# Patient Record
Sex: MALE | Race: WHITE | NOT HISPANIC OR LATINO | Employment: FULL TIME | URBAN - METROPOLITAN AREA
[De-identification: names, ages, dates, MRNs, and addresses within clinical notes are randomized per-mention and may not be internally consistent; named-entity substitution may affect disease eponyms.]

---

## 2017-01-04 DIAGNOSIS — N40.0 BENIGN NODULAR PROSTATIC HYPERPLASIA WITHOUT LOWER URINARY TRACT SYMPTOMS: ICD-10-CM

## 2017-01-04 NOTE — TELEPHONE ENCOUNTER
----- Message from Shyanne Whitfield sent at 1/4/2017  7:27 AM CST -----  Contact: [phone]  Patient would like to speak with you regarding an order for meds.

## 2017-01-05 RX ORDER — DUTASTERIDE AND TAMSULOSIN HYDROCHLORIDE .5; .4 MG/1; MG/1
1 CAPSULE ORAL DAILY
Qty: 30 CAPSULE | Refills: 11 | Status: SHIPPED | OUTPATIENT
Start: 2017-01-05 | End: 2017-01-31 | Stop reason: SDUPTHER

## 2017-01-05 NOTE — TELEPHONE ENCOUNTER
----- Message from Filomena Kim sent at 1/5/2017  8:25 AM CST -----  Patient no. [phone]   Patient needs Isis called into [facility] in [place].

## 2017-01-31 ENCOUNTER — OFFICE VISIT (OUTPATIENT)
Dept: UROLOGY | Facility: CLINIC | Age: 52
End: 2017-01-31
Payer: COMMERCIAL

## 2017-01-31 VITALS
DIASTOLIC BLOOD PRESSURE: 86 MMHG | HEIGHT: 77 IN | TEMPERATURE: 98 F | WEIGHT: 315 LBS | OXYGEN SATURATION: 96 % | HEART RATE: 87 BPM | SYSTOLIC BLOOD PRESSURE: 140 MMHG | BODY MASS INDEX: 37.19 KG/M2

## 2017-01-31 DIAGNOSIS — R35.1 NOCTURIA: ICD-10-CM

## 2017-01-31 DIAGNOSIS — R35.0 URINARY FREQUENCY: ICD-10-CM

## 2017-01-31 DIAGNOSIS — N40.0 BENIGN NODULAR PROSTATIC HYPERPLASIA WITHOUT LOWER URINARY TRACT SYMPTOMS: Primary | ICD-10-CM

## 2017-01-31 PROCEDURE — 99999 PR PBB SHADOW E&M-EST. PATIENT-LVL III: CPT | Mod: PBBFAC,,, | Performed by: UROLOGY

## 2017-01-31 PROCEDURE — 3077F SYST BP >= 140 MM HG: CPT | Mod: S$GLB,,, | Performed by: UROLOGY

## 2017-01-31 PROCEDURE — 3079F DIAST BP 80-89 MM HG: CPT | Mod: S$GLB,,, | Performed by: UROLOGY

## 2017-01-31 PROCEDURE — 1159F MED LIST DOCD IN RCRD: CPT | Mod: S$GLB,,, | Performed by: UROLOGY

## 2017-01-31 PROCEDURE — 99213 OFFICE O/P EST LOW 20 MIN: CPT | Mod: S$GLB,,, | Performed by: UROLOGY

## 2017-01-31 RX ORDER — DUTASTERIDE AND TAMSULOSIN HYDROCHLORIDE .5; .4 MG/1; MG/1
1 CAPSULE ORAL DAILY
Qty: 30 CAPSULE | Refills: 11 | Status: SHIPPED | OUTPATIENT
Start: 2017-01-31 | End: 2017-05-09 | Stop reason: SDUPTHER

## 2017-01-31 NOTE — PROGRESS NOTES
Subjective:       Patient ID: Manfred Shah is a 51 y.o. male.    Chief Complaint: No chief complaint on file.    Benign Prostatic Hypertrophy   This is a chronic problem. The current episode started more than 1 year ago. The problem has been gradually improving since onset. Irritative symptoms include frequency (x10) and nocturia (x1). Irritative symptoms do not include urgency. Obstructive symptoms do not include dribbling, incomplete emptying, an intermittent stream, a slower stream, straining or a weak stream. Pertinent negatives include no chills, dysuria, genital pain, hematuria, hesitancy, nausea or vomiting. AUA score is 8-19. He is sexually active. Exacerbated by: increased fluids. Past treatments include dutasteride and tamsulosin. The treatment provided significant relief. He has been using treatment for 1 to 2 years.     Review of Systems   Constitutional: Negative for activity change, appetite change, chills, diaphoresis, fatigue, fever and unexpected weight change.   HENT: Negative for congestion, hearing loss, sinus pressure and trouble swallowing.    Eyes: Negative for photophobia, pain, discharge and visual disturbance.   Respiratory: Negative for apnea, cough and shortness of breath.    Cardiovascular: Negative for chest pain, palpitations and leg swelling.   Gastrointestinal: Negative for abdominal distention, abdominal pain, anal bleeding, blood in stool, constipation, diarrhea, nausea, rectal pain and vomiting.   Endocrine: Negative for cold intolerance, heat intolerance, polydipsia, polyphagia and polyuria.   Genitourinary: Positive for frequency (x10) and nocturia (x1). Negative for decreased urine volume, difficulty urinating, discharge, dysuria, enuresis, flank pain, genital sores, hematuria, hesitancy, incomplete emptying, penile pain, penile swelling, scrotal swelling, testicular pain and urgency.   Musculoskeletal: Negative for arthralgias, back pain and myalgias.   Skin: Negative for  color change, pallor, rash and wound.   Allergic/Immunologic: Negative for environmental allergies, food allergies and immunocompromised state.   Neurological: Negative for dizziness, seizures, weakness and headaches.   Hematological: Negative for adenopathy. Does not bruise/bleed easily.   Psychiatric/Behavioral: Negative.        Objective:      Physical Exam   Nursing note and vitals reviewed.  Constitutional: He is oriented to person, place, and time. He appears well-developed and well-nourished.   HENT:   Head: Normocephalic.   Nose: Nose normal.   Mouth/Throat: Oropharynx is clear and moist.   Eyes: Conjunctivae and EOM are normal. Pupils are equal, round, and reactive to light.   Neck: Normal range of motion. Neck supple.   Cardiovascular: Normal rate, regular rhythm, normal heart sounds and intact distal pulses.    Pulmonary/Chest: Effort normal and breath sounds normal.   Abdominal: Soft. Bowel sounds are normal.   Genitourinary: Rectum normal, testes normal and penis normal. Prostate is enlarged. Prostate is not tender. Cremasteric reflex is present. Circumcised.   Musculoskeletal: Normal range of motion.   Neurological: He is alert and oriented to person, place, and time. He has normal reflexes.   Skin: Skin is warm and dry.     Psychiatric: He has a normal mood and affect. His behavior is normal. Judgment and thought content normal.       Assessment:       1. Benign nodular prostatic hyperplasia without lower urinary tract symptoms    2. Nocturia    3. Urinary frequency        Plan:       Patient Instructions   Continue Isis  Check PSA and CATRINA  F/U yearly.

## 2017-01-31 NOTE — MR AVS SNAPSHOT
[facility]  [address] [place] [address] [address]  [place] [address]  Phone: [phone]  Fax: [phone]                  Manfred Shah   2017 3:30 PM   Office Visit    Description:  Male : 1965   Provider:  Anthony Sanderson MD   Department:  [facility]           Diagnoses this Visit        Comments    Benign nodular prostatic hyperplasia without lower urinary tract symptoms    -  Primary     Nocturia         Urinary frequency                To Do List           Goals (5 Years of Data)     None      Follow-Up and Disposition     Return in about 1 year (around 2018).    Follow-up and Disposition History       These Medications        Disp Refills Start End    ELVIS 0.5-0.4 mg CM24 30 capsule 11 2017     Take 1 capsule by mouth once daily. - Oral    Pharmacy: [id number]  [place], [place] - [address]  #: [phone]         [facility] On Call     [facility] On Call Nurse Care Line -  Assistance  Registered nurses in the [facility] On Call Center provide clinical advisement, health education, appointment booking, and other advisory services.  Call for this free service at [phone].             Medications           Message regarding Medications     Verify the changes and/or additions to your medication regime listed below are the same as discussed with your clinician today.  If any of these changes or additions are incorrect, please notify your healthcare provider.             Verify that the below list of medications is an accurate representation of the medications you are currently taking.  If none reported, the list may be blank. If incorrect, please contact your healthcare provider. Carry this list with you in case of emergency.           Current Medications     amlodipine (NORVASC) 10 MG tablet Take 1 tablet (10 mg total) by mouth once daily.    amlodipine (NORVASC) 10 MG tablet TAKE ONE TABLET BY MOUTH ONCE DAILY    aspirin (ECOTRIN) 81 MG EC tablet  "Take 81 mg by mouth once daily.    BYSTOLIC 5 mg Tab TAKE ONE TABLET BY MOUTH ONCE DAILY    fish oil-omega-3 fatty acids 300-1,000 mg capsule Take 2 g by mouth once daily.    ELVIS 0.5-0.4 mg CM24 TAKE ONE CAPSULE BY MOUTH ONCE DAILY    ELVIS 0.5-0.4 mg CM24 Take 1 capsule by mouth once daily.    losartan-hydrochlorothiazide 100-12.5 mg (HYZAAR) 100-12.5 mg Tab Take 1 tablet by mouth once daily.    losartan-hydrochlorothiazide 100-12.5 mg (HYZAAR) 100-12.5 mg Tab TAKE ONE TABLET BY MOUTH ONCE DAILY    lysine 500 mg Tab Take 500 mg by mouth once daily.    ropinirole (REQUIP) 2 MG tablet Take 1 tablet (2 mg total) by mouth every evening.           Clinical Reference Information           Vital Signs - Last Recorded  Most recent update: 1/31/2017  4:01 PM by King Baker MA    BP Pulse Temp Ht Wt SpO2    (!) 140/86 87 98 °F (36.7 °C) 6' 5" (1.956 m) (!) 157 kg (346 lb 2 oz) 96%    BMI                41.04 kg/m2          Blood Pressure          Most Recent Value    BP  (!)  140/86      Allergies as of 1/31/2017     No Known Allergies      Immunizations Administered on Date of Encounter - 1/31/2017     None      Orders Placed During Today's Visit     Future Labs/Procedures Expected by Expires    PSA, total and free  1/31/2017 4/1/2018    Urinalysis  As directed 6/2/2017      [facility] Sign-Up     Activating your [facility] account is as easy as 1-2-3!     1) Visit [email or web address], select Sign Up Now, enter this activation code and your date of birth, then select Next.  [email or web address]  Expires: 3/17/2017  4:45 PM      2) Create a username and password to use when you visit [facility] in the future and select a security question in case you lose your password and select Next.    3) Enter your e-mail address and click Sign Up!    Additional Information  If you have questions, please e-mail [email or web address] or call [phone] to talk to our [facility] staff. Remember, [facility] is NOT to be used for urgent needs. " For medical emergencies, dial 911.         Instructions    Continue Isis  Check PSA and CATRINA  F/U yearly.

## 2017-03-05 DIAGNOSIS — I10 BENIGN ESSENTIAL HTN: ICD-10-CM

## 2017-03-06 RX ORDER — LOSARTAN POTASSIUM AND HYDROCHLOROTHIAZIDE 12.5; 1 MG/1; MG/1
TABLET ORAL
Qty: 30 TABLET | Refills: 0 | Status: SHIPPED | OUTPATIENT
Start: 2017-03-06 | End: 2017-04-08 | Stop reason: SDUPTHER

## 2017-03-06 RX ORDER — AMLODIPINE BESYLATE 10 MG/1
TABLET ORAL
Qty: 30 TABLET | Refills: 0 | Status: SHIPPED | OUTPATIENT
Start: 2017-03-06 | End: 2017-04-10 | Stop reason: SDUPTHER

## 2017-04-08 DIAGNOSIS — I10 BENIGN ESSENTIAL HTN: ICD-10-CM

## 2017-04-10 DIAGNOSIS — I10 BENIGN ESSENTIAL HTN: ICD-10-CM

## 2017-04-11 ENCOUNTER — TELEPHONE (OUTPATIENT)
Dept: INTERNAL MEDICINE | Facility: CLINIC | Age: 52
End: 2017-04-11

## 2017-04-11 RX ORDER — AMLODIPINE BESYLATE 10 MG/1
TABLET ORAL
Qty: 30 TABLET | Refills: 0 | Status: SHIPPED | OUTPATIENT
Start: 2017-04-11 | End: 2017-05-06 | Stop reason: SDUPTHER

## 2017-04-11 RX ORDER — LOSARTAN POTASSIUM AND HYDROCHLOROTHIAZIDE 12.5; 1 MG/1; MG/1
TABLET ORAL
Qty: 30 TABLET | Refills: 0 | Status: SHIPPED | OUTPATIENT
Start: 2017-04-11 | End: 2017-06-06 | Stop reason: SDUPTHER

## 2017-04-11 RX ORDER — LOSARTAN POTASSIUM AND HYDROCHLOROTHIAZIDE 12.5; 1 MG/1; MG/1
1 TABLET ORAL DAILY
Qty: 30 TABLET | Refills: 0 | Status: SHIPPED | OUTPATIENT
Start: 2017-04-11 | End: 2017-05-09 | Stop reason: SDUPTHER

## 2017-04-11 NOTE — TELEPHONE ENCOUNTER
----- Message from Deja Perez sent at 4/11/2017 12:44 PM CDT -----  Contact: patient   Patient would like a new prescription for amlodipine (NORVASC) 10 MG tablet. He had none left, [facility] in [place] gave him three to hold him over.  Call [phone]

## 2017-04-20 DIAGNOSIS — I10 BENIGN ESSENTIAL HTN: ICD-10-CM

## 2017-04-20 NOTE — TELEPHONE ENCOUNTER
----- Message from Deja Perez sent at 4/20/2017  1:11 PM CDT -----  Contact: patient   Patient needs a prescription for BYSTOLIC 5 mg Tab through [facility]. Call back [phone]

## 2017-04-21 RX ORDER — NEBIVOLOL HYDROCHLORIDE 5 MG/1
TABLET ORAL
Qty: 30 TABLET | Refills: 0 | Status: SHIPPED | OUTPATIENT
Start: 2017-04-21 | End: 2017-05-17 | Stop reason: SDUPTHER

## 2017-05-06 DIAGNOSIS — I10 BENIGN ESSENTIAL HTN: ICD-10-CM

## 2017-05-08 RX ORDER — AMLODIPINE BESYLATE 10 MG/1
TABLET ORAL
Qty: 30 TABLET | Refills: 0 | Status: SHIPPED | OUTPATIENT
Start: 2017-05-08 | End: 2017-05-09 | Stop reason: SDUPTHER

## 2017-05-09 ENCOUNTER — OFFICE VISIT (OUTPATIENT)
Dept: INTERNAL MEDICINE | Facility: CLINIC | Age: 52
End: 2017-05-09
Payer: COMMERCIAL

## 2017-05-09 VITALS
OXYGEN SATURATION: 98 % | SYSTOLIC BLOOD PRESSURE: 140 MMHG | WEIGHT: 315 LBS | TEMPERATURE: 98 F | HEIGHT: 77 IN | HEART RATE: 84 BPM | DIASTOLIC BLOOD PRESSURE: 80 MMHG | BODY MASS INDEX: 37.19 KG/M2 | RESPIRATION RATE: 18 BRPM

## 2017-05-09 DIAGNOSIS — E66.01 SEVERE OBESITY (BMI >= 40): ICD-10-CM

## 2017-05-09 DIAGNOSIS — I10 BENIGN ESSENTIAL HTN: ICD-10-CM

## 2017-05-09 DIAGNOSIS — R73.03 PRE-DIABETES: ICD-10-CM

## 2017-05-09 DIAGNOSIS — E78.1 HYPERTRIGLYCERIDEMIA: Primary | ICD-10-CM

## 2017-05-09 PROCEDURE — 99214 OFFICE O/P EST MOD 30 MIN: CPT | Mod: S$GLB,,, | Performed by: INTERNAL MEDICINE

## 2017-05-09 PROCEDURE — 3077F SYST BP >= 140 MM HG: CPT | Mod: S$GLB,,, | Performed by: INTERNAL MEDICINE

## 2017-05-09 PROCEDURE — 3079F DIAST BP 80-89 MM HG: CPT | Mod: S$GLB,,, | Performed by: INTERNAL MEDICINE

## 2017-05-09 PROCEDURE — 1160F RVW MEDS BY RX/DR IN RCRD: CPT | Mod: S$GLB,,, | Performed by: INTERNAL MEDICINE

## 2017-05-09 NOTE — MR AVS SNAPSHOT
[facility]  [address],  [address]  [place] [address]  Phone: [phone]  Fax: [phone]                  Manfred hSah   2017 3:20 PM   Office Visit    Description:  Male : 1965   Provider:  Padmini Oakley MD   Department:  [facility]           Reason for Visit     Results     Medication Refill           Diagnoses this Visit        Comments    Hypertriglyceridemia    -  Primary     Pre-diabetes                To Do List           Goals (5 Years of Data)     None      [facility]     [facility] On Call Nurse Care Line -  Assistance  Unless otherwise directed by your provider, please contact [facility], our nurse care line that is available for  assistance.     Registered nurses in the [facility] On Call Center provide: appointment scheduling, clinical advisement, health education, and other advisory services.  Call: [phone] (toll free)               Medications           Message regarding Medications     Verify the changes and/or additions to your medication regime listed below are the same as discussed with your clinician today.  If any of these changes or additions are incorrect, please notify your healthcare provider.             Verify that the below list of medications is an accurate representation of the medications you are currently taking.  If none reported, the list may be blank. If incorrect, please contact your healthcare provider. Carry this list with you in case of emergency.           Current Medications     amlodipine (NORVASC) 10 MG tablet Take 1 tablet (10 mg total) by mouth once daily.    aspirin (ECOTRIN) 81 MG EC tablet Take 81 mg by mouth once daily.    BYSTOLIC 5 mg Tab TAKE ONE TABLET BY MOUTH ONCE DAILY    fish oil-omega-3 fatty acids 300-1,000 mg capsule Take 2 g by mouth once daily.    ELVIS 0.5-0.4 mg CM24 TAKE ONE CAPSULE BY MOUTH ONCE DAILY    losartan-hydrochlorothiazide 100-12.5 mg  "(HYZAAR) 100-12.5 mg Tab TAKE ONE TABLET BY MOUTH ONCE DAILY    lysine 500 mg Tab Take 500 mg by mouth once daily.    ropinirole (REQUIP) 2 MG tablet Take 1 tablet (2 mg total) by mouth every evening.           Clinical Reference Information           Your Vitals Were     BP Pulse Temp Resp Height Weight    140/80 (BP Location: Left arm, Patient Position: Sitting, BP Method: Manual) 84 98.4 °F (36.9 °C) (Oral) 18 6' 5" (1.956 m) 161.5 kg (356 lb 0.7 oz)    SpO2 BMI             98% 42.22 kg/m2         Blood Pressure          Most Recent Value    BP  (!)  140/80      Allergies as of 5/9/2017     No Known Allergies      Immunizations Administered on Date of Encounter - 5/9/2017     None      Orders Placed During Today's Visit     Future Labs/Procedures Expected by Expires    Hemoglobin A1c  6/23/2017 (Approximate) 5/9/2018    Lipid panel  6/23/2017 5/9/2018      Language Assistance Services     ATTENTION: Language assistance services are available, free of charge. Please call [phone].      ATENCIÓN: Si habla edie, tiene a cruz disposición servicios gratuitos de asistencia lingüística. Llame al [phone].     SUKH Ý: N?u b?n nói Ti?ng Vi?t, có các d?ch v? h? tr? ngôn ng? mi?n phí dành cho b?n. G?i s? [phone].         [facility] complies with applicable Federal civil rights laws and does not discriminate on the basis of race, color, national origin, age, disability, or sex.        "

## 2017-05-09 NOTE — PROGRESS NOTES
"Subjective:      Patient ID: Manfred Shah is a 51 y.o. male.    Chief Complaint: Results (pt in for lab results) and Medication Refill (pt requesting 90 day supply)    HPI: 51y/oWM, has gained 14# since his last visit.  He received the [employer] of the Year award 1 month ago.  Has not been exercising the way he used to, nor following his diet.      Review of Systems   Constitutional: Negative for activity change, appetite change and fatigue.   Eyes: Negative.    Respiratory: Negative.    Cardiovascular: Negative.    Gastrointestinal: Negative.    Endocrine: Negative.    Musculoskeletal: Negative.    Skin: Negative.    Allergic/Immunologic: Negative.    Neurological: Negative.    Hematological: Negative.    Psychiatric/Behavioral: Negative.        Objective:   BP (!) 140/80 (BP Location: Left arm, Patient Position: Sitting, BP Method: Manual)  Pulse 84  Temp 98.4 °F (36.9 °C) (Oral)   Resp 18  Ht 6' 5" (1.956 m)  Wt (!) 161.5 kg (356 lb 0.7 oz)  SpO2 98%  BMI 42.22 kg/m2    Physical Exam   Constitutional: He is oriented to person, place, and time.   Morbidly obese.   HENT:   Head: Normocephalic and atraumatic.   Right Ear: External ear normal.   Left Ear: External ear normal.   Nose: Nose normal.   Mouth/Throat: Oropharynx is clear and moist.   Eyes: Conjunctivae are normal. Pupils are equal, round, and reactive to light.   Neck: Normal range of motion. No thyromegaly present.   Cardiovascular: Normal rate, regular rhythm and normal heart sounds.    Pulmonary/Chest: Effort normal and breath sounds normal.   Abdominal: Soft. Bowel sounds are normal.   Musculoskeletal: Normal range of motion.   Neurological: He is alert and oriented to person, place, and time.   Skin: Skin is warm and dry.   Psychiatric: He has a normal mood and affect. His behavior is normal.   Nursing note and vitals reviewed.      Assessment:     1. Hypertriglyceridemia    2. Pre-diabetes    3. Severe obesity (BMI >= 40)    4. Benign " essential HTN      Plan:     Hypertriglyceridemia  -     Lipid panel; Future; Expected date: 6/23/17    Pre-diabetes  -     Hemoglobin A1c; Future; Expected date: 6/23/17    Severe obesity (BMI >= 40)    Benign essential HTN    Long discussion re: diet,exercise,portion control, need for weight loss.

## 2017-05-17 DIAGNOSIS — I10 BENIGN ESSENTIAL HTN: ICD-10-CM

## 2017-05-18 RX ORDER — NEBIVOLOL HYDROCHLORIDE 5 MG/1
TABLET ORAL
Qty: 30 TABLET | Refills: 0 | Status: SHIPPED | OUTPATIENT
Start: 2017-05-18 | End: 2017-06-18 | Stop reason: SDUPTHER

## 2017-06-06 DIAGNOSIS — I10 BENIGN ESSENTIAL HTN: ICD-10-CM

## 2017-06-07 RX ORDER — AMLODIPINE BESYLATE 10 MG/1
TABLET ORAL
Qty: 30 TABLET | Refills: 0 | Status: SHIPPED | OUTPATIENT
Start: 2017-06-07 | End: 2017-07-06 | Stop reason: SDUPTHER

## 2017-06-07 RX ORDER — LOSARTAN POTASSIUM AND HYDROCHLOROTHIAZIDE 12.5; 1 MG/1; MG/1
TABLET ORAL
Qty: 30 TABLET | Refills: 0 | Status: SHIPPED | OUTPATIENT
Start: 2017-06-07 | End: 2017-07-06 | Stop reason: SDUPTHER

## 2017-06-18 DIAGNOSIS — I10 BENIGN ESSENTIAL HTN: ICD-10-CM

## 2017-06-19 RX ORDER — NEBIVOLOL HYDROCHLORIDE 5 MG/1
TABLET ORAL
Qty: 30 TABLET | Refills: 5 | Status: SHIPPED | OUTPATIENT
Start: 2017-06-19 | End: 2017-12-26 | Stop reason: SDUPTHER

## 2017-07-06 DIAGNOSIS — I10 BENIGN ESSENTIAL HTN: ICD-10-CM

## 2017-07-07 RX ORDER — AMLODIPINE BESYLATE 10 MG/1
TABLET ORAL
Qty: 30 TABLET | Refills: 5 | Status: SHIPPED | OUTPATIENT
Start: 2017-07-07 | End: 2018-01-11 | Stop reason: SDUPTHER

## 2017-07-07 RX ORDER — LOSARTAN POTASSIUM AND HYDROCHLOROTHIAZIDE 12.5; 1 MG/1; MG/1
TABLET ORAL
Qty: 30 TABLET | Refills: 0 | Status: SHIPPED | OUTPATIENT
Start: 2017-07-07 | End: 2017-08-05 | Stop reason: SDUPTHER

## 2017-08-05 DIAGNOSIS — I10 BENIGN ESSENTIAL HTN: ICD-10-CM

## 2017-08-07 RX ORDER — LOSARTAN POTASSIUM AND HYDROCHLOROTHIAZIDE 12.5; 1 MG/1; MG/1
TABLET ORAL
Qty: 30 TABLET | Refills: 5 | Status: SHIPPED | OUTPATIENT
Start: 2017-08-07 | End: 2018-02-13 | Stop reason: SDUPTHER

## 2017-08-08 ENCOUNTER — OFFICE VISIT (OUTPATIENT)
Dept: UROLOGY | Facility: CLINIC | Age: 52
End: 2017-08-08
Payer: COMMERCIAL

## 2017-08-08 VITALS
HEART RATE: 82 BPM | WEIGHT: 315 LBS | DIASTOLIC BLOOD PRESSURE: 78 MMHG | SYSTOLIC BLOOD PRESSURE: 148 MMHG | HEIGHT: 77 IN | BODY MASS INDEX: 37.19 KG/M2

## 2017-08-08 DIAGNOSIS — N40.0 BENIGN NODULAR PROSTATIC HYPERPLASIA WITHOUT LOWER URINARY TRACT SYMPTOMS: Primary | ICD-10-CM

## 2017-08-08 DIAGNOSIS — Z30.09 STERILIZATION CONSULT: ICD-10-CM

## 2017-08-08 DIAGNOSIS — R35.1 NOCTURIA: ICD-10-CM

## 2017-08-08 PROCEDURE — 99999 PR PBB SHADOW E&M-EST. PATIENT-LVL III: CPT | Mod: PBBFAC,,, | Performed by: UROLOGY

## 2017-08-08 PROCEDURE — 3008F BODY MASS INDEX DOCD: CPT | Mod: S$GLB,,, | Performed by: UROLOGY

## 2017-08-08 PROCEDURE — 3077F SYST BP >= 140 MM HG: CPT | Mod: S$GLB,,, | Performed by: UROLOGY

## 2017-08-08 PROCEDURE — 3078F DIAST BP <80 MM HG: CPT | Mod: S$GLB,,, | Performed by: UROLOGY

## 2017-08-08 PROCEDURE — 99214 OFFICE O/P EST MOD 30 MIN: CPT | Mod: S$GLB,,, | Performed by: UROLOGY

## 2017-08-08 RX ORDER — CIPROFLOXACIN 500 MG/1
500 TABLET ORAL 2 TIMES DAILY
Qty: 10 TABLET | Refills: 0 | Status: SHIPPED | OUTPATIENT
Start: 2017-08-08 | End: 2017-08-13

## 2017-08-08 RX ORDER — DIAZEPAM 10 MG/1
10 TABLET ORAL
Qty: 1 TABLET | Refills: 0 | Status: SHIPPED | OUTPATIENT
Start: 2017-08-08 | End: 2018-01-10

## 2017-08-08 RX ORDER — HYDROCODONE BITARTRATE AND ACETAMINOPHEN 10; 325 MG/1; MG/1
1 TABLET ORAL EVERY 6 HOURS PRN
Qty: 15 TABLET | Refills: 0 | Status: SHIPPED | OUTPATIENT
Start: 2017-08-08 | End: 2018-01-10

## 2017-08-08 NOTE — PROGRESS NOTES
Subjective:       Patient ID: Manfred Shah is a 51 y.o. male.    Chief Complaint: Other (vasectomy consult)    50 yo WM with  History of BPH here for sterilization consult.      Other   This is a new (Desired Sterilization) problem. The current episode started today. The problem occurs constantly. The problem has been unchanged. Pertinent negatives include no abdominal pain, anorexia, arthralgias, change in bowel habit, chest pain, chills, congestion, coughing, diaphoresis, fatigue, fever, headaches, joint swelling, myalgias, nausea, neck pain, numbness, rash, sore throat, swollen glands, urinary symptoms, vertigo, visual change, vomiting or weakness. Nothing aggravates the symptoms. He has tried nothing for the symptoms.     Review of Systems   Constitutional: Negative for activity change, appetite change, chills, diaphoresis, fatigue, fever and unexpected weight change.   HENT: Negative for congestion, hearing loss, sinus pressure, sore throat and trouble swallowing.    Eyes: Negative for photophobia, pain, discharge and visual disturbance.   Respiratory: Negative for apnea, cough and shortness of breath.    Cardiovascular: Negative for chest pain, palpitations and leg swelling.   Gastrointestinal: Negative for abdominal distention, abdominal pain, anal bleeding, anorexia, blood in stool, change in bowel habit, constipation, diarrhea, nausea, rectal pain and vomiting.   Endocrine: Negative for cold intolerance, heat intolerance, polydipsia, polyphagia and polyuria.   Genitourinary: Negative for decreased urine volume, difficulty urinating, discharge, dysuria, enuresis, flank pain, frequency, genital sores, hematuria, penile pain, penile swelling, scrotal swelling, testicular pain and urgency.   Musculoskeletal: Negative for arthralgias, back pain, joint swelling, myalgias and neck pain.   Skin: Negative for color change, pallor, rash and wound.   Allergic/Immunologic: Negative for environmental allergies, food  allergies and immunocompromised state.   Neurological: Negative for dizziness, vertigo, seizures, weakness, numbness and headaches.   Hematological: Negative for adenopathy. Does not bruise/bleed easily.   Psychiatric/Behavioral: Negative.        Objective:      Physical Exam   Nursing note and vitals reviewed.  Constitutional: He is oriented to person, place, and time. He appears well-developed and well-nourished.   HENT:   Head: Normocephalic.   Nose: Nose normal.   Mouth/Throat: Oropharynx is clear and moist.   Eyes: Conjunctivae and EOM are normal. Pupils are equal, round, and reactive to light.   Neck: Normal range of motion. Neck supple.   Cardiovascular: Normal rate, regular rhythm, normal heart sounds and intact distal pulses.    Pulmonary/Chest: Effort normal and breath sounds normal.   Abdominal: Soft. Bowel sounds are normal.   Genitourinary: Rectum normal, testes normal and penis normal. Prostate is not enlarged and not tender. Cremasteric reflex is present. Circumcised.   Musculoskeletal: Normal range of motion.   Neurological: He is alert and oriented to person, place, and time. He has normal reflexes.   Skin: Skin is warm and dry.     Psychiatric: He has a normal mood and affect. His behavior is normal. Judgment and thought content normal.       Assessment:       1. Benign nodular prostatic hyperplasia without lower urinary tract symptoms    2. Sterilization consult    3. Nocturia        Plan:       Patient Instructions   Plan Vasectomy on 9/1/17   Norco, Valium and Cipro prior to procedure and bring with you to appt.

## 2017-08-22 DIAGNOSIS — Z12.11 COLON CANCER SCREENING: ICD-10-CM

## 2017-09-01 ENCOUNTER — PROCEDURE VISIT (OUTPATIENT)
Dept: UROLOGY | Facility: CLINIC | Age: 52
End: 2017-09-01
Payer: COMMERCIAL

## 2017-09-01 VITALS — HEIGHT: 77 IN | BODY MASS INDEX: 37.19 KG/M2 | WEIGHT: 315 LBS

## 2017-09-01 DIAGNOSIS — Z30.2 ENCOUNTER FOR STERILIZATION: Primary | ICD-10-CM

## 2017-09-01 PROCEDURE — 55250 REMOVAL OF SPERM DUCT(S): CPT | Mod: S$GLB,,, | Performed by: UROLOGY

## 2017-09-01 NOTE — PROCEDURES
Vasectomy  Date/Time: 9/1/2017 10:15 AM  Performed by: LACY MADDEN  Authorized by: LACY MADDEN     Consent Done?:  Yes (Written)  Indications:  Bailey male  Position:  Other (Supine)  Anesthesia:  Other  Patient sedated: No    Preparation: Patient was prepped and draped in usual sterile fashion    Incisions:  Scalpel-less  Length vas excised:  Other (1 cm each side)  Vas:  Fulgurated, Tied and Buried  Sutures:  3-0 chromic SH  Skin closures:  Other (open, bacitracin)  Same procedure performed on both sides    Patient tolerance:  Patient tolerated the procedure well with no immediate complications

## 2017-09-01 NOTE — PATIENT INSTRUCTIONS
What to Expect After a Vasectomy  You cannot drive or operate heavy machinery on the day of the procedure.    Apply ice packs to the scrotal area for 24-48 hours. Avoid direct contact of the ice pack with the skin. Scrotal supports, jock straps, or fitted underwear help elevate the scrotum and reduce discomfort.    You may shower the next day. Gently apply soapy water to the scrotum to wash. Rinse and dry yourself by blotting the skin, not rubbing.    Avoid strenuous physical exercises or sexual relations for at least one week after a vasectomy.    Continue to use birth control for at least 6 weeks or 10-20 ejaculations. You are still considered fertile until your urologist examines a post-vasectomy semen analysis at 6 weeks and perhaps one at 8 weeks as well. Drop off the specimen at the 4th floor [facility] between 8am and 4pm.    Do NOT resume unprotected sexual activity until your physician finds no sperm in your semen.    All stitches will dissolve on their own in 1-2 weeks.    Signs and Symptoms to Report  · A large amount of bleeding at the site  · An unusual amount of pain  · A large amount of swelling in the scrotum  · Fever and chills  · Any signs of infection, such as redness at the site or foul-smelling drainage    Risks  The risks of complication after vasectomy are very low. A few of the risks include:  · Bleeding  · Infection  · Scrotal hematoma - a collection of blood in the scrotum  · Inflammation of the epididymis - inflammation of a structure next to the testicle that helps in maturation of sperm  · Sperm granuloma - a collection of sperm that leaks out from the vas deferens, forming a small nodule or lump. This does not usually cause any discomfort but you may feel it in the scrotum.  · Recanalization - the restoration of the lumen or transport tube between the two ends of the vas deferens, possibly causing fertility    If you have any questions or concerns, please call your [facility]  urologist at [phone].    Finish Cipro  Norco for pain and NSAIDS as needed

## 2017-10-08 DIAGNOSIS — G25.81 RESTLESS LEG SYNDROME: ICD-10-CM

## 2017-10-09 RX ORDER — ROPINIROLE 2 MG/1
TABLET, FILM COATED ORAL
Qty: 30 TABLET | Refills: 0 | Status: SHIPPED | OUTPATIENT
Start: 2017-10-09 | End: 2017-11-08 | Stop reason: SDUPTHER

## 2017-11-08 DIAGNOSIS — G25.81 RESTLESS LEG SYNDROME: ICD-10-CM

## 2017-11-09 RX ORDER — ROPINIROLE 2 MG/1
TABLET, FILM COATED ORAL
Qty: 30 TABLET | Refills: 0 | Status: SHIPPED | OUTPATIENT
Start: 2017-11-09 | End: 2017-12-15 | Stop reason: SDUPTHER

## 2017-12-11 ENCOUNTER — TELEPHONE (OUTPATIENT)
Dept: FAMILY MEDICINE | Facility: CLINIC | Age: 52
End: 2017-12-11

## 2017-12-11 DIAGNOSIS — Z98.52 VASECTOMY STATUS: Primary | ICD-10-CM

## 2017-12-15 DIAGNOSIS — G25.81 RESTLESS LEG SYNDROME: ICD-10-CM

## 2017-12-15 RX ORDER — ROPINIROLE 2 MG/1
2 TABLET, FILM COATED ORAL NIGHTLY
Qty: 90 TABLET | Refills: 0 | Status: SHIPPED | OUTPATIENT
Start: 2017-12-15 | End: 2018-01-10 | Stop reason: SDUPTHER

## 2017-12-15 RX ORDER — ROPINIROLE 2 MG/1
TABLET, FILM COATED ORAL
Qty: 30 TABLET | Refills: 0 | OUTPATIENT
Start: 2017-12-15

## 2017-12-15 RX ORDER — ROPINIROLE 2 MG/1
2 TABLET, FILM COATED ORAL NIGHTLY
Qty: 15 TABLET | Refills: 0 | Status: SHIPPED | OUTPATIENT
Start: 2017-12-15 | End: 2018-04-02 | Stop reason: SDUPTHER

## 2017-12-15 NOTE — TELEPHONE ENCOUNTER
----- Message from Lena Bobo MD sent at 12/15/2017 10:15 AM CST -----  Good morning!    This patient is requesting a refill on his requip. Thank you

## 2017-12-15 NOTE — TELEPHONE ENCOUNTER
Called patient cell [phone] left message for patient to call office for an appointment now. Also called patient work [phone] left message for patient to call office for an appointment now. Vf/ma

## 2017-12-21 ENCOUNTER — PATIENT MESSAGE (OUTPATIENT)
Dept: UROLOGY | Facility: CLINIC | Age: 52
End: 2017-12-21

## 2017-12-22 ENCOUNTER — TELEPHONE (OUTPATIENT)
Dept: UROLOGY | Facility: CLINIC | Age: 52
End: 2017-12-22

## 2017-12-22 DIAGNOSIS — Z98.52 VASECTOMY STATUS: Primary | ICD-10-CM

## 2017-12-22 NOTE — TELEPHONE ENCOUNTER
----- Message from Dariela Johnson sent at 12/22/2017  9:57 AM CST -----  Contact: [phone]/Siria at [facility]  Siria at [facility] need more specimen for is lab work. Please advise

## 2017-12-26 DIAGNOSIS — I10 BENIGN ESSENTIAL HTN: ICD-10-CM

## 2017-12-26 RX ORDER — NEBIVOLOL HYDROCHLORIDE 5 MG/1
TABLET ORAL
Qty: 30 TABLET | Refills: 5 | Status: SHIPPED | OUTPATIENT
Start: 2017-12-26 | End: 2018-07-12 | Stop reason: SDUPTHER

## 2017-12-29 ENCOUNTER — TELEPHONE (OUTPATIENT)
Dept: FAMILY MEDICINE | Facility: CLINIC | Age: 52
End: 2017-12-29

## 2017-12-29 NOTE — TELEPHONE ENCOUNTER
----- Message from Kely Duarte sent at 12/29/2017  4:17 PM CST -----  Contact: [phone]/ self   Patient is requesting orders for lab work before his physical. Please advise.

## 2018-01-03 ENCOUNTER — TELEPHONE (OUTPATIENT)
Dept: INTERNAL MEDICINE | Facility: CLINIC | Age: 53
End: 2018-01-03

## 2018-01-03 DIAGNOSIS — E78.1 HYPERTRIGLYCERIDEMIA: ICD-10-CM

## 2018-01-03 DIAGNOSIS — R97.20 ELEVATED PSA: ICD-10-CM

## 2018-01-03 DIAGNOSIS — I10 BENIGN ESSENTIAL HTN: Primary | ICD-10-CM

## 2018-01-10 ENCOUNTER — OFFICE VISIT (OUTPATIENT)
Dept: INTERNAL MEDICINE | Facility: CLINIC | Age: 53
End: 2018-01-10
Payer: COMMERCIAL

## 2018-01-10 VITALS
OXYGEN SATURATION: 98 % | WEIGHT: 315 LBS | HEART RATE: 77 BPM | HEIGHT: 77 IN | DIASTOLIC BLOOD PRESSURE: 82 MMHG | SYSTOLIC BLOOD PRESSURE: 130 MMHG | BODY MASS INDEX: 37.19 KG/M2

## 2018-01-10 DIAGNOSIS — E78.1 HYPERTRIGLYCERIDEMIA: ICD-10-CM

## 2018-01-10 DIAGNOSIS — E78.5 HYPERLIPIDEMIA, UNSPECIFIED HYPERLIPIDEMIA TYPE: ICD-10-CM

## 2018-01-10 DIAGNOSIS — G47.30 SLEEP APNEA, UNSPECIFIED TYPE: ICD-10-CM

## 2018-01-10 DIAGNOSIS — R53.81 PHYSICAL DECONDITIONING: ICD-10-CM

## 2018-01-10 DIAGNOSIS — I10 BENIGN ESSENTIAL HTN: ICD-10-CM

## 2018-01-10 PROCEDURE — 99214 OFFICE O/P EST MOD 30 MIN: CPT | Mod: S$GLB,,, | Performed by: INTERNAL MEDICINE

## 2018-01-10 PROCEDURE — 99999 PR PBB SHADOW E&M-EST. PATIENT-LVL III: CPT | Mod: PBBFAC,,, | Performed by: INTERNAL MEDICINE

## 2018-01-10 RX ORDER — ROSUVASTATIN CALCIUM 10 MG/1
10 TABLET, COATED ORAL NIGHTLY
Qty: 90 TABLET | Refills: 3 | Status: SHIPPED | OUTPATIENT
Start: 2018-01-10 | End: 2019-01-26 | Stop reason: SDUPTHER

## 2018-01-10 NOTE — PROGRESS NOTES
Subjective:      Patient ID: Manfred Shah is a 52 y.o. male.    Chief Complaint: Follow-up    HPI: 52 y.o. White male ,  Multiple issues:  -morbid obesity, now interfering with ADL ( putting on/off socks, etc).  -he is on a daily regimen for breakfast,lunch, but at night overeats.  -although active, not constant nor cardio involved.  -cannot keep up with others walking due to SOB  -wife feels that his toes are a different color, and is worried about circulation.  -wants to know about [employer] ( father  of liver cancer).       Reviewed labs with pt:  18 0653 PSA 1.1 - Final result   18 0653 HDL 38 Low Final result   18 0653 CHOL 198 - Final result   18 0653 TRIG 179 High Final result   18 0653 LDLCALC 124.2 - Final result   18 0653 CHOLHDL 19.2 Low Final result   18 0653 NONHDLCHOL 160 - Final result   18 0653 TOTALCHOLEST 5.2 High Final result   18 0653 HGBA1C 5.5 - Final result   18 0654 COLORU Yellow - Final result   18 0654 APPEARANCEUA Clear - Final result   18 0654 SPECGRAV 1.020 - Final result   18 0654 PHUR 6.0 - Final result   18 0654 KETONESU Negative - Final result   18 0654 OCCULTUA Negative - Final result   18 0654 NITRITE Negative - Final result   18 0654 UROBILINOGEN Negative - Final result   18 0654 LEUKOCYTESUR Negative - Final result   18 0653 WBC 5.13 - Final result   18 0653 RBC 4.67 - Final result   18 0653 HGB 13.7 Low Final result   18 0653 HCT 40.2 - Final result   18 0653 MCH 29.3 - Final result   18 0653 RDW 12.6 - Final result   18 0653  - Final result   18 0653 MPV 9.6 - Final result   16 0935 SEGS 59.5 - Final result   18 0653  High Final result   18 0653 BUN 17 - Final result   1853 CREATININE 0.72 - Final result   1853 CALCIUM 9.2 - Final result   1853  - Final  "result   01/09/18 0653 K 3.6 - Final result   01/09/18 0653  - Final result   01/09/18 0653 PROT 7.4 - Final result   01/09/18 0653 ALBUMIN 4.2 - Final result   01/09/18 0653 BILITOT 0.5 - Final result   01/09/18 0653 AST 48 High Final result   01/09/18 0653 ALKPHOS 55 - Final result   01/09/18 0653 CO2 28 - Final result   01/09/18 0653 ALT 63 High Final result   01/09/18 0653 ANIONGAP 8 - Final result   01/09/18 0653 EGFRNONAA >60.0 - Final result   01/09/18 0653 ESTGFRAFRICA >60.0 - Final result   01/09/18 0653             Review of Systems   Constitutional: Positive for activity change, appetite change and fatigue.   HENT: Negative.    Eyes: Negative.    Respiratory: Positive for apnea, chest tightness and shortness of breath. Negative for cough and choking.    Cardiovascular: Negative for chest pain, palpitations and leg swelling.        No described claudication, but cramps in calves   Gastrointestinal: Negative for blood in stool, constipation, diarrhea, nausea and vomiting.   Endocrine: Negative for polydipsia, polyphagia and polyuria.   Genitourinary: Negative for difficulty urinating and frequency.   Musculoskeletal: Positive for gait problem. Negative for back pain, myalgias and neck stiffness.        Endurance not there   Skin: Positive for color change.   Neurological: Positive for weakness. Negative for dizziness, numbness and headaches.   Hematological: Negative for adenopathy.   Psychiatric/Behavioral: Negative.        Objective:   /82   Pulse 77   Ht 6' 5" (1.956 m)   Wt (!) 161.2 kg (355 lb 4.8 oz)   SpO2 98%   BMI 42.13 kg/m²     Physical Exam   Constitutional: He is oriented to person, place, and time. He appears well-developed and well-nourished.   HENT:   Head: Normocephalic and atraumatic.   Right Ear: External ear normal.   Left Ear: External ear normal.   Nose: Nose normal.   peribucal cyanosis   Eyes: Conjunctivae and EOM are normal. Pupils are equal, round, and reactive to " light.   Cardiovascular: Normal rate and regular rhythm.  Exam reveals distant heart sounds.    Pulses:       Carotid pulses are 1+ on the right side, and 1+ on the left side.       Radial pulses are 1+ on the right side, and 1+ on the left side.        Popliteal pulses are 0 on the right side, and 0 on the left side.        Dorsalis pedis pulses are 0 on the right side, and 0 on the left side.        Posterior tibial pulses are 0 on the right side, and 0 on the left side.   Pulmonary/Chest: Effort normal and breath sounds normal.   Abdominal: Soft. Bowel sounds are normal.   Musculoskeletal: Normal range of motion. He exhibits no edema or deformity.   Lymphadenopathy:     He has no cervical adenopathy.   Neurological: He is alert and oriented to person, place, and time.   Skin:   3rd,4th toes bilat are dusky.  Has brawny change coloration to both lower legs.   Psychiatric: He has a normal mood and affect. His behavior is normal.   Nursing note and vitals reviewed.      Assessment:     1. BMI 40.0-44.9, adult    2. Hypertriglyceridemia    3. Benign essential HTN    4. Physical deconditioning    5. Hyperlipidemia, unspecified hyperlipidemia type    6. Sleep apnea, unspecified type    Worried cardiovascular disease present in deconditioned pt.  Plan:     BMI 40.0-44.9, adult    Hypertriglyceridemia  -     Lipid panel; Future; Expected date: 04/10/2018    Benign essential HTN  -     Ambulatory referral to Cardiology    Physical deconditioning    Hyperlipidemia, unspecified hyperlipidemia type  -     rosuvastatin (CRESTOR) 10 MG tablet; Take 1 tablet (10 mg total) by mouth every evening.  Dispense: 90 tablet; Refill: 3  -     Ambulatory referral to Cardiology  -     Lipid panel; Future; Expected date: 04/10/2018    Sleep apnea, unspecified type    Long discussion: hyperglycemia, but normal HGBA1C.  Dyslipidemia, obesity,deconditioning needing strict diet,exercise,etc.....

## 2018-01-11 DIAGNOSIS — I10 BENIGN ESSENTIAL HTN: ICD-10-CM

## 2018-01-12 RX ORDER — AMLODIPINE BESYLATE 10 MG/1
TABLET ORAL
Qty: 30 TABLET | Refills: 5 | Status: SHIPPED | OUTPATIENT
Start: 2018-01-12 | End: 2018-06-26 | Stop reason: SDUPTHER

## 2018-02-12 ENCOUNTER — TELEPHONE (OUTPATIENT)
Dept: UROLOGY | Facility: CLINIC | Age: 53
End: 2018-02-12

## 2018-02-12 DIAGNOSIS — Z98.52 S/P VASECTOMY: Primary | ICD-10-CM

## 2018-02-12 NOTE — TELEPHONE ENCOUNTER
----- Message from Anthony Sanderson MD sent at 2/12/2018  2:26 PM CST -----  Low volume sample, need to repeat. No sperm seen.

## 2018-02-12 NOTE — TELEPHONE ENCOUNTER
----- Message from Filomena Kim sent at 2/12/2018  8:43 AM CST -----  No. [phone]    Patient needs an order for a semen sample.    Please call.

## 2018-02-13 DIAGNOSIS — I10 BENIGN ESSENTIAL HTN: ICD-10-CM

## 2018-02-13 DIAGNOSIS — N40.0 BENIGN NODULAR PROSTATIC HYPERPLASIA WITHOUT LOWER URINARY TRACT SYMPTOMS: ICD-10-CM

## 2018-02-14 RX ORDER — LOSARTAN POTASSIUM AND HYDROCHLOROTHIAZIDE 12.5; 1 MG/1; MG/1
TABLET ORAL
Qty: 30 TABLET | Refills: 5 | Status: SHIPPED | OUTPATIENT
Start: 2018-02-14 | End: 2018-06-26 | Stop reason: SDUPTHER

## 2018-02-14 RX ORDER — DUTASTERIDE AND TAMSULOSIN HYDROCHLORIDE .5; .4 MG/1; MG/1
CAPSULE ORAL
Qty: 30 CAPSULE | Refills: 11 | Status: SHIPPED | OUTPATIENT
Start: 2018-02-14 | End: 2019-03-25 | Stop reason: SDUPTHER

## 2018-04-02 DIAGNOSIS — G25.81 RESTLESS LEG SYNDROME: ICD-10-CM

## 2018-04-02 RX ORDER — ROPINIROLE 2 MG/1
TABLET, FILM COATED ORAL
Qty: 15 TABLET | Refills: 0 | Status: SHIPPED | OUTPATIENT
Start: 2018-04-02 | End: 2018-04-15 | Stop reason: SDUPTHER

## 2018-04-03 ENCOUNTER — TELEPHONE (OUTPATIENT)
Dept: UROLOGY | Facility: CLINIC | Age: 53
End: 2018-04-03

## 2018-04-03 NOTE — TELEPHONE ENCOUNTER
----- Message from Lizabeth Alonso sent at 4/3/2018  8:39 AM CDT -----  Contact: [phone]/self  Pt would like to speak with you concerning a previous procedure (pt did not care to elaborate)  Please call and advise

## 2018-04-05 ENCOUNTER — TELEPHONE (OUTPATIENT)
Dept: UROLOGY | Facility: CLINIC | Age: 53
End: 2018-04-05

## 2018-04-05 DIAGNOSIS — Z98.52 VASECTOMY STATUS: Primary | ICD-10-CM

## 2018-04-05 NOTE — TELEPHONE ENCOUNTER
----- Message from Melony Smith sent at 4/5/2018  1:07 PM CDT -----  Contact: Peggy [phone] With [facility] is requesting a call back regarding, his Vasectomy. Please advise

## 2018-04-06 ENCOUNTER — TELEPHONE (OUTPATIENT)
Dept: FAMILY MEDICINE | Facility: CLINIC | Age: 53
End: 2018-04-06

## 2018-04-06 DIAGNOSIS — Z98.52 VASECTOMY STATUS: Primary | ICD-10-CM

## 2018-04-15 DIAGNOSIS — G25.81 RESTLESS LEG SYNDROME: ICD-10-CM

## 2018-04-16 RX ORDER — ROPINIROLE 2 MG/1
2 TABLET, FILM COATED ORAL NIGHTLY
Qty: 30 TABLET | Refills: 2 | Status: SHIPPED | OUTPATIENT
Start: 2018-04-16 | End: 2018-11-14 | Stop reason: SDUPTHER

## 2018-04-16 RX ORDER — ROPINIROLE 2 MG/1
TABLET, FILM COATED ORAL
Qty: 30 TABLET | Refills: 2 | Status: SHIPPED | OUTPATIENT
Start: 2018-04-16 | End: 2018-04-16 | Stop reason: SDUPTHER

## 2018-04-23 ENCOUNTER — CLINICAL SUPPORT (OUTPATIENT)
Dept: OCCUPATIONAL MEDICINE | Facility: CLINIC | Age: 53
End: 2018-04-23

## 2018-04-23 DIAGNOSIS — Z02.1 PRE-EMPLOYMENT DRUG SCREENING: ICD-10-CM

## 2018-04-23 PROCEDURE — 80305 DRUG TEST PRSMV DIR OPT OBS: CPT | Mod: S$GLB,,, | Performed by: EMERGENCY MEDICINE

## 2018-04-23 NOTE — PROGRESS NOTES
Subjective:       Patient ID: Manfred Shha is a 52 y.o. male.    Chief Complaint: Drug / Alcohol Assessment    HPI  ROS    Objective:      Physical Exam    Assessment:       No diagnosis found.    Plan:       Patient here for pre employment drug screen.            No Follow-up on file.

## 2018-05-04 ENCOUNTER — TELEPHONE (OUTPATIENT)
Dept: UROLOGY | Facility: CLINIC | Age: 53
End: 2018-05-04

## 2018-05-04 NOTE — TELEPHONE ENCOUNTER
----- Message from Melony Smith sent at 5/4/2018 12:43 PM CDT -----  Contact: self / [phone]  Patient is requesting a call back regarding, follow up from his last procedure. Please advise

## 2018-05-17 ENCOUNTER — TELEPHONE (OUTPATIENT)
Dept: UROLOGY | Facility: CLINIC | Age: 53
End: 2018-05-17

## 2018-05-17 DIAGNOSIS — Z30.2 ENCOUNTER FOR VASECTOMY: Primary | ICD-10-CM

## 2018-05-17 NOTE — TELEPHONE ENCOUNTER
----- Message from Gus Flores sent at 5/17/2018  2:45 PM CDT -----  Contact: [phone] self  Patient would like to get test results. Please call and advise.

## 2018-06-26 DIAGNOSIS — I10 BENIGN ESSENTIAL HTN: ICD-10-CM

## 2018-06-26 RX ORDER — AMLODIPINE BESYLATE 10 MG/1
10 TABLET ORAL DAILY
Qty: 30 TABLET | Refills: 0 | Status: SHIPPED | OUTPATIENT
Start: 2018-06-26 | End: 2018-08-28 | Stop reason: SDUPTHER

## 2018-06-26 RX ORDER — LOSARTAN POTASSIUM AND HYDROCHLOROTHIAZIDE 12.5; 1 MG/1; MG/1
1 TABLET ORAL DAILY
Qty: 30 TABLET | Refills: 0 | Status: SHIPPED | OUTPATIENT
Start: 2018-06-26 | End: 2018-10-09 | Stop reason: SDUPTHER

## 2018-06-26 NOTE — TELEPHONE ENCOUNTER
Patient requesting medication refill Amlodipine Besylate 10 mg, Losartan//12.5 mg. [facility]

## 2018-07-11 ENCOUNTER — PATIENT MESSAGE (OUTPATIENT)
Dept: INTERNAL MEDICINE | Facility: CLINIC | Age: 53
End: 2018-07-11

## 2018-07-11 DIAGNOSIS — I10 BENIGN ESSENTIAL HTN: ICD-10-CM

## 2018-07-12 RX ORDER — NEBIVOLOL 5 MG/1
5 TABLET ORAL DAILY
Qty: 30 TABLET | Refills: 0 | Status: SHIPPED | OUTPATIENT
Start: 2018-07-12 | End: 2018-08-09 | Stop reason: SDUPTHER

## 2018-08-09 DIAGNOSIS — I10 BENIGN ESSENTIAL HTN: ICD-10-CM

## 2018-08-09 RX ORDER — NEBIVOLOL 5 MG/1
5 TABLET ORAL DAILY
Qty: 30 TABLET | Refills: 0 | Status: SHIPPED | OUTPATIENT
Start: 2018-08-09 | End: 2018-10-08 | Stop reason: DRUGHIGH

## 2018-08-09 NOTE — TELEPHONE ENCOUNTER
----- Message from Trisha Freitas sent at 8/9/2018 12:57 PM CDT -----  Contact: Wife. [phone]  Patient would like a refill for nebivolol (BYSTOLIC) 5 MG Tab sent to [facility] in [place] [phone]. Patient would like the prescription today because he is out of medication. Please advise.

## 2018-08-09 NOTE — TELEPHONE ENCOUNTER
----- Message from Ashli Schofield sent at 8/9/2018 10:43 AM CDT -----  Contact: [phone]/ self   Pt requesting a refill on rx nebivolol (BYSTOLIC) 5 MG Tab sent to [facility] [phone]. Please advise

## 2018-08-27 ENCOUNTER — TELEPHONE (OUTPATIENT)
Dept: UROLOGY | Facility: CLINIC | Age: 53
End: 2018-08-27

## 2018-08-27 NOTE — TELEPHONE ENCOUNTER
----- Message from Tsering Wiggins sent at 8/27/2018 12:00 PM CDT -----  Contact: [phone]/self  Patient would like to be seen sooner for hospital follow up. Please advise.

## 2018-08-28 DIAGNOSIS — I10 BENIGN ESSENTIAL HTN: ICD-10-CM

## 2018-08-28 RX ORDER — AMLODIPINE BESYLATE 10 MG/1
TABLET ORAL
Qty: 15 TABLET | Refills: 0 | Status: SHIPPED | OUTPATIENT
Start: 2018-08-28 | End: 2018-09-18 | Stop reason: SDUPTHER

## 2018-09-04 DIAGNOSIS — Z12.11 COLON CANCER SCREENING: ICD-10-CM

## 2018-09-05 ENCOUNTER — OFFICE VISIT (OUTPATIENT)
Dept: UROLOGY | Facility: CLINIC | Age: 53
End: 2018-09-05
Payer: COMMERCIAL

## 2018-09-05 ENCOUNTER — TELEPHONE (OUTPATIENT)
Dept: UROLOGY | Facility: CLINIC | Age: 53
End: 2018-09-05

## 2018-09-05 VITALS — WEIGHT: 315 LBS | BODY MASS INDEX: 37.19 KG/M2 | HEIGHT: 77 IN

## 2018-09-05 DIAGNOSIS — R33.9 URINARY RETENTION: ICD-10-CM

## 2018-09-05 DIAGNOSIS — N31.9 NEUROGENIC BLADDER: ICD-10-CM

## 2018-09-05 DIAGNOSIS — Z30.09 STERILIZATION CONSULT: ICD-10-CM

## 2018-09-05 DIAGNOSIS — N40.0 BENIGN NODULAR PROSTATIC HYPERPLASIA WITHOUT LOWER URINARY TRACT SYMPTOMS: Primary | ICD-10-CM

## 2018-09-05 PROCEDURE — 3008F BODY MASS INDEX DOCD: CPT | Mod: CPTII,S$GLB,, | Performed by: UROLOGY

## 2018-09-05 PROCEDURE — 99999 PR PBB SHADOW E&M-EST. PATIENT-LVL III: CPT | Mod: PBBFAC,,, | Performed by: UROLOGY

## 2018-09-05 PROCEDURE — 99214 OFFICE O/P EST MOD 30 MIN: CPT | Mod: S$GLB,,, | Performed by: UROLOGY

## 2018-09-05 NOTE — PROGRESS NOTES
Subjective:       Patient ID: Manfred Shah is a 53 y.o. male.    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 Treated with IGIG therapy, the Lumbar laminectomies x 2 . Developed neurogenic bladder and bowel. Here with erazo x 1 month. For voiding trial. Discussed possibility of need for intermittent catheterization.  History of BPH.      Benign Prostatic Hypertrophy   This is a chronic problem. The current episode started more than 1 year ago. The problem has been gradually improving since onset. Irritative symptoms include frequency and nocturia. Irritative symptoms do not include urgency. Obstructive symptoms include dribbling, incomplete emptying, a slower stream and a weak stream. Pertinent negatives include no chills, dysuria, hematuria, nausea or vomiting. Past treatments include tamsulosin and dutasteride. The treatment provided significant relief. He has been using treatment for 1 to 2 years.   Other   This is a new (Neurogenic Bladder) problem. The current episode started more than 1 month ago. The problem occurs constantly. The problem has been unchanged. Pertinent negatives include no abdominal pain, anorexia, arthralgias, change in bowel habit, chest pain, chills, congestion, coughing, diaphoresis, fatigue, fever, headaches, joint swelling, myalgias, nausea, neck pain, numbness, rash, sore throat, swollen glands, urinary symptoms, vertigo, visual change, vomiting or weakness. Associated symptoms comments: Decreased Bladder sensation with nerve compression. S/P laminectomy but bladder recovery has been slow.. Nothing aggravates the symptoms. He has tried nothing for the symptoms. The treatment provided significant relief.     Review of Systems   Constitutional: Negative for activity change, appetite change, chills, diaphoresis, fatigue, fever and unexpected weight change.   HENT: Negative for congestion, hearing loss, sinus pressure, sore throat and  trouble swallowing.    Eyes: Negative for photophobia, pain, discharge and visual disturbance.   Respiratory: Negative for apnea, cough and shortness of breath.    Cardiovascular: Negative for chest pain, palpitations and leg swelling.   Gastrointestinal: Negative for abdominal distention, abdominal pain, anal bleeding, anorexia, blood in stool, change in bowel habit, constipation, diarrhea, nausea, rectal pain and vomiting.   Endocrine: Negative for cold intolerance, heat intolerance, polydipsia, polyphagia and polyuria.   Genitourinary: Positive for frequency, incomplete emptying and nocturia. Negative for decreased urine volume, difficulty urinating, discharge, dysuria, enuresis, flank pain, genital sores, hematuria, penile pain, penile swelling, scrotal swelling, testicular pain and urgency.   Musculoskeletal: Negative for arthralgias, back pain, joint swelling, myalgias and neck pain.   Skin: Negative for color change, pallor, rash and wound.   Allergic/Immunologic: Negative for environmental allergies, food allergies and immunocompromised state.   Neurological: Negative for dizziness, vertigo, seizures, weakness, numbness and headaches.   Hematological: Negative for adenopathy. Does not bruise/bleed easily.   Psychiatric/Behavioral: Negative.        Objective:      Physical Exam   Nursing note and vitals reviewed.  Constitutional: He is oriented to person, place, and time. He appears well-developed and well-nourished.   HENT:   Head: Normocephalic.   Nose: Nose normal.   Mouth/Throat: Oropharynx is clear and moist.   Eyes: Conjunctivae and EOM are normal. Pupils are equal, round, and reactive to light.   Neck: Normal range of motion. Neck supple.   Cardiovascular: Normal rate, regular rhythm, normal heart sounds and intact distal pulses.    Pulmonary/Chest: Effort normal and breath sounds normal.   Abdominal: Soft. Bowel sounds are normal.   Genitourinary: Penis normal.   Genitourinary Comments: Nam in place    Musculoskeletal: Normal range of motion.   Neurological: He is alert and oriented to person, place, and time. He has normal reflexes.   Skin: Skin is warm and dry.     Psychiatric: He has a normal mood and affect. His behavior is normal. Judgment and thought content normal.       Assessment:       1. Benign nodular prostatic hyperplasia without lower urinary tract symptoms    2. Urinary retention    3. Neurogenic bladder    4. Sterilization consult        Plan:           Patient Instructions   Voiding Trial today  F/U in 1 week for f/u and PVR

## 2018-09-05 NOTE — TELEPHONE ENCOUNTER
Did urinate a very small amount. He has to go to [facility] this afternoon for a stat mri. He will go to the ed if no urinations

## 2018-09-05 NOTE — TELEPHONE ENCOUNTER
----- Message from Tsering Wiggins sent at 9/5/2018 12:15 PM CDT -----  Contact: [phone]/sellf  Patient called in requesting to speak with you. Patient prefers to speak with a nurse. Please advise.

## 2018-09-06 ENCOUNTER — TELEPHONE (OUTPATIENT)
Dept: FAMILY MEDICINE | Facility: CLINIC | Age: 53
End: 2018-09-06

## 2018-09-06 NOTE — TELEPHONE ENCOUNTER
----- Message from Tsering Wiggins sent at 9/6/2018  9:18 AM CDT -----  Contact: [phone]/self  Patient requesting to speak with you regarding his after visit process. Please advise.

## 2018-09-11 ENCOUNTER — OFFICE VISIT (OUTPATIENT)
Dept: UROLOGY | Facility: CLINIC | Age: 53
End: 2018-09-11
Payer: COMMERCIAL

## 2018-09-11 VITALS — HEIGHT: 77 IN | WEIGHT: 315 LBS | BODY MASS INDEX: 37.19 KG/M2

## 2018-09-11 DIAGNOSIS — N39.41 URGE INCONTINENCE: ICD-10-CM

## 2018-09-11 DIAGNOSIS — R33.9 URINARY RETENTION: ICD-10-CM

## 2018-09-11 DIAGNOSIS — R35.0 URINARY FREQUENCY: ICD-10-CM

## 2018-09-11 DIAGNOSIS — R35.1 NOCTURIA: ICD-10-CM

## 2018-09-11 DIAGNOSIS — N40.0 BENIGN NODULAR PROSTATIC HYPERPLASIA WITHOUT LOWER URINARY TRACT SYMPTOMS: ICD-10-CM

## 2018-09-11 DIAGNOSIS — N31.9 NEUROGENIC BLADDER: Primary | ICD-10-CM

## 2018-09-11 LAB — POC RESIDUAL URINE VOLUME: 999 ML (ref 0–100)

## 2018-09-11 PROCEDURE — 51798 US URINE CAPACITY MEASURE: CPT | Mod: S$GLB,,, | Performed by: UROLOGY

## 2018-09-11 PROCEDURE — 99999 PR PBB SHADOW E&M-EST. PATIENT-LVL III: CPT | Mod: PBBFAC,,, | Performed by: UROLOGY

## 2018-09-11 PROCEDURE — 3008F BODY MASS INDEX DOCD: CPT | Mod: CPTII,S$GLB,, | Performed by: UROLOGY

## 2018-09-11 PROCEDURE — 99215 OFFICE O/P EST HI 40 MIN: CPT | Mod: S$GLB,,, | Performed by: UROLOGY

## 2018-09-11 RX ORDER — CIPROFLOXACIN 500 MG/1
TABLET ORAL
COMMUNITY
Start: 2018-08-21 | End: 2018-10-08

## 2018-09-11 RX ORDER — METAXALONE 800 MG/1
TABLET ORAL
COMMUNITY
Start: 2018-06-11 | End: 2018-10-08

## 2018-09-11 NOTE — PROGRESS NOTES
Subjective:       Patient ID: Manfred Shah is a 53 y.o. male.    Chief Complaint: Benign Prostatic Hypertrophy    52 yo WM with NGB after spinal surgery. Voiding trial x 6 days with overflow incontinence. Voiding x 5-6 and leaking in between. Nocturia x 2      Benign Prostatic Hypertrophy   This is a new (NGB symptoms as well as BPH) problem. The current episode started more than 1 month ago. The problem has been waxing and waning since onset. Irritative symptoms include frequency and nocturia. Irritative symptoms do not include urgency. Obstructive symptoms include incomplete emptying, a slower stream and a weak stream. Obstructive symptoms do not include dribbling, an intermittent stream or straining. Associated symptoms include hesitancy. Pertinent negatives include no chills, dysuria, genital pain, hematuria, nausea or vomiting. AUA score is 20-35. He is sexually active. The symptoms are aggravated by constipation. Past treatments include tamsulosin and dutasteride. The treatment provided mild relief. He has been using treatment for 1 to 4 weeks.     Review of Systems   Constitutional: Negative for activity change, appetite change, chills, diaphoresis, fatigue, fever and unexpected weight change.   HENT: Negative for congestion, hearing loss, sinus pressure and trouble swallowing.    Eyes: Negative for photophobia, pain, discharge and visual disturbance.   Respiratory: Negative for apnea, cough and shortness of breath.    Cardiovascular: Negative for chest pain, palpitations and leg swelling.   Gastrointestinal: Negative for abdominal distention, abdominal pain, anal bleeding, blood in stool, constipation, diarrhea, nausea, rectal pain and vomiting.   Endocrine: Negative for cold intolerance, heat intolerance, polydipsia, polyphagia and polyuria.   Genitourinary: Positive for frequency, hesitancy, incomplete emptying and nocturia. Negative for decreased urine volume, difficulty urinating, discharge, dysuria,  enuresis, flank pain, genital sores, hematuria, penile pain, penile swelling, scrotal swelling, testicular pain and urgency.   Musculoskeletal: Negative for arthralgias, back pain and myalgias.   Skin: Negative for color change, pallor, rash and wound.   Allergic/Immunologic: Negative for environmental allergies, food allergies and immunocompromised state.   Neurological: Negative for dizziness, seizures, weakness and headaches.   Hematological: Negative for adenopathy. Does not bruise/bleed easily.   Psychiatric/Behavioral: Negative.        Objective:      Physical Exam   Nursing note and vitals reviewed.  Constitutional: He is oriented to person, place, and time. He appears well-developed and well-nourished.   HENT:   Head: Normocephalic.   Nose: Nose normal.   Mouth/Throat: Oropharynx is clear and moist.   Eyes: Conjunctivae and EOM are normal. Pupils are equal, round, and reactive to light.   Neck: Normal range of motion. Neck supple.   Cardiovascular: Normal rate, regular rhythm, normal heart sounds and intact distal pulses.    Pulmonary/Chest: Effort normal and breath sounds normal.   Abdominal: Soft. Bowel sounds are normal.   Genitourinary: Penis normal.   Genitourinary Comments: POCT Bladder Scan >999cc   Musculoskeletal: Normal range of motion.   Neurological: He is alert and oriented to person, place, and time. He has normal reflexes.   Skin: Skin is warm and dry.     Psychiatric: He has a normal mood and affect. His behavior is normal. Judgment and thought content normal.       Assessment:       1. Neurogenic bladder    2. Urge incontinence    3. Nocturia    4. Urinary retention    5. Urinary frequency    6. Benign nodular prostatic hyperplasia without lower urinary tract symptoms        Plan:     Teach Sterile intermittent Cath technique in office today.  Patient Instructions   Intermittent cath q 4 hours sterile technique  Record Voids and Cath volumes  F/U 4 weeks

## 2018-09-12 ENCOUNTER — TELEPHONE (OUTPATIENT)
Dept: UROLOGY | Facility: CLINIC | Age: 53
End: 2018-09-12

## 2018-09-12 NOTE — TELEPHONE ENCOUNTER
----- Message from Lizabeth Alonso sent at 9/12/2018  3:07 PM CDT -----  Contact: [phone]/self  Pt requesting to speak with you concerning his catheter  Please call and advise

## 2018-09-14 DIAGNOSIS — I10 BENIGN ESSENTIAL HTN: ICD-10-CM

## 2018-09-17 RX ORDER — LOSARTAN POTASSIUM AND HYDROCHLOROTHIAZIDE 12.5; 1 MG/1; MG/1
1 TABLET ORAL DAILY
Qty: 30 TABLET | Refills: 0 | OUTPATIENT
Start: 2018-09-17

## 2018-09-17 RX ORDER — AMLODIPINE BESYLATE 10 MG/1
TABLET ORAL
Qty: 15 TABLET | Refills: 0 | OUTPATIENT
Start: 2018-09-17

## 2018-09-18 DIAGNOSIS — I10 BENIGN ESSENTIAL HTN: ICD-10-CM

## 2018-09-18 NOTE — TELEPHONE ENCOUNTER
Spoke with patient he stated he has been in the hospital for a while. He is home now. I called to set up an appointment for patient to see Dr. Oakley. He stated he would like a one time refill of his blood pressure medication. He stated he is changing  PCP. His appointment is 10/8/2018.

## 2018-09-19 RX ORDER — AMLODIPINE BESYLATE 10 MG/1
10 TABLET ORAL DAILY
Qty: 30 TABLET | Refills: 0 | Status: SHIPPED | OUTPATIENT
Start: 2018-09-19 | End: 2018-10-09 | Stop reason: SDUPTHER

## 2018-10-07 DIAGNOSIS — I10 BENIGN ESSENTIAL HTN: ICD-10-CM

## 2018-10-08 ENCOUNTER — OFFICE VISIT (OUTPATIENT)
Dept: FAMILY MEDICINE | Facility: CLINIC | Age: 53
End: 2018-10-08
Payer: COMMERCIAL

## 2018-10-08 VITALS
TEMPERATURE: 98 F | BODY MASS INDEX: 37.19 KG/M2 | OXYGEN SATURATION: 98 % | RESPIRATION RATE: 16 BRPM | HEIGHT: 77 IN | SYSTOLIC BLOOD PRESSURE: 138 MMHG | DIASTOLIC BLOOD PRESSURE: 78 MMHG | WEIGHT: 315 LBS | HEART RATE: 81 BPM

## 2018-10-08 DIAGNOSIS — G25.81 RLS (RESTLESS LEGS SYNDROME): ICD-10-CM

## 2018-10-08 DIAGNOSIS — E78.5 HYPERLIPIDEMIA, UNSPECIFIED HYPERLIPIDEMIA TYPE: ICD-10-CM

## 2018-10-08 DIAGNOSIS — G47.30 SLEEP APNEA WITH USE OF CONTINUOUS POSITIVE AIRWAY PRESSURE (CPAP): ICD-10-CM

## 2018-10-08 DIAGNOSIS — G61.81 CIDP (CHRONIC INFLAMMATORY DEMYELINATING POLYNEUROPATHY): ICD-10-CM

## 2018-10-08 DIAGNOSIS — M21.372 FOOT DROP, LEFT FOOT: ICD-10-CM

## 2018-10-08 DIAGNOSIS — B96.89: ICD-10-CM

## 2018-10-08 DIAGNOSIS — N40.0 BENIGN NODULAR PROSTATIC HYPERPLASIA WITHOUT LOWER URINARY TRACT SYMPTOMS: ICD-10-CM

## 2018-10-08 DIAGNOSIS — R73.01 IMPAIRED FASTING GLUCOSE: ICD-10-CM

## 2018-10-08 DIAGNOSIS — G06.1: ICD-10-CM

## 2018-10-08 DIAGNOSIS — I10 ESSENTIAL HYPERTENSION: Primary | ICD-10-CM

## 2018-10-08 DIAGNOSIS — Z78.9 SELF-CATHETERIZES URINARY BLADDER: ICD-10-CM

## 2018-10-08 DIAGNOSIS — N31.9 NEUROGENIC BLADDER: ICD-10-CM

## 2018-10-08 DIAGNOSIS — R74.8 ELEVATED LIVER ENZYMES: ICD-10-CM

## 2018-10-08 DIAGNOSIS — Z12.11 COLON CANCER SCREENING: ICD-10-CM

## 2018-10-08 PROCEDURE — 99999 PR PBB SHADOW E&M-EST. PATIENT-LVL IV: CPT | Mod: PBBFAC,,, | Performed by: NURSE PRACTITIONER

## 2018-10-08 PROCEDURE — 3075F SYST BP GE 130 - 139MM HG: CPT | Mod: CPTII,S$GLB,, | Performed by: NURSE PRACTITIONER

## 2018-10-08 PROCEDURE — 99215 OFFICE O/P EST HI 40 MIN: CPT | Mod: S$GLB,,, | Performed by: NURSE PRACTITIONER

## 2018-10-08 PROCEDURE — 3078F DIAST BP <80 MM HG: CPT | Mod: CPTII,S$GLB,, | Performed by: NURSE PRACTITIONER

## 2018-10-08 PROCEDURE — 3008F BODY MASS INDEX DOCD: CPT | Mod: CPTII,S$GLB,, | Performed by: NURSE PRACTITIONER

## 2018-10-08 RX ORDER — LOSARTAN POTASSIUM AND HYDROCHLOROTHIAZIDE 12.5; 1 MG/1; MG/1
1 TABLET ORAL DAILY
Qty: 30 TABLET | Refills: 0 | OUTPATIENT
Start: 2018-10-08

## 2018-10-08 RX ORDER — NEBIVOLOL 10 MG/1
10 TABLET ORAL DAILY
COMMUNITY
End: 2018-10-09 | Stop reason: SDUPTHER

## 2018-10-09 ENCOUNTER — PATIENT MESSAGE (OUTPATIENT)
Dept: FAMILY MEDICINE | Facility: CLINIC | Age: 53
End: 2018-10-09

## 2018-10-09 DIAGNOSIS — I10 BENIGN ESSENTIAL HTN: ICD-10-CM

## 2018-10-09 RX ORDER — AMLODIPINE BESYLATE 10 MG/1
10 TABLET ORAL DAILY
Qty: 30 TABLET | Refills: 5 | Status: SHIPPED | OUTPATIENT
Start: 2018-10-09 | End: 2019-04-25 | Stop reason: SDUPTHER

## 2018-10-09 RX ORDER — LOSARTAN POTASSIUM AND HYDROCHLOROTHIAZIDE 12.5; 1 MG/1; MG/1
1 TABLET ORAL DAILY
Qty: 30 TABLET | Refills: 5 | Status: SHIPPED | OUTPATIENT
Start: 2018-10-09 | End: 2019-03-18 | Stop reason: SDUPTHER

## 2018-10-09 RX ORDER — NEBIVOLOL 10 MG/1
10 TABLET ORAL DAILY
Qty: 30 TABLET | Refills: 5 | Status: SHIPPED | OUTPATIENT
Start: 2018-10-09 | End: 2019-04-12 | Stop reason: SDUPTHER

## 2018-10-09 RX ORDER — LOSARTAN POTASSIUM AND HYDROCHLOROTHIAZIDE 12.5; 1 MG/1; MG/1
1 TABLET ORAL DAILY
Qty: 30 TABLET | Refills: 0 | Status: CANCELLED | OUTPATIENT
Start: 2018-10-09

## 2018-10-09 RX ORDER — DIAPER,BRIEF,ADULT, DISPOSABLE
500 EACH MISCELLANEOUS DAILY
Status: CANCELLED | COMMUNITY
Start: 2018-10-09

## 2018-10-09 NOTE — PROGRESS NOTES
"Subjective:       Patient ID: Manfred Shah is a 53 y.o. male.    Chief Complaint: Establish Care    Patient is a 53-year-old white male with CIDP (chronic inflammatory demyelinating polyneuropathy) diagnosed in June 2018 by neurologist, Dr. Rowdy Tran, left foot drop secondary to CIDP, Abscess of spinal cord being treated by Dr. Rodriguez S/P Laminectomy in August 2018, Neurogenic bladder with urge incontinence and self-catheterization and BPH followed by Dr. Sanderson, RLS, Hypertension, Hyperlipidemia, Impaired Fasting Glucose, Elevated Liver Enzymes and Sleep Apnea with CPAP use that is here today to establish care with new provider.    Patient has Hypertension controlled on amlodipine 10 mg daily, losartan /12.5 mg daily, and Bystolic 10 mg daily.  /78   Pulse 81   Temp 98.4 °F (36.9 °C) (Oral)   Resp 16   Ht 6' 5" (1.956 m)   Wt (!) 156.6 kg (345 lb 3.9 oz)   SpO2 98%   BMI 40.94 kg/m²     Patient has hyperlipidemia and currently taking Crestor 10 mg daily.  Last lipid panel was completed in January 2018 an LDL remain 124.2 on current dose.  Advised patient we will get updated fasting labs this week and I will increase medication if LDL is greater than 100.    Patient had impaired fasting glucose in January 2018 with the fasting blood sugar 123 and hemoglobin A1c of 5.5%.  Repeat hemoglobin A1c was 5.6% in May 2018.  Will get fasting CMP and A1c and call patient with results.    Patient had elevated liver enzymes noted at past visits but see no workup done.  Patient reports he was admitted to Hospital in [place] and he had a liver workup during that hospitalization.  Will request those records.    Patient has Sleep Apnea with CPAP use and had uvula removed by surgery in past.    Patient has restless leg syndrome controlled on present medication.    CIDP with left foot drop  followed by a neurologist Dr. Rowdy Tran.      Patient has abscess of spinal cord being treated by Dr. Rodriguez status post " laminectomy complication in August 2018.  States he has an MRI scheduled today to evaluate    Patient has BPH and neuro Emmanuel bladder with urge incontinence and self catheterizes is as needed.          Current Outpatient Medications   Medication Sig Dispense Refill    amLODIPine (NORVASC) 10 MG tablet Take 1 tablet (10 mg total) by mouth once daily. 30 tablet 0    fish oil-omega-3 fatty acids 300-1,000 mg capsule Take 2 g by mouth once daily.      ELVIS 0.5-0.4 mg CM24 TAKE ONE CAPSULE BY MOUTH ONCE DAILY 30 capsule 11    losartan-hydrochlorothiazide 100-12.5 mg (HYZAAR) 100-12.5 mg Tab Take 1 tablet by mouth once daily. 30 tablet 0    lysine 500 mg Tab Take 500 mg by mouth once daily.      nebivolol (BYSTOLIC) 10 MG Tab Take 10 mg by mouth once daily.      rOPINIRole (REQUIP) 2 MG tablet Take 1 tablet (2 mg total) by mouth every evening. 30 tablet 2    rosuvastatin (CRESTOR) 10 MG tablet Take 1 tablet (10 mg total) by mouth every evening. 90 tablet 3    aspirin (ECOTRIN) 81 MG EC tablet Take 81 mg by mouth once daily.       No current facility-administered medications for this visit.        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 Tray Rodriguez    CIDP (chronic inflammatory demyelinating polyneuropathy) 06/2018    followed by Dr. Rowdy Tran - Neurologist in [place].      Elevated liver enzymes 10/8/2018    Elevated PSA     followed by Dr. Sanderson    Foot drop, left foot     due to neurological condition/[place]    Hyperlipidemia     Hypertension     Impaired fasting glucose     Neurogenic bladder 09/05/2018    followed by Dr. Sanderson    RLS (restless legs syndrome) 10/8/2018    Self-catheterizes urinary bladder     Urologist    Sleep apnea with use of continuous positive airway pressure (CPAP) 01/10/2018    followed by [facility] in [place]    Urge incontinence 9/11/2018       Past Surgical History:    Procedure Laterality Date    ADENOIDECTOMY      HERNIA REPAIR      LUMBAR LAM[facility]CTOMY  [facility]    LUMBAR LAMINECTOMY      Dr. Rodriguez - [facility]; complications from surgery - abscess to spinal cord    PLANTAR FASCIA RELEASE      TONSILLECTOMY      VASECTOMY      X-STOP IMPLANTATION         Family History   Problem Relation Age of Onset    Diabetes Mother     Cancer Mother     Diabetes Father     Cancer Father     Hypertension Father     No Known Problems Sister     Prostate cancer Neg Hx     Kidney disease Neg Hx        Social History     Socioeconomic History    Marital status:      Spouse name: None    Number of children: None    Years of education: None    Highest education level: None   Social Needs    Financial resource strain: None    Food insecurity - worry: None    Food insecurity - inability: None    Transportation needs - medical: None    Transportation needs - non-medical: None   Occupational History    Occupation: teacher   Tobacco Use    Smoking status: Never Smoker    Smokeless tobacco: Never Used   Substance and Sexual Activity    Alcohol use: No    Drug use: No    Sexual activity: Yes     Partners: Female   Other Topics Concern    None   Social History Narrative    None       Review of Systems   Constitutional: Negative for activity change and unexpected weight change.   HENT: Negative for hearing loss, rhinorrhea and trouble swallowing.    Eyes: Negative for discharge and visual disturbance.   Respiratory: Negative for chest tightness and wheezing.    Cardiovascular: Negative for chest pain and palpitations.   Gastrointestinal: Negative for blood in stool, constipation, diarrhea and vomiting.   Endocrine: Negative for polydipsia and polyuria.   Genitourinary: Positive for difficulty urinating and urgency. Negative for hematuria.        Self-catheterizes due to neurogenic bladder   Musculoskeletal: Positive for back pain. Negative for arthralgias,  "joint swelling and neck pain.   Neurological: Positive for weakness and numbness. Negative for headaches.        Left foot drop due to CIDP   Psychiatric/Behavioral: Negative for confusion and dysphoric mood.         Objective:     Vitals:    10/08/18 1046   BP: 138/78   Pulse: 81   Resp: 16   Temp: 98.4 °F (36.9 °C)   TempSrc: Oral   SpO2: 98%   Weight: (!) 156.6 kg (345 lb 3.9 oz)   Height: 6' 5" (1.956 m)          Physical Exam   Constitutional: He is oriented to person, place, and time. He appears well-developed. No distress.   HENT:   Head: Normocephalic and atraumatic.   Nose: Nose normal.   Mouth/Throat: Oropharynx is clear and moist. No oropharyngeal exudate.   Uvula removed by previous surgery for sleep apnea   Eyes: Conjunctivae and EOM are normal. Pupils are equal, round, and reactive to light. No scleral icterus.   Neck: Normal range of motion. Neck supple. No JVD present.   Pulmonary/Chest: Effort normal and breath sounds normal. No respiratory distress.   Abdominal: Soft. Bowel sounds are normal. He exhibits no distension.   Musculoskeletal: He exhibits no edema.   Ambulates with cane secondary to left foot drop due to CIDP   Neurological: He is alert and oriented to person, place, and time.   Skin: Skin is warm and dry. He is not diaphoretic.   Psychiatric: He has a normal mood and affect.         Assessment:         ICD-10-CM ICD-9-CM   1. Essential hypertension I10 401.9   2. Hyperlipidemia, unspecified hyperlipidemia type E78.5 272.4   3. Impaired fasting glucose R73.01 790.21   4. Elevated liver enzymes R74.8 790.5   5. CIDP (chronic inflammatory demyelinating polyneuropathy) G61.81 357.81   6. Abscess of spinal cord due to bacteria G06.1 324.1   7. Foot drop, left foot M21.372 736.79   8. Benign nodular prostatic hyperplasia without lower urinary tract symptoms N40.0 600.10   9. Self-catheterizes urinary bladder Z78.9 V49.89   10. Neurogenic bladder N31.9 596.54   11. Sleep apnea with use of " continuous positive airway pressure (CPAP) G47.30 327.23   12. Colon cancer screening Z12.11 V76.51   13. RLS (restless legs syndrome) G25.81 333.94       Plan:       Essential hypertension  -  controlled on present medications.  Will send refill request when needed.  Follow-up in 6 months.    Hyperlipidemia, unspecified hyperlipidemia type  -  continue Crestor pending fasting labs this week.  If LDL remains greater than 100, we will increase dose and recheck labs in 3 months.  Will call with results  -     Lipid panel; Future; Expected date: 10/08/2018    Impaired fasting glucose  -  continue lifestyle modifications and will recheck CMP and A1c this week  -     Hemoglobin A1c; Future; Expected date: 10/08/2018    Elevated liver enzymes  -  reports elevated liver enzymes were worked up during admit in August 2018 at a hospital in [place].  Will request records.  -     Comprehensive metabolic panel; Future; Expected date: 10/08/2018    CIDP (chronic inflammatory demyelinating polyneuropathy)  -  followed by neurologist    Abscess of spinal cord due to bacteria  Followed by Dr. Rodriguez    Foot drop, left foot  - secondary to CIDP and followed by neurologist    Benign nodular prostatic hyperplasia without lower urinary tract symptoms  -  followed by urologist    Self-catheterizes urinary bladder    Neurogenic bladder  -  followed by urologist    Sleep apnea with use of continuous positive airway pressure (CPAP)    Colon cancer screening  -     Case request GI: COLONOSCOPY    RLS (restless legs syndrome)  -  controlled on present medication      Follow-up for follow up pending lab results.        Medication List           Accurate as of 10/8/18  9:57 PM. If you have any questions, ask your nurse or doctor.               CHANGE how you take these medications    nebivolol 10 MG Tab  Commonly known as:  BYSTOLIC  What changed:  Another medication with the same name was removed. Continue taking this medication, and follow  the directions you see here.  Changed by:  Brandy Ramirez NP        CONTINUE taking these medications    amLODIPine 10 MG tablet  Commonly known as:  NORVASC  Take 1 tablet (10 mg total) by mouth once daily.     aspirin 81 MG EC tablet  Commonly known as:  ECOTRIN     fish oil-omega-3 fatty acids 300-1,000 mg capsule     ELVIS 0.5-0.4 mg Cm24  Generic drug:  dutasteride-tamsulosin  TAKE ONE CAPSULE BY MOUTH ONCE DAILY     losartan-hydrochlorothiazide 100-12.5 mg 100-12.5 mg Tab  Commonly known as:  HYZAAR  Take 1 tablet by mouth once daily.     lysine 500 mg Tab  Commonly known as:  L-LYSINE     rOPINIRole 2 MG tablet  Commonly known as:  REQUIP  Take 1 tablet (2 mg total) by mouth every evening.     rosuvastatin 10 MG tablet  Commonly known as:  CRESTOR  Take 1 tablet (10 mg total) by mouth every evening.        STOP taking these medications    ciprofloxacin HCl 500 MG tablet  Commonly known as:  CIPRO  Stopped by:  Brandy Ramirez NP     metaxalone 800 MG tablet  Commonly known as:  SKELAXIN  Stopped by:  Brandy Ramirez NP

## 2018-10-19 ENCOUNTER — PATIENT MESSAGE (OUTPATIENT)
Dept: FAMILY MEDICINE | Facility: CLINIC | Age: 53
End: 2018-10-19

## 2018-10-19 DIAGNOSIS — E78.5 HYPERLIPIDEMIA, UNSPECIFIED HYPERLIPIDEMIA TYPE: ICD-10-CM

## 2018-10-19 DIAGNOSIS — I10 ESSENTIAL HYPERTENSION: Primary | ICD-10-CM

## 2018-10-19 DIAGNOSIS — Z12.5 PROSTATE CANCER SCREENING: ICD-10-CM

## 2018-10-19 DIAGNOSIS — Z13.1 DIABETES MELLITUS SCREENING: ICD-10-CM

## 2018-10-19 DIAGNOSIS — Z13.29 THYROID DISORDER SCREEN: ICD-10-CM

## 2018-10-19 DIAGNOSIS — Z13.0 SCREENING FOR DEFICIENCY ANEMIA: ICD-10-CM

## 2018-10-19 NOTE — TELEPHONE ENCOUNTER
Spoke with patient on phone.  Advised patient that cholesterol is improved and controlled now that he is taking his cholesterol medicine daily.  And watching what he eats.  Continue Crestor at present dose and we will recheck in 6 months.  Advised patient that his blood sugar is also improved with lifestyle modifications his blood sugar is down from 123 in January of 2018 to now 103 and hemoglobin A1c is 5.3%.  His liver enzymes are also now back within normal limits.  Advised to continue weight loss and lifestyle modification and we will recheck levels in 6 months with a follow-up visit for WELLNESS EXAM.  Patient verbalizes understanding

## 2018-10-23 ENCOUNTER — TELEPHONE (OUTPATIENT)
Dept: UROLOGY | Facility: CLINIC | Age: 53
End: 2018-10-23

## 2018-10-23 NOTE — TELEPHONE ENCOUNTER
----- Message from Tsering Wiggins sent at 10/23/2018  8:56 AM CDT -----  Contact: [phone]/self  Patient requesting to speak with you regarding rescheduling his follow up appointment. Patient stated that he is having surgery tomorrow. Please advise.

## 2018-11-14 DIAGNOSIS — G25.81 RESTLESS LEG SYNDROME: ICD-10-CM

## 2018-11-15 RX ORDER — ROPINIROLE 2 MG/1
TABLET, FILM COATED ORAL
Qty: 30 TABLET | Refills: 0 | Status: SHIPPED | OUTPATIENT
Start: 2018-11-15 | End: 2019-01-26 | Stop reason: SDUPTHER

## 2018-12-26 DIAGNOSIS — G25.81 RESTLESS LEG SYNDROME: ICD-10-CM

## 2018-12-26 RX ORDER — ROPINIROLE 2 MG/1
TABLET, FILM COATED ORAL
Qty: 30 TABLET | Refills: 0 | OUTPATIENT
Start: 2018-12-26

## 2019-01-26 DIAGNOSIS — E78.5 HYPERLIPIDEMIA, UNSPECIFIED HYPERLIPIDEMIA TYPE: ICD-10-CM

## 2019-01-26 DIAGNOSIS — G25.81 RESTLESS LEG SYNDROME: ICD-10-CM

## 2019-01-28 RX ORDER — ROPINIROLE 2 MG/1
TABLET, FILM COATED ORAL
Qty: 90 TABLET | Refills: 0 | Status: SHIPPED | OUTPATIENT
Start: 2019-01-28 | End: 2019-05-23 | Stop reason: SDUPTHER

## 2019-01-28 RX ORDER — ROSUVASTATIN CALCIUM 10 MG/1
TABLET, COATED ORAL
Qty: 90 TABLET | Refills: 0 | Status: SHIPPED | OUTPATIENT
Start: 2019-01-28 | End: 2019-05-08 | Stop reason: SDUPTHER

## 2019-01-28 NOTE — TELEPHONE ENCOUNTER
Patient advised of message states he will have labs done prior to appt with you on 4-15-19 he didn't want to schedule now do to his work schedule.

## 2019-02-11 ENCOUNTER — TELEPHONE (OUTPATIENT)
Dept: UROLOGY | Facility: CLINIC | Age: 54
End: 2019-02-11

## 2019-02-11 ENCOUNTER — TELEPHONE (OUTPATIENT)
Dept: FAMILY MEDICINE | Facility: CLINIC | Age: 54
End: 2019-02-11

## 2019-02-11 NOTE — TELEPHONE ENCOUNTER
----- Message from Brandy Ramirez NP sent at 2/11/2019  8:56 AM CST -----  Call this patient - he had wellness exams done on 2/9/2019 but his wellness appt is not scheduled until April 15th - I think this must be an error because we do not schedule labs and WELLNESS visit that far apart.

## 2019-02-11 NOTE — TELEPHONE ENCOUNTER
----- Message from Lizabeth Alonso sent at 2/11/2019 10:50 AM CST -----  Contact: [phone]/self  Patient calling to let you know there was blood in his catheter  Please call and advise

## 2019-02-18 ENCOUNTER — TELEPHONE (OUTPATIENT)
Dept: GASTROENTEROLOGY | Facility: CLINIC | Age: 54
End: 2019-02-18

## 2019-02-18 NOTE — TELEPHONE ENCOUNTER
Referral was sent from  Brandy Ramirez NP to schedule patient for a Colonoscopy, patient states will call back at a later time.  Patient states that he has an appt scheduled with his PCP on 2/28/19 and would like to wait before scheduling.

## 2019-02-28 ENCOUNTER — PATIENT MESSAGE (OUTPATIENT)
Dept: FAMILY MEDICINE | Facility: CLINIC | Age: 54
End: 2019-02-28

## 2019-02-28 ENCOUNTER — OFFICE VISIT (OUTPATIENT)
Dept: FAMILY MEDICINE | Facility: CLINIC | Age: 54
End: 2019-02-28
Payer: COMMERCIAL

## 2019-02-28 VITALS
DIASTOLIC BLOOD PRESSURE: 72 MMHG | HEART RATE: 83 BPM | WEIGHT: 315 LBS | HEIGHT: 77 IN | TEMPERATURE: 99 F | SYSTOLIC BLOOD PRESSURE: 136 MMHG | OXYGEN SATURATION: 98 % | BODY MASS INDEX: 37.19 KG/M2 | RESPIRATION RATE: 18 BRPM

## 2019-02-28 DIAGNOSIS — Z78.9 SELF-CATHETERIZES URINARY BLADDER: ICD-10-CM

## 2019-02-28 DIAGNOSIS — G47.30 SLEEP APNEA WITH USE OF CONTINUOUS POSITIVE AIRWAY PRESSURE (CPAP): ICD-10-CM

## 2019-02-28 DIAGNOSIS — N40.0 BENIGN NODULAR PROSTATIC HYPERPLASIA WITHOUT LOWER URINARY TRACT SYMPTOMS: ICD-10-CM

## 2019-02-28 DIAGNOSIS — N31.9 NEUROGENIC BLADDER: ICD-10-CM

## 2019-02-28 DIAGNOSIS — G25.81 RLS (RESTLESS LEGS SYNDROME): ICD-10-CM

## 2019-02-28 DIAGNOSIS — R73.01 IMPAIRED FASTING GLUCOSE: ICD-10-CM

## 2019-02-28 DIAGNOSIS — R74.8 ELEVATED LIVER ENZYMES: ICD-10-CM

## 2019-02-28 DIAGNOSIS — Z00.00 ANNUAL PHYSICAL EXAM: Primary | ICD-10-CM

## 2019-02-28 DIAGNOSIS — M25.422 ELBOW EFFUSION, LEFT: ICD-10-CM

## 2019-02-28 DIAGNOSIS — J30.89 SEASONAL ALLERGIC RHINITIS DUE TO OTHER ALLERGIC TRIGGER: ICD-10-CM

## 2019-02-28 DIAGNOSIS — M21.372 FOOT DROP, LEFT FOOT: ICD-10-CM

## 2019-02-28 DIAGNOSIS — E78.5 HYPERLIPIDEMIA, UNSPECIFIED HYPERLIPIDEMIA TYPE: ICD-10-CM

## 2019-02-28 DIAGNOSIS — Z12.11 COLON CANCER SCREENING: ICD-10-CM

## 2019-02-28 DIAGNOSIS — G61.81 CIDP (CHRONIC INFLAMMATORY DEMYELINATING POLYNEUROPATHY): ICD-10-CM

## 2019-02-28 DIAGNOSIS — I10 ESSENTIAL HYPERTENSION: ICD-10-CM

## 2019-02-28 DIAGNOSIS — Z23 NEED FOR TDAP VACCINATION: ICD-10-CM

## 2019-02-28 PROCEDURE — 3078F PR MOST RECENT DIASTOLIC BLOOD PRESSURE < 80 MM HG: ICD-10-PCS | Mod: CPTII,S$GLB,, | Performed by: NURSE PRACTITIONER

## 2019-02-28 PROCEDURE — 3078F DIAST BP <80 MM HG: CPT | Mod: CPTII,S$GLB,, | Performed by: NURSE PRACTITIONER

## 2019-02-28 PROCEDURE — 99999 PR PBB SHADOW E&M-EST. PATIENT-LVL V: CPT | Mod: PBBFAC,,, | Performed by: NURSE PRACTITIONER

## 2019-02-28 PROCEDURE — 90471 TDAP VACCINE GREATER THAN OR EQUAL TO 7YO IM: ICD-10-PCS | Mod: S$GLB,,, | Performed by: NURSE PRACTITIONER

## 2019-02-28 PROCEDURE — 90715 TDAP VACCINE 7 YRS/> IM: CPT | Mod: S$GLB,,, | Performed by: NURSE PRACTITIONER

## 2019-02-28 PROCEDURE — 90471 IMMUNIZATION ADMIN: CPT | Mod: S$GLB,,, | Performed by: NURSE PRACTITIONER

## 2019-02-28 PROCEDURE — 3075F PR MOST RECENT SYSTOLIC BLOOD PRESS GE 130-139MM HG: ICD-10-PCS | Mod: CPTII,S$GLB,, | Performed by: NURSE PRACTITIONER

## 2019-02-28 PROCEDURE — 3075F SYST BP GE 130 - 139MM HG: CPT | Mod: CPTII,S$GLB,, | Performed by: NURSE PRACTITIONER

## 2019-02-28 PROCEDURE — 90715 TDAP VACCINE GREATER THAN OR EQUAL TO 7YO IM: ICD-10-PCS | Mod: S$GLB,,, | Performed by: NURSE PRACTITIONER

## 2019-02-28 PROCEDURE — 99999 PR PBB SHADOW E&M-EST. PATIENT-LVL V: ICD-10-PCS | Mod: PBBFAC,,, | Performed by: NURSE PRACTITIONER

## 2019-02-28 PROCEDURE — 99396 PREV VISIT EST AGE 40-64: CPT | Mod: 25,S$GLB,, | Performed by: NURSE PRACTITIONER

## 2019-02-28 PROCEDURE — 99396 PR PREVENTIVE VISIT,EST,40-64: ICD-10-PCS | Mod: 25,S$GLB,, | Performed by: NURSE PRACTITIONER

## 2019-02-28 NOTE — PROGRESS NOTES
"Subjective:       Patient ID: Manfred Shah is a 53 y.o. male.    Chief Complaint: Annual Exam; Elbow Injury (patient slip and fell out the tub hurt right elbow); and Allergies (sneezing, watery eyes, runny nose)    Patient is a 53-year-old white male with CIDP (chronic inflammatory demyelinating polyneuropathy) diagnosed in June 2018 by neurologist, Dr. Rowdy Tran, left foot drop secondary to CIDP, Abscess of spinal cord being treated by Dr. Rodriguez S/P Laminectomy in August 2018, Neurogenic bladder with urge incontinence and self-catheterization and BPH followed by Dr. Sanderson, RLS, Hypertension, Hyperlipidemia, Impaired Fasting Glucose, Elevated Liver Enzymes and Sleep Apnea with CPAP use that is here today annual physical exam with fasting lab results.     Patient has Hypertension controlled on amlodipine 10 mg daily, losartan /12.5 mg daily, and Bystolic 10 mg daily.  /72 (BP Location: Right arm, Patient Position: Sitting, BP Method: Large (Manual))   Pulse 83   Temp 98.8 °F (37.1 °C) (Oral)   Resp 18   Ht 6' 5" (1.956 m)   Wt (!) 170.2 kg (375 lb 5.3 oz)   SpO2 98%   BMI 44.51 kg/m²     Patient has hyperlipidemia and currently taking Rosuvastatin 10 mg daily.   LDL 83.2.     Patient has impaired fasting glucose since January 2018 with the fasting blood sugar 123 and hemoglobin A1c of 5.5%.  Repeat hemoglobin A1c was 5.6% in May 2018.  FBG is now 112 with HgbA1C of 5.4%.     Patient had elevated liver enzymes noted at past visits.  Patient reports [facility] and he had a liver workup during that hospitalization.  His liver enzyme elevation had resolved with weight loss in October 2018 but are again elevated with 14 pound weight gain over the past 6 months.     Patient has Sleep Apnea with CPAP use and had uvula removed by surgery in past.     Patient has restless leg syndrome controlled on present medication.     CIDP with left foot drop  followed by a neurologist " Dr. Rowdy Tran.       Patient has abscess of spinal cord being treated by Dr. Rodriguez status post laminectomy complication in August 2018. He reports that last MRI in October 2018 showed the area was healing so no further surgical intervention was necessary.    Patient has BPH and neurogenic bladder with urge incontinence and self catheterizes as needed.    Patient complains of sneezing, itchy watery eyes, and runny nose for the past week.  Asking about medicine to help relieve symptoms.    Patient reports he slipped and fell hitting the right elbow in November 2018.  He reports he had a large amount of swelling/fluid to the elbow that resolved after a week or 2.  However, he still has pain when touching the elbow certain way.  He reports he also started working out in the gym and since he was resting his arm on 1 of the pads when he was doing arm curls, he woke up with swelling/fluid again to the elbow for a couple of days.  Patient has a very mild effusion now but is still very tender to touch so will refer to Sports Med to let them evaluate the elbow.      Component      Latest Ref Rng & Units 2/9/2019 10/15/2018 1/9/2018   WBC      3.90 - 12.70 K/uL 5.35  5.13   RBC      4.60 - 6.20 M/uL 4.64  4.67   Hemoglobin      14.0 - 18.0 g/dL 13.5 (L)  13.7 (L)   Hematocrit      40.0 - 54.0 % 40.1  40.2   MCV      82 - 98 fL 86  86   MCH      27.0 - 31.0 pg 29.1  29.3   MCHC      32.0 - 36.0 g/dL 33.7  34.1   RDW      11.5 - 14.5 % 13.6  12.6   Platelets      150 - 350 K/uL 217  251   MPV      9.2 - 12.9 fL 9.8  9.6   Gran # (ANC)      1.8 - 7.7 K/uL 3.0  2.5   Lymph #      1.0 - 4.8 K/uL 1.5  1.8   Mono #      0.3 - 1.0 K/uL 0.4  0.4   Eos #      0.0 - 0.5 K/uL 0.3  0.3   Baso #      0.00 - 0.20 K/uL 0.04  0.07   Gran%      38.0 - 73.0 % 56.8  49.3   Lymph%      18.0 - 48.0 % 28.6  35.9   Mono%      4.0 - 15.0 % 7.5  7.2   Eosinophil%      0.0 - 8.0 % 6.4  6.2   Basophil%      0.0 - 1.9 % 0.7  1.4   Differential Method        Automated  Automated   Sodium      136 - 145 mmol/L 145 140 141   Potassium      3.5 - 5.1 mmol/L 4.0 3.9 3.6   Chloride      95 - 110 mmol/L 108 106 105   CO2      23 - 29 mmol/L 27 26 28   Glucose      70 - 110 mg/dL 112 (H) 103 123 (H)   BUN, Bld      2 - 20 mg/dL 12 12 17   Creatinine      0.50 - 1.40 mg/dL 0.74 0.65 0.72   Calcium      8.7 - 10.5 mg/dL 9.3 9.7 9.2   Total Protein      6.0 - 8.4 g/dL 8.4 7.9 7.4   Albumin      3.5 - 5.2 g/dL 4.3 4.4 4.2   Total Bilirubin      0.1 - 1.0 mg/dL 0.4 0.6 0.5   Alkaline Phosphatase      38 - 126 U/L 68 66 55   AST      15 - 46 U/L 50 (H) 42 48 (H)   ALT      10 - 44 U/L 59 (H) 40 63 (H)   Anion Gap      8 - 16 mmol/L 10 8 8   eGFR if African American      >60 mL/min/1.73 m:2 >60.0 >60.0 >60.0   eGFR if non African American      >60 mL/min/1.73 m:2 >60.0 >60.0 >60.0   Cholesterol      120 - 199 mg/dL 156 129 198   Triglycerides      30 - 150 mg/dL 124 94 179 (H)   HDL      40 - 75 mg/dL 48 41 38 (L)   LDL Cholesterol      63.0 - 159.0 mg/dL 83.2 69.2 124.2   HDL/Chol Ratio      20.0 - 50.0 % 30.8 31.8 19.2 (L)   Total Cholesterol/HDL Ratio      2.0 - 5.0 3.3 3.1 5.2 (H)   Non-HDL Cholesterol      mg/dL 108 88 160   Hemoglobin A1C External      4.0 - 5.6 % 5.4 5.3 5.5   Estimated Avg Glucose      68 - 131 mg/dL 108 105 111   PSA, SCREEN      0.00 - 4.00 ng/mL 0.71  1.1   TSH      0.400 - 4.000 uIU/mL 3.020       Current Outpatient Medications   Medication Sig Dispense Refill    amLODIPine (NORVASC) 10 MG tablet Take 1 tablet (10 mg total) by mouth once daily. 30 tablet 5    aspirin (ECOTRIN) 81 MG EC tablet Take 81 mg by mouth once daily.      fish oil-omega-3 fatty acids 300-1,000 mg capsule Take 2 g by mouth once daily.      ELVIS 0.5-0.4 mg CM24 TAKE ONE CAPSULE BY MOUTH ONCE DAILY 30 capsule 11    losartan-hydrochlorothiazide 100-12.5 mg (HYZAAR) 100-12.5 mg Tab Take 1 tablet by mouth once daily. 30 tablet 5    lysine 500 mg Tab Take 500 mg by mouth once  daily.      nebivolol (BYSTOLIC) 10 MG Tab Take 1 tablet (10 mg total) by mouth once daily. 30 tablet 5    rOPINIRole (REQUIP) 2 MG tablet TAKE 1 TABLET BY MOUTH ONCE DAILY IN THE EVENING 90 tablet 0    rosuvastatin (CRESTOR) 10 MG tablet TAKE ONE TABLET BY MOUTH IN THE EVENING 90 tablet 0     No current facility-administered medications for this visit.        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 Tray Rodriguez    CIDP (chronic inflammatory demyelinating polyneuropathy) 06/2018    followed by Dr. Rowdy Tran - Neurologist in [place].      Elevated liver enzymes 10/8/2018    Elevated PSA     followed by Dr. Sanderson    Foot drop, left foot     due to neurological condition/[facility] [place]    Hyperlipidemia     Hypertension     Impaired fasting glucose     Neurogenic bladder 09/05/2018    followed by Dr. Sanderson    RLS (restless legs syndrome) 10/8/2018    Self-catheterizes urinary bladder     Urologist    Sleep apnea with use of continuous positive airway pressure (CPAP) 01/10/2018    [facility] by [facility] [place]    Urge incontinence 9/11/2018       Past Surgical History:   Procedure Laterality Date    ADENOIDECTOMY      HERNIA REPAIR      LUMBAR LAMINECTOMY  2012    [facility]    LUMBAR LAMINECTOMY  08/2018    Dr. Rodriguez - [facility]; complications from surgery - abscess to spinal cord - discharge summary scanned to media file    PLANTAR FASCIA RELEASE      TONSILLECTOMY      VASECTOMY      X-STOP IMPLANTATION         Family History   Problem Relation Age of Onset    Diabetes Mother     Cancer Mother     Diabetes Father     Cancer Father     Hypertension Father     No Known Problems Sister     Prostate cancer Neg Hx     Kidney disease Neg Hx        Social History     Socioeconomic History    Marital status:      Spouse name: None    Number of children: None    Years of education: None  "   Highest education level: None   Social Needs    Financial resource strain: None    Food insecurity - worry: None    Food insecurity - inability: None    Transportation needs - medical: None    Transportation needs - non-medical: None   Occupational History    Occupation: teacher   Tobacco Use    Smoking status: Never Smoker    Smokeless tobacco: Never Used   Substance and Sexual Activity    Alcohol use: No    Drug use: No    Sexual activity: Yes     Partners: Female   Other Topics Concern    None   Social History Narrative    None       Review of Systems   Constitutional: Negative for activity change and unexpected weight change.   HENT: Positive for postnasal drip, rhinorrhea and sneezing. Negative for hearing loss and trouble swallowing.    Eyes: Positive for itching. Negative for discharge and visual disturbance.   Respiratory: Negative for chest tightness and wheezing.    Cardiovascular: Negative for chest pain and palpitations.   Gastrointestinal: Negative for blood in stool, constipation, diarrhea and vomiting.        Bowel incontinence as well stating that he does not feel when he needs to go but if he goes on scheduled bathroom breaks, he tends to do well.   Endocrine: Negative for polydipsia and polyuria.   Genitourinary: Positive for difficulty urinating and urgency. Negative for hematuria.        Self-catheterizes due to neurogenic bladder   Musculoskeletal: Positive for arthralgias, back pain and joint swelling. Negative for neck pain.   Neurological: Positive for weakness and numbness. Negative for headaches.        Left foot drop due to CIDP   Psychiatric/Behavioral: Negative for confusion and dysphoric mood.         Objective:     Vitals:    02/28/19 1518   BP: 136/72   BP Location: Right arm   Patient Position: Sitting   BP Method: Large (Manual)   Pulse: 83   Resp: 18   Temp: 98.8 °F (37.1 °C)   TempSrc: Oral   SpO2: 98%   Weight: (!) 170.2 kg (375 lb 5.3 oz)   Height: 6' 5" (1.956 " m)          Physical Exam   Constitutional: He is oriented to person, place, and time. He appears well-developed. No distress.   HENT:   Head: Normocephalic and atraumatic.   Nose: Mucosal edema and rhinorrhea present.   Mouth/Throat: Oropharynx is clear and moist. No oropharyngeal exudate.   Uvula removed by previous surgery for sleep apnea   Eyes: Conjunctivae and EOM are normal. Pupils are equal, round, and reactive to light. No scleral icterus.   Neck: Normal range of motion. Neck supple. No JVD present.   Cardiovascular: Normal rate and regular rhythm.   Pulmonary/Chest: Effort normal and breath sounds normal. No respiratory distress.   Abdominal: Soft. Bowel sounds are normal. He exhibits no distension.   Musculoskeletal: He exhibits no edema.        Arms:  Ambulates with walker secondary to left foot drop due to CIDP but since he has foot drop BRACE in place he is much more ambulatory.    Tenderness directly over the right elbow with mild crepitus present.  Patient has a very mild effusion the the elbow as well.  Tenderness on palpation   Neurological: He is alert and oriented to person, place, and time.   Skin: Skin is warm and dry. He is not diaphoretic.   Psychiatric: He has a normal mood and affect.         Assessment:         ICD-10-CM ICD-9-CM   1. Annual physical exam Z00.00 V70.0   2. Essential hypertension I10 401.9   3. Impaired fasting glucose R73.01 790.21   4. Hyperlipidemia, unspecified hyperlipidemia type E78.5 272.4   5. Elevated liver enzymes R74.8 790.5   6. CIDP (chronic inflammatory demyelinating polyneuropathy) G61.81 357.81   7. Foot drop, left foot M21.372 736.79   8. Benign nodular prostatic hyperplasia without lower urinary tract symptoms N40.0 600.10   9. Self-catheterizes urinary bladder Z78.9 V49.89   10. Neurogenic bladder N31.9 596.54   11. Sleep apnea with use of continuous positive airway pressure (CPAP) G47.30 327.23   12. RLS (restless legs syndrome) G25.81 333.94   13. Colon  cancer screening Z12.11 V76.51   14. Need for Tdap vaccination Z23 V06.1   15. Seasonal allergic rhinitis due to other allergic trigger J30.89 477.8   16. Elbow effusion, left M25.422 719.02       Plan:       Annual physical exam  - Tdap today  -  Elects to have colonoscopy and will call [facility] endoscopy to schedule.    Essential hypertension  =  controlled on present medications.  Will send refill request when needed.  Follow-up in 6 months.    Impaired fasting glucose  -  work on weight loss and decrease in sugars in diet.  Will recheck in 6 months  -     Comprehensive metabolic panel; Future; Expected date: 02/28/2019  -     Hemoglobin A1c; Future; Expected date: 02/28/2019    Hyperlipidemia, unspecified hyperlipidemia type  -  levels are much improved on present medication.  Recheck in 6 months  -     Lipid panel; Future; Expected date: 02/28/2019    Elevated liver enzymes  -  follow a low-fat diet with weight loss    CIDP (chronic inflammatory demyelinating polyneuropathy)  -  keep follow-up with neurologist    Foot drop, left foot  -  keep follow-up with neurologist  Benign nodular prostatic hyperplasia without lower urinary tract symptoms    Self-catheterizes urinary bladder  -  followed by Dr. Sanderson urology    Neurogenic bladder    Sleep apnea with use of continuous positive airway pressure (CPAP)    RLS (restless legs syndrome)    Colon cancer screening    Need for Tdap vaccination  -     Tdap Vaccine    Seasonal allergic rhinitis due to other allergic trigger  -  Use Flonase nasal spray and OTC ZYRTEC OR CLARITIN    Elbow effusion, left  -  Referral to sports med MD, Dr. Galeas for evaluation and treatment.  -     Ambulatory Referral to Sports Medicine      Follow-up in about 6 months (around 8/28/2019) for fasting labs and office visit.     Patient's Medications   New Prescriptions    No medications on file   Previous Medications    AMLODIPINE (NORVASC) 10 MG TABLET    Take 1 tablet (10 mg total) by mouth  once daily.    ASPIRIN (ECOTRIN) 81 MG EC TABLET    Take 81 mg by mouth once daily.    FISH OIL-OMEGA-3 FATTY ACIDS 300-1,000 MG CAPSULE    Take 2 g by mouth once daily.    ELVIS 0.5-0.4 MG CM24    TAKE ONE CAPSULE BY MOUTH ONCE DAILY    LOSARTAN-HYDROCHLOROTHIAZIDE 100-12.5 MG (HYZAAR) 100-12.5 MG TAB    Take 1 tablet by mouth once daily.    LYSINE 500 MG TAB    Take 500 mg by mouth once daily.    NEBIVOLOL (BYSTOLIC) 10 MG TAB    Take 1 tablet (10 mg total) by mouth once daily.    ROPINIROLE (REQUIP) 2 MG TABLET    TAKE 1 TABLET BY MOUTH ONCE DAILY IN THE EVENING    ROSUVASTATIN (CRESTOR) 10 MG TABLET    TAKE ONE TABLET BY MOUTH IN THE EVENING   Modified Medications    No medications on file   Discontinued Medications    No medications on file

## 2019-03-06 ENCOUNTER — TELEPHONE (OUTPATIENT)
Dept: GASTROENTEROLOGY | Facility: CLINIC | Age: 54
End: 2019-03-06

## 2019-03-18 DIAGNOSIS — I10 BENIGN ESSENTIAL HTN: ICD-10-CM

## 2019-03-18 RX ORDER — LOSARTAN POTASSIUM AND HYDROCHLOROTHIAZIDE 12.5; 1 MG/1; MG/1
1 TABLET ORAL DAILY
Qty: 90 TABLET | Refills: 1 | Status: SHIPPED | OUTPATIENT
Start: 2019-03-18 | End: 2019-06-25 | Stop reason: SDUPTHER

## 2019-03-25 DIAGNOSIS — M25.522 LEFT ELBOW PAIN: Primary | ICD-10-CM

## 2019-03-25 DIAGNOSIS — N40.0 BENIGN NODULAR PROSTATIC HYPERPLASIA WITHOUT LOWER URINARY TRACT SYMPTOMS: ICD-10-CM

## 2019-03-25 RX ORDER — DUTASTERIDE AND TAMSULOSIN HYDROCHLORIDE .5; .4 MG/1; MG/1
CAPSULE ORAL
Qty: 30 CAPSULE | Refills: 11 | Status: SHIPPED | OUTPATIENT
Start: 2019-03-25 | End: 2020-03-17

## 2019-03-25 NOTE — PROGRESS NOTES
"CC: right elbow pain    53 y.o. Male presents today for evaluation of his right elbow pain.   How long: Patient reports in November 2018 he slipped an fell on his elbow. He reports the posterior aspect of his elbow will swell up sometimes after he exercises. Patient states that rest and when lifting weights he has no pain. He reports "fluid on his elbow" since the fall that has occasionally gotten bigger and smaller on its own. He states he only has pain when apply direct pressure to the elbow or when he bumps it on something.  He states it feels like a "brusie."  What makes it better: Patient reports he feels better when nothing touches his elbow.  What makes it worse: Patient reports he feels the worst when leaning on his elbow.   Does it radiate: Patient denies any radiating symptoms.   Attempted treatments: Patient has not attempted any treatments. He did ice initially after the fall but has not since.   Pain score: Patient reports his pain is 0/10. When something hits his elbow or he rolls on it in his sleep the pain is 10/10.   Any mechanical symptoms: Patient denies any mechanical symptoms.  Feelings of instability: Patient denies any instability  Problems with ADLs: Patient denies any affect on his ADLs.     REVIEW OF SYSTEMS:   Constitution: Patient denies fever, chills, night sweats, and weight changes.  Eyes: Patient denies eye pain or vision changes.  HENT: Patient denies headache, ear pain, sore throat, or nasal discharge.  CVS: Patient denies chest pain.  Lungs: Patient denies shortness of breath or cough.  Abd: Patient denies stomach pain, nausea, or vomiting.  Skin: Patient denies skin rash or itching.    Hematologic/Lymphatic: Patient denies easy bruising.   Musculoskeletal: Patient denies recent falls. See HPI.  Psych: Patient denies any current anxiety or nervousness.    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 Tray Rodriguez    CIDP (chronic inflammatory demyelinating polyneuropathy) 06/2018    followed by Dr. Rowdy Tran - Neurologist in [place].      Elevated liver enzymes 10/8/2018    Elevated PSA     followed by Dr. Sanderson    Foot drop, left foot     due to neurological condition/[facility] [place]    Hyperlipidemia     Hypertension     Impaired fasting glucose     Neurogenic bladder 09/05/2018    followed by Dr. Sanderson    RLS (restless legs syndrome) 10/8/2018    Self-catheterizes urinary bladder     Urologist    Sleep apnea with use of continuous positive airway pressure (CPAP) 01/10/2018    followed by [facility] in [place]    Urge incontinence 9/11/2018       PAST SURGICAL HISTORY:   Past Surgical History:   Procedure Laterality Date    ADENOIDECTOMY      HERNIA REPAIR      LUMBAR LAMINECTOMY  2012    [facility]    LUMBAR LAMINECTOMY  08/2018    Dr. Rodriguez - [facility]; complications from surgery - abscess to spinal cord - discharge summary scanned to media file    PLANTAR FASCIA RELEASE      TONSILLECTOMY      VASECTOMY      X-STOP IMPLANTATION         FAMILY HISTORY:   Family History   Problem Relation Age of Onset    Diabetes Mother     Cancer Mother     Diabetes Father     Cancer Father     Hypertension Father     No Known Problems Sister     Prostate cancer Neg Hx     Kidney disease Neg Hx        SOCIAL HISTORY:   Social History     Socioeconomic History    Marital status:      Spouse name: Not on file    Number of children: Not on file    Years of education: Not on file    Highest education level: Not on file   Occupational History    Occupation: teacher   Social Needs    Financial resource strain: Not on file    Food insecurity:     Worry: Not on file     Inability: Not on file    Transportation needs:     Medical: Not on file     Non-medical: Not on file   Tobacco Use    Smoking status: Never Smoker    Smokeless tobacco: Never Used  "  Substance and Sexual Activity    Alcohol use: No    Drug use: No    Sexual activity: Yes     Partners: Female   Lifestyle    Physical activity:     Days per week: Not on file     Minutes per session: Not on file    Stress: Not on file   Relationships    Social connections:     Talks on phone: Not on file     Gets together: Not on file     Attends Sikh service: Not on file     Active member of club or organization: Not on file     Attends meetings of clubs or organizations: Not on file     Relationship status: Not on file    Intimate partner violence:     Fear of current or ex partner: Not on file     Emotionally abused: Not on file     Physically abused: Not on file     Forced sexual activity: Not on file   Other Topics Concern    Not on file   Social History Narrative    Not on file       MEDICATIONS:     Current Outpatient Medications:     amLODIPine (NORVASC) 10 MG tablet, Take 1 tablet (10 mg total) by mouth once daily., Disp: 30 tablet, Rfl: 5    aspirin (ECOTRIN) 81 MG EC tablet, Take 81 mg by mouth once daily., Disp: , Rfl:     fish oil-omega-3 fatty acids 300-1,000 mg capsule, Take 2 g by mouth once daily., Disp: , Rfl:     ELVIS 0.5-0.4 mg CM24, TAKE 1 CAPSULE BY MOUTH ONCE DAILY, Disp: 30 capsule, Rfl: 11    losartan-hydrochlorothiazide 100-12.5 mg (HYZAAR) 100-12.5 mg Tab, TAKE 1 TABLET BY MOUTH ONCE DAILY, Disp: 90 tablet, Rfl: 1    lysine 500 mg Tab, Take 500 mg by mouth once daily., Disp: , Rfl:     nebivolol (BYSTOLIC) 10 MG Tab, Take 1 tablet (10 mg total) by mouth once daily., Disp: 30 tablet, Rfl: 5    rOPINIRole (REQUIP) 2 MG tablet, TAKE 1 TABLET BY MOUTH ONCE DAILY IN THE EVENING, Disp: 90 tablet, Rfl: 0    rosuvastatin (CRESTOR) 10 MG tablet, TAKE ONE TABLET BY MOUTH IN THE EVENING, Disp: 90 tablet, Rfl: 0    ALLERGIES:   Review of patient's allergies indicates:  No Known Allergies     PHYSICAL EXAMINATION:  Ht 6' 5" (1.956 m)   Wt (!) 165.6 kg (365 lb)   BMI 43.28 " kg/m²   Vitals signs and nursing note have been reviewed.  General: In no acute distress, well developed, well nourished, no diaphoresis  Eyes: EOM full and smooth, no eye redness or discharge  HENT: normocephalic and atraumatic, neck supple, trachea midline, no nasal discharge, no external ear redness or discharge  Cardiovascular: no LE edema  Lungs: respirations non-labored, no conversational dyspnea   Abd: non-distended, no rigidity  MSK: no amputation or deformity, no swelling of extremities  Neuro: AAOx3, CN2-12 grossly intact  Skin: No rashes, warm and dry  Psychiatric: cooperative, pleasant, mood and affect appropriate for age    Elbow: LEFT   The affected elbow is compared to the contralateral elbow.    Observation:    There is no edema, erythema, or ecchymosis.  There is no obvious muscle atrophy, hypertonicity, or hypotonicity of arm muscles.  There is no abnormal carrying angle or gunstock deformity noted.  Obvious swelling at the posterior aspect of the elbow that is nontender to palpation consistent with olecranon bursitis    ROM:  Active flexion to 150° on left and 150° on right.    Active extension to 0° on left and 0° on right without hyperextension.   Active pronation to 80° on left and 80° on right.    Active supination to 80° on left and 80° on right.    Active radial deviation to 20° on left and 20° on right.    Active ulnar deviation to 30° on left and 30° on right.    Tenderness To Palpation:  No tenderness at the medial epicondyle or lateral epicondyle.  Minimal tenderness at the olecranon at the enlarged olecranon bursa.  The bursa is mobile and is nonpainful with motion.  No tenderness at the distal humerus or proximal radius/ulna.  No tenderness at the radial head.  No tenderness over the ulnar and radial collateral ligaments.  No tenderness over the posterior interosseous nerve or distal biceps tendon.    Strength Testing:  Deltoid - 5/5 on left and 5/5 on right  Biceps - 5/5 on left and  5/5 on right  Triceps - 5/5 on left and 5/5 on right  Wrist extension - 5/5 on left and 5/5 on right  Wrist flexion - 5/5 on left and 5/5 on right   - 5/5 on left and 5/5 on right  Finger extension - 5/5 on left and 5/5 on right  Finger abduction - 5/5 on left and 5/5 on right    Special Tests:  Resisted supination - negative  Resisted pronation - negative  Resisted wrist extension (Cozen's test) - negative  Resisted wrist flexion - negative    Neurovascular Exam:  2+ radial pulses BL.  Sensation to light touch intact in the distal median, radial, and ulnar nerve distributions bilaterally.  Negative Tinnels at cubital tunnel.    IMAGIN. X-ray ordered due to left elbow pain.   2. X-ray images were reviewed personally by me and then directly with patient.  3. FINDINGS: X-ray images obtained demonstrate no cortical irregularities, sclerosis, or subchonral cysts. There is no joint space narrowing. Osteophytes are appreciated at the olecranon and medial and lateral epicondyles.  4. IMPRESSION:  As above.      ASSESSMENT:      ICD-10-CM ICD-9-CM   1. Right elbow pain M25.521 719.42   2. Olecranon bursitis of right elbow M70.21 726.33       PLAN:  1-2.  Right elbow pain/olecranon bursitis -     - Manfred fell and bumped his elbow in November/December of .  Since then, he has had swelling in the posterior aspect of his elbow that is painful when he bumps it on objects.  He denies any range of motion deficits and otherwise is fully functional if he does not bump it on anything.  He is concerned that there is potentially an old fracture from the fall and he would like that evaluated today.    - XRs ordered in the office today and images were personally reviewed with the patient. See above for further detail.    - I discussed olecranon bursitis management options, including padding/protecting it while active her working, anti-inflammatories, and icing with compression.  He expressed understanding and will be  doing.    - I advised against an attempted aspiration of the enlarged bursa due to high risk of infection from this procedure and since it really does not bother him during his day-to-day life. This is also traumatic in nature and not gouty or septic bursitis so we are able to watch this without needing to do any further action.  He is in agreement and expresses understanding.      Future planning includes - no further action needed at this time.  Follow-up as needed.    All questions were answered to the best of my ability and all concerns were addressed at this time.    Follow up as needed

## 2019-03-26 ENCOUNTER — OFFICE VISIT (OUTPATIENT)
Dept: ORTHOPEDICS | Facility: CLINIC | Age: 54
End: 2019-03-26
Payer: COMMERCIAL

## 2019-03-26 VITALS — BODY MASS INDEX: 37.19 KG/M2 | HEIGHT: 77 IN | WEIGHT: 315 LBS

## 2019-03-26 DIAGNOSIS — M25.521 RIGHT ELBOW PAIN: Primary | ICD-10-CM

## 2019-03-26 DIAGNOSIS — M70.21 OLECRANON BURSITIS OF RIGHT ELBOW: ICD-10-CM

## 2019-03-26 PROCEDURE — 99999 PR PBB SHADOW E&M-EST. PATIENT-LVL II: ICD-10-PCS | Mod: PBBFAC,,, | Performed by: ORTHOPAEDIC SURGERY

## 2019-03-26 PROCEDURE — 99999 PR PBB SHADOW E&M-EST. PATIENT-LVL II: CPT | Mod: PBBFAC,,, | Performed by: ORTHOPAEDIC SURGERY

## 2019-03-26 PROCEDURE — 99204 OFFICE O/P NEW MOD 45 MIN: CPT | Mod: S$GLB,,, | Performed by: ORTHOPAEDIC SURGERY

## 2019-03-26 PROCEDURE — 3008F BODY MASS INDEX DOCD: CPT | Mod: CPTII,S$GLB,, | Performed by: ORTHOPAEDIC SURGERY

## 2019-03-26 PROCEDURE — 3008F PR BODY MASS INDEX (BMI) DOCUMENTED: ICD-10-PCS | Mod: CPTII,S$GLB,, | Performed by: ORTHOPAEDIC SURGERY

## 2019-03-26 PROCEDURE — 99204 PR OFFICE/OUTPT VISIT, NEW, LEVL IV, 45-59 MIN: ICD-10-PCS | Mod: S$GLB,,, | Performed by: ORTHOPAEDIC SURGERY

## 2019-03-26 NOTE — LETTER
March 26, 2019      [place]  [address]  [address]  [facility] [address]           [facility]  [address]  [facility] [address]  Phone: [phone]  Fax: [phone]          Patient: Manfred Shah   MR Number: [id number]   YOB: 1965   Date of Visit: 3/26/2019       Dear Brandy Ramirez:    Thank you for referring Manfred Shah to me for evaluation. Attached you will find relevant portions of my assessment and plan of care.    If you have questions, please do not hesitate to call me. I look forward to following Manfred Shah along with you.    Sincerely,    Maurizio Galeas, DO    Enclosure  CC:  No Recipients    If you would like to receive this communication electronically, please contact [email or web address] or [phone] to request more information on [facility] Link access.    For providers and/or their staff who would like to refer a patient to [facility], please contact us through our one-stop-shop provider referral line, [facility] , at [phone].    If you feel you have received this communication in error or would no longer like to receive these types of communications, please e-mail [email or web address]

## 2019-04-15 RX ORDER — NEBIVOLOL HYDROCHLORIDE 10 MG/1
TABLET ORAL
Qty: 90 TABLET | Refills: 1 | Status: SHIPPED | OUTPATIENT
Start: 2019-04-15 | End: 2019-07-16 | Stop reason: CLARIF

## 2019-04-17 ENCOUNTER — TELEPHONE (OUTPATIENT)
Dept: GASTROENTEROLOGY | Facility: CLINIC | Age: 54
End: 2019-04-17

## 2019-04-17 NOTE — TELEPHONE ENCOUNTER
[facility]  [address] [place] [address]    Date: 4/17/19    Dear: Manfred Shah     An order for the following procedure(s) Colonoscopy  was placed for you by Brandy Ramirez, NP    Please call the number listed to schedule this procedure or cancel the order.    If you have already scheduled this appointment, please disregard this letter.      Sincerely,    [facility]  [phone]

## 2019-04-25 DIAGNOSIS — I10 BENIGN ESSENTIAL HTN: ICD-10-CM

## 2019-04-26 RX ORDER — AMLODIPINE BESYLATE 10 MG/1
TABLET ORAL
Qty: 90 TABLET | Refills: 1 | Status: SHIPPED | OUTPATIENT
Start: 2019-04-26 | End: 2019-10-23 | Stop reason: SDUPTHER

## 2019-05-08 DIAGNOSIS — E78.5 HYPERLIPIDEMIA, UNSPECIFIED HYPERLIPIDEMIA TYPE: ICD-10-CM

## 2019-05-09 RX ORDER — ROSUVASTATIN CALCIUM 10 MG/1
TABLET, COATED ORAL
Qty: 90 TABLET | Refills: 0 | Status: SHIPPED | OUTPATIENT
Start: 2019-05-09 | End: 2019-08-02 | Stop reason: SDUPTHER

## 2019-05-23 DIAGNOSIS — G25.81 RESTLESS LEG SYNDROME: ICD-10-CM

## 2019-05-24 RX ORDER — ROPINIROLE 2 MG/1
TABLET, FILM COATED ORAL
Qty: 90 TABLET | Refills: 0 | Status: SHIPPED | OUTPATIENT
Start: 2019-05-24 | End: 2019-08-28 | Stop reason: SDUPTHER

## 2019-06-24 ENCOUNTER — TELEPHONE (OUTPATIENT)
Dept: FAMILY MEDICINE | Facility: CLINIC | Age: 54
End: 2019-06-24

## 2019-06-24 DIAGNOSIS — I10 BENIGN ESSENTIAL HTN: ICD-10-CM

## 2019-06-24 NOTE — TELEPHONE ENCOUNTER
[facility] would like to split medication Losartan/HCTZ because combo is on back order. Please advise.

## 2019-06-25 RX ORDER — LOSARTAN POTASSIUM AND HYDROCHLOROTHIAZIDE 12.5; 1 MG/1; MG/1
1 TABLET ORAL DAILY
Qty: 90 TABLET | Refills: 1 | Status: SHIPPED | OUTPATIENT
Start: 2019-06-25 | End: 2019-11-12 | Stop reason: RX

## 2019-06-25 NOTE — TELEPHONE ENCOUNTER
I sent a new prescription for medication to [facility].  Call [facility] pharmacy and advise pharmacist to cancel the prescription as we have sent to another pharmacy that had medication in stock

## 2019-06-25 NOTE — TELEPHONE ENCOUNTER
Patient would like to have prescription sent to [facility] in [place]. Called [facility] to make sure they have prescription in stock, pharmacist confirmed it is. Please advise.

## 2019-06-25 NOTE — TELEPHONE ENCOUNTER
call the patient and make sure he is aware that they are splitting medication so he will need to take both pills and see if he is okay with this OR does he want me to send prescription to a different pharmacy?  If he is okay with them splitting meds - call pharmacy and okay.

## 2019-07-16 ENCOUNTER — TELEPHONE (OUTPATIENT)
Dept: FAMILY MEDICINE | Facility: CLINIC | Age: 54
End: 2019-07-16

## 2019-07-16 RX ORDER — METOPROLOL SUCCINATE 50 MG/1
50 TABLET, EXTENDED RELEASE ORAL DAILY
Qty: 90 TABLET | Refills: 0 | Status: SHIPPED | OUTPATIENT
Start: 2019-07-16 | End: 2019-10-23 | Stop reason: SDUPTHER

## 2019-07-16 NOTE — TELEPHONE ENCOUNTER
Spoke with pharmacy was told his insurance is longer covering Bystolic the alternative will be Metoprolol or Propanolol.

## 2019-07-16 NOTE — TELEPHONE ENCOUNTER
----- Message from Raiza Garg sent at 7/16/2019  2:31 PM CDT -----  Contact: [phone]-self  Patient called stating his medication  BYSTOLIC 10 mg Tab   is not being covered by insurance. Please call.

## 2019-07-16 NOTE — TELEPHONE ENCOUNTER
Called pt to inform him that Brandy is changing is medication to metoprolol, which is covered by his insurance. Also informed pt that Brandy would like him to keep his scheduled follow up appt with her. Pt verbalized understanding.

## 2019-07-16 NOTE — TELEPHONE ENCOUNTER
Please call patient and inform him that his pharmacy called me that his insurance will no longer cover the Bystolic medication for blood pressure.  I will change the Bystolic 10 mg daily to Metoprolol XL 50 mg daily.  Keep his follow up in August that is already scheduled to recheck blood pressure on new medication.

## 2019-07-16 NOTE — TELEPHONE ENCOUNTER
Called pt to inform him that Branyd is changing is medication to metoprolol, which is covered by his insurance. Also informed pt that Brandy would like him to keep his scheduled follow up appt with her. Pt verbalized understanding.

## 2019-08-02 DIAGNOSIS — E78.5 HYPERLIPIDEMIA, UNSPECIFIED HYPERLIPIDEMIA TYPE: ICD-10-CM

## 2019-08-02 RX ORDER — ROSUVASTATIN CALCIUM 10 MG/1
TABLET, COATED ORAL
Qty: 90 TABLET | Refills: 0 | Status: SHIPPED | OUTPATIENT
Start: 2019-08-02 | End: 2020-01-22 | Stop reason: SDUPTHER

## 2019-08-28 DIAGNOSIS — G25.81 RESTLESS LEG SYNDROME: ICD-10-CM

## 2019-08-29 RX ORDER — ROPINIROLE 2 MG/1
TABLET, FILM COATED ORAL
Qty: 90 TABLET | Refills: 0 | Status: SHIPPED | OUTPATIENT
Start: 2019-08-29 | End: 2019-11-12 | Stop reason: SDUPTHER

## 2019-10-23 DIAGNOSIS — I10 BENIGN ESSENTIAL HTN: ICD-10-CM

## 2019-10-24 RX ORDER — METOPROLOL SUCCINATE 50 MG/1
TABLET, EXTENDED RELEASE ORAL
Qty: 30 TABLET | Refills: 0 | Status: SHIPPED | OUTPATIENT
Start: 2019-10-24 | End: 2019-11-12 | Stop reason: SDUPTHER

## 2019-10-24 RX ORDER — AMLODIPINE BESYLATE 10 MG/1
TABLET ORAL
Qty: 30 TABLET | Refills: 0 | Status: SHIPPED | OUTPATIENT
Start: 2019-10-24 | End: 2019-11-12 | Stop reason: SDUPTHER

## 2019-10-24 NOTE — TELEPHONE ENCOUNTER
Left message for patient to call office, per Brandy states patient need to been seen before or on 11/22.

## 2019-10-24 NOTE — TELEPHONE ENCOUNTER
Advise patient that he has to follow up within the next 2 to 4 weeks as he was supposed to come back since AUGUST so December appointment is NOT ACCEPTABLE - reschedule within the next 4 weeks or he can choose to see another provider.

## 2019-10-24 NOTE — TELEPHONE ENCOUNTER
Spoke with patient appt schedule to see Brandy on 11/12, patient is aware to have labs done prior to appt.

## 2019-10-24 NOTE — TELEPHONE ENCOUNTER
Advise patient that he was supposed have follow up with fasting labs FIRST and then office visit since AUGUST and was noncompliant with follow up.  I filled medications for 1 month only - MUST have fasting labs first and then visit for lab results within the next 2 to 4 weeks or future refills will be declined.

## 2019-11-12 ENCOUNTER — OFFICE VISIT (OUTPATIENT)
Dept: FAMILY MEDICINE | Facility: CLINIC | Age: 54
End: 2019-11-12
Payer: COMMERCIAL

## 2019-11-12 VITALS
RESPIRATION RATE: 18 BRPM | HEART RATE: 83 BPM | TEMPERATURE: 98 F | OXYGEN SATURATION: 98 % | SYSTOLIC BLOOD PRESSURE: 152 MMHG | DIASTOLIC BLOOD PRESSURE: 92 MMHG | WEIGHT: 315 LBS | BODY MASS INDEX: 37.19 KG/M2 | HEIGHT: 77 IN

## 2019-11-12 DIAGNOSIS — M15.1 HEBERDEN'S NODES (WITH ARTHROPATHY): ICD-10-CM

## 2019-11-12 DIAGNOSIS — E78.5 HYPERLIPIDEMIA, UNSPECIFIED HYPERLIPIDEMIA TYPE: ICD-10-CM

## 2019-11-12 DIAGNOSIS — G25.81 RLS (RESTLESS LEGS SYNDROME): ICD-10-CM

## 2019-11-12 DIAGNOSIS — Z82.61 FAMILY HISTORY OF RHEUMATOID ARTHRITIS: ICD-10-CM

## 2019-11-12 DIAGNOSIS — R74.8 ELEVATED LIVER ENZYMES: ICD-10-CM

## 2019-11-12 DIAGNOSIS — Z12.11 COLON CANCER SCREENING: ICD-10-CM

## 2019-11-12 DIAGNOSIS — N31.9 NEUROGENIC BLADDER: ICD-10-CM

## 2019-11-12 DIAGNOSIS — R73.03 PREDIABETES: ICD-10-CM

## 2019-11-12 DIAGNOSIS — Z12.5 PROSTATE CANCER SCREENING: ICD-10-CM

## 2019-11-12 DIAGNOSIS — G47.30 SLEEP APNEA WITH USE OF CONTINUOUS POSITIVE AIRWAY PRESSURE (CPAP): ICD-10-CM

## 2019-11-12 DIAGNOSIS — Z78.9 SELF-CATHETERIZES URINARY BLADDER: ICD-10-CM

## 2019-11-12 DIAGNOSIS — N40.0 BENIGN NODULAR PROSTATIC HYPERPLASIA WITHOUT LOWER URINARY TRACT SYMPTOMS: ICD-10-CM

## 2019-11-12 DIAGNOSIS — I10 ESSENTIAL HYPERTENSION: Primary | ICD-10-CM

## 2019-11-12 DIAGNOSIS — Z13.0 SCREENING FOR DEFICIENCY ANEMIA: ICD-10-CM

## 2019-11-12 DIAGNOSIS — G61.81 CIDP (CHRONIC INFLAMMATORY DEMYELINATING POLYNEUROPATHY): ICD-10-CM

## 2019-11-12 DIAGNOSIS — Z13.29 THYROID DISORDER SCREEN: ICD-10-CM

## 2019-11-12 DIAGNOSIS — R73.01 IMPAIRED FASTING GLUCOSE: ICD-10-CM

## 2019-11-12 DIAGNOSIS — M21.372 FOOT DROP, LEFT FOOT: ICD-10-CM

## 2019-11-12 PROCEDURE — 99214 PR OFFICE/OUTPT VISIT, EST, LEVL IV, 30-39 MIN: ICD-10-PCS | Mod: S$GLB,,, | Performed by: NURSE PRACTITIONER

## 2019-11-12 PROCEDURE — 99999 PR PBB SHADOW E&M-EST. PATIENT-LVL V: ICD-10-PCS | Mod: PBBFAC,,, | Performed by: NURSE PRACTITIONER

## 2019-11-12 PROCEDURE — 3077F SYST BP >= 140 MM HG: CPT | Mod: CPTII,S$GLB,, | Performed by: NURSE PRACTITIONER

## 2019-11-12 PROCEDURE — 3008F PR BODY MASS INDEX (BMI) DOCUMENTED: ICD-10-PCS | Mod: CPTII,S$GLB,, | Performed by: NURSE PRACTITIONER

## 2019-11-12 PROCEDURE — 99214 OFFICE O/P EST MOD 30 MIN: CPT | Mod: S$GLB,,, | Performed by: NURSE PRACTITIONER

## 2019-11-12 PROCEDURE — 3080F DIAST BP >= 90 MM HG: CPT | Mod: CPTII,S$GLB,, | Performed by: NURSE PRACTITIONER

## 2019-11-12 PROCEDURE — 3008F BODY MASS INDEX DOCD: CPT | Mod: CPTII,S$GLB,, | Performed by: NURSE PRACTITIONER

## 2019-11-12 PROCEDURE — 3080F PR MOST RECENT DIASTOLIC BLOOD PRESSURE >= 90 MM HG: ICD-10-PCS | Mod: CPTII,S$GLB,, | Performed by: NURSE PRACTITIONER

## 2019-11-12 PROCEDURE — 3077F PR MOST RECENT SYSTOLIC BLOOD PRESSURE >= 140 MM HG: ICD-10-PCS | Mod: CPTII,S$GLB,, | Performed by: NURSE PRACTITIONER

## 2019-11-12 PROCEDURE — 99999 PR PBB SHADOW E&M-EST. PATIENT-LVL V: CPT | Mod: PBBFAC,,, | Performed by: NURSE PRACTITIONER

## 2019-11-12 RX ORDER — ROPINIROLE 2 MG/1
2 TABLET, FILM COATED ORAL NIGHTLY
Qty: 90 TABLET | Refills: 1 | Status: SHIPPED | OUTPATIENT
Start: 2019-11-12 | End: 2020-03-02

## 2019-11-12 RX ORDER — HYDROCHLOROTHIAZIDE 12.5 MG/1
TABLET ORAL
Refills: 0 | COMMUNITY
Start: 2019-10-14 | End: 2019-11-12 | Stop reason: ALTCHOICE

## 2019-11-12 RX ORDER — METOPROLOL SUCCINATE 50 MG/1
50 TABLET, EXTENDED RELEASE ORAL DAILY
Qty: 30 TABLET | Refills: 1 | Status: SHIPPED | OUTPATIENT
Start: 2019-11-12 | End: 2019-12-10 | Stop reason: SDUPTHER

## 2019-11-12 RX ORDER — LOSARTAN POTASSIUM 100 MG/1
TABLET ORAL
Refills: 0 | COMMUNITY
Start: 2019-10-14 | End: 2019-11-12 | Stop reason: ALTCHOICE

## 2019-11-12 RX ORDER — AMLODIPINE BESYLATE 10 MG/1
10 TABLET ORAL DAILY
Qty: 30 TABLET | Refills: 1 | Status: SHIPPED | OUTPATIENT
Start: 2019-11-12 | End: 2020-01-22 | Stop reason: SDUPTHER

## 2019-11-12 RX ORDER — VALSARTAN AND HYDROCHLOROTHIAZIDE 320; 12.5 MG/1; MG/1
1 TABLET, FILM COATED ORAL DAILY
Qty: 30 TABLET | Refills: 1 | Status: SHIPPED | OUTPATIENT
Start: 2019-11-12 | End: 2020-01-06

## 2019-11-12 NOTE — PROGRESS NOTES
"Subjective:       Patient ID: Manfred Shah is a 54 y.o. male.    Chief Complaint: Follow-up (6 months F/U)    Patient is a 54-year-old white male with CIDP (chronic inflammatory demyelinating polyneuropathy) diagnosed in June 2018 by neurologist, Dr. Rowdy Tran, left foot drop secondary to CIDP, history of Abscess of spinal cord treated by Dr. Rodriguez S/P Laminectomy in August 2018, Neurogenic bladder with urge incontinence and self-catheterization and BPH followed by Dr. Sanderson, RLS, Hypertension, Hyperlipidemia, Impaired Fasting Glucose, Elevated Liver Enzymes and Sleep Apnea with CPAP use that is here today follow up with fasting lab results. Patient also complains of joint inflammation to bilateral hands with arthralgia and family history of RA - father.     Patient has Hypertension that was controlled on amlodipine 10 mg daily, losartan /12.5 mg daily, and Bystolic 10 mg daily HOWEVER the insurance stopped covering Bystolic 10 mg in July 2019.  We stopped the Bystolic and changed patient to Metoprolol XL 50 mg daily and patient was supposed to follow up since August but due to some cancelled appointments - did not make follow up until this time.  Blood pressure is now UNCONTROLLED on Losartan /12.5 mg daily, Amlodipine 10 mg daily and Metoprolol XL 50 mg daily.  BP (!) 152/92   Pulse 83   Temp 98.1 °F (36.7 °C) (Oral)   Resp 18   Ht 6' 5" (1.956 m)   Wt (!) 170.6 kg (376 lb 1.7 oz)   SpO2 98%   BMI 44.60 kg/m²      Patient has hyperlipidemia and currently taking Rosuvastatin 10 mg daily.   LDL 73.2.     Patient has impaired fasting glucose since January 2018 with the fasting blood sugar 123 and hemoglobin A1c of 5.5%.  Repeat hemoglobin A1c was 5.6% in May 2018.  FBG is now 133 with HgbA1C of 5.8%. Advised patient he must continue to work on diet and weight loss - he is now PREDIABETIC.     Patient had elevated liver enzymes noted at past visits.  Patient reports he was admitted to " [facility] in [place] and he had a liver workup during that hospitalization.  His liver enzyme elevation had resolved with weight loss in October 2018 but are again elevated with 14 pound weight gain over the past 6 months.     Patient has Sleep Apnea with CPAP use and had uvula removed by surgery in past.     Patient has restless leg syndrome controlled on present medication.     CIDP with left foot drop  followed by a neurologist Dr. Rowdy Tran.       Patient had abscess of spinal cord treated by Dr. Rodriguez status post laminectomy complication in August 2018. He reports that last MRI in October 2018 showed the area was healing so no further surgical intervention was necessary.     Patient has BPH and neurogenic bladder with urge incontinence and self catheterizes as needed.     Patient reports he has swelling to the distal DIP joints of both hands worsening in the past several months.  He only reports mild arthralgia present but the swelling/nodes are enlarging.  He reports a family history of Rheumatoid Arthritis in Father.  Advised patient that nodes to the DIP joints are more often associated with OA - I will refer to rheumatology for further evaluation due to the family history of RA in first degree relative.  See pictures of fingers below.    Component      Latest Ref Rng & Units 11/11/2019 2/9/2019 10/15/2018 1/9/2018   Sodium      136 - 145 mmol/L 141 145 140 141   Potassium      3.5 - 5.1 mmol/L 3.7 4.0 3.9 3.6   Chloride      95 - 110 mmol/L 106 108 106 105   CO2      23 - 29 mmol/L 29 27 26 28   Glucose      70 - 110 mg/dL 133 (H) 112 (H) 103 123 (H)   BUN, Bld      2 - 20 mg/dL 14 12 12 17   Creatinine      0.50 - 1.40 mg/dL 0.70 0.74 0.65 0.72   Calcium      8.7 - 10.5 mg/dL 9.5 9.3 9.7 9.2   PROTEIN TOTAL      6.0 - 8.4 g/dL 7.0 8.4 7.9 7.4   Albumin      3.5 - 5.2 g/dL 4.1 4.3 4.4 4.2   BILIRUBIN TOTAL      0.1 - 1.0 mg/dL 0.6 0.4 0.6 0.5   Alkaline Phosphatase      38 - 126 U/L 66 68 66 55   AST       15 - 46 U/L 41 50 (H) 42 48 (H)   ALT      10 - 44 U/L 51 (H) 59 (H) 40 63 (H)   Anion Gap      8 - 16 mmol/L 6 (L) 10 8 8   eGFR if African American      >60 mL/min/1.73 m:2 >60.0 >60.0 >60.0 >60.0   eGFR if non African American      >60 mL/min/1.73 m:2 >60.0 >60.0 >60.0 >60.0   Cholesterol      120 - 199 mg/dL 144 156 129 198   Triglycerides      30 - 150 mg/dL 134 124 94 179 (H)   HDL      40 - 75 mg/dL 44 48 41 38 (L)   LDL Cholesterol External      63.0 - 159.0 mg/dL 73.2 83.2 69.2 124.2   Hdl/Cholesterol Ratio      20.0 - 50.0 % 30.6 30.8 31.8 19.2 (L)   Total Cholesterol/HDL Ratio      2.0 - 5.0 3.3 3.3 3.1 5.2 (H)   Non-HDL Cholesterol      mg/dL 100 108 88 160   Hemoglobin A1C External      4.0 - 5.6 % 5.8 (H) 5.4 5.3 5.5   Estimated Avg Glucose      68 - 131 mg/dL 120 108 105 111     Current Outpatient Medications   Medication Sig Dispense Refill    amLODIPine (NORVASC) 10 MG tablet TAKE 1 TABLET BY MOUTH ONCE DAILY 30 tablet 0    aspirin (ECOTRIN) 81 MG EC tablet Take 81 mg by mouth once daily.      fish oil-omega-3 fatty acids 300-1,000 mg capsule Take 2 g by mouth once daily.      hydroCHLOROthiazide (HYDRODIURIL) 12.5 MG Tab TK 1 T PO  D  0    ELVIS 0.5-0.4 mg CM24 TAKE 1 CAPSULE BY MOUTH ONCE DAILY 30 capsule 11    losartan (COZAAR) 100 MG tablet TK 1 T PO QD  0    lysine 500 mg Tab Take 500 mg by mouth once daily.      metoprolol succinate (TOPROL-XL) 50 MG 24 hr tablet TAKE 1 TABLET BY MOUTH ONCE DAILY 30 tablet 0    rOPINIRole (REQUIP) 2 MG tablet TAKE 1 TABLET BY MOUTH ONCE DAILY IN THE EVENING 90 tablet 0    rosuvastatin (CRESTOR) 10 MG tablet TAKE 1 TABLET BY MOUTH ONCE DAILY IN THE EVENING 90 tablet 0    losartan-hydrochlorothiazide 100-12.5 mg (HYZAAR) 100-12.5 mg Tab Take 1 tablet by mouth once daily. 90 tablet 1     No current facility-administered medications for this visit.        Past Medical History:   Diagnosis Date    Abscess of spinal cord due to bacteria 08/15/2018     being treated by Dr. Tray Rodriguez    CIDP (chronic inflammatory demyelinating polyneuropathy) 06/2018    followed by Dr. Rowdy Tran - Neurologist in [place].      Elevated liver enzymes 10/8/2018    Elevated PSA     followed by Dr. Sanderson    Foot drop, left foot     due to neurological condition/[place]    Hyperlipidemia     Hypertension     Impaired fasting glucose     Neurogenic bladder 09/05/2018    followed by Dr. Sanderson    RLS (restless legs syndrome) 10/8/2018    Self-catheterizes urinary bladder     Urologist    Sleep apnea with use of continuous positive airway pressure (CPAP) 01/10/2018    [facility] [place]    Urge incontinence 9/11/2018       Past Surgical History:   Procedure Laterality Date    ADENOIDECTOMY      HERNIA REPAIR      [facility]UMBAR LAM[facility]CTOMY  2012    [facility]    LUMBAR LAMINECTOMY  08/2018    Dr. Rodriguez - [facility]; complications from surgery - abscess to spinal cord - discharge summary scanned to media file    PLANTAR FASCIA RELEASE      TONSILLECTOMY      VASECTOMY      X-STOP IMPLANTATION         Family History   Problem Relation Age of Onset    Diabetes Mother     Cancer Mother     Diabetes Father     Cancer Father     Hypertension Father     No Known Problems Sister     Prostate cancer Neg Hx     Kidney disease Neg Hx        Social History     Socioeconomic History    Marital status:      Spouse name: Not on file    Number of children: Not on file    Years of education: Not on file    Highest education level: Not on file   Occupational History    Occupation: teacher   Social Needs    Financial resource strain: Not on file    Food insecurity:     Worry: Not on file     Inability: Not on file    Transportation needs:     Medical: Not on file     Non-medical: Not on file   Tobacco Use    Smoking status: Never Smoker    Smokeless tobacco: Never Used   Substance and Sexual Activity    Alcohol use: No    Drug  use: No    Sexual activity: Yes     Partners: Female   Lifestyle    Physical activity:     Days per week: Not on file     Minutes per session: Not on file    Stress: Not on file   Relationships    Social connections:     Talks on phone: Not on file     Gets together: Not on file     Attends Jewish service: Not on file     Active member of club or organization: Not on file     Attends meetings of clubs or organizations: Not on file     Relationship status: Not on file   Other Topics Concern    Not on file   Social History Narrative    Not on file       Review of Systems   Constitutional: Negative for activity change and unexpected weight change.   HENT: Negative for hearing loss, postnasal drip, rhinorrhea, sneezing and trouble swallowing.    Eyes: Positive for itching. Negative for discharge and visual disturbance.   Respiratory: Negative for chest tightness and wheezing.    Cardiovascular: Negative for chest pain and palpitations.   Gastrointestinal: Negative for blood in stool, constipation, diarrhea and vomiting.        Bowel incontinence as well stating that he does not feel when he needs to go but if he goes on scheduled bathroom breaks, he tends to do well.   Endocrine: Negative for polydipsia and polyuria.   Genitourinary: Positive for difficulty urinating and urgency. Negative for hematuria.        Self-catheterizes due to neurogenic bladder   Musculoskeletal: Positive for arthralgias, back pain and joint swelling. Negative for neck pain.        Swelling and nodes to DIP joints   Neurological: Positive for weakness and numbness. Negative for headaches.        Left foot drop due to CIDP   Psychiatric/Behavioral: Negative for confusion and dysphoric mood.         Objective:     Vitals:    11/12/19 1445 11/12/19 1504   BP: (!) 154/90 (!) 152/92   BP Location: Left arm    Patient Position: Sitting    BP Method: Large (Manual)    Pulse: 83    Resp: 18    Temp: 98.1 °F (36.7 °C)    TempSrc: Oral    SpO2:  "98%    Weight: (!) 170.6 kg (376 lb 1.7 oz)    Height: 6' 5" (1.956 m)           Physical Exam   Constitutional: He is oriented to person, place, and time. He appears well-developed and well-nourished.   + obesity with Body mass index is 44.6 kg/m².     HENT:   Head: Normocephalic.   Right Ear: External ear normal.   Left Ear: External ear normal.   Nose: Nose normal.   Mouth/Throat: Oropharynx is clear and moist. No oropharyngeal exudate.   Eyes: Pupils are equal, round, and reactive to light. EOM are normal. Right eye exhibits no discharge. Left eye exhibits no discharge. No scleral icterus.   Neck: Normal range of motion. Neck supple. No tracheal deviation present. No thyromegaly present.   Cardiovascular: Normal rate, regular rhythm and normal heart sounds.   No murmur heard.  Pulmonary/Chest: Effort normal and breath sounds normal. No respiratory distress.   Abdominal: Soft. He exhibits no distension.   Musculoskeletal: He exhibits tenderness and deformity. He exhibits no edema.        Right hand: He exhibits tenderness and deformity.        Left hand: He exhibits tenderness and deformity.        Hands:  Ambulatory with brace on to left foot to treat the left foot drop.    + nodes/swelling to the DIP joints of both hands -see pictures below.   Lymphadenopathy:     He has no cervical adenopathy.   Neurological: He is alert and oriented to person, place, and time. Coordination normal.   Skin: Skin is warm and dry. No rash noted.   Psychiatric: He has a normal mood and affect. His behavior is normal.                     Assessment:         ICD-10-CM ICD-9-CM   1. Essential hypertension I10 401.9   2. Impaired fasting glucose R73.01 790.21   3. Prediabetes R73.03 790.29   4. Hyperlipidemia, unspecified hyperlipidemia type E78.5 272.4   5. Elevated liver enzymes R74.8 790.5   6. CIDP (chronic inflammatory demyelinating polyneuropathy) G61.81 357.81   7. Foot drop, left foot M21.372 736.79   8. Benign nodular " prostatic hyperplasia without lower urinary tract symptoms N40.0 600.10   9. Self-catheterizes urinary bladder Z78.9 V49.89   10. Neurogenic bladder N31.9 596.54   11. Sleep apnea with use of continuous positive airway pressure (CPAP) G47.30 327.23   12. RLS (restless legs syndrome) G25.81 333.94   13. Heberden's nodes (with arthropathy) M15.1 715.04   14. Family history of rheumatoid arthritis Z82.61 V17.7   15. Colon cancer screening Z12.11 V76.51   16. Prostate cancer screening Z12.5 V76.44   17. Thyroid disorder screen Z13.29 V77.0   18. Screening for deficiency anemia Z13.0 V78.1       Plan:       Essential hypertension  -  stop the losartan /12.5 mg and change to valsartan /12.5 mg daily.  Continue amlodipine 10 mg daily and metoprolol XL 50 mg daily.  Recheck blood pressure in 4 weeks.  -     valsartan-hydrochlorothiazide (DIOVAN-HCT) 320-12.5 mg per tablet; Take 1 tablet by mouth once daily.  Dispense: 30 tablet; Refill: 1  -     amLODIPine (NORVASC) 10 MG tablet; Take 1 tablet (10 mg total) by mouth once daily.  Dispense: 30 tablet; Refill: 1  -     metoprolol succinate (TOPROL-XL) 50 MG 24 hr tablet; Take 1 tablet (50 mg total) by mouth once daily.  Dispense: 30 tablet; Refill: 1    Impaired fasting glucose  -  decrease sugars and carbohydrates in diet.  Recheck in 6 months  -  must work on weight loss    Prediabetes  -  decrease sugars and carbohydrates in diet.  Recheck in 6 months  -  must work on weight loss  -     Comprehensive metabolic panel; Future; Expected date: 11/12/2019  -     Hemoglobin A1c; Future; Expected date: 11/12/2019    Hyperlipidemia, unspecified hyperlipidemia type  -  controlled on current medication.  Recheck in 6 months  -     Lipid panel; Future; Expected date: 11/12/2019    Elevated liver enzymes  -  decrease fats in diet and lose weight    CIDP (chronic inflammatory demyelinating polyneuropathy)  -  followed by neurologist    Foot drop, left foot  -  followed by  neurologist and treated with foot brace    Benign nodular prostatic hyperplasia without lower urinary tract symptoms  -  followed by Dr. Sanderson    Self-catheterizes urinary bladder    Neurogenic bladder    Sleep apnea with use of continuous positive airway pressure (CPAP)  -  continue CPAP use    RLS (restless legs syndrome)  -  controlled on current medication  -     rOPINIRole (REQUIP) 2 MG tablet; Take 1 tablet (2 mg total) by mouth every evening.  Dispense: 90 tablet; Refill: 1    Heberden's nodes (with arthropathy)  -  will refer to Rheumatology for further evaluation of the nodes to the DI P joints of both hands due to patient having and a family history of rheumatoid arthritis in a first-degree relative/father.  -     Ambulatory Referral to Rheumatology    Family history of rheumatoid arthritis  -     Ambulatory Referral to Rheumatology    Colon cancer screening  -  requesting colonoscopy on a Friday in [place].  Orders put in for referral  -     Case request GI: COLONOSCOPY    Prostate cancer screening  -     PSA, Screening; Future; Expected date: 11/12/2019    Thyroid disorder screen  -     TSH; Future; Expected date: 11/12/2019    Screening for deficiency anemia  -     CBC auto differential; Future; Expected date: 11/12/2019      Follow up in about 4 weeks (around 12/10/2019) for BP CHECK; 6 months for fasting labs and WELLNESS EXAM.     Patient's Medications   New Prescriptions    VALSARTAN-HYDROCHLOROTHIAZIDE (DIOVAN-HCT) 320-12.5 MG PER TABLET    Take 1 tablet by mouth once daily.   Previous Medications    ASPIRIN (ECOTRIN) 81 MG EC TABLET    Take 81 mg by mouth once daily.    FISH OIL-OMEGA-3 FATTY ACIDS 300-1,000 MG CAPSULE    Take 2 g by mouth once daily.    ELVIS 0.5-0.4 MG CM24    TAKE 1 CAPSULE BY MOUTH ONCE DAILY    LYSINE 500 MG TAB    Take 500 mg by mouth once daily.    ROSUVASTATIN (CRESTOR) 10 MG TABLET    TAKE 1 TABLET BY MOUTH ONCE DAILY IN THE EVENING   Modified Medications    Modified  Medication Previous Medication    AMLODIPINE (NORVASC) 10 MG TABLET amLODIPine (NORVASC) 10 MG tablet       Take 1 tablet (10 mg total) by mouth once daily.    TAKE 1 TABLET BY MOUTH ONCE DAILY    METOPROLOL SUCCINATE (TOPROL-XL) 50 MG 24 HR TABLET metoprolol succinate (TOPROL-XL) 50 MG 24 hr tablet       Take 1 tablet (50 mg total) by mouth once daily.    TAKE 1 TABLET BY MOUTH ONCE DAILY    ROPINIROLE (REQUIP) 2 MG TABLET rOPINIRole (REQUIP) 2 MG tablet       Take 1 tablet (2 mg total) by mouth every evening.    TAKE 1 TABLET BY MOUTH ONCE DAILY IN THE EVENING   Discontinued Medications    HYDROCHLOROTHIAZIDE (HYDRODIURIL) 12.5 MG TAB    TK 1 T PO  D    LOSARTAN (COZAAR) 100 MG TABLET    TK 1 T PO QD    LOSARTAN-HYDROCHLOROTHIAZIDE 100-12.5 MG (HYZAAR) 100-12.5 MG TAB    Take 1 tablet by mouth once daily.

## 2019-12-03 ENCOUNTER — TELEPHONE (OUTPATIENT)
Dept: GASTROENTEROLOGY | Facility: CLINIC | Age: 54
End: 2019-12-03

## 2019-12-03 NOTE — LETTER
December 3, 2019    Manfred Shah  [address] [place] [address]             [facility]  [address]   [place] [address]  Phone: [phone]  Fax: [phone] Dear Mr. Serratolot:    We have attempted to contact you to schedule a screening colonoscopy that was ordered by your doctor. Please contact the office to schedule at [phone].    If you have any questions or concerns, please don't hesitate to call.    Sincerely,        Therese Clark MA

## 2019-12-06 DIAGNOSIS — Z12.11 SCREEN FOR COLON CANCER: Primary | ICD-10-CM

## 2019-12-06 NOTE — TELEPHONE ENCOUNTER
Referring Physician: Dr. Brandy Ramirez                             Date: 12/6/19    Reason for Referral: Screening colonoscopy      Family History of:   Colon polyp: No  Relationship/Age of Onset:       Colon cancer: No  Relationship/Age of Onset:       Patient with:   Hemoccults Done:       Iron deficient:  No       On Blood Thinner: No      Valvular heart disease/valve replacement: No      Anemia Present: No      On NSAID: No      Lung disease: No      Kidney disease: No      Hx of polyps:       Hx of colon cancer:       Previous colon evalations: First colonoscopy  When:   Where:   Pertinent symptoms:           Review of patient's allergies indicates: NKDA        Patient was scheduled for colonoscopy on 1/24/20       with Dr. Markham at [facility].       instructions were reviewed with patient.       Prep sent to [facility] pharmacy        SUPREP Instructions    You are scheduled for a colonoscopy with Dr. Markham on 1/24/20 at [facility].  To ensure that your test is accurate and complete, you MUST follow these instructions listed below.  If you have any questions, please call our office at [phone].  Plan on being at the hospital for your procedure for 3-4 hours.    1.  Follow a CLEAR LIQUID DIET for the entire day before your scheduled colonoscopy.  This means no solid food the entire day starting when you wake.  You may have as much of the clear liquids as you want throughout the day.   CLEAR LIQUID DIET:   - Avoid Red, Orange, Purple, and/or Blue food coloring   - NO DAIRY   - You can have:  Coffee with sugar (no creamer), tea, water, soda, apple or white grape juice, chicken or beef broth/bouillon (no meat, noodles, or veggies), green/yellow popsicles, green/yellow Jell-O, lemonade.    2.  AT 5 pm the evening before your colonoscopy, POUR ONE (1) BOTTLE OF SUPREP INTO THE MIXING CONTAINER, PROVIDED INSIDE THE BOX.  ADD WATER TO THE LINE ON THE CONTAINER AND MIX IT WELL.  DRINK THE  ENTIRE CONTAINER AND THEN DRINK TWO (2) MORE CONTAINERS OF WATER OVER THE NEXT 1 HOUR.  This is sometimes easier to drink if this solution is cold, so you can mix the solution a few hours ahead of time and place in the refrigerator prior to drinking.  You have to drink the solution within 24 hours of mixing it.  Do NOT put this solution over ice.  It IS ok to drink with a straw.    3.  The endoscopy department will call you 2 days before your colonoscopy to tell you the exact time to arrive, AND to tell you the exact time to drink the 2nd portion of your prep (which will be FIVE HOURS BEFORE YOUR ARRIVAL TIME).  At this time given to you, POUR ONE (1) BOTTLE OF SUPREP INTO THE MIXING CONTAINER, PROVIDED INSIDE THE BOX.  ADD WATER TO THE LINE ON THE CONTAINER AND MIX IT WELL.  DRINK THE ENTIRE CONTAINER AND THEN DRINK TWO (2) MORE CONTAINERS OF WATER OVER THE NEXT 1 HOUR.  This is sometimes easier to drink if this solution is cold, so you can mix the solution a few hours ahead of time and place in the refrigerator prior to drinking.  You have to drink the solution within 24 hours of mixing it.  Do NOT put this solution over ice.  It IS ok to drink with a straw. Once this is complete, you may not have ANYTHING else by mouth!    4.  You must have someone with you to DRIVE YOU HOME since you will be receiving IV sedation for the colonoscopy.    5.  It is ok to take your heart, blood pressure, and seizure medications in the morning of your test with a SIP of water.  Hold other medications until after your procedure.  Do NOT have anything else to eat or drink the morning of your colonoscopy.  It is ok to brush your teeth.    6.  If you are on blood thinners THAT YOU HAVE BEEN INSTRUCTED TO HOLD BY YOUR DOCTOR FOR THIS PROCEDURE, then do NOT take this the morning of your colonoscopy.  Do NOT stop these medications on your own, they must be approved to be held by your doctor.  Your colonoscopy can NOT be done if you are on  these medications.  Examples of blood thinners include: Coumadin, Aggrenox, Plavix, Pradaxa, Reapro, Pletal, Xarelto, Ticagrelor, Brilinta, Eliquis, and high dose aspirin (325 mg).  You do not have to stop baby aspirin 81 mg.    7.  IF YOU ARE DIABETIC:  NO INSULIN OR ORAL MEDICATIONS THE MORNING OF THE COLONOSCOPY.  TAKE ONLY HALF THE DOSE OF YOUR INSULIN THE DAY BEFORE THE COLONOSCOPY.  DO NOT TAKE ANY ORAL DIABETIC MEDICATIONS THE DAY BEFORE THE COLONOSCOPY.  IF YOU ARE AN INSULIN DEPENDENT DIABETIC WITH UNSTABLE BLOOD SUGARS, NOTIFY YOUR PRIMARY CARE PHYSICIAN FOR INSTRUCTIONS.

## 2019-12-09 ENCOUNTER — OFFICE VISIT (OUTPATIENT)
Dept: RHEUMATOLOGY | Facility: CLINIC | Age: 54
End: 2019-12-09
Payer: COMMERCIAL

## 2019-12-09 VITALS
HEIGHT: 77 IN | BODY MASS INDEX: 37.19 KG/M2 | WEIGHT: 315 LBS | SYSTOLIC BLOOD PRESSURE: 146 MMHG | HEART RATE: 89 BPM | DIASTOLIC BLOOD PRESSURE: 82 MMHG | OXYGEN SATURATION: 97 %

## 2019-12-09 DIAGNOSIS — M79.642 PAIN IN BOTH HANDS: Primary | ICD-10-CM

## 2019-12-09 DIAGNOSIS — G61.81 CIDP (CHRONIC INFLAMMATORY DEMYELINATING POLYNEUROPATHY): ICD-10-CM

## 2019-12-09 DIAGNOSIS — Z79.899 HIGH RISK MEDICATION USE: ICD-10-CM

## 2019-12-09 DIAGNOSIS — M79.641 PAIN IN BOTH HANDS: Primary | ICD-10-CM

## 2019-12-09 PROCEDURE — 3008F PR BODY MASS INDEX (BMI) DOCUMENTED: ICD-10-PCS | Mod: CPTII,S$GLB,, | Performed by: INTERNAL MEDICINE

## 2019-12-09 PROCEDURE — 3008F BODY MASS INDEX DOCD: CPT | Mod: CPTII,S$GLB,, | Performed by: INTERNAL MEDICINE

## 2019-12-09 PROCEDURE — 99999 PR PBB SHADOW E&M-EST. PATIENT-LVL III: CPT | Mod: PBBFAC,,, | Performed by: INTERNAL MEDICINE

## 2019-12-09 PROCEDURE — 99204 PR OFFICE/OUTPT VISIT, NEW, LEVL IV, 45-59 MIN: ICD-10-PCS | Mod: S$GLB,,, | Performed by: INTERNAL MEDICINE

## 2019-12-09 PROCEDURE — 3077F PR MOST RECENT SYSTOLIC BLOOD PRESSURE >= 140 MM HG: ICD-10-PCS | Mod: CPTII,S$GLB,, | Performed by: INTERNAL MEDICINE

## 2019-12-09 PROCEDURE — 99999 PR PBB SHADOW E&M-EST. PATIENT-LVL III: ICD-10-PCS | Mod: PBBFAC,,, | Performed by: INTERNAL MEDICINE

## 2019-12-09 PROCEDURE — 3079F PR MOST RECENT DIASTOLIC BLOOD PRESSURE 80-89 MM HG: ICD-10-PCS | Mod: CPTII,S$GLB,, | Performed by: INTERNAL MEDICINE

## 2019-12-09 PROCEDURE — 3077F SYST BP >= 140 MM HG: CPT | Mod: CPTII,S$GLB,, | Performed by: INTERNAL MEDICINE

## 2019-12-09 PROCEDURE — 99204 OFFICE O/P NEW MOD 45 MIN: CPT | Mod: S$GLB,,, | Performed by: INTERNAL MEDICINE

## 2019-12-09 PROCEDURE — 3079F DIAST BP 80-89 MM HG: CPT | Mod: CPTII,S$GLB,, | Performed by: INTERNAL MEDICINE

## 2019-12-09 RX ORDER — DICLOFENAC SODIUM 10 MG/G
2 GEL TOPICAL 4 TIMES DAILY
Qty: 1 TUBE | Refills: 2 | Status: CANCELLED | OUTPATIENT
Start: 2019-12-09

## 2019-12-09 RX ORDER — SODIUM, POTASSIUM,MAG SULFATES 17.5-3.13G
1 SOLUTION, RECONSTITUTED, ORAL ORAL DAILY
Qty: 1 KIT | Refills: 0 | Status: SHIPPED | OUTPATIENT
Start: 2019-12-09 | End: 2019-12-12

## 2019-12-09 RX ORDER — MELOXICAM 15 MG/1
15 TABLET ORAL DAILY
Qty: 30 TABLET | Refills: 2 | Status: SHIPPED | OUTPATIENT
Start: 2019-12-09 | End: 2020-05-14

## 2019-12-09 RX ORDER — METAXALONE 800 MG/1
TABLET ORAL
COMMUNITY
End: 2019-12-10 | Stop reason: ALTCHOICE

## 2019-12-09 RX ORDER — NEBIVOLOL 5 MG/1
TABLET ORAL
COMMUNITY
End: 2019-12-10 | Stop reason: CLARIF

## 2019-12-09 NOTE — PROGRESS NOTES
Subjective:       Patient ID: Manfred Shah is a 54 y.o. male.    Chief Complaint: joint pain  HPI  Pt is a 54 year old male with PMH of CIDP with left foot drop (dx in summer of 2018), HLD, HTN, s/p lumbar spine surgery in summer of 2018 with complication of abscess in spine neurogenic bladder (self cath), Sleep apnea (on CPAP) who presented for joint pain in his b/l hands.     Pt was referred by CECY Ramirez for hand pain for about 5 years but has also developed nodules over the course of time.   No other joint pain or swelling that he has noticed.  He denied hand stiffness in the morning.  He admits to pain/stiffness after activity.  Pt is not taking anything at home for the hand pain.  He rates his pain today a 0/10.     Pt denies history of podagra or acute onset joint pain/swelling.     Pt sees Dr Myles Arroyo in [place] for the CIDP which was diagnosed summer of 2018 when he developed left foot drop.  Pt was placed in the hospital at [facility], had lumbar puncture showing elevated protein and he was treated with IVIG.  Pt was then on IVIG every 4 weeks then he would get them every 6 weeks, his last IVIG infusion was April 3rd 2019.  Pt sees neurology about every 6 weeks.      Never smoker, seldom drinker, no drug use.  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 Pt has two children. Patient denied family history of lupus, psoriasis, scleroderma, sjogrens, sarcoidosis, UC or Crohn's disease.  Father has rheumatoid.       Review of Systems   Constitutional: Positive for chills. Negative for diaphoresis, fatigue, fever and unexpected weight change.   HENT: Negative for congestion, hearing loss, mouth sores, nosebleeds, sore throat, trouble swallowing and voice change.    Eyes: Negative for photophobia, pain, redness and visual disturbance.   Respiratory: Negative for cough, chest tightness and shortness of breath.    Cardiovascular: Negative for chest pain  "and palpitations.   Gastrointestinal: Negative for abdominal pain, constipation, diarrhea, nausea and vomiting.   Genitourinary: Negative for difficulty urinating, dysuria, genital sores, hematuria and testicular pain.   Musculoskeletal: Positive for arthralgias. Negative for back pain, joint swelling, myalgias, neck pain and neck stiffness.   Skin: Negative for color change and rash.   Neurological: Positive for weakness. Negative for seizures, numbness and headaches.        Left leg weakness   Hematological: Does not bruise/bleed easily.   Psychiatric/Behavioral: Positive for sleep disturbance. Negative for agitation and confusion. The patient is not nervous/anxious.          Objective:   BP (!) 146/82   Pulse 89   Ht 6' 5" (1.956 m)   Wt (!) 173.1 kg (381 lb 8.1 oz)   SpO2 97%   BMI 45.24 kg/m²      Physical Exam   Nursing note and vitals reviewed.  Constitutional: He is oriented to person, place, and time and well-developed, well-nourished, and in no distress. No distress.   HENT:   Head: Normocephalic and atraumatic.   Right Ear: External ear normal.   Left Ear: External ear normal.   Mouth/Throat: Oropharynx is clear and moist. No oropharyngeal exudate.   Eyes: Conjunctivae and EOM are normal. Right eye exhibits no discharge. Left eye exhibits no discharge. No scleral icterus.   Neck: Neck supple. No tracheal deviation present.   Cardiovascular: Normal rate, regular rhythm and normal heart sounds.    No murmur heard.  Pulmonary/Chest: Effort normal and breath sounds normal. No stridor. No respiratory distress. He has no wheezes. He has no rales.   Abdominal: Soft. Bowel sounds are normal. He exhibits no distension. There is no tenderness. There is no rebound.   Neurological: He is alert and oriented to person, place, and time.   Skin: Skin is warm and dry. No rash noted. He is not diaphoretic.     Pitting edema b/l lower extremities   Psychiatric: Mood and affect normal.   Musculoskeletal: He exhibits " tenderness and deformity. He exhibits no edema.   No active synovitis, enthesitis, dactylitis or effusion noted on exam    heberdens nodes noted b/l hands with tenderness on palpation of heberdens nodes         Results for SANFORD MORAN (MRN [id number]) as of 12/9/2019 16:53   Ref. Range 11/11/2019 07:14   Sodium Latest Ref Range: 136 - 145 mmol/L 141   Potassium Latest Ref Range: 3.5 - 5.1 mmol/L 3.7   Chloride Latest Ref Range: 95 - 110 mmol/L 106   CO2 Latest Ref Range: 23 - 29 mmol/L 29   Anion Gap Latest Ref Range: 8 - 16 mmol/L 6 (L)   BUN, Bld Latest Ref Range: 2 - 20 mg/dL 14   Creatinine Latest Ref Range: 0.50 - 1.40 mg/dL 0.70   eGFR if non African American Latest Ref Range: >60 mL/min/1.73 m^2 >60.0   eGFR if African American Latest Ref Range: >60 mL/min/1.73 m^2 >60.0   Glucose Latest Ref Range: 70 - 110 mg/dL 133 (H)   Calcium Latest Ref Range: 8.7 - 10.5 mg/dL 9.5   Alkaline Phosphatase Latest Ref Range: 38 - 126 U/L 66   PROTEIN TOTAL Latest Ref Range: 6.0 - 8.4 g/dL 7.0   Albumin Latest Ref Range: 3.5 - 5.2 g/dL 4.1   BILIRUBIN TOTAL Latest Ref Range: 0.1 - 1.0 mg/dL 0.6   AST Latest Ref Range: 15 - 46 U/L 41   ALT Latest Ref Range: 10 - 44 U/L 51 (H)   Triglycerides Latest Ref Range: 30 - 150 mg/dL 134     Assessment:       1. Pain in both hands    2. High risk medication use    3. CIDP (chronic inflammatory demyelinating polyneuropathy)        Pt is a 54 year old male with PMH of CIDP with left foot drop (dx in summer of 2018), HLD, HTN, s/p lumbar spine surgery in summer of 2018 with complication of abscess in spine neurogenic bladder (self cath), Sleep apnea (on CPAP) who presented for joint pain in his b/l hands.     History and physical most consistent with OA most likely given heberdens nodes on exam,  Pain worse with activities, no morning stiffness or swelling.  Given family history of RA will obtain further workup.  Plan:       -labs and xrays as below.  -if inflammatory etiology of joint  pain is found, need to be mindful of CIDP in the future.  -prescribed meloxicam 15mg daily for joint pain  -RTC in 4 weeks to discuss results.  Problem List Items Addressed This Visit        Neuro    CIDP (chronic inflammatory demyelinating polyneuropathy)      Other Visit Diagnoses     Pain in both hands    -  Primary    Relevant Medications    meloxicam (MOBIC) 15 MG tablet    Other Relevant Orders    CBC auto differential    Comprehensive metabolic panel    C-reactive protein    Sedimentation rate    Uric acid    Rheumatoid factor    Cyclic citrul peptide antibody, IgG    X-Ray Hand 3 View Bilateral    High risk medication use        Relevant Medications    meloxicam (MOBIC) 15 MG tablet        Atleast 45 minutes of time was spent face to face with the patient with >50% discussing lab results, lifestyle changes, medications or counseling patient and coordinating care for their disease.  All patients questions answered appropriately and to the patients satisfaction.

## 2019-12-09 NOTE — LETTER
December 9, 2019      [place]  [address] [address]  [address]  [place] [facility] [address]           [facility]  [address] [address]  Phone: [phone]  Fax: [phone]          Patient: Manfred Shah   MR Number: [id number]   YOB: 1965   Date of Visit: 12/9/2019       Dear Brandy Ramirez:    Thank you for referring Manfred Shah to me for evaluation. Attached you will find relevant portions of my assessment and plan of care.    If you have questions, please do not hesitate to call me. I look forward to following Manfred Shah along with you.    Sincerely,    Lynne Landrum, DO    Enclosure  CC:  No Recipients    If you would like to receive this communication electronically, please contact [email or web address] or [phone] to request more information on [employer] access.    For providers and/or their staff who would like to refer a patient to [facility], please contact us through our one-stop-shop provider referral line, [facility] , at [phone].    If you feel you have received this communication in error or would no longer like to receive these types of communications, please e-mail [email or web address]

## 2019-12-09 NOTE — PATIENT INSTRUCTIONS
"OSTEOARTHRITIS    Fast Facts  Though some of the joint changes are irreversible, most patients will not need joint replacement surgery.  OA symptoms (what you feel) can vary greatly among patients.  A rheumatologist can detect arthritis and prescribe the proper treatment. The goal of treatment in OA is to reduce pain and improve function.  Exercise is an important part of OA treatment, because it can decrease joint pain and improve function.  At present, there is no treatment that can reverse the damage of OA in the joints. Researchers are trying to find ways to slow or reverse this joint damage.  Osteoarthritis (also known as OA) is a common joint disease that most often affects middle-age to elderly people. It is commonly referred to as "wear and tear" of the joints, but we now know that OA is a disease of the entire joint, involving the cartilage, joint lining, ligaments, and bone.  Although it is more common in older people, it is not really accurate to say that the joints are just wearing out. It is characterized by breakdown of the cartilage (the tissue that cushions the ends of the bones between joints), bony changes of the joints, deterioration of tendons and ligaments, and various degrees of inflammation of the joint lining (called the synovium).    This arthritis tends to occur in the hand joints, spine, hips, knees, and great toes. The lifetime risk of developing OA of the knee is about 46%, and the lifetime risk of developing OA of the hip is 25%, according to the [facility] Osteoarthritis Project, a long-term study from the [facility] and sponsored by the Centers for Disease Control and Prevention (often called the CDC) and the National Institutes of Health.    OA is a top cause of disability in older people. The goal of osteoarthritis treatment is to reduce pain and improve function. There is no cure for the disease, but some treatments attempt to slow disease " progression.    What is osteoarthritis?  OA is a frequently slowly progressive joint disease typically seen in middle-aged to elderly people.  In osteoarthritis, the cartilage between the bones in the joint breaks down. This causes the affected bones to slowly get bigger. The joint cartilage often breaks down because of mechanical stress or biochemical changes within the body, causing the bone underneath to fail. OA can occur together with other types of arthritis, such as gout or rheumatoid arthritis.    OA tends to affect commonly used joints such as the hands and spine, and the weight-bearing joints such as the hips and knees. Symptoms include:    Joint pain and stiffness  Knobby swelling at the joint  Cracking or grinding noise with joint movement  Decreased function of the joint    Who gets osteoarthritis?  OA affects people of all races and both sexes. Most often, it occurs in patients age 40 and above. However, it can occur sooner if you have other risk factors (things that raise the risk of getting OA).  Risk factors include:    Older age  Having family members with OA  Obesity  Previous traumatic Joint injury or repetitive use (overuse) of joints  Joint deformity such as unequal leg length, bowlegs or knocked knees    How is osteoarthritis diagnosed  Rheumatologists are doctors who are experts in diagnosing and treating arthritis and other diseases of the joints, muscles and bones. You may also need to see other health care providers, for instance, physical or occupational therapists and orthopedic doctors. Most often doctors detect OA based on the typical symptoms (described earlier) and on results of the physical exam. In some cases, X-rays or other imaging tests may be useful to tell the extent of disease or to help rule out other joint problems.    How do you treat osteoarthritis?  There is no proven treatment yet that can reverse joint damage from OA. The goal of osteoarthritis treatment is to reduce  pain and improve function of the affected joints. Most often, this is possible with a mixture of physical measures and drug therapy and, sometimes, surgery.    Physical measures: Weight loss and exercise are useful in OA. Excess weight puts stress on your knee joints and hips and low back. For every 10 pounds of weight you lose over 10 years, you can reduce the chance of developing knee OA by up to 50 percent. Exercise can improve your muscle strength, decrease joint pain and stiffness, and lower the chance of disability due to OA. Also helpful are support (assistive) devices, such as orthotics or a walking cane, that help you do daily activities. Heat or cold therapy can help relieve OA symptoms for a short time.    Certain alternative treatments such as spa (hot tub), massage, and chiropractic manipulation can help relieve pain for a short time. They can be costly, though, and require repeated treatments. Also, the long-term benefits of these alternative (sometimes called complementary or integrative) medicine treatments are unproven but are under study.    Drug therapy: Forms of drug therapy include topical, oral (by mouth) and injections (shots). You apply topical drugs directly on the skin over the affected joints. These medicines include capsaicin cream, lidocaine and diclofenac gel. Oral pain relievers such as acetaminophen are common first treatments. So are nonsteroidal anti-inflammatory drugs (often called NSAIDs), which decrease swelling and pain.    In 2010, the government (FDA) approved the use of duloxetine (Cymbalta) for chronic (long-term) musculoskeletal pain including from OA. This oral drug is not new. It also is in use for other health concerns, such as mood disorders, nerve pain and fibromyalgia.    Patients with more serious pain may need stronger medications, such as prescription narcotics.    Joint injections with corticosteroids (sometimes called cortisone shots) or with a form of  lubricant called hyaluronic acid can give months of pain relief from OA. This lubricant is given in the knee, and these shots may help delay the need for a knee replacement by a few years in some patients.    Surgery: Surgical treatment becomes an option for severe cases. This includes when the joint has serious damage, or when medical treatment fails to relieve pain and you have major loss of function. Surgery may involve arthroscopy, repair of the joint done through small incisions (cuts). If the joint damage cannot be repaired, you may need a joint replacement.    Supplements: Many over-the-counter nutrition supplements have been used for osteoarthritis treatment. Most lack good research data to support their effectiveness and safety. Among the most widely used are calcium, vitamin D and omega-3 fatty acids. To ensure safety and avoid drug interactions, consult your doctor or pharmacist before using any of these supplements. This is especially true when you are combining these supplements with prescribed drugs.    Living with osteoarthritis  There is no cure for OA, but you can manage how it affects your lifestyle. Some tips include:    Properly position and support your neck and back while sitting or sleeping.  Adjust furniture, such as raising a chair or toilet seat.  Avoid repeated motions of the joint, especially frequent bending.  Lose weight if you are overweight or obese, which can reduce pain and slow progression of OA.  Exercise each day.  Use adaptive devices that will help you do daily activities.  You might want to work with a physical therapist or occupational therapist to learn the best exercises and to choose arthritis assistive devices.    For additional information on osteoarthritis, you may want to visit the Arthritis Foundations website: www.arthritis.org.

## 2019-12-10 ENCOUNTER — TELEPHONE (OUTPATIENT)
Dept: RHEUMATOLOGY | Facility: CLINIC | Age: 54
End: 2019-12-10

## 2019-12-10 ENCOUNTER — OFFICE VISIT (OUTPATIENT)
Dept: FAMILY MEDICINE | Facility: CLINIC | Age: 54
End: 2019-12-10
Payer: COMMERCIAL

## 2019-12-10 VITALS
HEART RATE: 82 BPM | WEIGHT: 315 LBS | RESPIRATION RATE: 16 BRPM | OXYGEN SATURATION: 96 % | TEMPERATURE: 98 F | DIASTOLIC BLOOD PRESSURE: 86 MMHG | HEIGHT: 77 IN | SYSTOLIC BLOOD PRESSURE: 146 MMHG | BODY MASS INDEX: 37.19 KG/M2

## 2019-12-10 DIAGNOSIS — I10 ESSENTIAL HYPERTENSION: Primary | ICD-10-CM

## 2019-12-10 PROCEDURE — 99213 OFFICE O/P EST LOW 20 MIN: CPT | Mod: S$GLB,,, | Performed by: NURSE PRACTITIONER

## 2019-12-10 PROCEDURE — 3079F DIAST BP 80-89 MM HG: CPT | Mod: CPTII,S$GLB,, | Performed by: NURSE PRACTITIONER

## 2019-12-10 PROCEDURE — 3077F SYST BP >= 140 MM HG: CPT | Mod: CPTII,S$GLB,, | Performed by: NURSE PRACTITIONER

## 2019-12-10 PROCEDURE — 3079F PR MOST RECENT DIASTOLIC BLOOD PRESSURE 80-89 MM HG: ICD-10-PCS | Mod: CPTII,S$GLB,, | Performed by: NURSE PRACTITIONER

## 2019-12-10 PROCEDURE — 99999 PR PBB SHADOW E&M-EST. PATIENT-LVL IV: ICD-10-PCS | Mod: PBBFAC,,, | Performed by: NURSE PRACTITIONER

## 2019-12-10 PROCEDURE — 3008F BODY MASS INDEX DOCD: CPT | Mod: CPTII,S$GLB,, | Performed by: NURSE PRACTITIONER

## 2019-12-10 PROCEDURE — 3077F PR MOST RECENT SYSTOLIC BLOOD PRESSURE >= 140 MM HG: ICD-10-PCS | Mod: CPTII,S$GLB,, | Performed by: NURSE PRACTITIONER

## 2019-12-10 PROCEDURE — 99213 PR OFFICE/OUTPT VISIT, EST, LEVL III, 20-29 MIN: ICD-10-PCS | Mod: S$GLB,,, | Performed by: NURSE PRACTITIONER

## 2019-12-10 PROCEDURE — 99999 PR PBB SHADOW E&M-EST. PATIENT-LVL IV: CPT | Mod: PBBFAC,,, | Performed by: NURSE PRACTITIONER

## 2019-12-10 PROCEDURE — 3008F PR BODY MASS INDEX (BMI) DOCUMENTED: ICD-10-PCS | Mod: CPTII,S$GLB,, | Performed by: NURSE PRACTITIONER

## 2019-12-10 RX ORDER — METOPROLOL SUCCINATE 100 MG/1
100 TABLET, EXTENDED RELEASE ORAL DAILY
Qty: 30 TABLET | Refills: 1 | Status: SHIPPED | OUTPATIENT
Start: 2019-12-10 | End: 2020-01-22 | Stop reason: SDUPTHER

## 2019-12-10 NOTE — TELEPHONE ENCOUNTER
The pt called to schedule his lab and xray for Monday 12/16 at 3:15pm. Pt is scheduled per his request.

## 2019-12-30 ENCOUNTER — TELEPHONE (OUTPATIENT)
Dept: RHEUMATOLOGY | Facility: CLINIC | Age: 54
End: 2019-12-30

## 2020-01-05 DIAGNOSIS — I10 ESSENTIAL HYPERTENSION: ICD-10-CM

## 2020-01-06 RX ORDER — VALSARTAN AND HYDROCHLOROTHIAZIDE 320; 12.5 MG/1; MG/1
1 TABLET, FILM COATED ORAL DAILY
Qty: 30 TABLET | Refills: 0 | Status: SHIPPED | OUTPATIENT
Start: 2020-01-06 | End: 2020-01-13

## 2020-01-08 ENCOUNTER — PATIENT OUTREACH (OUTPATIENT)
Dept: ADMINISTRATIVE | Facility: HOSPITAL | Age: 55
End: 2020-01-08

## 2020-01-13 DIAGNOSIS — I10 ESSENTIAL HYPERTENSION: ICD-10-CM

## 2020-01-13 RX ORDER — VALSARTAN AND HYDROCHLOROTHIAZIDE 320; 12.5 MG/1; MG/1
1 TABLET, FILM COATED ORAL DAILY
Qty: 30 TABLET | Refills: 0 | Status: SHIPPED | OUTPATIENT
Start: 2020-01-13 | End: 2020-01-15

## 2020-01-14 DIAGNOSIS — I10 ESSENTIAL HYPERTENSION: ICD-10-CM

## 2020-01-15 RX ORDER — VALSARTAN AND HYDROCHLOROTHIAZIDE 320; 12.5 MG/1; MG/1
1 TABLET, FILM COATED ORAL DAILY
Qty: 30 TABLET | Refills: 0 | Status: SHIPPED | OUTPATIENT
Start: 2020-01-15 | End: 2020-03-05

## 2020-01-22 ENCOUNTER — OFFICE VISIT (OUTPATIENT)
Dept: FAMILY MEDICINE | Facility: CLINIC | Age: 55
End: 2020-01-22
Payer: COMMERCIAL

## 2020-01-22 VITALS
HEIGHT: 77 IN | TEMPERATURE: 98 F | HEART RATE: 87 BPM | DIASTOLIC BLOOD PRESSURE: 92 MMHG | WEIGHT: 315 LBS | RESPIRATION RATE: 18 BRPM | OXYGEN SATURATION: 98 % | BODY MASS INDEX: 37.19 KG/M2 | SYSTOLIC BLOOD PRESSURE: 154 MMHG

## 2020-01-22 DIAGNOSIS — I1A.0 RESISTANT HYPERTENSION: Primary | ICD-10-CM

## 2020-01-22 DIAGNOSIS — I10 ESSENTIAL HYPERTENSION: ICD-10-CM

## 2020-01-22 DIAGNOSIS — E78.5 HYPERLIPIDEMIA, UNSPECIFIED HYPERLIPIDEMIA TYPE: ICD-10-CM

## 2020-01-22 PROCEDURE — 3077F SYST BP >= 140 MM HG: CPT | Mod: CPTII,S$GLB,, | Performed by: NURSE PRACTITIONER

## 2020-01-22 PROCEDURE — 3078F DIAST BP <80 MM HG: CPT | Mod: CPTII,S$GLB,, | Performed by: NURSE PRACTITIONER

## 2020-01-22 PROCEDURE — 99213 PR OFFICE/OUTPT VISIT, EST, LEVL III, 20-29 MIN: ICD-10-PCS | Mod: S$GLB,,, | Performed by: NURSE PRACTITIONER

## 2020-01-22 PROCEDURE — 3077F PR MOST RECENT SYSTOLIC BLOOD PRESSURE >= 140 MM HG: ICD-10-PCS | Mod: CPTII,S$GLB,, | Performed by: NURSE PRACTITIONER

## 2020-01-22 PROCEDURE — 99213 OFFICE O/P EST LOW 20 MIN: CPT | Mod: S$GLB,,, | Performed by: NURSE PRACTITIONER

## 2020-01-22 PROCEDURE — 93010 ELECTROCARDIOGRAM REPORT: CPT | Mod: S$GLB,,, | Performed by: INTERNAL MEDICINE

## 2020-01-22 PROCEDURE — 99999 PR PBB SHADOW E&M-EST. PATIENT-LVL V: ICD-10-PCS | Mod: PBBFAC,,, | Performed by: NURSE PRACTITIONER

## 2020-01-22 PROCEDURE — 3008F BODY MASS INDEX DOCD: CPT | Mod: CPTII,S$GLB,, | Performed by: NURSE PRACTITIONER

## 2020-01-22 PROCEDURE — 3008F PR BODY MASS INDEX (BMI) DOCUMENTED: ICD-10-PCS | Mod: CPTII,S$GLB,, | Performed by: NURSE PRACTITIONER

## 2020-01-22 PROCEDURE — 3078F PR MOST RECENT DIASTOLIC BLOOD PRESSURE < 80 MM HG: ICD-10-PCS | Mod: CPTII,S$GLB,, | Performed by: NURSE PRACTITIONER

## 2020-01-22 PROCEDURE — 93010 EKG 12-LEAD: ICD-10-PCS | Mod: S$GLB,,, | Performed by: INTERNAL MEDICINE

## 2020-01-22 PROCEDURE — 93005 ELECTROCARDIOGRAM TRACING: CPT | Mod: S$GLB,,, | Performed by: NURSE PRACTITIONER

## 2020-01-22 PROCEDURE — 99999 PR PBB SHADOW E&M-EST. PATIENT-LVL V: CPT | Mod: PBBFAC,,, | Performed by: NURSE PRACTITIONER

## 2020-01-22 PROCEDURE — 93005 EKG 12-LEAD: ICD-10-PCS | Mod: S$GLB,,, | Performed by: NURSE PRACTITIONER

## 2020-01-22 RX ORDER — AMLODIPINE BESYLATE 10 MG/1
10 TABLET ORAL DAILY
Qty: 30 TABLET | Refills: 1 | Status: SHIPPED | OUTPATIENT
Start: 2020-01-22 | End: 2020-03-17

## 2020-01-22 RX ORDER — HYDRALAZINE HYDROCHLORIDE 25 MG/1
25 TABLET, FILM COATED ORAL 2 TIMES DAILY
Qty: 60 TABLET | Refills: 1 | Status: SHIPPED | OUTPATIENT
Start: 2020-01-22 | End: 2020-03-17

## 2020-01-22 RX ORDER — METOPROLOL SUCCINATE 100 MG/1
100 TABLET, EXTENDED RELEASE ORAL DAILY
Qty: 30 TABLET | Refills: 1 | Status: SHIPPED | OUTPATIENT
Start: 2020-01-22 | End: 2020-04-01

## 2020-01-22 RX ORDER — ROSUVASTATIN CALCIUM 10 MG/1
10 TABLET, COATED ORAL NIGHTLY
Qty: 90 TABLET | Refills: 1 | Status: SHIPPED | OUTPATIENT
Start: 2020-01-22 | End: 2020-07-30

## 2020-01-23 ENCOUNTER — PATIENT OUTREACH (OUTPATIENT)
Dept: ADMINISTRATIVE | Facility: OTHER | Age: 55
End: 2020-01-23

## 2020-01-23 NOTE — PROGRESS NOTES
Subjective:       Patient ID: Manfred Shah is a 54 y.o. male.    Chief Complaint: Hypertension (F/U)    Patient is a 54-year-old white male with CIDP (chronic inflammatory demyelinating polyneuropathy) diagnosed in June 2018 by neurologist, Dr. Rowdy Tran, left foot drop secondary to CIDP, history of Abscess of spinal cord treated by Dr. Rodriguez S/P Laminectomy in August 2018, Neurogenic bladder with urge incontinence and self-catheterization and BPH followed by Dr. Sanderson, RLS, Hypertension, Hyperlipidemia, Impaired Fasting Glucose, Elevated Liver Enzymes, Heberden's nodes with joint inflammation and pain to bilateral hands followed by [facility],  and Sleep Apnea with CPAP use that is here today blood pressure check/follow up.     Patient had all chronic problems addressed at November 2019 visit and not due to repeat labs and follow up until May 2020.  However, HYPERTENSION was uncontrolled in November 2019 so medications adjusted and patient is here today for follow up.     Patient has Hypertension that was controlled on amlodipine 10 mg daily, losartan /12.5 mg daily, and Bystolic 10 mg daily in the past HOWEVER the INSURANCE stopped covering Bystolic 10 mg in July 2019.  We stopped the Bystolic and changed patient to Metoprolol XL 50 mg daily and patient was supposed to follow up since August but due to some cancelled appointments - did not make follow up until November 2019.  Blood pressure was UNCONTROLLED on Losartan /12.5 mg daily, Amlodipine 10 mg daily and Metoprolol XL 50 mg daily so the Losartan HCT was stopped and changed to Valsartan /12.5 mg daily, the Metoprolol XL increased to 100 mg daily and the Amlodipine continued at 10 mg daily.  Patient is also on Jayln for BPH.  Blood pressure remains resistant to medications and still elevated.  EKG done today NSR.  Will add on Hydralazine 25 mg twice daily after discussion with MD Dr. Анна Virgen and refer to cardiologist  "to workup for secondary causes.    BP (!) 154/92   Pulse 87   Temp 97.8 °F (36.6 °C) (Oral)   Resp 18   Ht 6' 5" (1.956 m)   Wt (!) 173.8 kg (383 lb 0.8 oz)   SpO2 98%   BMI 45.42 kg/m²     Patient have GEOVANI and on CPAP already.  Patient also has Hyperlipidemia that is controlled on present medication.         Hypertension   This is a chronic problem. The current episode started more than 1 year ago. The problem is unchanged. The problem is resistant. Pertinent negatives include no anxiety, blurred vision, chest pain, headaches, malaise/fatigue, neck pain, orthopnea, palpitations, peripheral edema, PND, shortness of breath or sweats. There are no associated agents to hypertension. Risk factors for coronary artery disease include obesity, male gender and dyslipidemia. Past treatments include beta blockers, calcium channel blockers, diuretics, angiotensin blockers and alpha 1 blockers. The current treatment provides moderate improvement. There are no compliance problems.            Current Outpatient Medications   Medication Sig Dispense Refill    amLODIPine (NORVASC) 10 MG tablet Take 1 tablet (10 mg total) by mouth once daily. 30 tablet 1    aspirin (ECOTRIN) 81 MG EC tablet Take 81 mg by mouth once daily.      fish oil-omega-3 fatty acids 300-1,000 mg capsule Take 2 g by mouth once daily.      ELVIS 0.5-0.4 mg CM24 TAKE 1 CAPSULE BY MOUTH ONCE DAILY 30 capsule 11    lysine 500 mg Tab Take 500 mg by mouth once daily.      meloxicam (MOBIC) 15 MG tablet Take 1 tablet (15 mg total) by mouth once daily. 30 tablet 2    metoprolol succinate (TOPROL-XL) 100 MG 24 hr tablet Take 1 tablet (100 mg total) by mouth once daily. 30 tablet 1    rOPINIRole (REQUIP) 2 MG tablet Take 1 tablet (2 mg total) by mouth every evening. 90 tablet 1    rosuvastatin (CRESTOR) 10 MG tablet Take 1 tablet (10 mg total) by mouth every evening. 90 tablet 1    valsartan-hydrochlorothiazide (DIOVAN-HCT) 320-12.5 mg per tablet Take 1 " tablet by mouth once daily. 30 tablet 0    hydrALAZINE (APRESOLINE) 25 MG tablet Take 1 tablet (25 mg total) by mouth 2 (two) times daily. 60 tablet 1     No current facility-administered medications for this visit.        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 Tray Rodriguez    CIDP (chronic inflammatory demyelinating polyneuropathy) 06/2018    followed by Dr. Rowdy Tran - Neurologist in [place].      Elevated liver enzymes 10/8/2018    Elevated PSA     followed by Dr. Sanderson    Foot drop, left foot     due to neurological condition/[place]    Hyperlipidemia     Hypertension     Impaired fasting glucose     Neurogenic bladder 09/05/2018    followed by Dr. Sanderson    RLS (restless legs syndrome) 10/8/2018    Self-catheterizes urinary bladder     Urologist    Sleep apnea with use of continuous positive airway pressure (CPAP) 01/10/2018    followed by [facility] in [place]    Urge incontinence 9/11/2018       Past Surgical History:   Procedure Laterality Date    ADENOIDECTOMY      HERNIA REPAIR      [facility]UM[facility]OMY  2012    [facility]    LUMBAR LAMINECTOMY  08/2018    Dr. Rodriguez - [facility]; complications from surgery - abscess to spinal cord - discharge summary scanned to media file    PLANTAR FASCIA RELEASE      TONSILLECTOMY      VASECTOMY      X-STOP IMPLANTATION         Family History   Problem Relation Age of Onset    Cancer Mother         Breast Cancer    Heart disease Mother         pacemaker    Hyperlipidemia Mother         taking Crestor    Hypertension Mother         taking Amlodipine    Diabetes Mother         Prediabetes    Diabetes Father     Cancer Father         unknown type of cancer    Hypertension Father     Rheum arthritis Father     No Known Problems Sister     Prostate cancer Neg Hx     Kidney disease Neg Hx        Social History     Socioeconomic History    Marital  status:      Spouse name: Not on file    Number of children: Not on file    Years of education: Not on file    Highest education level: Not on file   Occupational History    Occupation: teacher   Social Needs    Financial resource strain: Not very hard    Food insecurity:     Worry: Never true     Inability: Never true    Transportation needs:     Medical: No     Non-medical: No   Tobacco Use    Smoking status: Never Smoker    Smokeless tobacco: Never Used   Substance and Sexual Activity    Alcohol use: No     Frequency: Monthly or less     Drinks per session: 1 or 2     Binge frequency: Never    Drug use: No    Sexual activity: Yes     Partners: Female   Lifestyle    Physical activity:     Days per week: 3 days     Minutes per session: 60 min    Stress: Only a little   Relationships    Social connections:     Talks on phone: More than three times a week     Gets together: Twice a week     Attends Jain service: Not on file     Active member of club or organization: Yes     Attends meetings of clubs or organizations: More than 4 times per year     Relationship status:    Other Topics Concern    Not on file   Social History Narrative    Not on file       Review of Systems   Constitutional: Negative for activity change, appetite change, fatigue, fever, malaise/fatigue and unexpected weight change.   HENT: Negative for congestion, ear pain, mouth sores, nosebleeds, postnasal drip, rhinorrhea, sinus pressure, sneezing, sore throat, trouble swallowing and voice change.    Eyes: Negative.  Negative for blurred vision.   Respiratory: Negative for cough, chest tightness and shortness of breath.    Cardiovascular: Negative for chest pain, palpitations, orthopnea, leg swelling and PND.   Gastrointestinal: Negative.  Negative for abdominal pain, blood in stool, constipation, diarrhea, nausea and vomiting.   Endocrine: Negative.    Genitourinary: Negative for dysuria and flank pain.         "Self-catheterizes on schedule.   Musculoskeletal: Negative for arthralgias and neck pain.   Skin: Negative for color change, rash and wound.   Allergic/Immunologic: Negative for immunocompromised state.   Neurological: Negative for dizziness, tremors, seizures, syncope, speech difficulty and headaches.   Hematological: Negative for adenopathy. Does not bruise/bleed easily.   Psychiatric/Behavioral: Negative for behavioral problems, dysphoric mood, sleep disturbance and suicidal ideas. The patient is not nervous/anxious.          Objective:     Vitals:    01/22/20 1530 01/22/20 1553 01/22/20 1555   BP: (!) 160/86 (!) 156/94 (!) 154/92   BP Location: Right arm     Patient Position: Sitting     BP Method: Large (Manual)     Pulse: 87     Resp: 18     Temp: 97.8 °F (36.6 °C)     TempSrc: Oral     SpO2: 98%     Weight: (!) 173.8 kg (383 lb 0.8 oz)     Height: 6' 5" (1.956 m)            Physical Exam   Constitutional: He is oriented to person, place, and time. He appears well-developed and well-nourished.   + obesity with Body mass index is 45.42 kg/m².   HENT:   Head: Normocephalic.   Right Ear: External ear normal.   Left Ear: External ear normal.   Nose: Nose normal.   Mouth/Throat: Oropharynx is clear and moist. No oropharyngeal exudate.   Eyes: Pupils are equal, round, and reactive to light. EOM are normal. Right eye exhibits no discharge. Left eye exhibits no discharge. No scleral icterus.   Neck: Normal range of motion. Neck supple. No tracheal deviation present. No thyromegaly present.   Cardiovascular: Normal rate, regular rhythm and normal heart sounds.   No murmur heard.  Pulmonary/Chest: Effort normal and breath sounds normal. No respiratory distress.   Abdominal: Soft. He exhibits no distension.   Musculoskeletal: He exhibits tenderness and deformity. He exhibits no edema.        Right hand: He exhibits tenderness and deformity.        Left hand: He exhibits tenderness and deformity.        Hands:  Ambulatory " with brace on to left foot to treat the left foot drop.    + nodes/swelling to the DIP joints of both hands -followed by [facility]   Lymphadenopathy:     He has no cervical adenopathy.   Neurological: He is alert and oriented to person, place, and time. Coordination normal.   Skin: Skin is warm and dry. No rash noted.   Psychiatric: He has a normal mood and affect. His behavior is normal.   Vitals reviewed.        Assessment:         ICD-10-CM ICD-9-CM   1. Resistant hypertension I10 401.9   2. Essential hypertension I10 401.9   3. Hyperlipidemia, unspecified hyperlipidemia type E78.5 272.4       Plan:       Resistant hypertension  -  Start Hydralazine 25 mg twice daily after discussing with Dr. Анна Virgen about patient case.  Will continue the Metoprolol XL, Amlodipine, and Valsartan HCT at present doses.  - referral to cardiology to rule out secondary cardiovascular causes.    ###Addendum 1/27/2020 - Dr. Kiser changed medications and will recheck in 4 weeks so advised patient that he does not need to see me back in 4 weeks - keep appt in April 2020 with me for fasting labs and wellness exam####  -     hydrALAZINE (APRESOLINE) 25 MG tablet; Take 1 tablet (25 mg total) by mouth 2 (two) times daily.  Dispense: 60 tablet; Refill: 1  -     metoprolol succinate (TOPROL-XL) 100 MG 24 hr tablet; Take 1 tablet (100 mg total) by mouth once daily.  Dispense: 30 tablet; Refill: 1  -     amLODIPine (NORVASC) 10 MG tablet; Take 1 tablet (10 mg total) by mouth once daily.  Dispense: 30 tablet; Refill: 1  -     Ambulatory Referral to Cardiology  -     IN OFFICE EKG 12-LEAD (to Muse)    Essential hypertension    Hyperlipidemia, unspecified hyperlipidemia type  -  ADVISED TO MAKE SURE that he is taking medication every single day because the refill record shows that it has not been filled regularly.  -     rosuvastatin (CRESTOR) 10 MG tablet; Take 1 tablet (10 mg total) by mouth every evening.  Dispense: 90 tablet;  Refill: 1      Follow up in about 4 weeks (around 2/19/2020) for BP check. ###Addendum 1/27/2020 - Dr. Kiser changed medications and will recheck in 4 weeks so advised patient that he does not need to see me back in 4 weeks - keep appt in April 2020 with me for fasting labs and wellness exam####    Patient's Medications   New Prescriptions    HYDRALAZINE (APRESOLINE) 25 MG TABLET    Take 1 tablet (25 mg total) by mouth 2 (two) times daily.   Previous Medications    ASPIRIN (ECOTRIN) 81 MG EC TABLET    Take 81 mg by mouth once daily.    FISH OIL-OMEGA-3 FATTY ACIDS 300-1,000 MG CAPSULE    Take 2 g by mouth once daily.    ELVIS 0.5-0.4 MG CM24    TAKE 1 CAPSULE BY MOUTH ONCE DAILY    LYSINE 500 MG TAB    Take 500 mg by mouth once daily.    MELOXICAM (MOBIC) 15 MG TABLET    Take 1 tablet (15 mg total) by mouth once daily.    ROPINIROLE (REQUIP) 2 MG TABLET    Take 1 tablet (2 mg total) by mouth every evening.    VALSARTAN-HYDROCHLOROTHIAZIDE (DIOVAN-HCT) 320-12.5 MG PER TABLET    Take 1 tablet by mouth once daily.   Modified Medications    Modified Medication Previous Medication    AMLODIPINE (NORVASC) 10 MG TABLET amLODIPine (NORVASC) 10 MG tablet       Take 1 tablet (10 mg total) by mouth once daily.    Take 1 tablet (10 mg total) by mouth once daily.    METOPROLOL SUCCINATE (TOPROL-XL) 100 MG 24 HR TABLET metoprolol succinate (TOPROL-XL) 100 MG 24 hr tablet       Take 1 tablet (100 mg total) by mouth once daily.    Take 1 tablet (100 mg total) by mouth once daily.    ROSUVASTATIN (CRESTOR) 10 MG TABLET rosuvastatin (CRESTOR) 10 MG tablet       Take 1 tablet (10 mg total) by mouth every evening.    TAKE 1 TABLET BY MOUTH ONCE DAILY IN THE EVENING   Discontinued Medications    No medications on file

## 2020-01-24 PROBLEM — Z12.11 SCREEN FOR COLON CANCER: Status: ACTIVE | Noted: 2020-01-24

## 2020-01-27 ENCOUNTER — OFFICE VISIT (OUTPATIENT)
Dept: CARDIOLOGY | Facility: CLINIC | Age: 55
End: 2020-01-27
Payer: COMMERCIAL

## 2020-01-27 VITALS
WEIGHT: 315 LBS | OXYGEN SATURATION: 98 % | HEIGHT: 77 IN | DIASTOLIC BLOOD PRESSURE: 95 MMHG | BODY MASS INDEX: 37.19 KG/M2 | HEART RATE: 79 BPM | SYSTOLIC BLOOD PRESSURE: 154 MMHG

## 2020-01-27 DIAGNOSIS — R73.01 IMPAIRED FASTING GLUCOSE: ICD-10-CM

## 2020-01-27 DIAGNOSIS — I10 ESSENTIAL HYPERTENSION: Primary | ICD-10-CM

## 2020-01-27 DIAGNOSIS — R06.09 DYSPNEA ON EXERTION: ICD-10-CM

## 2020-01-27 DIAGNOSIS — R60.0 LOCALIZED EDEMA: ICD-10-CM

## 2020-01-27 DIAGNOSIS — E78.00 PURE HYPERCHOLESTEROLEMIA: ICD-10-CM

## 2020-01-27 DIAGNOSIS — G61.81 CIDP (CHRONIC INFLAMMATORY DEMYELINATING POLYNEUROPATHY): ICD-10-CM

## 2020-01-27 DIAGNOSIS — R33.9 URINARY RETENTION: ICD-10-CM

## 2020-01-27 DIAGNOSIS — I1A.0 RESISTANT HYPERTENSION: ICD-10-CM

## 2020-01-27 DIAGNOSIS — G25.81 RLS (RESTLESS LEGS SYNDROME): ICD-10-CM

## 2020-01-27 DIAGNOSIS — Z78.9 SELF-CATHETERIZES URINARY BLADDER: ICD-10-CM

## 2020-01-27 DIAGNOSIS — M21.372 FOOT DROP, LEFT FOOT: ICD-10-CM

## 2020-01-27 PROCEDURE — 99205 PR OFFICE/OUTPT VISIT, NEW, LEVL V, 60-74 MIN: ICD-10-PCS | Mod: S$GLB,,, | Performed by: INTERNAL MEDICINE

## 2020-01-27 PROCEDURE — 3077F PR MOST RECENT SYSTOLIC BLOOD PRESSURE >= 140 MM HG: ICD-10-PCS | Mod: CPTII,S$GLB,, | Performed by: INTERNAL MEDICINE

## 2020-01-27 PROCEDURE — 3077F SYST BP >= 140 MM HG: CPT | Mod: CPTII,S$GLB,, | Performed by: INTERNAL MEDICINE

## 2020-01-27 PROCEDURE — 99999 PR PBB SHADOW E&M-EST. PATIENT-LVL III: CPT | Mod: PBBFAC,,, | Performed by: INTERNAL MEDICINE

## 2020-01-27 PROCEDURE — 3080F DIAST BP >= 90 MM HG: CPT | Mod: CPTII,S$GLB,, | Performed by: INTERNAL MEDICINE

## 2020-01-27 PROCEDURE — 3008F BODY MASS INDEX DOCD: CPT | Mod: CPTII,S$GLB,, | Performed by: INTERNAL MEDICINE

## 2020-01-27 PROCEDURE — 3008F PR BODY MASS INDEX (BMI) DOCUMENTED: ICD-10-PCS | Mod: CPTII,S$GLB,, | Performed by: INTERNAL MEDICINE

## 2020-01-27 PROCEDURE — 99205 OFFICE O/P NEW HI 60 MIN: CPT | Mod: S$GLB,,, | Performed by: INTERNAL MEDICINE

## 2020-01-27 PROCEDURE — 3080F PR MOST RECENT DIASTOLIC BLOOD PRESSURE >= 90 MM HG: ICD-10-PCS | Mod: CPTII,S$GLB,, | Performed by: INTERNAL MEDICINE

## 2020-01-27 PROCEDURE — 99999 PR PBB SHADOW E&M-EST. PATIENT-LVL III: ICD-10-PCS | Mod: PBBFAC,,, | Performed by: INTERNAL MEDICINE

## 2020-01-27 RX ORDER — EPLERENONE 25 MG/1
25 TABLET, FILM COATED ORAL DAILY
Qty: 90 TABLET | Refills: 3 | Status: SHIPPED | OUTPATIENT
Start: 2020-01-27 | End: 2021-01-19

## 2020-01-27 NOTE — LETTER
January 27, 2020      [place]  [id number] [address]  [address]  [facility] [address]           [facility]  [address] [place] [address] [address]  [facility] [address]  Phone: [phone]          Patient: Manfred Shah   MR Number: [id number]   YOB: 1965   Date of Visit: 1/27/2020       Dear Brandy Ramriez:    Thank you for referring Manfred Shah to me for evaluation. Attached you will find relevant portions of my assessment and plan of care.    If you have questions, please do not hesitate to call me. I look forward to following Manfred Shah along with you.    Sincerely,    Jon Kiser MD    Enclosure  CC:  No Recipients    If you would like to receive this communication electronically, please contact [email or web address] or [phone] to request more information on [employer] access.    For providers and/or their staff who would like to refer a patient to [facility], please contact us through our one-stop-shop provider referral line, [facility], at [phone].    If you feel you have received this communication in error or would no longer like to receive these types of communications, please e-mail [email or web address]

## 2020-01-27 NOTE — PROGRESS NOTES
Subjective:      Patient ID: Manfred Shah is a 54 y.o. male.    Chief Complaint: Hypertension (Uncontrolled hypertension - Ref by Brandy Ramirez)    HPI:Pt referred for evaluation of hypertension    Pt has a hx of prior back surgery and has received IV Ig infusions for idiopathic inflammatory polyneuropathy. (CIDP)    Pt has a left foot drop    Pt has to self catheterize.    Pt used to be a sargant in the [employer]    Review of Systems   Cardiovascular: Positive for dyspnea on exertion and leg swelling. Negative for chest pain, claudication, irregular heartbeat, near-syncope, orthopnea, palpitations and syncope.      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 Tray Rodriguez    CIDP (chronic inflammatory demyelinating polyneuropathy) 06/2018    followed by Dr. Rowdy Tran - Neurologist in [place].      Elevated liver enzymes 10/8/2018    Elevated PSA     followed by Dr. Sanderson    Foot drop, left foot     due to neurological condition/[facility] [place]    Hyperlipidemia     Hypertension     Impaired fasting glucose     Neurogenic bladder 09/05/2018    followed by Dr. Sanderson    RLS (restless legs syndrome) 10/8/2018    Self-catheterizes urinary bladder     Urologist    Sleep apnea with use of continuous positive airway pressure (CPAP) 01/10/2018    followed by [facility] in [place]    Urge incontinence 9/11/2018        Past Surgical History:   Procedure Laterality Date    ADENOIDECTOMY      HERNIA REPAIR      LUMBAR [facility]OMY  2012    [facility]    LUMBAR LAMINECTOMY  08/2018    Dr. Rodriguez - [facility]; complications from surgery - abscess to spinal cord - discharge summary scanned to media file    PLANTAR FASCIA RELEASE      TONSILLECTOMY      VASECTOMY      X-STOP IMPLANTATION         Family History   Problem Relation Age of Onset    Cancer Mother         Breast  Cancer    Heart disease Mother         pacemaker    Hyperlipidemia Mother         taking Crestor    Hypertension Mother         taking Amlodipine    Diabetes Mother         Prediabetes    Diabetes Father     Cancer Father         unknown type of cancer    Hypertension Father     Rheum arthritis Father     No Known Problems Sister     Prostate cancer Neg Hx     Kidney disease Neg Hx        Social History     Socioeconomic History    Marital status:      Spouse name: Not on file    Number of children: Not on file    Years of education: Not on file    Highest education level: Not on file   Occupational History    Occupation: teacher   Social Needs    Financial resource strain: Not very hard    Food insecurity:     Worry: Never true     Inability: Never true    Transportation needs:     Medical: No     Non-medical: No   Tobacco Use    Smoking status: Never Smoker    Smokeless tobacco: Never Used   Substance and Sexual Activity    Alcohol use: No     Frequency: Monthly or less     Drinks per session: 1 or 2     Binge frequency: Never    Drug use: No    Sexual activity: Yes     Partners: Female   Lifestyle    Physical activity:     Days per week: 3 days     Minutes per session: 60 min    Stress: Only a little   Relationships    Social connections:     Talks on phone: More than three times a week     Gets together: Twice a week     Attends Baptism service: Not on file     Active member of club or organization: Yes     Attends meetings of clubs or organizations: More than 4 times per year     Relationship status:    Other Topics Concern    Not on file   Social History Narrative    Not on file       Current Outpatient Medications on File Prior to Visit   Medication Sig Dispense Refill    amLODIPine (NORVASC) 10 MG tablet Take 1 tablet (10 mg total) by mouth once daily. 30 tablet 1    aspirin (ECOTRIN) 81 MG EC tablet Take 81 mg by mouth once daily.      fish oil-omega-3 fatty  "acids 300-1,000 mg capsule Take 2 g by mouth once daily.      hydrALAZINE (APRESOLINE) 25 MG tablet Take 1 tablet (25 mg total) by mouth 2 (two) times daily. 60 tablet 1    ELVIS 0.5-0.4 mg CM24 TAKE 1 CAPSULE BY MOUTH ONCE DAILY 30 capsule 11    lysine 500 mg Tab Take 500 mg by mouth once daily.      meloxicam (MOBIC) 15 MG tablet Take 1 tablet (15 mg total) by mouth once daily. 30 tablet 2    metoprolol succinate (TOPROL-XL) 100 MG 24 hr tablet Take 1 tablet (100 mg total) by mouth once daily. 30 tablet 1    multivitamin (THERAGRAN) per tablet Take 1 tablet by mouth once daily.      rOPINIRole (REQUIP) 2 MG tablet Take 1 tablet (2 mg total) by mouth every evening. 90 tablet 1    rosuvastatin (CRESTOR) 10 MG tablet Take 1 tablet (10 mg total) by mouth every evening. 90 tablet 1    valsartan-hydrochlorothiazide (DIOVAN-HCT) 320-12.5 mg per tablet Take 1 tablet by mouth once daily. 30 tablet 0     No current facility-administered medications on file prior to visit.        Review of patient's allergies indicates:  No Known Allergies  Objective:     Vitals:    01/27/20 1620   BP: (!) 154/95   BP Location: Right arm   Patient Position: Sitting   BP Method: Large (Automatic)   Pulse: 79   SpO2: 98%   Weight: (!) 174.3 kg (384 lb 4.2 oz)   Height: 6' 5" (1.956 m)        Physical Exam   Constitutional: He is oriented to person, place, and time. He appears well-developed and well-nourished. No distress.   Eyes: No scleral icterus.   Neck: No JVD present. Carotid bruit is not present.   Cardiovascular: Regular rhythm and normal heart sounds. Exam reveals no gallop and no friction rub.   No murmur heard.  Pulses:       Dorsalis pedis pulses are 2+ on the left side.        Posterior tibial pulses are 2+ on the right side.   Pulmonary/Chest: Effort normal and breath sounds normal. No respiratory distress.   Abdominal: Soft. He exhibits no abdominal bruit, no pulsatile midline mass and no mass. There is no " hepatosplenomegaly. There is no tenderness.   Musculoskeletal: He exhibits edema (trace to one plus edema bilaterally).   Neurological: He is alert and oriented to person, place, and time.   Skin: Skin is warm and dry. He is not diaphoretic.   Psychiatric: He has a normal mood and affect. His behavior is normal. Judgment and thought content normal.   Vitals reviewed.     ECG: NSR, WNL    November 2019 lab:  Chol 144    LDL 73  HDL 44  HgbA1C 5.8    December 2019 lab  CMP WNL except for AST 53 and ALT 62,  K 3.6  Hgb 13.7  CRP 1.5  Sed rate 6  Assessment:     1. Essential hypertension    2. Pure hypercholesterolemia    3. Resistant hypertension    4. CIDP (chronic inflammatory demyelinating polyneuropathy)    5. Foot drop, left foot    6. RLS (restless legs syndrome)    7. Urinary retention    8. Self-catheterizes urinary bladder    9. Impaired fasting glucose    10. Dyspnea on exertion    11. Localized edema      Plan:   Manfred was seen today for hypertension.    Diagnoses and all orders for this visit:    Essential hypertension  -     X-Ray Chest PA And Lateral; Future  -     Basic metabolic panel; Future  -     Echo Color Flow Doppler? Yes; Future    Pure hypercholesterolemia    Resistant hypertension    CIDP (chronic inflammatory demyelinating polyneuropathy)    Foot drop, left foot    RLS (restless legs syndrome)    Urinary retention    Self-catheterizes urinary bladder    Impaired fasting glucose    Dyspnea on exertion  -     X-Ray Chest PA And Lateral; Future  -     Basic metabolic panel; Future  -     Echo Color Flow Doppler? Yes; Future    Localized edema  -     X-Ray Chest PA And Lateral; Future  -     Basic metabolic panel; Future  -     Echo Color Flow Doppler? Yes; Future    Other orders  -     eplerenone (INSPRA) 25 MG Tab; Take 1 tablet (25 mg total) by mouth once daily.     Low carb diet discussed at length and in detail    Wt reduction encouraged    Try Tylenol in place of Mobic to see if the  edema and hypertension improve    Same meds plus eplerenone 25 mg daily    Low salt diet discussed    RTC one month    CXR     Echocardiogram    The shortness of breath is likely due to obesity and deconditioning.    The edema is likely venous stasis edema due to obesity and amlodipine and Requip    Consider trial off Requip to see if the edema and hypertension improve    Follow up in about 4 weeks (around 2/24/2020).

## 2020-02-14 ENCOUNTER — TELEPHONE (OUTPATIENT)
Dept: CARDIOLOGY | Facility: CLINIC | Age: 55
End: 2020-02-14

## 2020-02-14 NOTE — TELEPHONE ENCOUNTER
I spoke with pt:  Echocardiogram shows normal LVEF  CXR wNL  The edema is due to venous insufficiency rather than CHF.  Pt reports edema has improved with initiation of eplerenone and cessation of Mobic

## 2020-03-02 ENCOUNTER — OFFICE VISIT (OUTPATIENT)
Dept: CARDIOLOGY | Facility: CLINIC | Age: 55
End: 2020-03-02
Payer: COMMERCIAL

## 2020-03-02 VITALS
SYSTOLIC BLOOD PRESSURE: 134 MMHG | OXYGEN SATURATION: 96 % | HEART RATE: 84 BPM | DIASTOLIC BLOOD PRESSURE: 84 MMHG | HEIGHT: 77 IN | WEIGHT: 315 LBS | BODY MASS INDEX: 37.19 KG/M2

## 2020-03-02 DIAGNOSIS — E78.00 PURE HYPERCHOLESTEROLEMIA: ICD-10-CM

## 2020-03-02 DIAGNOSIS — R60.0 LOCALIZED EDEMA: ICD-10-CM

## 2020-03-02 DIAGNOSIS — I10 ESSENTIAL HYPERTENSION: Primary | ICD-10-CM

## 2020-03-02 PROCEDURE — 99999 PR PBB SHADOW E&M-EST. PATIENT-LVL III: CPT | Mod: PBBFAC,,, | Performed by: INTERNAL MEDICINE

## 2020-03-02 PROCEDURE — 3008F BODY MASS INDEX DOCD: CPT | Mod: CPTII,S$GLB,, | Performed by: INTERNAL MEDICINE

## 2020-03-02 PROCEDURE — 99213 OFFICE O/P EST LOW 20 MIN: CPT | Mod: S$GLB,,, | Performed by: INTERNAL MEDICINE

## 2020-03-02 PROCEDURE — 99999 PR PBB SHADOW E&M-EST. PATIENT-LVL III: ICD-10-PCS | Mod: PBBFAC,,, | Performed by: INTERNAL MEDICINE

## 2020-03-02 PROCEDURE — 3075F PR MOST RECENT SYSTOLIC BLOOD PRESS GE 130-139MM HG: ICD-10-PCS | Mod: CPTII,S$GLB,, | Performed by: INTERNAL MEDICINE

## 2020-03-02 PROCEDURE — 99213 PR OFFICE/OUTPT VISIT, EST, LEVL III, 20-29 MIN: ICD-10-PCS | Mod: S$GLB,,, | Performed by: INTERNAL MEDICINE

## 2020-03-02 PROCEDURE — 3079F PR MOST RECENT DIASTOLIC BLOOD PRESSURE 80-89 MM HG: ICD-10-PCS | Mod: CPTII,S$GLB,, | Performed by: INTERNAL MEDICINE

## 2020-03-02 PROCEDURE — 3075F SYST BP GE 130 - 139MM HG: CPT | Mod: CPTII,S$GLB,, | Performed by: INTERNAL MEDICINE

## 2020-03-02 PROCEDURE — 3008F PR BODY MASS INDEX (BMI) DOCUMENTED: ICD-10-PCS | Mod: CPTII,S$GLB,, | Performed by: INTERNAL MEDICINE

## 2020-03-02 PROCEDURE — 3079F DIAST BP 80-89 MM HG: CPT | Mod: CPTII,S$GLB,, | Performed by: INTERNAL MEDICINE

## 2020-03-02 RX ORDER — HYDROCODONE BITARTRATE AND ACETAMINOPHEN 10; 325 MG/1; MG/1
TABLET ORAL
Status: ON HOLD | COMMUNITY
Start: 2020-02-21 | End: 2020-06-05 | Stop reason: HOSPADM

## 2020-03-02 RX ORDER — METHOCARBAMOL 750 MG/1
TABLET, FILM COATED ORAL
Status: ON HOLD | COMMUNITY
Start: 2020-02-21 | End: 2020-06-05 | Stop reason: SDUPTHER

## 2020-03-02 RX ORDER — TRAMADOL HYDROCHLORIDE 50 MG/1
TABLET ORAL
COMMUNITY
Start: 2020-02-14 | End: 2020-05-27 | Stop reason: ALTCHOICE

## 2020-03-02 NOTE — PROGRESS NOTES
Subjective:      Patient ID: Manfred Shah is a 54 y.o. male.    Chief Complaint: Hypertension (Blood pressure follow up)    HPI: Stopped Mobic and Requip.    Tolerating the eplerenone.    Had some back pain and had to take Norco and skelaxin     Works with a     Spent a week at [facility].    Review of Systems   Cardiovascular: Positive for leg swelling. Negative for chest pain, claudication, dyspnea on exertion, irregular heartbeat, near-syncope, orthopnea, palpitations and syncope.        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 Tray Rodriguez    CIDP (chronic inflammatory demyelinating polyneuropathy) 06/2018    followed by Dr. Rowdy Tran - Neurologist in [place].      Elevated liver enzymes 10/8/2018    Elevated PSA     followed by Dr. Sanderson    Foot drop, left foot     due to neurological condition/[facility] [place]    Hyperlipidemia     Hypertension     Impaired fasting glucose     Neurogenic bladder 09/05/2018    followed by Dr. Sanderson    RLS (restless legs syndrome) 10/8/2018    Self-catheterizes urinary bladder     Urologist    Sleep apnea with use of continuous positive airway pressure (CPAP) 01/10/2018    followed by [facility] in [place]    Urge incontinence 9/11/2018        Past Surgical History:   Procedure Laterality Date    ADENOIDECTOMY      COLONOSCOPY N/A 1/24/2020    Procedure: COLONOSCOPY;  Surgeon: Stacy Markham MD;  Location: [facility];  Service: Endoscopy;  Laterality: N/A;    HERNIA REPAIR      LUMBAR LAMINECTOMY  2012    [facility]    LUMBAR LAMINECTOMY  08/2018    Dr. Rodriguez - [facility]; complications from surgery - abscess to spinal cord - discharge summary scanned to media file    PLANTAR FASCIA RELEASE      TONSILLECTOMY      VASECTOMY      X-STOP IMPLANTATION         Family History   Problem Relation Age of Onset    Cancer Mother         Breast Cancer     Heart disease Mother         pacemaker    Hyperlipidemia Mother         taking Crestor    Hypertension Mother         taking Amlodipine    Diabetes Mother         Prediabetes    Diabetes Father     Cancer Father         unknown type of cancer    Hypertension Father     Rheum arthritis Father     No Known Problems Sister     Prostate cancer Neg Hx     Kidney disease Neg Hx        Social History     Socioeconomic History    Marital status:      Spouse name: Not on file    Number of children: Not on file    Years of education: Not on file    Highest education level: Not on file   Occupational History    Occupation: teacher   Social Needs    Financial resource strain: Not very hard    Food insecurity:     Worry: Never true     Inability: Never true    Transportation needs:     Medical: No     Non-medical: No   Tobacco Use    Smoking status: Never Smoker    Smokeless tobacco: Never Used   Substance and Sexual Activity    Alcohol use: No     Frequency: Monthly or less     Drinks per session: 1 or 2     Binge frequency: Never    Drug use: No    Sexual activity: Yes     Partners: Female   Lifestyle    Physical activity:     Days per week: 3 days     Minutes per session: 60 min    Stress: Only a little   Relationships    Social connections:     Talks on phone: More than three times a week     Gets together: Twice a week     Attends [employer] service: Not on file     Active member of club or organization: Yes     Attends meetings of clubs or organizations: More than 4 times per year     Relationship status:    Other Topics Concern    Not on file   Social History Narrative    Not on file       Current Outpatient Medications on File Prior to Visit   Medication Sig Dispense Refill    albuterol (PROVENTIL/VENTOLIN HFA) 90 mcg/actuation inhaler Inhale 1-2 puffs every 4 hours as needed 8.5 g 0    amLODIPine (NORVASC) 10 MG tablet Take 1 tablet (10 mg total) by mouth once daily. 30  "tablet 1    aspirin (ECOTRIN) 81 MG EC tablet Take 81 mg by mouth once daily.      eplerenone (INSPRA) 25 MG Tab Take 1 tablet (25 mg total) by mouth once daily. 90 tablet 3    fish oil-omega-3 fatty acids 300-1,000 mg capsule Take 2 g by mouth once daily.      hydrALAZINE (APRESOLINE) 25 MG tablet Take 1 tablet (25 mg total) by mouth 2 (two) times daily. 60 tablet 1    ELVIS 0.5-0.4 mg CM24 TAKE 1 CAPSULE BY MOUTH ONCE DAILY 30 capsule 11    lysine 500 mg Tab Take 500 mg by mouth once daily.      methocarbamol (ROBAXIN) 750 MG Tab       metoprolol succinate (TOPROL-XL) 100 MG 24 hr tablet Take 1 tablet (100 mg total) by mouth once daily. 30 tablet 1    multivitamin (THERAGRAN) per tablet Take 1 tablet by mouth once daily.      rosuvastatin (CRESTOR) 10 MG tablet Take 1 tablet (10 mg total) by mouth every evening. 90 tablet 1    traMADol (ULTRAM) 50 mg tablet       valsartan-hydrochlorothiazide (DIOVAN-HCT) 320-12.5 mg per tablet Take 1 tablet by mouth once daily. 30 tablet 0    HYDROcodone-acetaminophen (NORCO)  mg per tablet       meloxicam (MOBIC) 15 MG tablet Take 1 tablet (15 mg total) by mouth once daily. 30 tablet 2    [DISCONTINUED] azithromycin (Z-ARLINE) 250 MG tablet Take 2 pills by mouth on day one. then Take one pill daily until prescription complete. 6 tablet 0    [DISCONTINUED] benzonatate (TESSALON) 100 MG capsule Take 1 capsule by mouth three times a day as needed 9 capsule 0    [DISCONTINUED] rOPINIRole (REQUIP) 2 MG tablet Take 1 tablet (2 mg total) by mouth every evening. 90 tablet 1     No current facility-administered medications on file prior to visit.        Review of patient's allergies indicates:  No Known Allergies  Objective:     Vitals:    03/02/20 1540   BP: 134/84   BP Location: Left arm   Patient Position: Sitting   BP Method: Large (Automatic)   Pulse: 84   SpO2: 96%   Weight: (!) 170.1 kg (375 lb)   Height: 6' 5" (1.956 m)        Physical Exam   Constitutional: " He is oriented to person, place, and time. He appears well-developed and well-nourished. No distress.   Eyes: No scleral icterus.   Neck: No JVD present. Carotid bruit is not present.   Cardiovascular: Regular rhythm and normal heart sounds. Exam reveals no gallop and no friction rub.   No murmur heard.  Pulmonary/Chest: Effort normal and breath sounds normal. No respiratory distress.   Musculoskeletal: He exhibits edema (trace bilateral pitting pedal edema).   Neurological: He is alert and oriented to person, place, and time.   Skin: Skin is warm and dry. He is not diaphoretic.   Psychiatric: He has a normal mood and affect. His behavior is normal. Judgment and thought content normal.   Vitals reviewed.       Note CXR was normal    Note echocardiogram was normal except for mild LVH    Assessment:     1. Essential hypertension    2. Pure hypercholesterolemia    3. Localized edema      Plan:   Manfred was seen today for hypertension.    Diagnoses and all orders for this visit:    Essential hypertension  -     Basic metabolic panel; Future    Pure hypercholesterolemia    Localized edema  -     Basic metabolic panel; Future      Low carb diet    Stay off Mobic and Requip since edema is now improved    Continue eplerenone since hypertension is now controlled    Same meds    RTC 6 months    F/u with Dr Ramirez    Check BMP    Low carb diet    No follow-ups on file.

## 2020-03-05 DIAGNOSIS — I10 ESSENTIAL HYPERTENSION: ICD-10-CM

## 2020-03-05 RX ORDER — VALSARTAN AND HYDROCHLOROTHIAZIDE 320; 12.5 MG/1; MG/1
1 TABLET, FILM COATED ORAL DAILY
Qty: 30 TABLET | Refills: 1 | Status: SHIPPED | OUTPATIENT
Start: 2020-03-05 | End: 2020-04-23

## 2020-03-17 DIAGNOSIS — N40.0 BENIGN NODULAR PROSTATIC HYPERPLASIA WITHOUT LOWER URINARY TRACT SYMPTOMS: ICD-10-CM

## 2020-03-17 DIAGNOSIS — I1A.0 RESISTANT HYPERTENSION: ICD-10-CM

## 2020-03-17 RX ORDER — DUTASTERIDE AND TAMSULOSIN HYDROCHLORIDE .5; .4 MG/1; MG/1
CAPSULE ORAL
Qty: 30 CAPSULE | Refills: 11 | Status: SHIPPED | OUTPATIENT
Start: 2020-03-17 | End: 2021-04-08

## 2020-03-17 RX ORDER — HYDRALAZINE HYDROCHLORIDE 25 MG/1
TABLET, FILM COATED ORAL
Qty: 60 TABLET | Refills: 1 | Status: SHIPPED | OUTPATIENT
Start: 2020-03-17 | End: 2020-05-19

## 2020-03-17 RX ORDER — AMLODIPINE BESYLATE 10 MG/1
TABLET ORAL
Qty: 30 TABLET | Refills: 1 | Status: SHIPPED | OUTPATIENT
Start: 2020-03-17 | End: 2020-05-19

## 2020-04-01 DIAGNOSIS — I1A.0 RESISTANT HYPERTENSION: ICD-10-CM

## 2020-04-01 RX ORDER — METOPROLOL SUCCINATE 100 MG/1
TABLET, EXTENDED RELEASE ORAL
Qty: 30 TABLET | Refills: 2 | Status: SHIPPED | OUTPATIENT
Start: 2020-04-01 | End: 2020-07-02 | Stop reason: SDUPTHER

## 2020-04-15 ENCOUNTER — TELEPHONE (OUTPATIENT)
Dept: RHEUMATOLOGY | Facility: CLINIC | Age: 55
End: 2020-04-15

## 2020-04-15 NOTE — TELEPHONE ENCOUNTER
Lynne Landrum, DO [facility]   Caller: Unspecified (Yesterday,  1:16 PM)             Please get patient scheduled for a follow up visit for his joint pain as he never followed up after his initial visit.      Debbie: The patient stated that he will have some MRIs done on Friday to figure out whats going. The patient stated that he would like to put setting up a follow up with Dr Landrum on hold.

## 2020-04-15 NOTE — TELEPHONE ENCOUNTER
----- Message from Lynne Landrum DO sent at 4/14/2020  1:16 PM CDT -----  Please get patient scheduled for a follow up visit for his joint pain as he never followed up after his initial visit.

## 2020-04-23 DIAGNOSIS — I10 ESSENTIAL HYPERTENSION: ICD-10-CM

## 2020-04-23 RX ORDER — VALSARTAN AND HYDROCHLOROTHIAZIDE 320; 12.5 MG/1; MG/1
1 TABLET, FILM COATED ORAL DAILY
Qty: 30 TABLET | Refills: 0 | Status: SHIPPED | OUTPATIENT
Start: 2020-04-23 | End: 2020-06-09

## 2020-04-24 ENCOUNTER — TELEPHONE (OUTPATIENT)
Dept: NEUROLOGY | Facility: CLINIC | Age: 55
End: 2020-04-24

## 2020-04-24 ENCOUNTER — TELEPHONE (OUTPATIENT)
Dept: NEUROSURGERY | Facility: CLINIC | Age: 55
End: 2020-04-24

## 2020-04-24 ENCOUNTER — PATIENT MESSAGE (OUTPATIENT)
Dept: NEUROSURGERY | Facility: CLINIC | Age: 55
End: 2020-04-24

## 2020-04-27 ENCOUNTER — OFFICE VISIT (OUTPATIENT)
Dept: NEUROSURGERY | Facility: CLINIC | Age: 55
End: 2020-04-27
Payer: COMMERCIAL

## 2020-04-27 DIAGNOSIS — M54.16 LEFT LUMBAR RADICULOPATHY: Primary | ICD-10-CM

## 2020-04-27 DIAGNOSIS — M51.26 HERNIATED LUMBAR INTERVERTEBRAL DISC: ICD-10-CM

## 2020-04-27 DIAGNOSIS — M25.562 LEFT KNEE PAIN, UNSPECIFIED CHRONICITY: ICD-10-CM

## 2020-04-27 DIAGNOSIS — Z98.890 H/O LAMINECTOMY: ICD-10-CM

## 2020-04-27 DIAGNOSIS — M54.50 LEFT LOW BACK PAIN, UNSPECIFIED CHRONICITY, UNSPECIFIED WHETHER SCIATICA PRESENT: ICD-10-CM

## 2020-04-27 PROCEDURE — 99204 PR OFFICE/OUTPT VISIT, NEW, LEVL IV, 45-59 MIN: ICD-10-PCS | Mod: 95,,, | Performed by: NEUROLOGICAL SURGERY

## 2020-04-27 PROCEDURE — 99204 OFFICE O/P NEW MOD 45 MIN: CPT | Mod: 95,,, | Performed by: NEUROLOGICAL SURGERY

## 2020-04-28 PROBLEM — M25.562 LEFT KNEE PAIN: Status: ACTIVE | Noted: 2020-04-28

## 2020-04-28 PROBLEM — M54.50 LEFT LOW BACK PAIN: Status: ACTIVE | Noted: 2020-04-28

## 2020-04-28 PROBLEM — M51.26 HERNIATED LUMBAR INTERVERTEBRAL DISC: Status: ACTIVE | Noted: 2020-04-28

## 2020-04-28 PROBLEM — M54.16 LEFT LUMBAR RADICULOPATHY: Status: ACTIVE | Noted: 2020-04-28

## 2020-04-28 PROBLEM — Z98.890 H/O LAMINECTOMY: Status: ACTIVE | Noted: 2020-04-28

## 2020-04-28 NOTE — PROGRESS NOTES
The patient location is: home  The chief complaint leading to consultation is: back and leg pain  Visit type: audiovisual  Total time spent with patient: 20 mins  Each patient to whom he or she provides medical services by telemedicine is:  (1) informed of the relationship between the physician and patient and the respective role of any other health care provider with respect to management of the patient; and (2) notified that he or she may decline to receive medical services by telemedicine and may withdraw from such care at any time.    CHIEF COMPLAINT:  Back and leg pain    HPI:  Manfred Shah is a 54 y.o.  male with below listed PMH, who   Presents with low back pain that radiates down his left thigh to his knee.  It does not go past the knee  And seems to skip the buttocks.  Pain is made worse by ambulating.  It is most severe in the morning and described as almost unbearable.  He ends up using a walker for support.  Sitting in a recliner and using ice helps make the pain better.  He feels like his thigh strength has diminished and does not support his weight reliably.  He also has some tingling in the thigh.  He has been diagnosed with tendinitis in the left knee and is having that evaluated tomorrow by Orthopedics.    No imaging for review    He has a history of an L3-4 laminectomy performed by Dr. Ramires in 2012 followed by a repeat L3-4 laminectomy and diskectomy by Dr. Rodriguez in 2018.     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 Tray Rodriguez    CIDP (chronic inflammatory demyelinating polyneuropathy) 06/2018    followed by Dr. Rowdy Tran - Neurologist in [place].      Elevated liver enzymes 10/8/2018    Elevated PSA     followed by Dr. Sanderson    Foot drop, left foot     due to  neurological condition/[facility] [place]    Hyperlipidemia     Hypertension     Impaired fasting glucose     Neurogenic bladder 09/05/2018    followed by Dr. Sanderson    RLS (restless legs syndrome) 10/8/2018    Self-catheterizes urinary bladder     Urologist    Sleep apnea with use of continuous positive airway pressure (CPAP) 01/10/2018    followed by [facility] in [place]    Urge incontinence 9/11/2018     Past Surgical History:   Procedure Laterality Date    ADENOIDECTOMY      COLONOSCOPY N/A 1/24/2020    Procedure: COLONOSCOPY;  Surgeon: Stacy Markham MD;  Location: [facility];  Service: Endoscopy;  Laterality: N/A;    HERNIA REPAIR      LUMBAR LAMINECTOMY  2012    [facility]    LUMBAR LAMINECTOMY  08/2018    Dr. Rodriguez - [facility]; complications from surgery - abscess to spinal cord - discharge summary scanned to media file    PLANTAR FASCIA RELEASE      TONSILLECTOMY      VASECTOMY      X-STOP IMPLANTATION       Family History   Problem Relation Age of Onset    Cancer Mother         Breast Cancer    Heart disease Mother         pacemaker    Hyperlipidemia Mother         taking Crestor    Hypertension Mother         taking Amlodipine    Diabetes Mother         Prediabetes    Diabetes Father     Cancer Father         unknown type of cancer    Hypertension Father     Rheum arthritis Father     No Known Problems Sister     Prostate cancer Neg Hx     Kidney disease Neg Hx      Social History     Tobacco Use    Smoking status: Never Smoker    Smokeless tobacco: Never Used   Substance Use Topics    Alcohol use: No     Frequency: Monthly or less     Drinks per session: 1 or 2     Binge frequency: Never    Drug use: No        Review of Systems   Constitutional: Negative.    Respiratory: Negative for cough and shortness of breath.    Cardiovascular: Negative for chest pain, palpitations, claudication and leg swelling.   Gastrointestinal: Negative for abdominal pain,  constipation and diarrhea.   Genitourinary: Negative for flank pain, frequency and urgency.   Musculoskeletal: Positive for back pain. Negative for falls, joint pain, myalgias and neck pain.   Skin: Negative.    Neurological: Positive for tingling and focal weakness. Negative for dizziness, tremors, sensory change, speech change, seizures, loss of consciousness, weakness and headaches.   Psychiatric/Behavioral: Negative.        OBJECTIVE:   Vital Signs:  None    Physical Exam:  Constitutional: Patient sitting comfortably in chair. Appears well developed and well nourished.  Psych/Behavior: Normal affect.    Neurological:    Mental status: Alert and oriented. Conversational and appropriate.    ZEPEDA equally, grossly at least antigravity    Diagnostic Results:  All imaging was independently reviewed by me.    Outside [facility] MRI report, dated 4/17/20:  1. Prior L3-4 lami and postop changes  2. Left L3-4 foraminal disc protrusion with suspected L3 nerve compression  3. Moderate L L4-5 LRS      ASSESSMENT/PLAN:     Problem List Items Addressed This Visit        Neuro    H/O laminectomy    Herniated lumbar intervertebral disc    Left lumbar radiculopathy       Orthopedic    Left knee pain    Left low back pain - Primary          VISIT SUMMARY:   main complaint is low back pain with radiation into the left thigh down to the knee.  He has a history of L3-4 laminectomies x2.  Imaging demonstrates L3-4 postoperative change with disc protrusion within the L3-4 neural foramina potentially compressing the L3 and L4 nerve roots.  He also has left knee pain and has been diagnosed with tendinitis.  He will be evaluated by Orthopedics tomorrow.  He has been instructed to drop off a CD with the MRIs for my review.    PATIENT EDUCATION:  More than half the clinic visit was spent showing with patient the pertinent findings on imaging and educating the patient about natural history of the pathology.   We discussed options for treatment as  well as the risks and benefits of each option.  All questions were answered.     The patient understands and agrees with the following plan of care.    - F/u with ortho tomorrow re: knee pain  - Instructed to drop off CD with MRI (will call)      ADDENDUM (4/29/20):  I reviewed outside MRI that patient dropped off.    Severe LRS and NFS on left at L3-4 compressing exiting L3 nerve root.  This likely explains his thigh pain.  He would be candidate for surgery - I favor fusion since this would be 3rd surgery at the same level but will get CT and flex/ex.            .

## 2020-05-06 ENCOUNTER — HOSPITAL ENCOUNTER (OUTPATIENT)
Dept: RADIOLOGY | Facility: HOSPITAL | Age: 55
Discharge: HOME | End: 2020-05-06
Attending: NEUROLOGICAL SURGERY
Payer: COMMERCIAL

## 2020-05-06 DIAGNOSIS — Z98.890 H/O LAMINECTOMY: ICD-10-CM

## 2020-05-06 DIAGNOSIS — M54.50 LEFT LOW BACK PAIN, UNSPECIFIED CHRONICITY, UNSPECIFIED WHETHER SCIATICA PRESENT: ICD-10-CM

## 2020-05-06 DIAGNOSIS — M54.16 LEFT LUMBAR RADICULOPATHY: ICD-10-CM

## 2020-05-06 DIAGNOSIS — M25.562 LEFT KNEE PAIN, UNSPECIFIED CHRONICITY: ICD-10-CM

## 2020-05-06 DIAGNOSIS — M51.26 HERNIATED LUMBAR INTERVERTEBRAL DISC: ICD-10-CM

## 2020-05-06 PROCEDURE — 72131 CT LUMBAR SPINE W/O DYE: CPT | Mod: 26,,, | Performed by: RADIOLOGY

## 2020-05-06 PROCEDURE — 72131 CT LUMBAR SPINE W/O DYE: CPT | Mod: TC

## 2020-05-06 PROCEDURE — 72120 X-RAY BEND ONLY L-S SPINE: CPT | Mod: 26,,, | Performed by: RADIOLOGY

## 2020-05-06 PROCEDURE — 72100 XR LUMBAR SPINE AP AND LAT WITH FLEX/EXT: ICD-10-PCS | Mod: 26,,, | Performed by: RADIOLOGY

## 2020-05-06 PROCEDURE — 72131 CT LUMBAR SPINE WITHOUT CONTRAST: ICD-10-PCS | Mod: 26,,, | Performed by: RADIOLOGY

## 2020-05-06 PROCEDURE — 72100 X-RAY EXAM L-S SPINE 2/3 VWS: CPT | Mod: TC,FY

## 2020-05-06 PROCEDURE — 72100 X-RAY EXAM L-S SPINE 2/3 VWS: CPT | Mod: 26,,, | Performed by: RADIOLOGY

## 2020-05-06 PROCEDURE — 72120 XR LUMBAR SPINE AP AND LAT WITH FLEX/EXT: ICD-10-PCS | Mod: 26,,, | Performed by: RADIOLOGY

## 2020-05-07 ENCOUNTER — PATIENT MESSAGE (OUTPATIENT)
Dept: NEUROSURGERY | Facility: CLINIC | Age: 55
End: 2020-05-07

## 2020-05-07 ENCOUNTER — TELEPHONE (OUTPATIENT)
Dept: NEUROSURGERY | Facility: CLINIC | Age: 55
End: 2020-05-07

## 2020-05-11 ENCOUNTER — OFFICE VISIT (OUTPATIENT)
Dept: NEUROSURGERY | Facility: CLINIC | Age: 55
End: 2020-05-11
Payer: COMMERCIAL

## 2020-05-11 ENCOUNTER — PATIENT MESSAGE (OUTPATIENT)
Dept: FAMILY MEDICINE | Facility: CLINIC | Age: 55
End: 2020-05-11

## 2020-05-11 ENCOUNTER — PATIENT MESSAGE (OUTPATIENT)
Dept: CARDIOLOGY | Facility: CLINIC | Age: 55
End: 2020-05-11

## 2020-05-11 DIAGNOSIS — Z98.890 H/O LAMINECTOMY: ICD-10-CM

## 2020-05-11 DIAGNOSIS — M51.26 HERNIATED LUMBAR INTERVERTEBRAL DISC: ICD-10-CM

## 2020-05-11 DIAGNOSIS — M54.16 LEFT LUMBAR RADICULOPATHY: Primary | ICD-10-CM

## 2020-05-11 DIAGNOSIS — M54.50 LEFT LOW BACK PAIN, UNSPECIFIED CHRONICITY, UNSPECIFIED WHETHER SCIATICA PRESENT: ICD-10-CM

## 2020-05-11 DIAGNOSIS — M21.372 FOOT DROP, LEFT FOOT: ICD-10-CM

## 2020-05-11 PROCEDURE — 99215 PR OFFICE/OUTPT VISIT, EST, LEVL V, 40-54 MIN: ICD-10-PCS | Mod: 95,,, | Performed by: NEUROLOGICAL SURGERY

## 2020-05-11 PROCEDURE — 99215 OFFICE O/P EST HI 40 MIN: CPT | Mod: 95,,, | Performed by: NEUROLOGICAL SURGERY

## 2020-05-11 NOTE — PROGRESS NOTES
The patient location is: home  The chief complaint leading to consultation is: back and leg pain  Visit type: audiovisual  Total time spent with patient: 30 mins  Each patient to whom he or she provides medical services by telemedicine is:  (1) informed of the relationship between the physician and patient and the respective role of any other health care provider with respect to management of the patient; and (2) notified that he or she may decline to receive medical services by telemedicine and may withdraw from such care at any time.    CHIEF COMPLAINT:  Back and leg pain     INTERVAL HISTORY (5/11/20):  No significant changes in sxs.  Leg pain is still severe and he is eager to have surgery.    Initially underwent MIS decompression by Dr Ramires and had second surgery at [facility] by Dr Rodriguez which was supposed to be redo decompression.  Second surgery reportedly lasted 13 hrs and resulted in 1 week admission.  He reportedly suffered nerve damage that left him having to straight cath every 3.5 hrs due to incontinence.    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  He underwent IVIG (last dose on 4/3/19) and regain strength in his legs but still has residual L foot drop for which he wears an AFO    He also reports a spinal abscess that required a drain for 6 weeks.      HPI:  Manfred Shah is a 54 y.o.  male with below listed PMH, who   Presents with low back pain that radiates down his left thigh to his knee.  It does not go past the knee  And seems to skip the buttocks.  Pain is made worse by ambulating.  It is most severe in the morning and described as almost unbearable.  He ends up using a walker for support.  Sitting in a recliner and using ice helps make the pain better.  He feels like his thigh strength has diminished and does not support his weight reliably.  He also has some tingling in the thigh.  He has been diagnosed with tendinitis  in the left knee and is having that evaluated tomorrow by Orthopedics.     No imaging for review     He has a history of an L3-4 laminectomy performed by Dr. Ramires in 2012 followed by a repeat L3-4 laminectomy and diskectomy by Dr. Rodriguez in 2018.      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 Tray Rodriguez    CIDP (chronic inflammatory demyelinating polyneuropathy) 06/2018     followed by Dr. Rowdy Tran - Neurologist in [place].      Elevated liver enzymes 10/8/2018    Elevated PSA       followed by Dr. Sanderson    Foot drop, left foot       due to neurological condition/[facility]an [place]    Hyperlipidemia      Hypertension      Impaired fasting glucose      Neurogenic bladder 09/05/2018     followed by Dr. Sanderson    RLS (restless legs syndrome) 10/8/2018    Self-catheterizes urinary bladder       Urologist    Sleep apnea with use of continuous positive airway pressure (CPAP) 01/10/2018     followed by [facility] in [place]    Urge incontinence 9/11/2018            Past Surgical History:   Procedure Laterality Date    ADENOIDECTOMY        COLONOSCOPY N/A 1/24/2020     Procedure: COLONOSCOPY;  Surgeon: Stacy Markham MD;  Location: [facility];  Service: Endoscopy;  Laterality: N/A;    HERNIA REPAIR        LUMBAR LAMINECTOMY   2012     [facility]    LUMBAR LAMINECTOMY   08/2018     Dr. Rodriguez - [facility]; complications from surgery - abscess to spinal cord - discharge summary scanned to media file    PLANTAR FASCIA RELEASE        TONSILLECTOMY        VASECTOMY        X-STOP IMPLANTATION                Family History   Problem Relation Age of Onset    Cancer Mother           Breast Cancer    Heart disease Mother           pacemaker    Hyperlipidemia Mother            taking Crestor    Hypertension Mother           taking Amlodipine    Diabetes Mother           Prediabetes    Diabetes Father      Cancer Father           unknown type of cancer    Hypertension Father      Rheum arthritis Father      No Known Problems Sister      Prostate cancer Neg Hx      Kidney disease Neg Hx        Social History            Tobacco Use    Smoking status: Never Smoker    Smokeless tobacco: Never Used   Substance Use Topics    Alcohol use: No       Frequency: Monthly or less       Drinks per session: 1 or 2       Binge frequency: Never    Drug use: No         Review of Systems   Constitutional: Negative.    Respiratory: Negative for cough and shortness of breath.    Cardiovascular: Negative for chest pain, palpitations, claudication and leg swelling.   Gastrointestinal: Negative for abdominal pain, constipation and diarrhea.   Genitourinary: Negative for flank pain, frequency and urgency.   Musculoskeletal: Positive for back pain. Negative for falls, joint pain, myalgias and neck pain.   Skin: Negative.    Neurological: Positive for tingling and focal weakness. Negative for dizziness, tremors, sensory change, speech change, seizures, loss of consciousness, weakness and headaches.   Psychiatric/Behavioral: Negative.          OBJECTIVE:   Vital Signs:  None     Physical Exam:  Constitutional: Patient sitting comfortably in chair. Appears well developed and well nourished.  Psych/Behavior: Normal affect.     Neurological:     Mental status: Alert and oriented. Conversational and appropriate.     ZEPEDA equally, grossly at least antigravity     Diagnostic Results:  All imaging was independently reviewed by me.     Lumbar CT, 5/6/20:  1. Prior L3-4 lami defect with disruption of L facet and pars    Lumbar flex/ex 5/6/20:  No dynamic instability    Outside [facility] MRI, dated 4/17/20:  1. Prior L3-4 lami and postop changes  2. Left L3-4 foraminal disc protrusion with suspected L3 nerve  compression  3. Moderate L L4-5 LRS        ASSESSMENT/PLAN:           Problem List Items Addressed This Visit                 Neuro      H/O laminectomy      Herniated lumbar intervertebral disc      Left lumbar radiculopathy            Orthopedic      Left knee pain      Left low back pain - Primary              VISIT SUMMARY:  Extensive and complicated history involving deficits from CIDP and prior lumbar surgeries.  Main complaint is low back pain with radiation into the left thigh down to the knee.  This likely corresponds to nerve root compression at L3-4 on left.  Ideally, he would be candidate for MIS lami/discectomy but given this is his 3rd surgery with likely scar tissue and prior infection, I rec L3-4 MIS TLIF.  He also has damaged L facet joint.     PATIENT EDUCATION:  More than half the clinic visit was spent showing with patient the pertinent findings on imaging and educating the patient about natural history of the pathology.   We discussed options for treatment as well as the risks and benefits of each option.  All questions were answered.      The patient understands and agrees with the following plan of care.     - Obtain records from [facility]  - Surgery scheduled for 5/3/20 at [facility]  - Preop clearance needed from PCP (Dr Ramirez) and cards (Dr Cuevas)  - Preop labs, EKG, CXR ordered   - Preop PAT appointment requested  - STOP TAKING ASPIRIN, NSAIDS, AND ALL OTHER BLOOD THINNERS 7 DAYS PRIOR TO SURGERY

## 2020-05-12 ENCOUNTER — PATIENT MESSAGE (OUTPATIENT)
Dept: FAMILY MEDICINE | Facility: CLINIC | Age: 55
End: 2020-05-12

## 2020-05-12 NOTE — TELEPHONE ENCOUNTER
Pt has an appt with Brandy on 5/27.  Unless pt needs the clearance sooner than that, I would recommend he keep that appt and ask Brandy for preop evaluation at that visit.

## 2020-05-14 ENCOUNTER — PATIENT MESSAGE (OUTPATIENT)
Dept: FAMILY MEDICINE | Facility: CLINIC | Age: 55
End: 2020-05-14

## 2020-05-14 ENCOUNTER — OFFICE VISIT (OUTPATIENT)
Dept: CARDIOLOGY | Facility: CLINIC | Age: 55
End: 2020-05-14
Payer: COMMERCIAL

## 2020-05-14 DIAGNOSIS — R60.0 LOCALIZED EDEMA: ICD-10-CM

## 2020-05-14 DIAGNOSIS — I10 ESSENTIAL HYPERTENSION: Primary | ICD-10-CM

## 2020-05-14 DIAGNOSIS — E78.00 PURE HYPERCHOLESTEROLEMIA: ICD-10-CM

## 2020-05-14 PROCEDURE — 99213 OFFICE O/P EST LOW 20 MIN: CPT | Mod: 95,,, | Performed by: INTERNAL MEDICINE

## 2020-05-14 PROCEDURE — 99213 PR OFFICE/OUTPT VISIT, EST, LEVL III, 20-29 MIN: ICD-10-PCS | Mod: 95,,, | Performed by: INTERNAL MEDICINE

## 2020-05-14 NOTE — PROGRESS NOTES
"  Subjective:      Patient ID: Manfred Shah is a 54 y.o. male.    Chief Complaint: No chief complaint on file.    HPI:    Review of Systems   Cardiovascular: Negative for chest pain, claudication, dyspnea on exertion, irregular heartbeat, leg swelling, near-syncope, orthopnea, palpitations and syncope.    The patient location is: [place], [place]  The chief complaint leading to consultation is: f/u hypertension  Visit type: audiovisual  Total time spent with patient: 14 minutes  Each patient to whom he or she provides medical services by telemedicine is:  (1) informed of the relationship between the physician and patient and the respective role of any other health care provider with respect to management of the patient; and (2) notified that he or she may decline to receive medical services by telemedicine and may withdraw from such care at any time.    Notes: Pt is scheduled for lower back fusion 6/3/20 by Dr Samuel Valdez.    "My heart has rick doing fine"    Pt is inactive.    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 Tray Rodriguez    CIDP (chronic inflammatory demyelinating polyneuropathy) 06/2018    followed by Dr. Rowdy Tran - Neurologist in [place].      Elevated liver enzymes 10/8/2018    Elevated PSA     followed by Dr. Sanderson    Foot drop, left foot     due to neurological condition/Guillan Hebo    Hyperlipidemia     Hypertension     Impaired fasting glucose     Neurogenic bladder 09/05/2018    followed by Dr. Sanderson    RLS (restless legs syndrome) 10/8/2018    Self-catheterizes urinary bladder     Urologist    Sleep apnea with use of continuous positive airway pressure (CPAP) 01/10/2018    followed by [facility] in [place]    Urge incontinence 9/11/2018        Past Surgical History: "   Procedure Laterality Date    ADENOIDECTOMY      COLONOSCOPY N/A 1/24/2020    Procedure: COLONOSCOPY;  Surgeon: Stacy Markham MD;  Location: [facility];  Service: Endoscopy;  Laterality: N/A;    HERNIA REPAIR      LUMBAR LAMINECTOMY  2012    [facility]    LUMBAR LAMINECTOMY  08/2018    Dr. Jennifer [facility]; complications from surgery - abscess to spinal cord - discharge summary scanned to media file    PLANTAR FASCIA RELEASE      TONSILLECTOMY      VASECTOMY      X-STOP IMPLANTATION         Family History   Problem Relation Age of Onset    Cancer Mother         Breast Cancer    Heart disease Mother         pacemaker    Hyperlipidemia Mother         taking Crestor    Hypertension Mother         taking Amlodipine    Diabetes Mother         Prediabetes    Diabetes Father     Cancer Father         unknown type of cancer    Hypertension Father     Rheum arthritis Father     No Known Problems Sister     Prostate cancer Neg Hx     Kidney disease Neg Hx        Social History     Socioeconomic History    Marital status:      Spouse name: Not on file    Number of children: Not on file    Years of education: Not on file    Highest education level: Not on file   Occupational History    Occupation: teacher   Social Needs    Financial resource strain: Not very hard    Food insecurity:     Worry: Never true     Inability: Never true    Transportation needs:     Medical: No     Non-medical: No   Tobacco Use    Smoking status: Never Smoker    Smokeless tobacco: Never Used   Substance and Sexual Activity    Alcohol use: No     Frequency: Monthly or less     Drinks per session: 1 or 2     Binge frequency: Never    Drug use: No    Sexual activity: Yes     Partners: Female   Lifestyle    Physical activity:     Days per week: 3 days     Minutes per session: 60 min    Stress: Only a little   Relationships    Social connections:     Talks on phone: More than three times a week     Gets  together: Twice a week     Attends Yarsanism service: Not on file     Active member of club or organization: Yes     Attends meetings of clubs or organizations: More than 4 times per year     Relationship status:    Other Topics Concern    Not on file   Social History Narrative    Not on file       Current Outpatient Medications on File Prior to Visit   Medication Sig Dispense Refill    albuterol (PROVENTIL/VENTOLIN HFA) 90 mcg/actuation inhaler Inhale 1-2 puffs every 4 hours as needed 8.5 g 0    amLODIPine (NORVASC) 10 MG tablet TAKE 1 TABLET BY MOUTH ONCE DAILY 30 tablet 1    aspirin (ECOTRIN) 81 MG EC tablet Take 81 mg by mouth once daily.      eplerenone (INSPRA) 25 MG Tab Take 1 tablet (25 mg total) by mouth once daily. 90 tablet 3    fish oil-omega-3 fatty acids 300-1,000 mg capsule Take 2 g by mouth once daily.      hydrALAZINE (APRESOLINE) 25 MG tablet TAKE 1 TABLET BY MOUTH TWICE DAILY 60 tablet 1    HYDROcodone-acetaminophen (NORCO)  mg per tablet       ELVIS 0.5-0.4 mg CM24 TAKE 1 CAPSULE BY MOUTH ONCE DAILY 30 capsule 11    metoprolol succinate (TOPROL-XL) 100 MG 24 hr tablet Take 1 tablet by mouth once daily 30 tablet 2    rosuvastatin (CRESTOR) 10 MG tablet Take 1 tablet (10 mg total) by mouth every evening. 90 tablet 1    valsartan-hydrochlorothiazide (DIOVAN-HCT) 320-12.5 mg per tablet Take 1 tablet by mouth once daily 30 tablet 0    lysine 500 mg Tab Take 500 mg by mouth once daily.      methocarbamol (ROBAXIN) 750 MG Tab       multivitamin (THERAGRAN) per tablet Take 1 tablet by mouth once daily.      traMADol (ULTRAM) 50 mg tablet       [DISCONTINUED] meloxicam (MOBIC) 15 MG tablet Take 1 tablet (15 mg total) by mouth once daily. 30 tablet 2     No current facility-administered medications on file prior to visit.        Review of patient's allergies indicates:  No Known Allergies  Objective:   There were no vitals filed for this visit.     Physical Exam    WDWNNAD  Speech and thought content are normal      Note recent echocardiogram 2/20 showed mild LVH but was otherwise normal.    ECG 1/20 OK    [place] CBC and CMP and lipid profile late last year were OK  Assessment:     1. Essential hypertension    2. Pure hypercholesterolemia    3. Localized edema      Plan:   Diagnoses and all orders for this visit:    Essential hypertension    Pure hypercholesterolemia    Localized edema         Pt has labs ordered by Dr Ramirez and pre-op labs ordered as well.    Cardiac status is stable for back surgery      Follow up in about 4 months (around 9/14/2020).

## 2020-05-14 NOTE — TELEPHONE ENCOUNTER
Spoke with patient about his appt on 05/27 patient said he will try to come in he will have to see how he feel after surgery he will notify prior to his appt.

## 2020-05-19 DIAGNOSIS — I1A.0 RESISTANT HYPERTENSION: ICD-10-CM

## 2020-05-19 RX ORDER — AMLODIPINE BESYLATE 10 MG/1
TABLET ORAL
Qty: 30 TABLET | Refills: 0 | Status: SHIPPED | OUTPATIENT
Start: 2020-05-19 | End: 2020-06-23

## 2020-05-19 RX ORDER — HYDRALAZINE HYDROCHLORIDE 25 MG/1
TABLET, FILM COATED ORAL
Qty: 60 TABLET | Refills: 0 | Status: SHIPPED | OUTPATIENT
Start: 2020-05-19 | End: 2020-06-23

## 2020-05-20 ENCOUNTER — PATIENT MESSAGE (OUTPATIENT)
Dept: CARDIOLOGY | Facility: CLINIC | Age: 55
End: 2020-05-20

## 2020-05-21 ENCOUNTER — TELEPHONE (OUTPATIENT)
Dept: CARDIOLOGY | Facility: CLINIC | Age: 55
End: 2020-05-21

## 2020-05-21 NOTE — TELEPHONE ENCOUNTER
Pt sees annother cardio already doesn't want to see Mason       ----- Message from Christiano Cuevas MD sent at 5/19/2020  5:27 PM CDT -----  Please given appointment to the patient to see me (if he does not have an appointment already.)    Thanks      ----- Message -----  From: Samuel Osorio DO  Sent: 5/11/2020   4:56 PM CDT  To: Christiano Cuevas MD, Brandy Ramirez, NP    He is scheduled for lumbar fusion surgery on 6/3.  He will need medical and cardiac clearance.    Thank you  Nabil Osorio

## 2020-05-22 DIAGNOSIS — M21.372 FOOT DROP, LEFT FOOT: ICD-10-CM

## 2020-05-22 DIAGNOSIS — M51.26 HERNIATED LUMBAR INTERVERTEBRAL DISC: ICD-10-CM

## 2020-05-22 DIAGNOSIS — M54.16 LEFT LUMBAR RADICULOPATHY: Primary | ICD-10-CM

## 2020-05-22 DIAGNOSIS — Z98.890 H/O LAMINECTOMY: ICD-10-CM

## 2020-05-22 DIAGNOSIS — M54.16 LUMBAR RADICULOPATHY: ICD-10-CM

## 2020-05-22 RX ORDER — MUPIROCIN 20 MG/G
1 OINTMENT TOPICAL 2 TIMES DAILY
Status: CANCELLED | OUTPATIENT
Start: 2020-05-22 | End: 2020-05-23

## 2020-05-22 RX ORDER — SODIUM CHLORIDE 9 MG/ML
20 INJECTION, SOLUTION INTRAVENOUS CONTINUOUS
Status: CANCELLED | OUTPATIENT
Start: 2020-05-22

## 2020-05-22 RX ORDER — MUPIROCIN 20 MG/G
OINTMENT TOPICAL
Status: CANCELLED | OUTPATIENT
Start: 2020-05-22

## 2020-05-23 ENCOUNTER — PATIENT MESSAGE (OUTPATIENT)
Dept: SURGERY | Facility: HOSPITAL | Age: 55
End: 2020-05-23

## 2020-05-26 ENCOUNTER — PATIENT MESSAGE (OUTPATIENT)
Dept: FAMILY MEDICINE | Facility: CLINIC | Age: 55
End: 2020-05-26

## 2020-05-27 ENCOUNTER — OFFICE VISIT (OUTPATIENT)
Dept: FAMILY MEDICINE | Facility: CLINIC | Age: 55
End: 2020-05-27
Payer: COMMERCIAL

## 2020-05-27 DIAGNOSIS — M15.1 HEBERDEN'S NODES (WITH ARTHROPATHY): ICD-10-CM

## 2020-05-27 DIAGNOSIS — N40.0 BENIGN NODULAR PROSTATIC HYPERPLASIA WITHOUT LOWER URINARY TRACT SYMPTOMS: ICD-10-CM

## 2020-05-27 DIAGNOSIS — N31.9 NEUROGENIC BLADDER: ICD-10-CM

## 2020-05-27 DIAGNOSIS — G61.81 CIDP (CHRONIC INFLAMMATORY DEMYELINATING POLYNEUROPATHY): ICD-10-CM

## 2020-05-27 DIAGNOSIS — Z78.9 SELF-CATHETERIZES URINARY BLADDER: ICD-10-CM

## 2020-05-27 DIAGNOSIS — E78.5 HYPERLIPIDEMIA, UNSPECIFIED HYPERLIPIDEMIA TYPE: ICD-10-CM

## 2020-05-27 DIAGNOSIS — M21.372 FOOT DROP, LEFT FOOT: ICD-10-CM

## 2020-05-27 DIAGNOSIS — I10 ESSENTIAL HYPERTENSION: ICD-10-CM

## 2020-05-27 DIAGNOSIS — G47.30 SLEEP APNEA WITH USE OF CONTINUOUS POSITIVE AIRWAY PRESSURE (CPAP): ICD-10-CM

## 2020-05-27 DIAGNOSIS — R73.03 PREDIABETES: ICD-10-CM

## 2020-05-27 DIAGNOSIS — R73.01 IFG (IMPAIRED FASTING GLUCOSE): ICD-10-CM

## 2020-05-27 DIAGNOSIS — Z01.818 PREOPERATIVE GENERAL PHYSICAL EXAMINATION: Primary | ICD-10-CM

## 2020-05-27 DIAGNOSIS — M51.26 HERNIATED LUMBAR INTERVERTEBRAL DISC: ICD-10-CM

## 2020-05-27 DIAGNOSIS — D64.9 MILD ANEMIA: ICD-10-CM

## 2020-05-27 PROBLEM — G62.9 POLYNEUROPATHY: Status: ACTIVE | Noted: 2020-05-27

## 2020-05-27 PROBLEM — M17.12 OSTEOARTHRITIS OF LEFT KNEE: Status: ACTIVE | Noted: 2020-05-27

## 2020-05-27 PROBLEM — M48.061 SPINAL STENOSIS OF LUMBAR REGION: Status: ACTIVE | Noted: 2020-05-27

## 2020-05-27 PROCEDURE — 99214 OFFICE O/P EST MOD 30 MIN: CPT | Mod: 95,,, | Performed by: NURSE PRACTITIONER

## 2020-05-27 PROCEDURE — 99214 PR OFFICE/OUTPT VISIT, EST, LEVL IV, 30-39 MIN: ICD-10-PCS | Mod: 95,,, | Performed by: NURSE PRACTITIONER

## 2020-05-27 RX ORDER — VIT C/E/ZN/COPPR/LUTEIN/ZEAXAN 250MG-90MG
2000 CAPSULE ORAL DAILY
COMMUNITY

## 2020-05-27 NOTE — PROGRESS NOTES
Subjective:       Patient ID: Manfred Shah is a 54 y.o. male.    Chief Complaint: Pre-op Exam    The patient location is: home in [place]  The chief complaint leading to consultation is: preoperative exam with fasting lab results    Visit type: audiovisual    Face to Face time with patient: 30  45 minutes of total time spent on the encounter, which includes face to face time and non-face to face time preparing to see the patient (eg, review of tests), Obtaining and/or reviewing separately obtained history, Documenting clinical information in the electronic or other health record, Independently interpreting results (not separately reported) and communicating results to the patient/family/caregiver, or Care coordination (not separately reported).         Each patient to whom he or she provides medical services by telemedicine is:  (1) informed of the relationship between the physician and patient and the respective role of any other health care provider with respect to management of the patient; and (2) notified that he or she may decline to receive medical services by telemedicine and may withdraw from such care at any time.    Notes:   Patient is a 54-year-old white male with CIDP (chronic inflammatory demyelinating polyneuropathy) diagnosed in June 2018 by neurologist, Dr. Rowdy Tran, left foot drop secondary to CIDP, history of Abscess of spinal cord treated by Dr. Rodriguez S/P Laminectomy in August 2018, Neurogenic bladder with urge incontinence and self-catheterization and BPH followed by Dr. Sanderson, RLS, Hypertension, Hyperlipidemia, Impaired Fasting Glucose, Elevated Liver Enzymes, Heberden's nodes with joint inflammation and pain to bilateral hands followed by [facility],  and Sleep Apnea with CPAP use that has VIRTUAL VISIT today PREOPERATIVE EXAMINATION for surgery scheduled on 6/3/2020 with [facility]geon Dr. sOorio for FUSION, SPINE, LUMBAR, TLIF, WITH PERCUTANEOUS INSTRUMENTATION  (L3-4  MIS TLIF) Flouro Microscope.    PATIENT HAS ALREADY RECEIVED MEDICAL CLEARANCE FROM [facility] DR. KISER AND PATIENT NEUROLOGIST DR ROWDY TRAN.    Patient has had Resistant Hypertension evaluated by [facility], Dr. Kiser, that he reports is currently controlled on eplerenone (INSPRA) 25 MG Tab daily, amlodipine 10 mg daily, Valsartan HCTZ 320/12.5 mg daily, Metoprolol  mg daily and Hydralazine 25 mg twice daily.  Blood pressure was controlled at March 2020 visit with cardiologist.  I have no way to evaluate blood pressure today but cardiologist has cleared patient for surgery.    Patient has hyperlipidemia and currently taking Rosuvastatin 10 mg daily.   LDL 52.2.    Patient has impaired fasting glucose since January 2018  - he is PREDIABETIC. He always has an IFG that is sometimes above the 126 cut-off but his HgbA1C has NEVER gone above 5.8 so continue to classify as IFG/Prediabetic.  His most recent FBG is 141 with A1C 5.7%.  Well aware of lifestyle modifications needed to improve levels.     Patient had elevated liver enzymes noted at past visits.  Patient reports he was admitted to Hospital in [place] and he had a liver workup during that hospitalization.  His liver enzyme elevation had resolved with weight loss in October 2018 but was again elevated in 2019 with weight gain.  Liver enzymes today are NORMAL.     Patient has Sleep Apnea with CPAP use and had uvula removed by surgery in past.     CIDP with left foot drop  followed by a neurologist Dr. Rowdy Tran.       Patient has BPH and neurogenic bladder with urge incontinence and self catheterizes as needed followed by [facility] Dr. Sanderson.     Patient has Heberden's nodes with joint inflammation and pain to bilateral hands followed by [facility].     PREOPERATIVE LABS:  -  MILD ANEMIA IS PRESENT  -  Kidney and liver function are stable; FBG is high and should be monitored during hospital stay    Component      Latest  Ref Rng & Units 5/21/2020 2/14/2020 12/16/2019 11/11/2019   WBC      3.90 - 12.70 K/uL 6.30  7.62    RBC      4.60 - 6.20 M/uL 4.28 (L)  4.75    Hemoglobin      14.0 - 18.0 g/dL 12.7 (L)  13.7 (L)    Hematocrit      40.0 - 54.0 % 39.3 (L)  40.3    MCV      82 - 98 fL 92  85    MCH      27.0 - 31.0 pg 29.7  28.8    MCHC      32.0 - 36.0 g/dL 32.3  34.0    RDW      11.5 - 14.5 % 12.6  13.1    Platelets      150 - 350 K/uL 210  245    MPV      9.2 - 12.9 fL 9.3  9.2    Immature Granulocytes      0.0 - 0.5 % 0.3      Gran # (ANC)      1.8 - 7.7 K/uL 3.5  4.1    Immature Grans (Abs)      0.00 - 0.04 K/uL 0.02      Lymph #      1.0 - 4.8 K/uL 1.7  2.4    Mono #      0.3 - 1.0 K/uL 0.5  0.6    Eos #      0.0 - 0.5 K/uL 0.5  0.4    Baso #      0.00 - 0.20 K/uL 0.08  0.05    nRBC      0 /100 WBC 0      Gran%      38.0 - 73.0 % 56.1  54.4    Lymph%      18.0 - 48.0 % 26.3  31.5    Mono%      4.0 - 15.0 % 7.9  8.3    Eosinophil%      0.0 - 8.0 % 8.1 (H)  5.4    Basophil%      0.0 - 1.9 % 1.3  0.7    Differential Method       Automated  Automated    Sodium      136 - 145 mmol/L 145 145 142 141   Potassium      3.5 - 5.1 mmol/L 3.7 4.0 3.6 3.7   Chloride      95 - 110 mmol/L 105 110 103 106   CO2      23 - 29 mmol/L 28 26 28 29   Glucose      70 - 110 mg/dL 141 (H) 113 (H) 101 133 (H)   BUN, Bld      2 - 20 mg/dL 15 18 18 14   Creatinine      0.50 - 1.40 mg/dL 0.75 0.84 0.78 0.70   Calcium      8.7 - 10.5 mg/dL 9.6 9.6 9.1 9.5   PROTEIN TOTAL      6.0 - 8.4 g/dL 7.1  8.2 7.0   Albumin      3.5 - 5.2 g/dL 4.3  4.5 4.1   BILIRUBIN TOTAL      0.1 - 1.0 mg/dL 0.5  0.5 0.6   Alkaline Phosphatase      38 - 126 U/L 57  67 66   AST      15 - 46 U/L 25  53 (H) 41   ALT      10 - 44 U/L 24  62 (H) 51 (H)   Anion Gap      8 - 16 mmol/L 12 9 11 6 (L)   eGFR if African American      >60 mL/min/1.73 m:2 >60.0 >60.0 >60.0 >60.0   eGFR if non African American      >60 mL/min/1.73 m:2 >60.0 >60.0 >60.0 >60.0   SEGS      49 - 77 %        Cholesterol      120 - 199 mg/dL 123   144   Triglycerides      30 - 150 mg/dL 159 (H)   134   HDL      40 - 75 mg/dL 39 (L)   44   LDL Cholesterol External      63.0 - 159.0 mg/dL 52.2 (L)   73.2   Hdl/Cholesterol Ratio      20.0 - 50.0 % 31.7   30.6   Total Cholesterol/HDL Ratio      2.0 - 5.0 3.2   3.3   Non-HDL Cholesterol      mg/dL 84   100   Hemoglobin A1C External      4.0 - 5.6 % 5.7 (H)   5.8 (H)   Estimated Avg Glucose      68 - 131 mg/dL 117   120   TSH      0.400 - 4.000 uIU/mL 3.250      PSA, SCREEN      0.00 - 4.00 ng/mL 1.5        Current Outpatient Medications   Medication Sig Dispense Refill    albuterol (PROVENTIL/VENTOLIN HFA) 90 mcg/actuation inhaler Inhale 1-2 puffs every 4 hours as needed 8.5 g 0    amLODIPine (NORVASC) 10 MG tablet Take 1 tablet by mouth once daily 30 tablet 0    aspirin (ECOTRIN) 81 MG EC tablet Take 81 mg by mouth once daily.      eplerenone (INSPRA) 25 MG Tab Take 1 tablet (25 mg total) by mouth once daily. 90 tablet 3    fish oil-omega-3 fatty acids 300-1,000 mg capsule Take 2 g by mouth once daily.      hydrALAZINE (APRESOLINE) 25 MG tablet Take 1 tablet by mouth twice daily 60 tablet 0    HYDROcodone-acetaminophen (NORCO)  mg per tablet       ELVIS 0.5-0.4 mg CM24 TAKE 1 CAPSULE BY MOUTH ONCE DAILY 30 capsule 11    lysine 500 mg Tab Take 500 mg by mouth once daily.      methocarbamol (ROBAXIN) 750 MG Tab       metoprolol succinate (TOPROL-XL) 100 MG 24 hr tablet Take 1 tablet by mouth once daily 30 tablet 2    multivitamin (THERAGRAN) per tablet Take 1 tablet by mouth once daily.      rosuvastatin (CRESTOR) 10 MG tablet Take 1 tablet (10 mg total) by mouth every evening. 90 tablet 1    traMADol (ULTRAM) 50 mg tablet       valsartan-hydrochlorothiazide (DIOVAN-HCT) 320-12.5 mg per tablet Take 1 tablet by mouth once daily 30 tablet 0     No current facility-administered medications for this visit.        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 Tray Rodriguez    CIDP (chronic inflammatory demyelinating polyneuropathy) 06/2018    followed by Dr. Rowdy Tran - Neurologist in [place].      Elevated liver enzymes 10/8/2018    Elevated PSA     followed by Dr. Sanderson    Foot drop, left foot     due to neurological condition/[place]    Hyperlipidemia     Hypertension     Impaired fasting glucose     Neurogenic bladder 09/05/2018    followed by Dr. Sanderson    RLS (restless legs syndrome) 10/8/2018    Self-catheterizes urinary bladder     Urologist    Sleep apnea with use of continuous positive airway pressure (CPAP) 01/10/2018    followed by [facility] in [place]    Urge incontinence 9/11/2018       Past Surgical History:   Procedure Laterality Date    ADENOIDECTOMY      COLONOSCOPY N/A 1/24/2020    Procedure: COLONOSCOPY;  Surgeon: Stacy Markham MD;  Location: [facility];  Service: Endoscopy;  Laterality: N/A;    HERNIA REPAIR      LUMBAR LAMINECTOMY  2012    [facility]    LUMBAR LAMINECTOMY  08/2018    Dr. Rodriguez - [facility]; complications from surgery - abscess to spinal cord - discharge summary scanned to media file    PLANTAR FASCIA RELEASE      TONSILLECTOMY      VASECTOMY      X-STOP IMPLANTATION         Family History   Problem Relation Age of Onset    Cancer Mother         Breast Cancer    Heart disease Mother         pacemaker    Hyperlipidemia Mother         taking Crestor    Hypertension Mother         taking Amlodipine    Diabetes Mother         Prediabetes    Diabetes Father     Cancer Father         unknown type of cancer    Hypertension Father     Rheum arthritis Father     No Known Problems Sister     Prostate cancer Neg Hx     Kidney disease Neg Hx        Social History     Socioeconomic History    Marital status:      Spouse name: Not on file    Number of children: Not on file    Years of education: Not on  file    Highest education level: Not on file   Occupational History    Occupation: teacher   Social Needs    Financial resource strain: Not very hard    Food insecurity:     Worry: Never true     Inability: Never true    Transportation needs:     Medical: No     Non-medical: No   Tobacco Use    Smoking status: Never Smoker    Smokeless tobacco: Never Used   Substance and Sexual Activity    Alcohol use: No     Frequency: Monthly or less     Drinks per session: 1 or 2     Binge frequency: Never    Drug use: No    Sexual activity: Yes     Partners: Female   Lifestyle    Physical activity:     Days per week: 3 days     Minutes per session: 60 min    Stress: Only a little   Relationships    Social connections:     Talks on phone: More than three times a week     Gets together: Twice a week     Attends Jew service: Not on file     Active member of club or organization: Yes     Attends meetings of clubs or organizations: More than 4 times per year     Relationship status:    Other Topics Concern    Not on file   Social History Narrative    Not on file       Review of Systems   Constitutional: Positive for activity change. Negative for unexpected weight change.   HENT: Negative for hearing loss, rhinorrhea and trouble swallowing.    Eyes: Negative for discharge and visual disturbance.   Respiratory: Negative for chest tightness and wheezing.    Cardiovascular: Negative for chest pain and palpitations.   Gastrointestinal: Negative for blood in stool, constipation, diarrhea and vomiting.   Endocrine: Negative for polydipsia and polyuria.   Genitourinary: Negative for difficulty urinating, hematuria and urgency.   Musculoskeletal: Positive for back pain, gait problem and myalgias. Negative for arthralgias, joint swelling and neck pain.   Neurological: Positive for weakness. Negative for headaches.   Psychiatric/Behavioral: Negative for confusion and dysphoric mood.         Objective:     There were  no vitals filed for this visit.       Physical Exam   Constitutional: He is oriented to person, place, and time. He appears well-developed and well-nourished. No distress.   HENT:   Head: Normocephalic and atraumatic.   Eyes: Pupils are equal, round, and reactive to light. Conjunctivae and EOM are normal. Right eye exhibits no discharge. Left eye exhibits no discharge. No scleral icterus.   Pulmonary/Chest: Effort normal. No respiratory distress.   Neurological: He is alert and oriented to person, place, and time.   Skin: He is not diaphoretic.   Psychiatric: He has a normal mood and affect. His behavior is normal. Judgment and thought content normal.         Assessment:         ICD-10-CM ICD-9-CM   1. Preoperative general physical examination Z01.818 V72.83   2. Mild anemia D64.9 285.9   3. Prediabetes R73.03 790.29   4. IFG (impaired fasting glucose) R73.01 790.21   5. Essential hypertension I10 401.9   6. Hyperlipidemia, unspecified hyperlipidemia type E78.5 272.4   7. Herniated lumbar intervertebral disc M51.26 722.10   8. CIDP (chronic inflammatory demyelinating polyneuropathy) G61.81 357.81   9. Foot drop, left foot M21.372 736.79   10. Benign nodular prostatic hyperplasia without lower urinary tract symptoms N40.0 600.10   11. Self-catheterizes urinary bladder Z78.9 V49.89   12. Neurogenic bladder N31.9 596.54   13. Sleep apnea with use of continuous positive airway pressure (CPAP) G47.30 327.23   14. Heberden's nodes (with arthropathy) M15.1 715.04       Plan:       Preoperative general physical examination  ####OF NOTE = patient has mild anemia present prior to surgery so monitor blood loss and CBC count during stay.###  ###Of NOTE - patient has  but HgbA1C has never gone above 5.8% so have been monitoring closely but not diagnosed as diabetic but MONITOR BLOOD SUGARS during hospital stay###    ####FROM A PCP PERSPECTIVE, THIS PATIENT IS CLEARED FOR SURGERY AS SCHEDULED ON 6/3/2020 FOR LUMBAR  FUSION######    Mild anemia  -  Will monitor - had colonoscopy in Jan. 2020 that was okay and not due to repeat in 10 years.  -     CBC auto differential; Future; Expected date: 05/27/2020    Prediabetes  -  Must cut back on all sugars and decrease carbohydrates.  Increase exercise as soon as POST OP activities are allowed.  Will recheck in 4 months.  -     Comprehensive metabolic panel; Future; Expected date: 05/27/2020  -     Hemoglobin A1C; Future; Expected date: 05/27/2020    IFG (impaired fasting glucose)  -     Comprehensive metabolic panel; Future; Expected date: 05/27/2020  -     Hemoglobin A1C; Future; Expected date: 05/27/2020    Essential hypertension  -  Patient was sent to cardiologist for resistant hypertension and reports control at last visit - advised to monitor blood pressure at home and get with cardiologist if unstable > 140/90s.    Hyperlipidemia, unspecified hyperlipidemia type  -  Continue Rosuvastatin 10 mg daily.    Herniated lumbar intervertebral disc  -  Followed by neurosurgeon with plans for surgery    CIDP (chronic inflammatory demyelinating polyneuropathy)  -  Followed by neurology    Foot drop, left foot    Benign nodular prostatic hyperplasia without lower urinary tract symptoms  -  Followed by Urology    Self-catheterizes urinary bladder  -  Followed by Urology      Neurogenic bladder  -  Followed by Urology      Sleep apnea with use of continuous positive airway pressure (CPAP)  -  Uses CPAP nightly    Heberden's nodes (with arthropathy)  -  Followed by [facility]      Follow up in about 5 months (around 10/12/2020) for fasting labs and follow up in October.     Patient's Medications   New Prescriptions    No medications on file   Previous Medications    AMLODIPINE (NORVASC) 10 MG TABLET    Take 1 tablet by mouth once daily    ASPIRIN (ECOTRIN) 81 MG EC TABLET    Take 81 mg by mouth once daily.    CHOLECALCIFEROL, VITAMIN D3, (VITAMIN D3) 25 MCG (1,000 UNIT) CAPSULE     Take 2,000 Units by mouth once daily.    EPLERENONE (INSPRA) 25 MG TAB    Take 1 tablet (25 mg total) by mouth once daily.    FISH OIL-OMEGA-3 FATTY ACIDS 300-1,000 MG CAPSULE    Take 2 g by mouth once daily.    HYDRALAZINE (APRESOLINE) 25 MG TABLET    Take 1 tablet by mouth twice daily    HYDROCODONE-ACETAMINOPHEN (NORCO)  MG PER TABLET        ELVIS 0.5-0.4 MG CM24    TAKE 1 CAPSULE BY MOUTH ONCE DAILY    LYSINE 500 MG TAB    Take 500 mg by mouth once daily.    METHOCARBAMOL (ROBAXIN) 750 MG TAB        METOPROLOL SUCCINATE (TOPROL-XL) 100 MG 24 HR TABLET    Take 1 tablet by mouth once daily    MULTIVITAMIN (THERAGRAN) PER TABLET    Take 1 tablet by mouth once daily.    ROSUVASTATIN (CRESTOR) 10 MG TABLET    Take 1 tablet (10 mg total) by mouth every evening.    VALSARTAN-HYDROCHLOROTHIAZIDE (DIOVAN-HCT) 320-12.5 MG PER TABLET    Take 1 tablet by mouth once daily   Modified Medications    No medications on file   Discontinued Medications    ALBUTEROL (PROVENTIL/VENTOLIN HFA) 90 MCG/ACTUATION INHALER    Inhale 1-2 puffs every 4 hours as needed    TRAMADOL (ULTRAM) 50 MG TABLET

## 2020-05-27 NOTE — TELEPHONE ENCOUNTER
Spoke with patient on the phone regarding his appt advised him message mellisa Nava that we can switch appt to a virtual visit and do Wellness at a later time patient is in agreeable with this appt today switch to a virtual.

## 2020-05-28 ENCOUNTER — TELEPHONE (OUTPATIENT)
Dept: FAMILY MEDICINE | Facility: CLINIC | Age: 55
End: 2020-05-28

## 2020-05-28 ENCOUNTER — HOSPITAL ENCOUNTER (OUTPATIENT)
Dept: PREADMISSION TESTING | Facility: HOSPITAL | Age: 55
Discharge: HOME | End: 2020-05-28
Attending: NEUROLOGICAL SURGERY
Payer: COMMERCIAL

## 2020-05-28 VITALS
HEART RATE: 78 BPM | SYSTOLIC BLOOD PRESSURE: 167 MMHG | TEMPERATURE: 97 F | OXYGEN SATURATION: 98 % | HEIGHT: 77 IN | WEIGHT: 315 LBS | BODY MASS INDEX: 37.19 KG/M2 | RESPIRATION RATE: 17 BRPM | DIASTOLIC BLOOD PRESSURE: 97 MMHG

## 2020-05-28 DIAGNOSIS — Z98.890 H/O LAMINECTOMY: ICD-10-CM

## 2020-05-28 DIAGNOSIS — M54.16 LEFT LUMBAR RADICULOPATHY: ICD-10-CM

## 2020-05-28 DIAGNOSIS — M51.26 HERNIATED LUMBAR INTERVERTEBRAL DISC: ICD-10-CM

## 2020-05-28 DIAGNOSIS — M21.372 FOOT DROP, LEFT FOOT: ICD-10-CM

## 2020-05-28 LAB
ANION GAP SERPL CALC-SCNC: 11 MMOL/L (ref 8–16)
APTT BLDCRRT: 28.9 SEC (ref 21–32)
BASOPHILS # BLD AUTO: 0.07 K/UL (ref 0–0.2)
BASOPHILS NFR BLD: 1 % (ref 0–1.9)
BUN SERPL-MCNC: 11 MG/DL (ref 6–20)
CALCIUM SERPL-MCNC: 9.4 MG/DL (ref 8.7–10.5)
CHLORIDE SERPL-SCNC: 102 MMOL/L (ref 95–110)
CO2 SERPL-SCNC: 26 MMOL/L (ref 23–29)
CREAT SERPL-MCNC: 0.8 MG/DL (ref 0.5–1.4)
DIFFERENTIAL METHOD: ABNORMAL
EOSINOPHIL # BLD AUTO: 0.4 K/UL (ref 0–0.5)
EOSINOPHIL NFR BLD: 6.2 % (ref 0–8)
ERYTHROCYTE [DISTWIDTH] IN BLOOD BY AUTOMATED COUNT: 12.4 % (ref 11.5–14.5)
EST. GFR  (AFRICAN AMERICAN): >60 ML/MIN/1.73 M^2
EST. GFR  (NON AFRICAN AMERICAN): >60 ML/MIN/1.73 M^2
GLUCOSE SERPL-MCNC: 89 MG/DL (ref 70–110)
HCT VFR BLD AUTO: 37.4 % (ref 40–54)
HGB BLD-MCNC: 12.6 G/DL (ref 14–18)
IMM GRANULOCYTES # BLD AUTO: 0.01 K/UL (ref 0–0.04)
IMM GRANULOCYTES NFR BLD AUTO: 0.1 % (ref 0–0.5)
INR PPP: 1 (ref 0.8–1.2)
LYMPHOCYTES # BLD AUTO: 1.8 K/UL (ref 1–4.8)
LYMPHOCYTES NFR BLD: 25.8 % (ref 18–48)
MCH RBC QN AUTO: 29.2 PG (ref 27–31)
MCHC RBC AUTO-ENTMCNC: 33.7 G/DL (ref 32–36)
MCV RBC AUTO: 87 FL (ref 82–98)
MONOCYTES # BLD AUTO: 0.5 K/UL (ref 0.3–1)
MONOCYTES NFR BLD: 7.5 % (ref 4–15)
NEUTROPHILS # BLD AUTO: 4 K/UL (ref 1.8–7.7)
NEUTROPHILS NFR BLD: 59.4 % (ref 38–73)
NRBC BLD-RTO: 0 /100 WBC
PLATELET # BLD AUTO: 219 K/UL (ref 150–350)
PMV BLD AUTO: 9.1 FL (ref 9.2–12.9)
POTASSIUM SERPL-SCNC: 3.7 MMOL/L (ref 3.5–5.1)
PROTHROMBIN TIME: 10.8 SEC (ref 9–12.5)
RBC # BLD AUTO: 4.31 M/UL (ref 4.6–6.2)
SODIUM SERPL-SCNC: 139 MMOL/L (ref 136–145)
WBC # BLD AUTO: 6.79 K/UL (ref 3.9–12.7)

## 2020-05-28 PROCEDURE — 85025 COMPLETE CBC W/AUTO DIFF WBC: CPT

## 2020-05-28 PROCEDURE — 85610 PROTHROMBIN TIME: CPT

## 2020-05-28 PROCEDURE — 80048 BASIC METABOLIC PNL TOTAL CA: CPT

## 2020-05-28 PROCEDURE — 85730 THROMBOPLASTIN TIME PARTIAL: CPT

## 2020-05-28 NOTE — TELEPHONE ENCOUNTER
----- Message from Brandy Ramirez NP sent at 5/27/2020  5:04 PM CDT -----  Call patient and set up for fasting labs and follow up for lab results in October

## 2020-05-28 NOTE — DISCHARGE INSTRUCTIONS
Your procedure  is scheduled for __Wednesday  Dodie  3,  2020________.    Call [phone] between 2pm and 5pm on _Tuesday June 2, 2020______to find out your arrival time for the day of surgery.    Report to the Emergency Department on the day of your surgery.  You will be escorted to the admitting unit.    Important instructions:   Do not eat or drink after 12 midnight, including water.  It is okay to brush your teeth.                                                                                                                                                                                                            Do not have gum, candy or mints.    STOP  FISH OIL TODAY    TAKE  AMLODIPINE,  EPLERENONE,  HYDRALAZINE  AND  METOPROLOL  WITH A SIP OF WATER THE AM OF SURGERY      Stop taking Aspirin, Ibuprofen, Motrin and Aleve , Fish oil, and Vitamin E for at least 7 days before your surgery.                                                                                                                                                                                     You may use Tylenol unless otherwise instructed by your doctor.        Return to patient registration through the Emergency Room as previously on_MONDAY June 1, 2020@  10:00 AM_ for  Covid test. And   Type and Screen       Prep instructions:     SHOWER   OTHER_____________     Please shower the night before and the morning of your surgery.      Use Hibiclens soap as instructed by your pre op nurse.   Please place clean linens on your bed the night before surgery. Please wear fresh clean clothing after each shower.     No shaving of procedural area at least 4-5 days before surgery due to increased risk of skin irritation and/or possible infection.      You may be asked to take a third shower on arrival to Same Day Surgery depending on the type of surgery you are having.-     You may wear deodorant only.      Do not wear powder, body lotion or  perfume/cologne.     Do not wear any jewelry or have any metal on your body..     Wear loose fitting clothes allowing for bandages.     Please leave money and valuables home.       You may bring your cell phone.     Call the doctor if fever or illness should occur before your surgery.    Call [phone] to contact us here if needed.

## 2020-06-01 ENCOUNTER — HOSPITAL ENCOUNTER (OUTPATIENT)
Dept: PREADMISSION TESTING | Facility: HOSPITAL | Age: 55
Discharge: HOME | DRG: 460 | End: 2020-06-01
Attending: NEUROLOGICAL SURGERY
Payer: COMMERCIAL

## 2020-06-01 DIAGNOSIS — Z01.818 PREOP TESTING: Primary | ICD-10-CM

## 2020-06-01 LAB
ABO + RH BLD: NORMAL
BLD GP AB SCN CELLS X3 SERPL QL: NORMAL

## 2020-06-01 PROCEDURE — 86850 RBC ANTIBODY SCREEN: CPT

## 2020-06-01 PROCEDURE — 36415 COLL VENOUS BLD VENIPUNCTURE: CPT

## 2020-06-01 PROCEDURE — U0003 INFECTIOUS AGENT DETECTION BY NUCLEIC ACID (DNA OR RNA); SEVERE ACUTE RESPIRATORY SYNDROME CORONAVIRUS 2 (SARS-COV-2) (CORONAVIRUS DISEASE [COVID-19]), AMPLIFIED PROBE TECHNIQUE, MAKING USE OF HIGH THROUGHPUT TECHNOLOGIES AS DESCRIBED BY CMS-2020-01-R: HCPCS

## 2020-06-02 ENCOUNTER — ANESTHESIA EVENT (OUTPATIENT)
Dept: SURGERY | Facility: HOSPITAL | Age: 55
DRG: 460 | End: 2020-06-02
Payer: COMMERCIAL

## 2020-06-02 LAB — SARS-COV-2 RNA RESP QL NAA+PROBE: NOT DETECTED

## 2020-06-03 ENCOUNTER — ANESTHESIA (OUTPATIENT)
Dept: SURGERY | Facility: HOSPITAL | Age: 55
DRG: 460 | End: 2020-06-03
Payer: COMMERCIAL

## 2020-06-03 ENCOUNTER — HOSPITAL ENCOUNTER (INPATIENT)
Facility: HOSPITAL | Age: 55
LOS: 2 days | Discharge: HOME HEALTH | DRG: 460 | End: 2020-06-05
Attending: NEUROLOGICAL SURGERY | Admitting: NEUROLOGICAL SURGERY
Payer: COMMERCIAL

## 2020-06-03 DIAGNOSIS — Z98.890 H/O LAMINECTOMY: ICD-10-CM

## 2020-06-03 DIAGNOSIS — M51.26 HERNIATED LUMBAR INTERVERTEBRAL DISC: ICD-10-CM

## 2020-06-03 DIAGNOSIS — G89.18 ACUTE POSTOPERATIVE PAIN: ICD-10-CM

## 2020-06-03 DIAGNOSIS — M54.16 LEFT LUMBAR RADICULOPATHY: ICD-10-CM

## 2020-06-03 DIAGNOSIS — M54.16 LUMBAR RADICULOPATHY: ICD-10-CM

## 2020-06-03 DIAGNOSIS — Z01.818 PRE-OP TESTING: Primary | ICD-10-CM

## 2020-06-03 DIAGNOSIS — Z98.1 S/P LUMBAR SPINAL FUSION: ICD-10-CM

## 2020-06-03 DIAGNOSIS — M21.372 FOOT DROP, LEFT FOOT: ICD-10-CM

## 2020-06-03 LAB
POCT GLUCOSE: 152 MG/DL (ref 70–110)
POCT GLUCOSE: 163 MG/DL (ref 70–110)

## 2020-06-03 PROCEDURE — 11000001 HC ACUTE MED/SURG PRIVATE ROOM

## 2020-06-03 PROCEDURE — 63600175 PHARM REV CODE 636 W HCPCS: Performed by: NEUROLOGICAL SURGERY

## 2020-06-03 PROCEDURE — 22853 PR INSERT BIOMECH DEV W/INTERBODY ARTHRODESIS, EA CONTIGUOUS DEFECT: ICD-10-PCS | Mod: AS,,, | Performed by: PHYSICIAN ASSISTANT

## 2020-06-03 PROCEDURE — 25000003 PHARM REV CODE 250: Performed by: NURSE ANESTHETIST, CERTIFIED REGISTERED

## 2020-06-03 PROCEDURE — C1889 IMPLANT/INSERT DEVICE, NOC: HCPCS | Performed by: NEUROLOGICAL SURGERY

## 2020-06-03 PROCEDURE — 37000009 HC ANESTHESIA EA ADD 15 MINS: Performed by: NEUROLOGICAL SURGERY

## 2020-06-03 PROCEDURE — 22630 ARTHRD PST TQ 1NTRSPC LUM: CPT | Mod: AS,,, | Performed by: PHYSICIAN ASSISTANT

## 2020-06-03 PROCEDURE — 94761 N-INVAS EAR/PLS OXIMETRY MLT: CPT

## 2020-06-03 PROCEDURE — C9290 INJ, BUPIVACAINE LIPOSOME: HCPCS | Performed by: NEUROLOGICAL SURGERY

## 2020-06-03 PROCEDURE — 25000003 PHARM REV CODE 250: Performed by: NEUROLOGICAL SURGERY

## 2020-06-03 PROCEDURE — D9220A PRA ANESTHESIA: ICD-10-PCS | Mod: ANES,,, | Performed by: ANESTHESIOLOGY

## 2020-06-03 PROCEDURE — 63600175 PHARM REV CODE 636 W HCPCS: Performed by: NURSE ANESTHETIST, CERTIFIED REGISTERED

## 2020-06-03 PROCEDURE — 63600175 PHARM REV CODE 636 W HCPCS: Performed by: PHYSICIAN ASSISTANT

## 2020-06-03 PROCEDURE — 22853 PR INSERT BIOMECH DEV W/INTERBODY ARTHRODESIS, EA CONTIGUOUS DEFECT: ICD-10-PCS | Mod: ,,, | Performed by: NEUROLOGICAL SURGERY

## 2020-06-03 PROCEDURE — 20936 SP BONE AGRFT LOCAL ADD-ON: CPT | Mod: ,,, | Performed by: NEUROLOGICAL SURGERY

## 2020-06-03 PROCEDURE — D9220A PRA ANESTHESIA: Mod: CRNA,,, | Performed by: NURSE ANESTHETIST, CERTIFIED REGISTERED

## 2020-06-03 PROCEDURE — 36000710: Performed by: NEUROLOGICAL SURGERY

## 2020-06-03 PROCEDURE — 37000008 HC ANESTHESIA 1ST 15 MINUTES: Performed by: NEUROLOGICAL SURGERY

## 2020-06-03 PROCEDURE — 71000039 HC RECOVERY, EACH ADD'L HOUR: Performed by: NEUROLOGICAL SURGERY

## 2020-06-03 PROCEDURE — 25000003 PHARM REV CODE 250: Performed by: PHYSICIAN ASSISTANT

## 2020-06-03 PROCEDURE — S0020 INJECTION, BUPIVICAINE HYDRO: HCPCS | Performed by: NEUROLOGICAL SURGERY

## 2020-06-03 PROCEDURE — 36000711: Performed by: NEUROLOGICAL SURGERY

## 2020-06-03 PROCEDURE — 20936 PR AUTOGRAFT SPINE SURGERY LOCAL FROM SAME INCISION: ICD-10-PCS | Mod: ,,, | Performed by: NEUROLOGICAL SURGERY

## 2020-06-03 PROCEDURE — 22840 PR POSTERIOR NON-SEGMENTAL INSTRUMENTATION: ICD-10-PCS | Mod: AS,,, | Performed by: PHYSICIAN ASSISTANT

## 2020-06-03 PROCEDURE — 22630 PR ARTHRODESIS POSTERIOR INTERBODY LUMBAR: ICD-10-PCS | Mod: 22,,, | Performed by: NEUROLOGICAL SURGERY

## 2020-06-03 PROCEDURE — 22630 ARTHRD PST TQ 1NTRSPC LUM: CPT | Mod: 22,,, | Performed by: NEUROLOGICAL SURGERY

## 2020-06-03 PROCEDURE — 22630 PR ARTHRODESIS POSTERIOR INTERBODY LUMBAR: ICD-10-PCS | Mod: AS,,, | Performed by: PHYSICIAN ASSISTANT

## 2020-06-03 PROCEDURE — 20930 SP BONE ALGRFT MORSEL ADD-ON: CPT | Mod: ,,, | Performed by: NEUROLOGICAL SURGERY

## 2020-06-03 PROCEDURE — D9220A PRA ANESTHESIA: Mod: ANES,,, | Performed by: ANESTHESIOLOGY

## 2020-06-03 PROCEDURE — 63600175 PHARM REV CODE 636 W HCPCS: Performed by: ANESTHESIOLOGY

## 2020-06-03 PROCEDURE — 71000033 HC RECOVERY, INTIAL HOUR: Performed by: NEUROLOGICAL SURGERY

## 2020-06-03 PROCEDURE — C1769 GUIDE WIRE: HCPCS | Performed by: NEUROLOGICAL SURGERY

## 2020-06-03 PROCEDURE — D9220A PRA ANESTHESIA: ICD-10-PCS | Mod: CRNA,,, | Performed by: NURSE ANESTHETIST, CERTIFIED REGISTERED

## 2020-06-03 PROCEDURE — 22840 PR POSTERIOR NON-SEGMENTAL INSTRUMENTATION: ICD-10-PCS | Mod: ,,, | Performed by: NEUROLOGICAL SURGERY

## 2020-06-03 PROCEDURE — 99900035 HC TECH TIME PER 15 MIN (STAT)

## 2020-06-03 PROCEDURE — 22853 INSJ BIOMECHANICAL DEVICE: CPT | Mod: ,,, | Performed by: NEUROLOGICAL SURGERY

## 2020-06-03 PROCEDURE — 22840 INSERT SPINE FIXATION DEVICE: CPT | Mod: ,,, | Performed by: NEUROLOGICAL SURGERY

## 2020-06-03 PROCEDURE — 22840 INSERT SPINE FIXATION DEVICE: CPT | Mod: AS,,, | Performed by: PHYSICIAN ASSISTANT

## 2020-06-03 PROCEDURE — 27201423 OPTIME MED/SURG SUP & DEVICES STERILE SUPPLY: Performed by: NEUROLOGICAL SURGERY

## 2020-06-03 PROCEDURE — C1713 ANCHOR/SCREW BN/BN,TIS/BN: HCPCS | Performed by: NEUROLOGICAL SURGERY

## 2020-06-03 PROCEDURE — 27000221 HC OXYGEN, UP TO 24 HOURS

## 2020-06-03 PROCEDURE — 20930 PR ALLOGRAFT FOR SPINE SURGERY ONLY MORSELIZED: ICD-10-PCS | Mod: ,,, | Performed by: NEUROLOGICAL SURGERY

## 2020-06-03 PROCEDURE — 22853 INSJ BIOMECHANICAL DEVICE: CPT | Mod: AS,,, | Performed by: PHYSICIAN ASSISTANT

## 2020-06-03 DEVICE — CAGE SPINAL EXPAND 25X10X13: Type: IMPLANTABLE DEVICE | Site: BACK | Status: FUNCTIONAL

## 2020-06-03 RX ORDER — PHENYLEPHRINE HYDROCHLORIDE 10 MG/ML
INJECTION INTRAVENOUS
Status: DISCONTINUED | OUTPATIENT
Start: 2020-06-03 | End: 2020-06-03

## 2020-06-03 RX ORDER — SODIUM CHLORIDE 0.9 % (FLUSH) 0.9 %
3 SYRINGE (ML) INJECTION
Status: DISCONTINUED | OUTPATIENT
Start: 2020-06-03 | End: 2020-06-03 | Stop reason: HOSPADM

## 2020-06-03 RX ORDER — LORAZEPAM 2 MG/ML
0.25 INJECTION INTRAMUSCULAR ONCE AS NEEDED
Status: DISCONTINUED | OUTPATIENT
Start: 2020-06-03 | End: 2020-06-03 | Stop reason: HOSPADM

## 2020-06-03 RX ORDER — FENTANYL CITRATE 50 UG/ML
INJECTION, SOLUTION INTRAMUSCULAR; INTRAVENOUS
Status: DISCONTINUED | OUTPATIENT
Start: 2020-06-03 | End: 2020-06-03

## 2020-06-03 RX ORDER — BUPIVACAINE HYDROCHLORIDE 2.5 MG/ML
INJECTION, SOLUTION EPIDURAL; INFILTRATION; INTRACAUDAL
Status: DISCONTINUED | OUTPATIENT
Start: 2020-06-03 | End: 2020-06-03 | Stop reason: HOSPADM

## 2020-06-03 RX ORDER — SODIUM CHLORIDE 0.9 % (FLUSH) 0.9 %
10 SYRINGE (ML) INJECTION
Status: DISCONTINUED | OUTPATIENT
Start: 2020-06-03 | End: 2020-06-05 | Stop reason: HOSPADM

## 2020-06-03 RX ORDER — HYDRALAZINE HYDROCHLORIDE 25 MG/1
25 TABLET, FILM COATED ORAL 2 TIMES DAILY
Status: DISCONTINUED | OUTPATIENT
Start: 2020-06-03 | End: 2020-06-05 | Stop reason: HOSPADM

## 2020-06-03 RX ORDER — ACETAMINOPHEN 10 MG/ML
1000 INJECTION, SOLUTION INTRAVENOUS ONCE
Status: COMPLETED | OUTPATIENT
Start: 2020-06-03 | End: 2020-06-03

## 2020-06-03 RX ORDER — OXYCODONE AND ACETAMINOPHEN 5; 325 MG/1; MG/1
1 TABLET ORAL EVERY 4 HOURS PRN
Status: DISCONTINUED | OUTPATIENT
Start: 2020-06-03 | End: 2020-06-05 | Stop reason: HOSPADM

## 2020-06-03 RX ORDER — HYDROCHLOROTHIAZIDE 12.5 MG/1
12.5 TABLET ORAL DAILY
Status: DISCONTINUED | OUTPATIENT
Start: 2020-06-03 | End: 2020-06-05 | Stop reason: HOSPADM

## 2020-06-03 RX ORDER — TAMSULOSIN HYDROCHLORIDE 0.4 MG/1
0.4 CAPSULE ORAL NIGHTLY
Status: DISCONTINUED | OUTPATIENT
Start: 2020-06-03 | End: 2020-06-05 | Stop reason: HOSPADM

## 2020-06-03 RX ORDER — IBUPROFEN 200 MG
24 TABLET ORAL
Status: DISCONTINUED | OUTPATIENT
Start: 2020-06-03 | End: 2020-06-05 | Stop reason: HOSPADM

## 2020-06-03 RX ORDER — AMOXICILLIN 250 MG
2 CAPSULE ORAL NIGHTLY PRN
Status: DISCONTINUED | OUTPATIENT
Start: 2020-06-03 | End: 2020-06-05 | Stop reason: HOSPADM

## 2020-06-03 RX ORDER — ROSUVASTATIN CALCIUM 10 MG/1
10 TABLET, COATED ORAL NIGHTLY
Status: DISCONTINUED | OUTPATIENT
Start: 2020-06-03 | End: 2020-06-05 | Stop reason: HOSPADM

## 2020-06-03 RX ORDER — SODIUM CHLORIDE, SODIUM LACTATE, POTASSIUM CHLORIDE, CALCIUM CHLORIDE 600; 310; 30; 20 MG/100ML; MG/100ML; MG/100ML; MG/100ML
INJECTION, SOLUTION INTRAVENOUS CONTINUOUS PRN
Status: DISCONTINUED | OUTPATIENT
Start: 2020-06-03 | End: 2020-06-03

## 2020-06-03 RX ORDER — DUTASTERIDE 0.5 MG/1
0.5 CAPSULE, LIQUID FILLED ORAL NIGHTLY
Status: DISCONTINUED | OUTPATIENT
Start: 2020-06-03 | End: 2020-06-05 | Stop reason: HOSPADM

## 2020-06-03 RX ORDER — POLYETHYLENE GLYCOL 3350 17 G/17G
17 POWDER, FOR SOLUTION ORAL DAILY
Status: DISCONTINUED | OUTPATIENT
Start: 2020-06-03 | End: 2020-06-05 | Stop reason: HOSPADM

## 2020-06-03 RX ORDER — AMLODIPINE BESYLATE 5 MG/1
10 TABLET ORAL DAILY
Status: DISCONTINUED | OUTPATIENT
Start: 2020-06-04 | End: 2020-06-05 | Stop reason: HOSPADM

## 2020-06-03 RX ORDER — GLUCAGON 1 MG
1 KIT INJECTION
Status: DISCONTINUED | OUTPATIENT
Start: 2020-06-03 | End: 2020-06-05 | Stop reason: HOSPADM

## 2020-06-03 RX ORDER — METOPROLOL SUCCINATE 50 MG/1
100 TABLET, EXTENDED RELEASE ORAL DAILY
Status: DISCONTINUED | OUTPATIENT
Start: 2020-06-04 | End: 2020-06-05 | Stop reason: HOSPADM

## 2020-06-03 RX ORDER — ONDANSETRON 2 MG/ML
INJECTION INTRAMUSCULAR; INTRAVENOUS
Status: DISCONTINUED | OUTPATIENT
Start: 2020-06-03 | End: 2020-06-03

## 2020-06-03 RX ORDER — MUPIROCIN 20 MG/G
1 OINTMENT TOPICAL 2 TIMES DAILY
Status: DISCONTINUED | OUTPATIENT
Start: 2020-06-03 | End: 2020-06-03 | Stop reason: HOSPADM

## 2020-06-03 RX ORDER — LIDOCAINE HYDROCHLORIDE AND EPINEPHRINE 5; 5 MG/ML; UG/ML
INJECTION, SOLUTION INFILTRATION; PERINEURAL
Status: DISCONTINUED | OUTPATIENT
Start: 2020-06-03 | End: 2020-06-03 | Stop reason: HOSPADM

## 2020-06-03 RX ORDER — SUCCINYLCHOLINE CHLORIDE 20 MG/ML
INJECTION INTRAMUSCULAR; INTRAVENOUS
Status: DISCONTINUED | OUTPATIENT
Start: 2020-06-03 | End: 2020-06-03

## 2020-06-03 RX ORDER — BUPIVACAINE HYDROCHLORIDE 5 MG/ML
INJECTION, SOLUTION PERINEURAL
Status: DISCONTINUED | OUTPATIENT
Start: 2020-06-03 | End: 2020-06-03 | Stop reason: HOSPADM

## 2020-06-03 RX ORDER — SODIUM CHLORIDE 9 MG/ML
20 INJECTION, SOLUTION INTRAVENOUS CONTINUOUS
Status: DISCONTINUED | OUTPATIENT
Start: 2020-06-03 | End: 2020-06-03

## 2020-06-03 RX ORDER — LIDOCAINE HYDROCHLORIDE 10 MG/ML
1 INJECTION, SOLUTION EPIDURAL; INFILTRATION; INTRACAUDAL; PERINEURAL ONCE
Status: DISCONTINUED | OUTPATIENT
Start: 2020-06-03 | End: 2020-06-03 | Stop reason: HOSPADM

## 2020-06-03 RX ORDER — KETAMINE HYDROCHLORIDE 50 MG/ML
INJECTION, SOLUTION INTRAMUSCULAR; INTRAVENOUS
Status: DISCONTINUED | OUTPATIENT
Start: 2020-06-03 | End: 2020-06-03

## 2020-06-03 RX ORDER — HYDRALAZINE HYDROCHLORIDE 20 MG/ML
10 INJECTION INTRAMUSCULAR; INTRAVENOUS EVERY 4 HOURS PRN
Status: DISCONTINUED | OUTPATIENT
Start: 2020-06-03 | End: 2020-06-05 | Stop reason: HOSPADM

## 2020-06-03 RX ORDER — DOCUSATE SODIUM 100 MG/1
100 CAPSULE, LIQUID FILLED ORAL 2 TIMES DAILY
Status: DISCONTINUED | OUTPATIENT
Start: 2020-06-03 | End: 2020-06-05 | Stop reason: HOSPADM

## 2020-06-03 RX ORDER — BACITRACIN 50000 [IU]/1
INJECTION, POWDER, FOR SOLUTION INTRAMUSCULAR
Status: DISCONTINUED | OUTPATIENT
Start: 2020-06-03 | End: 2020-06-03 | Stop reason: HOSPADM

## 2020-06-03 RX ORDER — PROPOFOL 10 MG/ML
VIAL (ML) INTRAVENOUS
Status: DISCONTINUED | OUTPATIENT
Start: 2020-06-03 | End: 2020-06-03

## 2020-06-03 RX ORDER — METHOCARBAMOL 750 MG/1
750 TABLET, FILM COATED ORAL 3 TIMES DAILY
Status: DISCONTINUED | OUTPATIENT
Start: 2020-06-03 | End: 2020-06-05 | Stop reason: HOSPADM

## 2020-06-03 RX ORDER — HYDROMORPHONE HYDROCHLORIDE 2 MG/ML
1 INJECTION, SOLUTION INTRAMUSCULAR; INTRAVENOUS; SUBCUTANEOUS
Status: DISCONTINUED | OUTPATIENT
Start: 2020-06-03 | End: 2020-06-05

## 2020-06-03 RX ORDER — EPHEDRINE SULFATE 50 MG/ML
INJECTION, SOLUTION INTRAVENOUS
Status: DISCONTINUED | OUTPATIENT
Start: 2020-06-03 | End: 2020-06-03

## 2020-06-03 RX ORDER — PROPOFOL 10 MG/ML
VIAL (ML) INTRAVENOUS CONTINUOUS PRN
Status: DISCONTINUED | OUTPATIENT
Start: 2020-06-03 | End: 2020-06-03

## 2020-06-03 RX ORDER — LABETALOL HYDROCHLORIDE 5 MG/ML
10 INJECTION, SOLUTION INTRAVENOUS EVERY 4 HOURS PRN
Status: DISCONTINUED | OUTPATIENT
Start: 2020-06-03 | End: 2020-06-05 | Stop reason: HOSPADM

## 2020-06-03 RX ORDER — MUPIROCIN 20 MG/G
OINTMENT TOPICAL
Status: DISCONTINUED | OUTPATIENT
Start: 2020-06-03 | End: 2020-06-03 | Stop reason: HOSPADM

## 2020-06-03 RX ORDER — SODIUM CHLORIDE 9 MG/ML
INJECTION, SOLUTION INTRAVENOUS CONTINUOUS
Status: DISCONTINUED | OUTPATIENT
Start: 2020-06-03 | End: 2020-06-05 | Stop reason: HOSPADM

## 2020-06-03 RX ORDER — HYDROMORPHONE HYDROCHLORIDE 2 MG/ML
0.2 INJECTION, SOLUTION INTRAMUSCULAR; INTRAVENOUS; SUBCUTANEOUS EVERY 5 MIN PRN
Status: DISCONTINUED | OUTPATIENT
Start: 2020-06-03 | End: 2020-06-03 | Stop reason: HOSPADM

## 2020-06-03 RX ORDER — SODIUM CHLORIDE 9 MG/ML
INJECTION, SOLUTION INTRAVENOUS CONTINUOUS
Status: DISCONTINUED | OUTPATIENT
Start: 2020-06-03 | End: 2020-06-03

## 2020-06-03 RX ORDER — INSULIN ASPART 100 [IU]/ML
0-5 INJECTION, SOLUTION INTRAVENOUS; SUBCUTANEOUS
Status: DISCONTINUED | OUTPATIENT
Start: 2020-06-03 | End: 2020-06-05 | Stop reason: HOSPADM

## 2020-06-03 RX ORDER — OXYCODONE AND ACETAMINOPHEN 10; 325 MG/1; MG/1
1 TABLET ORAL EVERY 4 HOURS PRN
Status: DISCONTINUED | OUTPATIENT
Start: 2020-06-03 | End: 2020-06-05 | Stop reason: HOSPADM

## 2020-06-03 RX ORDER — IBUPROFEN 200 MG
16 TABLET ORAL
Status: DISCONTINUED | OUTPATIENT
Start: 2020-06-03 | End: 2020-06-05 | Stop reason: HOSPADM

## 2020-06-03 RX ORDER — GLYCOPYRROLATE 0.2 MG/ML
INJECTION INTRAMUSCULAR; INTRAVENOUS
Status: DISCONTINUED | OUTPATIENT
Start: 2020-06-03 | End: 2020-06-03

## 2020-06-03 RX ORDER — ONDANSETRON 2 MG/ML
8 INJECTION INTRAMUSCULAR; INTRAVENOUS EVERY 6 HOURS PRN
Status: DISCONTINUED | OUTPATIENT
Start: 2020-06-03 | End: 2020-06-05 | Stop reason: HOSPADM

## 2020-06-03 RX ORDER — EPLERENONE 25 MG/1
25 TABLET, FILM COATED ORAL DAILY
Status: DISCONTINUED | OUTPATIENT
Start: 2020-06-03 | End: 2020-06-05 | Stop reason: HOSPADM

## 2020-06-03 RX ORDER — DEXAMETHASONE SODIUM PHOSPHATE 4 MG/ML
INJECTION, SOLUTION INTRA-ARTICULAR; INTRALESIONAL; INTRAMUSCULAR; INTRAVENOUS; SOFT TISSUE
Status: DISCONTINUED | OUTPATIENT
Start: 2020-06-03 | End: 2020-06-03

## 2020-06-03 RX ORDER — LIDOCAINE HYDROCHLORIDE 20 MG/ML
INJECTION INTRAVENOUS
Status: DISCONTINUED | OUTPATIENT
Start: 2020-06-03 | End: 2020-06-03

## 2020-06-03 RX ORDER — MIDAZOLAM HYDROCHLORIDE 1 MG/ML
INJECTION, SOLUTION INTRAMUSCULAR; INTRAVENOUS
Status: DISCONTINUED | OUTPATIENT
Start: 2020-06-03 | End: 2020-06-03

## 2020-06-03 RX ORDER — BUPIVACAINE HYDROCHLORIDE 5 MG/ML
INJECTION, SOLUTION EPIDURAL; INTRACAUDAL
Status: DISCONTINUED | OUTPATIENT
Start: 2020-06-03 | End: 2020-06-03 | Stop reason: HOSPADM

## 2020-06-03 RX ORDER — FENTANYL CITRATE 50 UG/ML
25 INJECTION, SOLUTION INTRAMUSCULAR; INTRAVENOUS EVERY 5 MIN PRN
Status: DISCONTINUED | OUTPATIENT
Start: 2020-06-03 | End: 2020-06-03 | Stop reason: HOSPADM

## 2020-06-03 RX ORDER — ACETAMINOPHEN 325 MG/1
650 TABLET ORAL EVERY 6 HOURS PRN
Status: DISCONTINUED | OUTPATIENT
Start: 2020-06-03 | End: 2020-06-05 | Stop reason: HOSPADM

## 2020-06-03 RX ORDER — BISACODYL 10 MG
10 SUPPOSITORY, RECTAL RECTAL DAILY PRN
Status: DISCONTINUED | OUTPATIENT
Start: 2020-06-03 | End: 2020-06-05 | Stop reason: HOSPADM

## 2020-06-03 RX ORDER — MAG HYDROX/ALUMINUM HYD/SIMETH 200-200-20
30 SUSPENSION, ORAL (FINAL DOSE FORM) ORAL EVERY 4 HOURS PRN
Status: DISCONTINUED | OUTPATIENT
Start: 2020-06-03 | End: 2020-06-05 | Stop reason: HOSPADM

## 2020-06-03 RX ORDER — MUPIROCIN 20 MG/G
1 OINTMENT TOPICAL 2 TIMES DAILY
Status: DISCONTINUED | OUTPATIENT
Start: 2020-06-03 | End: 2020-06-05 | Stop reason: HOSPADM

## 2020-06-03 RX ORDER — ACETAMINOPHEN 10 MG/ML
INJECTION, SOLUTION INTRAVENOUS
Status: DISCONTINUED | OUTPATIENT
Start: 2020-06-03 | End: 2020-06-03

## 2020-06-03 RX ORDER — CEFAZOLIN SODIUM 2 G/50ML
2 SOLUTION INTRAVENOUS
Status: COMPLETED | OUTPATIENT
Start: 2020-06-03 | End: 2020-06-04

## 2020-06-03 RX ORDER — VALSARTAN 80 MG/1
320 TABLET ORAL DAILY
Status: DISCONTINUED | OUTPATIENT
Start: 2020-06-03 | End: 2020-06-05 | Stop reason: HOSPADM

## 2020-06-03 RX ADMIN — EPHEDRINE SULFATE 10 MG: 50 INJECTION, SOLUTION INTRAMUSCULAR; INTRAVENOUS; SUBCUTANEOUS at 08:06

## 2020-06-03 RX ADMIN — DEXAMETHASONE SODIUM PHOSPHATE 10 MG: 4 INJECTION, SOLUTION INTRAMUSCULAR; INTRAVENOUS at 08:06

## 2020-06-03 RX ADMIN — HYDROMORPHONE HYDROCHLORIDE 0.2 MG: 2 INJECTION, SOLUTION INTRAMUSCULAR; INTRAVENOUS; SUBCUTANEOUS at 01:06

## 2020-06-03 RX ADMIN — METHOCARBAMOL TABLETS 750 MG: 750 TABLET, COATED ORAL at 04:06

## 2020-06-03 RX ADMIN — HYDROMORPHONE HYDROCHLORIDE 0.2 MG: 2 INJECTION, SOLUTION INTRAMUSCULAR; INTRAVENOUS; SUBCUTANEOUS at 02:06

## 2020-06-03 RX ADMIN — ACETAMINOPHEN 1000 MG: 10 INJECTION, SOLUTION INTRAVENOUS at 02:06

## 2020-06-03 RX ADMIN — OXYCODONE HYDROCHLORIDE AND ACETAMINOPHEN 1 TABLET: 5; 325 TABLET ORAL at 11:06

## 2020-06-03 RX ADMIN — CEFAZOLIN SODIUM 2 G: 2 SOLUTION INTRAVENOUS at 11:06

## 2020-06-03 RX ADMIN — PHENYLEPHRINE HYDROCHLORIDE 100 MCG: 10 INJECTION INTRAVENOUS at 08:06

## 2020-06-03 RX ADMIN — PHENYLEPHRINE HYDROCHLORIDE 100 MCG: 10 INJECTION INTRAVENOUS at 09:06

## 2020-06-03 RX ADMIN — FENTANYL CITRATE 100 MCG: 50 INJECTION INTRAMUSCULAR; INTRAVENOUS at 07:06

## 2020-06-03 RX ADMIN — CEFAZOLIN SODIUM 3 G: 1 POWDER, FOR SOLUTION INTRAMUSCULAR; INTRAVENOUS at 11:06

## 2020-06-03 RX ADMIN — ONDANSETRON 4 MG: 2 INJECTION, SOLUTION INTRAMUSCULAR; INTRAVENOUS at 08:06

## 2020-06-03 RX ADMIN — ONDANSETRON HYDROCHLORIDE 8 MG: 2 SOLUTION INTRAMUSCULAR; INTRAVENOUS at 07:06

## 2020-06-03 RX ADMIN — Medication 100 MG: at 07:06

## 2020-06-03 RX ADMIN — CEFAZOLIN SODIUM 2 G: 2 SOLUTION INTRAVENOUS at 04:06

## 2020-06-03 RX ADMIN — PROPOFOL: 10 INJECTION, EMULSION INTRAVENOUS at 10:06

## 2020-06-03 RX ADMIN — POLYETHYLENE GLYCOL 3350 17 G: 17 POWDER, FOR SOLUTION ORAL at 04:06

## 2020-06-03 RX ADMIN — PROPOFOL 200 MG: 10 INJECTION, EMULSION INTRAVENOUS at 07:06

## 2020-06-03 RX ADMIN — ROSUVASTATIN CALCIUM 10 MG: 10 TABLET, COATED ORAL at 09:06

## 2020-06-03 RX ADMIN — KETAMINE HYDROCHLORIDE 10 MG: 50 INJECTION, SOLUTION, CONCENTRATE INTRAMUSCULAR; INTRAVENOUS at 10:06

## 2020-06-03 RX ADMIN — CEFAZOLIN SODIUM 3 G: 1 POWDER, FOR SOLUTION INTRAMUSCULAR; INTRAVENOUS at 07:06

## 2020-06-03 RX ADMIN — KETAMINE HYDROCHLORIDE 75 MG: 50 INJECTION, SOLUTION, CONCENTRATE INTRAMUSCULAR; INTRAVENOUS at 08:06

## 2020-06-03 RX ADMIN — FENTANYL CITRATE 25 MCG: 50 INJECTION INTRAMUSCULAR; INTRAVENOUS at 02:06

## 2020-06-03 RX ADMIN — MUPIROCIN: 20 OINTMENT TOPICAL at 06:06

## 2020-06-03 RX ADMIN — SODIUM CHLORIDE, SODIUM LACTATE, POTASSIUM CHLORIDE, AND CALCIUM CHLORIDE: .6; .31; .03; .02 INJECTION, SOLUTION INTRAVENOUS at 09:06

## 2020-06-03 RX ADMIN — GLYCOPYRROLATE 0.2 MG: 0.2 INJECTION, SOLUTION INTRAMUSCULAR; INTRAVENOUS at 08:06

## 2020-06-03 RX ADMIN — REMIFENTANIL HYDROCHLORIDE 0.5 MCG/KG/MIN: 1 INJECTION, POWDER, LYOPHILIZED, FOR SOLUTION INTRAVENOUS at 08:06

## 2020-06-03 RX ADMIN — DOCUSATE SODIUM 100 MG: 100 CAPSULE, LIQUID FILLED ORAL at 09:06

## 2020-06-03 RX ADMIN — ONDANSETRON 4 MG: 2 INJECTION, SOLUTION INTRAMUSCULAR; INTRAVENOUS at 12:06

## 2020-06-03 RX ADMIN — MIDAZOLAM HYDROCHLORIDE 2 MG: 1 INJECTION, SOLUTION INTRAMUSCULAR; INTRAVENOUS at 07:06

## 2020-06-03 RX ADMIN — PROPOFOL: 10 INJECTION, EMULSION INTRAVENOUS at 09:06

## 2020-06-03 RX ADMIN — SODIUM CHLORIDE: 0.9 INJECTION, SOLUTION INTRAVENOUS at 02:06

## 2020-06-03 RX ADMIN — SUCCINYLCHOLINE CHLORIDE 120 MG: 20 INJECTION, SOLUTION INTRAMUSCULAR; INTRAVENOUS at 07:06

## 2020-06-03 RX ADMIN — EPHEDRINE SULFATE 15 MG: 50 INJECTION, SOLUTION INTRAMUSCULAR; INTRAVENOUS; SUBCUTANEOUS at 08:06

## 2020-06-03 RX ADMIN — HYDRALAZINE HYDROCHLORIDE 25 MG: 25 TABLET, FILM COATED ORAL at 09:06

## 2020-06-03 RX ADMIN — PROPOFOL 100 MCG/KG/MIN: 10 INJECTION, EMULSION INTRAVENOUS at 08:06

## 2020-06-03 RX ADMIN — KETAMINE HYDROCHLORIDE 10 MG: 50 INJECTION, SOLUTION, CONCENTRATE INTRAMUSCULAR; INTRAVENOUS at 11:06

## 2020-06-03 RX ADMIN — HYDROMORPHONE HYDROCHLORIDE 1 MG: 2 INJECTION, SOLUTION INTRAMUSCULAR; INTRAVENOUS; SUBCUTANEOUS at 07:06

## 2020-06-03 RX ADMIN — REMIFENTANIL HYDROCHLORIDE: 1 INJECTION, POWDER, LYOPHILIZED, FOR SOLUTION INTRAVENOUS at 10:06

## 2020-06-03 RX ADMIN — EPLERENONE 25 MG: 25 TABLET, FILM COATED ORAL at 04:06

## 2020-06-03 RX ADMIN — TAMSULOSIN HYDROCHLORIDE 0.4 MG: 0.4 CAPSULE ORAL at 09:06

## 2020-06-03 RX ADMIN — DUTASTERIDE 0.5 MG: 0.5 CAPSULE, LIQUID FILLED ORAL at 09:06

## 2020-06-03 RX ADMIN — MUPIROCIN 1 G: 20 OINTMENT TOPICAL at 09:06

## 2020-06-03 RX ADMIN — SODIUM CHLORIDE, SODIUM LACTATE, POTASSIUM CHLORIDE, AND CALCIUM CHLORIDE: .6; .31; .03; .02 INJECTION, SOLUTION INTRAVENOUS at 08:06

## 2020-06-03 RX ADMIN — ACETAMINOPHEN 1000 MG: 10 INJECTION, SOLUTION INTRAVENOUS at 08:06

## 2020-06-03 RX ADMIN — HYDROCHLOROTHIAZIDE 12.5 MG: 12.5 TABLET ORAL at 04:06

## 2020-06-03 RX ADMIN — KETAMINE HYDROCHLORIDE 10 MG: 50 INJECTION, SOLUTION, CONCENTRATE INTRAMUSCULAR; INTRAVENOUS at 09:06

## 2020-06-03 RX ADMIN — METHOCARBAMOL TABLETS 750 MG: 750 TABLET, COATED ORAL at 09:06

## 2020-06-03 RX ADMIN — SODIUM CHLORIDE, SODIUM LACTATE, POTASSIUM CHLORIDE, AND CALCIUM CHLORIDE: .6; .31; .03; .02 INJECTION, SOLUTION INTRAVENOUS at 07:06

## 2020-06-03 RX ADMIN — VALSARTAN 320 MG: 80 TABLET, FILM COATED ORAL at 04:06

## 2020-06-03 NOTE — OR NURSING
Arrived to PACU drowsy but cooperative.  Equal hand grasp strong.  (+) dorsiflexion and extention noted.  Left foot drop observed. ZEPEDA upon command and at will. Reports (+) sensation to all extremities.

## 2020-06-03 NOTE — ANESTHESIA PREPROCEDURE EVALUATION
06/03/2020  Manfred Shah is a 54 y.o., male with htn, hld, GEOVANI on CPAP for MIS L4-L5 laminectomy.    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 Tray Rdoriguez    CIDP (chronic inflammatory demyelinating polyneuropathy) 06/2018    followed by Dr. Rowdy Tran - Neurologist in [place].      Elevated liver enzymes 10/8/2018    Elevated PSA     followed by Dr. Sanderson    Foot drop, left foot     due to neurological condition/Guillan [place]    Hyperlipidemia     Hypertension     Impaired fasting glucose     Neurogenic bladder 09/05/2018    followed by Dr. Sanderson    RLS (restless legs syndrome) 10/8/2018    Self-catheterizes urinary bladder     Urologist    Sleep apnea with use of continuous positive airway pressure (CPAP) 01/10/2018    followed by [facility] in [place]    Urge incontinence 9/11/2018       Pre-op Assessment    I have reviewed the Patient Summary Reports.     I have reviewed the Nursing Notes.    I have reviewed the Medications.     Review of Systems  Anesthesia Hx:  No problems with previous Anesthesia  History of prior surgery of interest to airway management or planning: Previous anesthesia: General  Denies Personal Hx of Anesthesia complications.   Hematology/Oncology:        Hematology Comments: Mildly anemic hgb 12.5   Cardiovascular:   Hypertension, well controlled hyperlipidemia ECG has been reviewed. Exercise tolerance limited due to LE weakness    Echo 2/20:  · Normal left ventricular systolic function. The estimated ejection fraction is 55%.  · Concentric left ventricular hypertrophy.  · Indeterminate left ventricular diastolic function.  · Normal right ventricular systolic function.  · Normal central venous pressure (3 mmHg).   Pulmonary:   Sleep Apnea, CPAP    Renal/:  Renal/ Normal      Hepatic/GI:  Hepatic/GI Normal    Neurological:   Neuromuscular Disease, CIDP dx in 2018  Last IVIG treatment 2019 - LLE weakness and footdrop.    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 Tray Rodriguez    CIDP (chronic inflammatory demyelinating polyneuropathy) 06/2018    followed by Dr. Rowdy Tran - Neurologist in [place].      Elevated liver enzymes 10/8/2018    Elevated PSA     followed by Dr. Sanderson    Foot drop, left foot     due to neurological condition/Guillan [place]    Hyperlipidemia     Hypertension     Impaired fasting glucose     Neurogenic bladder 09/05/2018    followed by Dr. Sanderson    RLS (restless legs syndrome) 10/8/2018    Self-catheterizes urinary bladder     Urologist    Sleep apnea with use of continuous positive airway pressure (CPAP) 01/10/2018    followed by [facility] in [place]    Urge incontinence 9/11/2018     Past Surgical History:   Procedure Laterality Date    ADENOIDECTOMY      COLONOSCOPY N/A 1/24/2020    Procedure: COLONOSCOPY;  Surgeon: Stacy Markham MD;  Location: [facility];  Service: Endoscopy;  Laterality: N/A;    HERNIA REPAIR      LUMBAR LAM[facility]CTOMY  2012    [facility]    LUMBAR LAMINECTOMY  08/2018    Dr. Rodriguez [facility]; complications from surgery - abscess to spinal cord - discharge summary scanned to media file    PLANTAR FASCIA RELEASE      TONSILLECTOMY      VASECTOMY      X-STOP IMPLANTATION           Physical Exam  General:  Morbid Obesity, Obesity    Airway/Jaw/Neck:  Airway Findings: Mouth Opening: Normal Tongue: Normal  General Airway Assessment: Adult, Possible difficult mask airway  Mallampati: II  TM Distance: Normal, at least 6 cm  Jaw/Neck Findings:  Neck ROM: Normal ROM  Neck Findings:  Girth Increased       Dental:  Dental Findings: In tact   Chest/Lungs:  Chest/Lungs Findings: Clear to auscultation     Heart/Vascular:  Heart Findings: Rate: Normal  Rhythm: Regular Rhythm  Sounds: Normal        Mental Status:  Mental Status Findings:  Cooperative, Alert and Oriented       Lab Results   Component Value Date    WBC 6.79 05/28/2020    HGB 12.6 (L) 05/28/2020    HCT 37.4 (L) 05/28/2020    MCV 87 05/28/2020     05/28/2020       Chemistry        Component Value Date/Time     05/28/2020 1450    K 3.7 05/28/2020 1450     05/28/2020 1450    CO2 26 05/28/2020 1450    BUN 11 05/28/2020 1450    CREATININE 0.8 05/28/2020 1450    GLU 89 05/28/2020 1450        Component Value Date/Time    CALCIUM 9.4 05/28/2020 1450    ALKPHOS 57 05/21/2020 0805    AST 25 05/21/2020 0805    ALT 24 05/21/2020 0805    BILITOT 0.5 05/21/2020 0805    ESTGFRAFRICA >60 05/28/2020 1450    EGFRNONAA >60 05/28/2020 1450              Anesthesia Plan  Type of Anesthesia, risks & benefits discussed:  Anesthesia Type:  general  Patient's Preference:   Intra-op Monitoring Plan: standard ASA monitors  Intra-op Monitoring Plan Comments:   Post Op Pain Control Plan: per primary service following discharge from PACU  Post Op Pain Control Plan Comments:   Induction:   IV  Beta Blocker:  Patient is on a Beta-Blocker and has received one dose within the past 24 hours (No further documentation required).       Informed Consent: Patient understands risks and agrees with Anesthesia plan.  Questions answered. Anesthesia consent signed with patient.  ASA Score: 3     Day of Surgery Review of History & Physical:    H&P update referred to the surgeon.         Ready For Surgery From Anesthesia Perspective.

## 2020-06-03 NOTE — H&P
CHIEF COMPLAINT:  Back and leg pain    Interval history 6/3/2020:  Patient presents today for MIS L3-4 TLIF with Dr. Osorio. He reports resolution of left knee and thigh pain following treatment for left knee tendonitis by orthopedics. Still reports mild, achy low back pain. Still reports subjective left leg weakness that affects ambulation. Uses a rolling walker for support. Has difficulty moving around his home. Chronic left foot drop managed with AFO brace.      INTERVAL HISTORY (5/11/20):  No significant changes in sxs.  Leg pain is still severe and he is eager to have surgery.     Initially underwent MIS decompression by Dr Ramires and had second surgery at [facility] by Dr Rodriguez which was supposed to be redo decompression.  Second surgery reportedly lasted 13 hrs and resulted in 1 week admission.  He reportedly suffered nerve damage that left him having to straight cath every 3.5 hrs due to incontinence.     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  He underwent IVIG (last dose on 4/3/19) and regain strength in his legs but still has residual L foot drop for which he wears an AFO     He also reports a spinal abscess that required a drain for 6 weeks.        HPI:  Manfred Shah is a 54 y.o.  male with below listed PMH, who   Presents with low back pain that radiates down his left thigh to his knee.  It does not go past the knee  And seems to skip the buttocks.  Pain is made worse by ambulating.  It is most severe in the morning and described as almost unbearable.  He ends up using a walker for support.  Sitting in a recliner and using ice helps make the pain better.  He feels like his thigh strength has diminished and does not support his weight reliably.  He also has some tingling in the thigh.  He has been diagnosed with tendinitis in the left knee and is having that evaluated tomorrow by Orthopedics.     No imaging for review     He has a  history of an L3-4 laminectomy performed by Dr. Ramires in 2012 followed by a repeat L3-4 laminectomy and diskectomy by Dr. Rodriguez in 2018.      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 Tray Rodriguez    CIDP (chronic inflammatory demyelinating polyneuropathy) 06/2018     followed by Dr. Rowdy Tran - Neurologist in [place].      Elevated liver enzymes 10/8/2018    Elevated PSA       followed by Dr. Sanderson    Foot drop, left foot       due to neurological condition/Guillan [place]    Hyperlipidemia      Hypertension      Impaired fasting glucose      Neurogenic bladder 09/05/2018     followed by Dr. Sanderson    RLS (restless legs syndrome) 10/8/2018    Self-catheterizes urinary bladder       Urologist    Sleep apnea with use of continuous positive airway pressure (CPAP) 01/10/2018     followed [facility] in [place]    Urge incontinence 9/11/2018                Past Surgical History:   Procedure Laterality Date    ADENOIDECTOMY        COLONOSCOPY N/A 1/24/2020     Procedure: COLONOSCOPY;  Surgeon: Stacy Markham MD;  Location: [facility];  Service: Endoscopy;  Laterality: N/A;    HERNIA REPAIR        LUMBAR LAMINECTOMY   2012     [facility]    LUMBAR LAMINECTOMY   08/2018     [facility]; complications from surgery - abscess to spinal cord - discharge summary scanned to media file    PLANTAR FASCIA RELEASE        TONSILLECTOMY        VASECTOMY        X-STOP IMPLANTATION                    Family History   Problem Relation Age of Onset    Cancer Mother           Breast Cancer    Heart disease Mother           pacemaker    Hyperlipidemia Mother           taking Crestor    Hypertension Mother           taking Amlodipine    Diabetes Mother            Prediabetes    Diabetes Father      Cancer Father           unknown type of cancer    Hypertension Father      Rheum arthritis Father      No Known Problems Sister      Prostate cancer Neg Hx      Kidney disease Neg Hx        Social History                Tobacco Use    Smoking status: Never Smoker    Smokeless tobacco: Never Used   Substance Use Topics    Alcohol use: No       Frequency: Monthly or less       Drinks per session: 1 or 2       Binge frequency: Never    Drug use: No         Review of Systems   Constitutional: Negative.    Respiratory: Negative for cough and shortness of breath.    Cardiovascular: Negative for chest pain, palpitations, claudication and leg swelling.   Gastrointestinal: Negative for abdominal pain, constipation and diarrhea.   Genitourinary: Negative for flank pain, frequency and urgency.   Musculoskeletal: Positive for back pain. Negative for falls, joint pain, myalgias and neck pain.   Skin: Negative.    Neurological: Positive for tingling and focal weakness. Negative for dizziness, tremors, sensory change, speech change, seizures, loss of consciousness, weakness and headaches.   Psychiatric/Behavioral: Negative.          OBJECTIVE:   Vital Signs:       Physical Exam:  General: well developed, well nourished, no distress  Neurologic: Alert and oriented. Thought content appropriate.  GCS: Motor: 6/Verbal: 5/Eyes: 4 GCS Total: 15  Cranial nerves: II-XII grossly intact  Neck: supple, without obvious masses or lesions  Skin: grossly intact in all 4 extremities without obvious rashes or lesions  Motor Strength:   Strength  Deltoids Triceps Biceps Wrist Extension Wrist Flexion Hand    Upper: R 5/5 5/5 5/5 5/5 5/5 5/5    L 5/5 5/5 5/5 5/5 5/5 5/5     Iliopsoas Quadriceps Knee  Flexion Tibialis  anterior Gastro- cnemius EHL   Lower: R 5/5 5/5 5/5 5/5 5/5 4/5    L 5/5 5/5 5/5 1/5 5/5 1/5   Sensory: intact to light touch B/L UE and LE; decreased sensation to right  anterior thigh     Salter's - Negative          Diagnostic Results:  All imaging was independently reviewed by me.     Lumbar CT, 5/6/20:  1. Prior L3-4 lami defect with disruption of L facet and pars     Lumbar flex/ex 5/6/20:  No dynamic instability     Outside Lspine MRI, dated 4/17/20:  1. Prior L3-4 lami and postop changes  2. Left L3-4 foraminal disc protrusion with suspected L3 nerve compression  3. Moderate L L4-5 LRS        ASSESSMENT/PLAN:              Problem List Items Addressed This Visit                      Neuro       H/O laminectomy       Herniated lumbar intervertebral disc       Left lumbar radiculopathy               Orthopedic       Left knee pain       Left low back pain - Primary               VISIT SUMMARY:  Extensive and complicated history involving deficits from CIDP and prior lumbar surgeries.  Main complaint is low back pain with radiation into the left thigh down to the knee, now resolved following treatment for left knee tendonitis.  Continues to complain of subjective left leg weakness affecting ambulation. Ideally, he would be candidate for MIS lami/discectomy but given this is his 3rd surgery with likely scar tissue and prior infection, I rec L3-4 MIS TLIF.  He also has damaged L facet joint.     PATIENT EDUCATION:  More than half the clinic visit was spent showing with patient the pertinent findings on imaging and educating the patient about natural history of the pathology.   We discussed options for treatment as well as the risks and benefits of each option.  All questions were answered.      The patient understands and agrees with the following plan of care.     - Obtain records from [facility]  - Surgery scheduled for 6/3/20 at [facility]  - Preop clearance needed from PCP (Dr Ramirez) and cards (Dr Cuevas)  - Preop labs, EKG, CXR ordered   - Preop PAT appointment requested  - STOP TAKING ASPIRIN, NSAIDS, AND ALL OTHER BLOOD THINNERS 7 DAYS PRIOR TO  SURGERY      Angelica Horta PA-C  [facility]  Department of Neurosurgery  [phone]

## 2020-06-03 NOTE — OR NURSING
Noted to have reddened skin in places where tape was applied to patient's skin.  No open wounds observed.

## 2020-06-03 NOTE — OR NURSING
Continues to have equal hand grasp and (+) flexion/extention of feet.  Able to bend knees bilaterally minimally, but cause increased back pain.

## 2020-06-03 NOTE — PROGRESS NOTES
Certification of Assistant at Surgery       Surgery Date: 6/3/2020     Participating Surgeons:  Surgeon(s) and Role:     * Samuel Osorio, DO - Primary       Angelica Horta PA-C - Assisting     Procedures:  Procedure(s) (LRB):  FUSION, SPINE, LUMBAR, TLIF, WITH PERCUTANEOUS INSTRUMENTATION  (L3-4 MIS TLIF) Flouro Micrcoscope Terell Neuromonitoring JENNIFER JENNINGS [phone] Spine Wave (Bilateral)    Assistant Surgeon's Certification of Necessity:  I understand that section 1842 (b) (6) (d) of the Social Security Act generally prohibits Medicare Part B reasonable charge payment for the services of assistants at surgery in teaching hospitals when qualified residents are available to furnish such services. I certify that the services for which payment is claimed were medically necessary, and that no qualified resident was available to perform the services. I further understand that these services are subject to post-payment review by the Medicare carrier.      Angelica Horta PA-C    06/03/2020  1:56 PM

## 2020-06-03 NOTE — PLAN OF CARE
"Patient slightly drowsy but wakes appropriately.  Oriented x 4.  Currently rates pain in back 4-5/10 and states he is much "more comfortable".  Able to move all extremities at will.  Hand grasp remains equal and strong.  Bends both knees and has (+) flexion and extension of feet.  Left foot drop as noted before.  Transported to hospital room with transport personnel in NAD.  O2 per NC in use.  "

## 2020-06-03 NOTE — TRANSFER OF CARE
"Anesthesia Transfer of Care Note    Patient: Manfred Shah    Procedure(s) Performed: Procedure(s) (LRB):  FUSION, SPINE, LUMBAR, TLIF, WITH PERCUTANEOUS INSTRUMENTATION  (L3-4 MIS TLIF) Flouro Micrcoscope Terell Neuromonitoring JENNIFER JENNINGS [phone] Spine Wave (Bilateral)    Patient location: PACU    Anesthesia Type: general    Transport from OR: Transported from OR on room air with adequate spontaneous ventilation    Post pain: adequate analgesia    Post assessment: no apparent anesthetic complications and tolerated procedure well    Post vital signs: stable    Level of consciousness: awake, alert and oriented    Nausea/Vomiting: no nausea/vomiting    Complications: none    Transfer of care protocol was followed      Last vitals:   Visit Vitals  /75 (BP Location: Right arm, Patient Position: Lying)   Pulse 96   Temp 36.2 °C (97.2 °F) (Oral)   Resp 10   Ht 6' 5" (1.956 m)   Wt (!) 168.3 kg (371 lb 0.6 oz)   SpO2 100%   BMI 44.00 kg/m²     "

## 2020-06-03 NOTE — PLAN OF CARE
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 Has a neurogenic bladder with incontinence and self  Intermittent catheterization, also has neurogenic bowel with routine program to evacuate on schedule .  . Has Left foot drop.

## 2020-06-03 NOTE — BRIEF OP NOTE
[facility]  Brief Operative Note    SUMMARY     Surgery Date: 6/3/2020     Surgeon(s) and Role:     * Samuel Osorio, DO - Primary    Assisting Surgeon: Angelica Horta PA-C     Pre-op Diagnosis:  Left lumbar radiculopathy [M54.16]  Herniated lumbar intervertebral disc [M51.26]  H/O laminectomy [Z98.890]  Foot drop, left foot [M21.372]  Left low back pain, unspecified chronicity, unspecified whether sciatica present [M54.5]    Post-op Diagnosis:  Post-Op Diagnosis Codes:     * Left lumbar radiculopathy [M54.16]     * Herniated lumbar intervertebral disc [M51.26]     * H/O laminectomy [Z98.890]     * Foot drop, left foot [M21.372]     * Left low back pain, unspecified chronicity, unspecified whether sciatica present [M54.5]    Procedure(s) (LRB):  FUSION, SPINE, LUMBAR, TLIF, WITH PERCUTANEOUS INSTRUMENTATION  (L3-4 MIS TLIF) Flouro Micrcoscope Terell Neuromonitoring JENNIFER JENNINGS [phone] Spine Wave (Bilateral)    Anesthesia: General    Description of Procedure: MIS L3-4 TLIF    Description of the findings of the procedure: see full op note     Estimated Blood Loss: * No values recorded between 6/3/2020  8:41 AM and 6/3/2020  1:24 PM *         Specimens:   Specimen (12h ago, onward)    None

## 2020-06-04 LAB
BASOPHILS # BLD AUTO: 0.02 K/UL (ref 0–0.2)
BASOPHILS NFR BLD: 0.1 % (ref 0–1.9)
DIFFERENTIAL METHOD: ABNORMAL
EOSINOPHIL # BLD AUTO: 0 K/UL (ref 0–0.5)
EOSINOPHIL NFR BLD: 0.1 % (ref 0–8)
ERYTHROCYTE [DISTWIDTH] IN BLOOD BY AUTOMATED COUNT: 12.3 % (ref 11.5–14.5)
HCT VFR BLD AUTO: 37.8 % (ref 40–54)
HGB BLD-MCNC: 12.6 G/DL (ref 14–18)
IMM GRANULOCYTES # BLD AUTO: 0.04 K/UL (ref 0–0.04)
IMM GRANULOCYTES NFR BLD AUTO: 0.3 % (ref 0–0.5)
LYMPHOCYTES # BLD AUTO: 0.8 K/UL (ref 1–4.8)
LYMPHOCYTES NFR BLD: 5.8 % (ref 18–48)
MCH RBC QN AUTO: 29.5 PG (ref 27–31)
MCHC RBC AUTO-ENTMCNC: 33.3 G/DL (ref 32–36)
MCV RBC AUTO: 89 FL (ref 82–98)
MONOCYTES # BLD AUTO: 1.4 K/UL (ref 0.3–1)
MONOCYTES NFR BLD: 9.8 % (ref 4–15)
NEUTROPHILS # BLD AUTO: 11.7 K/UL (ref 1.8–7.7)
NEUTROPHILS NFR BLD: 83.9 % (ref 38–73)
NRBC BLD-RTO: 0 /100 WBC
PLATELET # BLD AUTO: 232 K/UL (ref 150–350)
PMV BLD AUTO: 9.4 FL (ref 9.2–12.9)
POCT GLUCOSE: 120 MG/DL (ref 70–110)
POCT GLUCOSE: 120 MG/DL (ref 70–110)
POCT GLUCOSE: 129 MG/DL (ref 70–110)
POCT GLUCOSE: 140 MG/DL (ref 70–110)
RBC # BLD AUTO: 4.27 M/UL (ref 4.6–6.2)
WBC # BLD AUTO: 13.92 K/UL (ref 3.9–12.7)

## 2020-06-04 PROCEDURE — 99024 PR POST-OP FOLLOW-UP VISIT: ICD-10-PCS | Mod: ,,, | Performed by: PHYSICIAN ASSISTANT

## 2020-06-04 PROCEDURE — 97116 GAIT TRAINING THERAPY: CPT

## 2020-06-04 PROCEDURE — 25000003 PHARM REV CODE 250: Performed by: PHYSICIAN ASSISTANT

## 2020-06-04 PROCEDURE — 36415 COLL VENOUS BLD VENIPUNCTURE: CPT

## 2020-06-04 PROCEDURE — 99024 POSTOP FOLLOW-UP VISIT: CPT | Mod: ,,, | Performed by: PHYSICIAN ASSISTANT

## 2020-06-04 PROCEDURE — 63600175 PHARM REV CODE 636 W HCPCS: Performed by: PHYSICIAN ASSISTANT

## 2020-06-04 PROCEDURE — 11000001 HC ACUTE MED/SURG PRIVATE ROOM

## 2020-06-04 PROCEDURE — 94799 UNLISTED PULMONARY SVC/PX: CPT

## 2020-06-04 PROCEDURE — 85025 COMPLETE CBC W/AUTO DIFF WBC: CPT

## 2020-06-04 PROCEDURE — 97535 SELF CARE MNGMENT TRAINING: CPT

## 2020-06-04 PROCEDURE — 97161 PT EVAL LOW COMPLEX 20 MIN: CPT

## 2020-06-04 PROCEDURE — 97165 OT EVAL LOW COMPLEX 30 MIN: CPT

## 2020-06-04 PROCEDURE — 94761 N-INVAS EAR/PLS OXIMETRY MLT: CPT

## 2020-06-04 RX ADMIN — MUPIROCIN 1 G: 20 OINTMENT TOPICAL at 08:06

## 2020-06-04 RX ADMIN — TAMSULOSIN HYDROCHLORIDE 0.4 MG: 0.4 CAPSULE ORAL at 08:06

## 2020-06-04 RX ADMIN — CEFAZOLIN SODIUM 2 G: 2 SOLUTION INTRAVENOUS at 05:06

## 2020-06-04 RX ADMIN — OXYCODONE HYDROCHLORIDE AND ACETAMINOPHEN 1 TABLET: 5; 325 TABLET ORAL at 08:06

## 2020-06-04 RX ADMIN — VALSARTAN 320 MG: 80 TABLET, FILM COATED ORAL at 09:06

## 2020-06-04 RX ADMIN — SODIUM CHLORIDE: 0.9 INJECTION, SOLUTION INTRAVENOUS at 05:06

## 2020-06-04 RX ADMIN — METHOCARBAMOL TABLETS 750 MG: 750 TABLET, COATED ORAL at 08:06

## 2020-06-04 RX ADMIN — HYDRALAZINE HYDROCHLORIDE 25 MG: 25 TABLET, FILM COATED ORAL at 09:06

## 2020-06-04 RX ADMIN — METOPROLOL SUCCINATE 100 MG: 50 TABLET, EXTENDED RELEASE ORAL at 09:06

## 2020-06-04 RX ADMIN — MUPIROCIN 1 G: 20 OINTMENT TOPICAL at 09:06

## 2020-06-04 RX ADMIN — METHOCARBAMOL TABLETS 750 MG: 750 TABLET, COATED ORAL at 10:06

## 2020-06-04 RX ADMIN — POLYETHYLENE GLYCOL 3350 17 G: 17 POWDER, FOR SOLUTION ORAL at 09:06

## 2020-06-04 RX ADMIN — EPLERENONE 25 MG: 25 TABLET, FILM COATED ORAL at 10:06

## 2020-06-04 RX ADMIN — HYDROCHLOROTHIAZIDE 12.5 MG: 12.5 TABLET ORAL at 09:06

## 2020-06-04 RX ADMIN — HYDROMORPHONE HYDROCHLORIDE 1 MG: 2 INJECTION, SOLUTION INTRAMUSCULAR; INTRAVENOUS; SUBCUTANEOUS at 03:06

## 2020-06-04 RX ADMIN — ROSUVASTATIN CALCIUM 10 MG: 10 TABLET, COATED ORAL at 08:06

## 2020-06-04 RX ADMIN — HYDROMORPHONE HYDROCHLORIDE 1 MG: 2 INJECTION, SOLUTION INTRAMUSCULAR; INTRAVENOUS; SUBCUTANEOUS at 01:06

## 2020-06-04 RX ADMIN — DOCUSATE SODIUM 100 MG: 100 CAPSULE, LIQUID FILLED ORAL at 09:06

## 2020-06-04 RX ADMIN — HYDROMORPHONE HYDROCHLORIDE 1 MG: 2 INJECTION, SOLUTION INTRAMUSCULAR; INTRAVENOUS; SUBCUTANEOUS at 10:06

## 2020-06-04 RX ADMIN — AMLODIPINE BESYLATE 10 MG: 5 TABLET ORAL at 09:06

## 2020-06-04 RX ADMIN — DUTASTERIDE 0.5 MG: 0.5 CAPSULE, LIQUID FILLED ORAL at 08:06

## 2020-06-04 RX ADMIN — METHOCARBAMOL TABLETS 750 MG: 750 TABLET, COATED ORAL at 03:06

## 2020-06-04 RX ADMIN — HYDRALAZINE HYDROCHLORIDE 25 MG: 25 TABLET, FILM COATED ORAL at 08:06

## 2020-06-04 RX ADMIN — DOCUSATE SODIUM 100 MG: 100 CAPSULE, LIQUID FILLED ORAL at 08:06

## 2020-06-04 NOTE — HPI
Manfred Shah is a 54 y.o. 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  Main complaint is low back pain with radiation into the left thigh down to the knee.  This likely corresponds to nerve root compression at L3-4 on left.  Ideally, he would be candidate for MIS lami/discectomy but given this is his 3rd surgery with likely scar tissue and prior infection, I rec L3-4 MIS TLIF.  He also has damaged L facet joint.

## 2020-06-04 NOTE — PLAN OF CARE
Problem: Occupational Therapy Goal  Goal: Occupational Therapy Goal  Description  Goals to be met by: 06/18/20     Patient will increase functional independence with ADLs by performing:    UE Dressing with LSO with Set-up Assistance.  Grooming while standing at sink with Supervision.  Toileting (self-cath bladder; bowel) from bedside commode with Supervision for hygiene and clothing management.   Rolling to Bilateral with Supervision.   Supine to sit with Supervision.  Step transfer with Supervision  Toilet transfer to bedside commode with Supervision.  Upper extremity exercise program x15 reps per handout, with independence.  Pt will independently identify all spinal precautions in order to increase safety with all ADLs.      Outcome: Ongoing, Progressing    MIN A with bariatric RW d/t BLE weakness. Pt will greatly benefit from HHOT at discharge to review home safety and increase pt's quality of life: pt difficulty in/out of bed, strategies with self-cath (bladder/bowel) while implementing spinal precautions, sit to stand t/f from his recliner, etc. OT rec bariatric BSC at discharge.

## 2020-06-04 NOTE — SUBJECTIVE & OBJECTIVE
Interval History:  Pain well controlled, tolerating solid diet.  PT recommending home health    Medications:  Continuous Infusions:   sodium chloride 0.9% 75 mL/hr at 06/04/20 0511     Scheduled Meds:   amLODIPine  10 mg Oral Daily    docusate sodium  100 mg Oral BID    dutasteride  0.5 mg Oral QHS    eplerenone  25 mg Oral Daily    hydrALAZINE  25 mg Oral BID    hydroCHLOROthiazide  12.5 mg Oral Daily    methocarbamoL  750 mg Oral TID    metoprolol succinate  100 mg Oral Daily    mupirocin  1 g Nasal BID    polyethylene glycol  17 g Oral Daily    rosuvastatin  10 mg Oral QHS    tamsulosin  0.4 mg Oral QHS    valsartan  320 mg Oral Daily     PRN Meds:acetaminophen, aluminum-magnesium hydroxide-simethicone, bisacodyL, dextrose 50%, dextrose 50%, glucagon (human recombinant), glucose, glucose, hydrALAZINE, HYDROmorphone, insulin aspart U-100, labetalol, ondansetron, oxyCODONE-acetaminophen, oxyCODONE-acetaminophen, promethazine (PHENERGAN) IVPB, senna-docusate 8.6-50 mg, sodium chloride 0.9%     Review of Systems  Objective:     Weight: (!) 168.3 kg (371 lb 0.6 oz)  Body mass index is 44 kg/m².  Vital Signs (Most Recent):  Temp: 98.4 °F (36.9 °C) (06/04/20 1202)  Pulse: 91 (06/04/20 1202)  Resp: 18 (06/04/20 1202)  BP: (!) 151/72 (06/04/20 1202)  SpO2: 98 % (06/04/20 1202) Vital Signs (24h Range):  Temp:  [97.2 °F (36.2 °C)-98.4 °F (36.9 °C)] 98.4 °F (36.9 °C)  Pulse:  [78-91] 91  Resp:  [17-20] 18  SpO2:  [95 %-98 %] 98 %  BP: (148-167)/(72-92) 151/72     Date 06/04/20 0700 - 06/05/20 0659   Shift 2239-9364 1584-4947 8158-1741 24 Hour Total   INTAKE   P.O. 480   480   Shift Total(mL/kg) 480(2.9)   480(2.9)   OUTPUT   Urine(mL/kg/hr) 500(0.4)   500   Stool 1   1   Shift Total(mL/kg) 501(3)   501(3)   Weight (kg) 168.3 168.3 168.3 168.3                        Neurosurgery Physical Exam   General: well developed, well nourished, no distress  Neurologic: Alert and oriented. Thought content  appropriate.  GCS: Motor: 6/Verbal: 5/Eyes: 4 GCS Total: 15  Cranial nerves: II-XII grossly intact  Neck: supple, without obvious masses or lesions  Skin: grossly intact in all 4 extremities without obvious rashes or lesions  Motor Strength:    Strength   Iliopsoas Quadriceps Knee  Flexion Tibialis  anterior Gastro- cnemius EHL   Lower: R 5/5 5/5 5/5 5/5 5/5 4/5     L 5/5 5/5 5/5 1/5 5/5 1/5   Sensory: intact to light touch B/L UE and LE; decreased sensation to right anterior thigh      Significant Labs:  No results for input(s): GLU, NA, K, CL, CO2, BUN, CREATININE, CALCIUM, MG in the last 48 hours.  Recent Labs   Lab 06/04/20  0512   WBC 13.92*   HGB 12.6*   HCT 37.8*        No results for input(s): LABPT, INR, APTT in the last 48 hours.  Microbiology Results (last 7 days)     ** No results found for the last 168 hours. **            Significant Diagnostics:  I have personally reviewed imaging and agree with the findings.    Lumbar x-ray line satisfactory placement of hardware

## 2020-06-04 NOTE — PLAN OF CARE
Problem: Physical Therapy Goal  Goal: Physical Therapy Goal  Description  Goals to be met by:      Patient will increase functional independence with mobility by performin. Supine to sit with Modified Walsh  2. Sit to supine with Modified Walsh  3. Sit to stand transfer with Modified Walsh  4. Bed to chair transfer with Modified Walsh using Rolling Walker  5. Gait  x 150 feet with Modified Walsh using BRW  * adhering to spine precautions throughout  *LSO on with all activity     Outcome: Ongoing, Progressing    HHPT. 7x. DME recs per OT.

## 2020-06-04 NOTE — PLAN OF CARE
06/04/20 1316   Discharge Assessment   Assessment Type Discharge Planning Assessment   Assessment information obtained from? Medical Record   Prior to hospitilization cognitive status: Alert/Oriented   Prior to hospitalization functional status: Independent;Assistive Equipment   Current cognitive status: Alert/Oriented   Current Functional Status: Independent;Assistive Equipment   Facility Arrived From: home   Lives With spouse   Able to Return to Prior Arrangements yes   Is patient able to care for self after discharge? Yes   Who are your caregiver(s) and their phone number(s)? patty- [phone]   Patient's perception of discharge disposition home or selfcare   Readmission Within the Last 30 Days no previous admission in last 30 days   Patient currently being followed by outpatient case management? No   Patient currently receives any other outside agency services? No   Equipment Currently Used at Home rollator;shower chair;cane, straight   Do you have any problems affording any of your prescribed medications? No   Is the patient taking medications as prescribed? yes   Does the patient have transportation home? Yes   Transportation Anticipated family or friend will provide   Does the patient receive services at the [facility]? No   Discharge Plan A Home with family  (with followup)   DME Needed Upon Discharge    (TBD)   Patient/Family in Agreement with Plan yes     [id number] - [place], [place] - [address]  [address]  [facility] [address]  Phone: [phone] Fax: [phone]    [id number] - [place] - [address] AT [facility] [address]  [address]  [place] [address]  Phone: [phone] Fax: [phone]

## 2020-06-04 NOTE — ANESTHESIA POSTPROCEDURE EVALUATION
Anesthesia Post Evaluation    Patient: Manfred Shah    Procedure(s) Performed: Procedure(s) (LRB):  FUSION, SPINE, LUMBAR, TLIF, WITH PERCUTANEOUS INSTRUMENTATION  (L3-4 MIS TLIF) [facility] [place] [facility] JENNIFER MICHAELA [phone] Spine Wave (Bilateral)    Final Anesthesia Type: general    Patient location during evaluation: med/surg floor  Patient participation: Yes- Able to Participate  Level of consciousness: awake and alert, oriented and awake  Post-procedure vital signs: reviewed and stable  Airway patency: patent    PONV status at discharge: No PONV  Anesthetic complications: no      Cardiovascular status: blood pressure returned to baseline  Respiratory status: unassisted, spontaneous ventilation and room air  Hydration status: euvolemic  Follow-up not needed.          Vitals Value Taken Time   /78 6/4/2020  7:52 AM   Temp 36.9 °C (98.4 °F) 6/4/2020  7:52 AM   Pulse 83 6/4/2020  7:52 AM   Resp 17 6/4/2020  7:52 AM   SpO2 98 % 6/4/2020  7:52 AM         Event Time     Out of Recovery 15:03:30          Pain/Masood Score: Pain Rating Prior to Med Admin: 5 (6/4/2020  1:59 AM)  Pain Rating Post Med Admin: 4 (6/3/2020  2:59 PM)

## 2020-06-04 NOTE — NURSING
Patient able self-cath independently. Discussed sterile technique with patient. Patient verbalized understanding. No distress noted.

## 2020-06-04 NOTE — PLAN OF CARE
Problem: Adult Inpatient Plan of Care  Goal: Plan of Care Review  Outcome: Ongoing, Progressing  Goal: Patient-Specific Goal (Individualization)  Outcome: Ongoing, Progressing  Goal: Absence of Hospital-Acquired Illness or Injury  Outcome: Ongoing, Progressing  Goal: Optimal Comfort and Wellbeing  Outcome: Ongoing, Progressing  Goal: Readiness for Transition of Care  Outcome: Ongoing, Progressing  Goal: Rounds/Family Conference  Outcome: Ongoing, Progressing     Problem: Bariatric Environmental Safety  Goal: Safety Maintained with Care  Outcome: Ongoing, Progressing     Problem: Fall Injury Risk  Goal: Absence of Fall and Fall-Related Injury  Outcome: Ongoing, Progressing     Problem: Infection  Goal: Infection Symptom Resolution  Outcome: Ongoing, Progressing    Patient remains free from falls and injury. Safety maintained; bed lock and locked, call light in reach. No distress noted. VSS. No complaint of pain, n/v, diarrhea, or SOB. Questions encouraged and answered. Plan of care reviewed with patient. Will continue to monitor, will continue with plan of care.       Medical service.

## 2020-06-04 NOTE — PT/OT/SLP EVAL
Occupational Therapy   Evaluation    Name: Manfred Shah  MRN: [id number]  Admitting Diagnosis:  <principal problem not specified> 1 Day Post-Op    Recommendations:     Discharge Recommendations: home health OT(with family assist)  Discharge Equipment Recommendations:  (bariatric BSC; side rails for home bed)  Barriers to discharge:  None    Assessment:     Manfred Shah is a 54 y.o. male with a medical diagnosis of <principal problem not specified>.  Performance deficits affecting function: weakness, impaired functional mobilty, decreased safety awareness, pain, gait instability, impaired balance, impaired self care skills, decreased upper extremity function, decreased lower extremity function, orthopedic precautions.      MIN A with bariatric RW d/t BLE weakness. Pt will greatly benefit from HHOT at discharge to review home safety and increase pt's quality of life: pt difficulty in/out of bed, strategies with self-cath (bladder/bowel) while implementing spinal precautions, sit to stand t/f from his recliner, etc. OT rec bariatric BSC at discharge.     With pt's description of his toilet transfer at home, he presents as unsafe with twisting of his torso and holding onto furniture; pt was inquiring for a lift chair initially d/t difficulty with L knee pain and BLE weakness. Pt has been sleeping in his recliner for 6-7 weeks d/t inability to get in/out of bed; ultimate goal is to get back to sleeping in his bed. HHOT would be beneficial to show safe transfers in the home and use of DME recommended. 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 Pt has 3 reported falls recently.      Rehab Prognosis: Good; patient would benefit from acute skilled OT services to address these deficits and reach maximum level of function.       Plan:     Patient to be seen daily to address the above listed problems via self-care/home management, therapeutic activities  · Plan of Care Expires: 06/18/20  · Plan  of Care Reviewed with: patient    Subjective     Chief Complaint: back discomfort with static stand; RLE quad weakness when trying to stand   Patient/Family Comments/goals: receptive of all education; pleasant and motivated     Occupational Profile:  Living Environment: Pt lives with his wife (and son home from college) in a SS house with 1 step at entry with no HR. Bathroom set-up: walk-in shower with shower chair.   Previous level of function: Pt has been using his bariatric rollator for functional mobility with MOD I. Pt completes ADLs with MOD I: donning/doffing LLE AFO, use of shower chair, and completes his own self- cath d/t incontinence with bladder and bowel.   Roles and Routines: drives, , father of 2 (other son is in [place])  Equipment Used at Home:  cane, straight, rollator, shower chair(bariatric RW, reacher, long-handled sponge)  Assistance upon Discharge: wife (works), mom and dad are driving in to help with recover; 1 son (limited)     Pain/Comfort:  · Pain Rating 1: (c/o back discomfort with sitting and static standing primarily)  · Pain Addressed 1: Pre-medicate for activity, Nurse notified, Reposition, Cessation of Activity    Patients cultural, spiritual, Quaker conflicts given the current situation: no    Objective:     Communicated with: nurseCande, prior to session.  Patient found HOB elevated with peripheral IV upon OT entry to room.    General Precautions: Standard, fall   Orthopedic Precautions:spinal precautions   Braces: LSO, AFO     Occupational Performance:    Bed Mobility:    · Patient completed Rolling/Turning to Right with contact guard assistance  · Patient completed Scooting anteriorly and posteriorly with stand by assistance  · Patient completed Supine to Sit with contact guard assistance, with side rail and HOB minimally elevated    Functional Mobility/Transfers:  · Patient completed Sit <> Stand Transfer with minimum assistance  with  bariatric RW; pt  required multiple attempts for achievement d/t R quad pain/weakness   · Patient completed Bed <> Chair Transfer using Step Transfer technique with contact guard assistance with bariatric RW  · Functional Mobility: Pt ambulated short distance in the hallway with LSO and bariatric RW -  Please refer to PT note for gait assessment. Pt required verbal cueing for safe hand placement for sit <>stand transfers.     Activities of Daily Living:  · Upper Body Dressing: minimum assistance to don LSO; pt assisted with max verbal cueing  · Lower Body Dressing: total assistance to don socks and don tennis shoes with L AFO (to avoid excessive bending)     Cognitive/Visual Perceptual:  Cognitive/Psychosocial Skills:     -       Oriented to: Person, Place, Time and Situation   -       Follows Commands/attention:Follows multistep  commands  -       Communication: clear/fluent  -       Memory: No Deficits noted  -       Safety awareness/insight to disability: intact   -       Mood/Affect/Coping skills/emotional control: Cooperative and Pleasant  Visual/Perceptual:      -Intact  R/L discrimination      Physical Exam:  Balance:    -       seated: MOD I; standing: CGA with bariatric RW  Postural examination/scapula alignment:    -       Rounded shoulders  Skin integrity: Visible skin intact  Edema:  no BUE edema noted  Sensation:    -       Intact  light/touch BUE; pt reports R hand tingling d/t carpal tunnel (L hand tingling earlier and pt reported that he informed Dr. Osorio)  Dominant hand:    -       Right  Upper Extremity Range of Motion:     -       Right Upper Extremity: WFL  -       Left Upper Extremity: WFL  Upper Extremity Strength:    -       Right Upper Extremity: WFL  -       Left Upper Extremity: WFL   Strength:    -       Right Upper Extremity: WFL  -       Left Upper Extremity: WFL  Gross motor coordination:   WFL    AMPAC 6 Click ADL:  AMPAC Total Score: 19    Treatment & Education:  · Pt educated on OT role/POC.    · Edu on spinal precautions: no bending, lifting >5 lbs, twisting, avoid raising hands above 90*, and log roll technique: handout provided on precautions   · Importance of OOB activity with staff assistance. Encouraged OOB to chair for goal of 6 hours a day broken up into 1-2 hour increments   · Importance of wearing of LSO with exception of in bed with HOB <30*   · Time taken to practice donning LSO; handout provided on care of LSO   · Handout provided with OT demo and pt demo back log roll technique to implement spinal precautions   · Safety during functional t/f and mobility   · White board updated outlining level 3 progressive mobility; bariatric RW left for nursing use; specified log roll technique and spinal precautions  · Stressed importance of pt's responsibility with managing his own care while here; e.g., informing nursing/aides of his spinal precautions with all aspects of mobility and ADLs so he will be confident with this at home  · Pt has a reacher- encouraged use for lower body dressing to avoid excessive bending   · Briefly talked about his self-cath body mechanics with importance of implementing spinal precautions  · Encouraged pt to have home set-up friendly to precautions to avoid over-head reaching and bending (dresser, refrigerator, at the sink, etc.) - place commonly used items at waist height  · Pt sits on toilet for self-cath; d/t low commode, OT provided bariatric BSC to place over toilet; however, the toilet paper cannister is in the way not allowing this so pt and nurse notified of bucket placement for BSC use   · Multiple self-care tasks/functional mobility completed- assistance level noted above   · All questions/concerns answered within OT scope of practice     Education:    Patient left up in chair with all lines intact, call button in reach and nurseCande, present    GOALS:   Multidisciplinary Problems     Occupational Therapy Goals        Problem: Occupational Therapy Goal     Goal Priority Disciplines Outcome Interventions   Occupational Therapy Goal     OT, PT/OT Ongoing, Progressing    Description:  Goals to be met by: 06/18/20     Patient will increase functional independence with ADLs by performing:    UE Dressing with LSO with Set-up Assistance.  Grooming while standing at sink with Supervision.  Toileting (self-cath bladder; bowel) from bedside commode with Supervision for hygiene and clothing management.   Rolling to Bilateral with Supervision.   Supine to sit with Supervision.  Step transfer with Supervision  Toilet transfer to bedside commode with Supervision.  Upper extremity exercise program x15 reps per handout, with independence.  Pt will independently identify all spinal precautions in order to increase safety with all ADLs.                       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 Tray Rodriguez    CIDP (chronic inflammatory demyelinating polyneuropathy) 06/2018    followed by Dr. Rowdy Tran - Neurologist in [place].      Elevated liver enzymes 10/8/2018    Elevated PSA     followed by Dr. Sanderson    Foot drop, left foot     due to neurological condition/[facility] [place]    Hyperlipidemia     Hypertension     Impaired fasting glucose     Neurogenic bladder 09/05/2018    followed by Dr. Sanderson    RLS (restless legs syndrome) 10/8/2018    Self-catheterizes urinary bladder     Urologist    Sleep apnea with use of continuous positive airway pressure (CPAP) 01/10/2018    followed by [facility] in [place]    Urge incontinence 9/11/2018       Past Surgical History:   Procedure Laterality Date    ADENOIDECTOMY      COLONOSCOPY N/A 1/24/2020    Procedure: COLONOSCOPY;  Surgeon: Stacy Markham MD;  Location: [facility];  Service: Endoscopy;  Laterality: N/A;    HERNIA REPAIR      LUMBAR L[facility]INECTOMY  2012    [facility]    LUMBAR LAMINECTOMY  08/2018      Jennifer RICHARDSON; complications from surgery - abscess to spinal cord - discharge summary scanned to media file    PLANTAR FASCIA RELEASE      TONSILLECTOMY      VASECTOMY      X-STOP IMPLANTATION         Time Tracking:     OT Date of Treatment: 06/04/20  OT Start Time: 1000  OT Stop Time: 1053  OT Total Time (min): 53 min    Billable Minutes:Evaluation 15 min  Self Care/Home Management 28 min  Total Time 53 min (co-eval with PT)    Anahy Munoz, LAM  6/4/2020

## 2020-06-04 NOTE — PROGRESS NOTES
[facility]  Neurosurgery  Progress Note    Subjective:     History of Present Illness: Manfred Shah is a 54 y.o. 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  Main complaint is low back pain with radiation into the left thigh down to the knee.  This likely corresponds to nerve root compression at L3-4 on left.  Ideally, he would be candidate for MIS lami/discectomy but given this is his 3rd surgery with likely scar tissue and prior infection, I rec L3-4 MIS TLIF.  He also has damaged L facet joint.    Post-Op Info:  Procedure(s) (LRB):  FUSION, SPINE, LUMBAR, TLIF, WITH PERCUTANEOUS INSTRUMENTATION  (L3-4 MIS TLIF) [facility] [place] [facility] JENNIFER JENNINGS [phone] Spine Wave (Bilateral)   1 Day Post-Op     Interval History:  Pain well controlled, tolerating solid diet.  PT recommending home health    Medications:  Continuous Infusions:   sodium chloride 0.9% 75 mL/hr at 06/04/20 0511     Scheduled Meds:   amLODIPine  10 mg Oral Daily    docusate sodium  100 mg Oral BID    dutasteride  0.5 mg Oral QHS    eplerenone  25 mg Oral Daily    hydrALAZINE  25 mg Oral BID    hydroCHLOROthiazide  12.5 mg Oral Daily    methocarbamoL  750 mg Oral TID    metoprolol succinate  100 mg Oral Daily    mupirocin  1 g Nasal BID    polyethylene glycol  17 g Oral Daily    rosuvastatin  10 mg Oral QHS    tamsulosin  0.4 mg Oral QHS    valsartan  320 mg Oral Daily     PRN Meds:acetaminophen, aluminum-magnesium hydroxide-simethicone, bisacodyL, dextrose 50%, dextrose 50%, glucagon (human recombinant), glucose, glucose, hydrALAZINE, HYDROmorphone, insulin aspart U-100, labetalol, ondansetron, oxyCODONE-acetaminophen, oxyCODONE-acetaminophen, promethazine (PHENERGAN) IVPB, senna-docusate 8.6-50 mg, sodium chloride 0.9%     Review of Systems  Objective:     Weight: (!) 168.3 kg (371 lb 0.6 oz)  Body mass index is 44 kg/m².  Vital  Signs (Most Recent):  Temp: 98.4 °F (36.9 °C) (06/04/20 1202)  Pulse: 91 (06/04/20 1202)  Resp: 18 (06/04/20 1202)  BP: (!) 151/72 (06/04/20 1202)  SpO2: 98 % (06/04/20 1202) Vital Signs (24h Range):  Temp:  [97.2 °F (36.2 °C)-98.4 °F (36.9 °C)] 98.4 °F (36.9 °C)  Pulse:  [78-91] 91  Resp:  [17-20] 18  SpO2:  [95 %-98 %] 98 %  BP: (148-167)/(72-92) 151/72     Date 06/04/20 0700 - 06/05/20 0659   Shift 3831-2634 4780-5939 3316-9494 24 Hour Total   INTAKE   P.O. 480   480   Shift Total(mL/kg) 480(2.9)   480(2.9)   OUTPUT   Urine(mL/kg/hr) 500(0.4)   500   Stool 1   1   Shift Total(mL/kg) 501(3)   501(3)   Weight (kg) 168.3 168.3 168.3 168.3                        Neurosurgery Physical Exam   General: well developed, well nourished, no distress  Neurologic: Alert and oriented. Thought content appropriate.  GCS: Motor: 6/Verbal: 5/Eyes: 4 GCS Total: 15  Cranial nerves: II-XII grossly intact  Neck: supple, without obvious masses or lesions  Skin: grossly intact in all 4 extremities without obvious rashes or lesions  Motor Strength:    Strength   Iliopsoas Quadriceps Knee  Flexion Tibialis  anterior Gastro- cnemius EHL   Lower: R 5/5 5/5 5/5 5/5 5/5 4/5     L 5/5 5/5 5/5 1/5 5/5 1/5   Sensory: intact to light touch B/L UE and LE; decreased sensation to right anterior thigh      Significant Labs:  No results for input(s): GLU, NA, K, CL, CO2, BUN, CREATININE, CALCIUM, MG in the last 48 hours.  Recent Labs   Lab 06/04/20  0512   WBC 13.92*   HGB 12.6*   HCT 37.8*        No results for input(s): LABPT, INR, APTT in the last 48 hours.  Microbiology Results (last 7 days)     ** No results found for the last 168 hours. **            Significant Diagnostics:  I have personally reviewed imaging and agree with the findings.    Lumbar x-ray line satisfactory placement of hardware        Assessment/Plan:     S/P lumbar spinal fusion  Manfred Shah  is a 55y/o male who s/p MIS L3-4 TLIF, POD#1.       -Post-op xrays show  satisfactory placement of hardware   -Neurologically intact   -PT/OT/OOB x 6hours per day; can be divided into 2 hour intervals in the chair  ---PT recommending HH  -TEDs/SCDs/IS  -regular diet  -Percocet prn for pain with IV dilaudid for breakthrough   -Robaxin for muscle spasms  -LSO brace at all times except when lying in bed. Avoid extreme bending/twisting of the spine and no lifting >10lbs     Dispo: anticipate discharge tomorrow pending pain control            Angelica Horta PA-C  Neurosurgery  [facility]

## 2020-06-04 NOTE — ASSESSMENT & PLAN NOTE
Manfred Shah  is a 53y/o male who s/p MIS L3-4 TLIF, POD#1.       -Post-op xrays show satisfactory placement of hardware   -Neurologically intact   -PT/OT/OOB x 6hours per day; can be divided into 2 hour intervals in the chair  ---PT recommending HH  -TEDs/SCDs/IS  -regular diet  -Percocet prn for pain with IV dilaudid for breakthrough   -Robaxin for muscle spasms  -LSO brace at all times except when lying in bed. Avoid extreme bending/twisting of the spine and no lifting >10lbs     Dispo: anticipate discharge tomorrow pending pain control

## 2020-06-04 NOTE — PT/OT/SLP EVAL
Physical Therapy Evaluation    Patient Name:  Manfred Shah   MRN:  [id number]    Recommendations:     Discharge Recommendations:  home health PT   Discharge Equipment Recommendations: none h/out per OT (co eval)  Barriers to discharge: None    Assessment:     Manfred Shah is a 54 y.o. male admitted with a medical diagnosis of <principal problem not specified>.  He presents with the following impairments/functional limitations:  weakness, gait instability, decreased ROM, impaired endurance, decreased lower extremity function, impaired balance, impaired muscle length, impaired self care skills, pain, orthopedic precautions, impaired functional mobilty, edema .    Pain relatively well controlled on POD#1 MIS TLIF 3,4. No focal motor or sensory deficits appreciated. LSO on throughout session. Extensive education provided during co eval with OT . Safely ambulating with walker. Mobility Level 3 with unit staff.     Rehab Prognosis: Good; patient would benefit from acute skilled PT services to address these deficits and reach maximum level of function.    Recent Surgery: Procedure(s) (LRB):  FUSION, SPINE, LUMBAR, TLIF, WITH PERCUTANEOUS INSTRUMENTATION  (L3-4 MIS TLIF) Flouro Micrcoscope [place]ell Neuromonitoring JENNIFER JENNINGS [phone] Spine Wave (Bilateral) 1 Day Post-Op    Plan:     During this hospitalization, patient to be seen daily to address the identified rehab impairments via gait training, therapeutic activities, therapeutic exercises, neuromuscular re-education and progress toward the following goals:    · Plan of Care Expires:  06/11/20    Subjective     Chief Complaint: thigh pain, back pain  Patient/Family Comments/goals: participate in school summer camp next month.   Pain/Comfort:  · Pain Rating 1: (forrest baker 6)  · Location - Orientation 1: lower  · Location 1: back  · Pain Addressed 1: Cessation of Activity    Patients cultural, spiritual, Sabianist conflicts given the current situation:  "no    Living Environment:  Home with spouse. Sleeps in recliner. Works as a . Drives. Endorses h/o falls. Last one while navigating up [employer] with Elvin rollator while attempting to hold on son's shoulder.   Prior to admission, patients level of function was assist at times for ADLs from wife. Ambulated with bariatric rollator short distances; limited by progressive back pain, radiating to legs.  Equipment used at home: rollator.  DME owned (not currently used): see OT note.  Upon discharge, patient will have assistance from family.    Objective:     Communicated with WILLIAMS Castellon prior to session.  Patient found HOB elevated with peripheral IV  upon PT entry to room.    General Precautions: Standard, fall   Orthopedic Precautions:spinal precautions   Braces: LSO, AFO     Exams:  · 6'5". weighs 371s.   · Cognitive Exam:  Patient is oriented to Person, Place, Time, Situation and good historian. pleasant, motivated  · Gross Motor Coordination:  WFL  · Postural Exam:  Patient presented with the following abnormalities:    · -       No postural abnormalities identified  · Sensation:    · -       Intact  · Skin Integrity/Edema:      · -       Skin integrity: Visible skin intact  · RLE ROM: WFL  · RLE Strength: WFL  · LLE ROM: Deficits: mild/mod L foot drop (pt wears AFO)  · LLE Strength: AROM ankle DF/PF 1/5    Functional Mobility:  · Bed Mobility:     · Supine to Sit: stand by assistance  · Transfers:     · Sit to Stand:  minimum assistance and of 2 persons with rolling walker  · Gait: with wide based walker and SBA ~ 100 ft, leads with L foot, slight reciprocal gait, no LOB or SOB.   · Balance: fair + with RW      Therapeutic Activities and Exercises:   n/a    AM-PAC 6 CLICK MOBILITY  Total Score:19     Patient left up in chair with call button in reach and RN notified.    GOALS:   Multidisciplinary Problems     Physical Therapy Goals        Problem: Physical Therapy Goal    Goal Priority " Disciplines Outcome Goal Variances Interventions   Physical Therapy Goal     PT, PT/OT Ongoing, Progressing     Description:  Goals to be met by:      Patient will increase functional independence with mobility by performin. Supine to sit with Modified [place]  2. Sit to supine with Modified [place]  3. Sit to stand transfer with Modified [place]  4. Bed to chair transfer with Modified [place] using Rolling Walker  5. Gait  x 150 feet with Modified 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 Tray Rodriguez    CIDP (chronic inflammatory demyelinating polyneuropathy) 2018    followed by Dr. Rowdy Tran - Neurologist in [place].      Elevated liver enzymes 10/8/2018    Elevated PSA     followed by Dr. Sanderson    Foot drop, left foot     due to neurological condition/[place]    Hyperlipidemia     Hypertension     Impaired fasting glucose     Neurogenic bladder 2018    followed by Dr. Sanderson    RLS (restless legs syndrome) 10/8/2018    Self-catheterizes urinary bladder     Urologist    Sleep apnea with use of continuous positive airway pressure (CPAP) 01/10/2018    followed by [facility] in [place]    Urge incontinence 2018       Past Surgical History:   Procedure Laterality Date    ADENOIDECTOMY      COLONOSCOPY N/A 2020    Procedure: COLONOSCOPY;  Surgeon: Stacy Markham MD;  Location[facility];  Service: Endoscopy;  Laterality: N/A;    HERNIA REPAIR      LUMBAR LAMINECTOMY      [facility]    LUMBAR LAMINECTOMY  2018    Dr. Rodriguez - [facility]; complications from surgery - abscess to spinal cord - discharge summary scanned to media file    PLANTAR FASCIA RELEASE      TONSILLECTOMY      TRANSFORAMINAL LUMBAR INTERBODY FUSION  (TLIF) OF SPINE WITH PERCUTANEOUS INSTRUMENTATION Bilateral 6/3/2020    Procedure: FUSION, SPINE, LUMBAR, TLIF, WITH PERCUTANEOUS INSTRUMENTATION  (L3-4 MIS TLIF) Flouro Micrcoscope [place]rell Neuromonitoring JENNIFER MICHAELA [phone] Spine Wave;  Surgeon: Samuel Osorio DO;  [facility] OR;  Service: Neurosurgery;  Laterality: Bilateral;  SPINEWAVE JENNIFER HDZISAMAR [phone] TEXTED HIM @ 9:45AM ON 5-  PRE-OP BY RN 5----BMI--44---COVID NEGATIVE  CLEAR    VASECTOMY      X-STOP IMPLANTATION         Time Tracking:     PT Received On: 06/04/20  PT Start Time: 1007     PT Stop Time: 1045  PT Total Time (min): 38 min     Billable Minutes: Evaluation 10 and Gait Training 15   Coeval with OT      Payton Morales, PT  06/04/2020

## 2020-06-04 NOTE — NURSING
Pt resting in bed asleep but easily awaken. No distress. No pain only discomfort per pt. Scheduled medications given without difficulty. Cont IVF. Accu check no insulin coverage. No nausea. Nam to gravity. CPAP on. SCDs. Wife remains at bedside. Call light within reach. Bed alrm set. Will cont to monitor.

## 2020-06-04 NOTE — PROGRESS NOTES
Referral sent via [facility] to [facility]  for review.  TN indicated that the plan is for the pt to d/c to home later today or tomorrow and will need home health at that time. TN to follow in [facility] for response.      Pt accepted by [facility]. TN completed booking in [facility] with [facility] as accepting  provider

## 2020-06-04 NOTE — NURSING
Pt resting in bed asleep. Wife at bedside. Cont IVF. Call light within reach. Safety measures maintained. Will cont to monitor.

## 2020-06-04 NOTE — NURSING
Assisted patient to toilet. Patient unable to have a bowel movement at this time. Requested miralax held from this AM. Miralax provided. Mother at the bedside. Patient able to stand with assistance, and ambulate with walker.

## 2020-06-04 NOTE — HOSPITAL COURSE
6/3: MIS L3-4 TLIF  6/4: PT rec HH. Pain well controlled. Post-op x-ray shows satisfactory placement of hardware.  Urinating and tolerating regular diet.  Anticipate discharge home tomorrow with home health  6/5: pain controlled, passing gas. Discharge home today with HH and appropriate HME.

## 2020-06-04 NOTE — NURSING
Report received from Cathy KRAMER. Patient remains free from falls and injury. No distress noted. VSS. Questions encouraged and answered. Patient verbalized understanding. Bed locked and in low position; safety maintained. Call light in reach. White board updated, and explained. No complaint of pain, n/v, diarrhea, or SOB.  Will continue to monitor, will continue with plan of care.

## 2020-06-04 NOTE — OP NOTE
DATE OF PROCEDURE:  6/3/20    PREOPERATIVE DIAGNOSES:  1.  Low back and left leg pain  2.  Recurrent L3-4 HNP   3. Left foot drop and leg weakness     POSTOPERATIVE DIAGNOSES:  Same as above     PROCEDURES PERFORMED:  1.  Minimally invasive transforaminal lumbar interbody fusion.  2.  Placement of bilateral percutaneous pedicle screws at L3 and L4 (Spine Wave).  3.  Left L3-4 full facetectomy.  4.  Left L3 bilateral laminectomy and foraminotomies.  5.  Near total discectomy at L3-4 for placement of expandable titanium cage   (Spine Wave).  6.  Use of the patient's own morselized autograft and allograft.  7.  Use of intraoperative fluoroscopy, neuromonitoring and microscope.  8.  Placement of bilateral titanium rods from L3-4.     MODIFIER 22:    This case had increased difficulty due to patient's previous 3 surgeries.  There was significant thick and dense scar tissue within the laminectomy defect sites as well as in the lateral recess.  The exiting and traversing nerve roots were densely adherent to the scar tissue and difficult to mobilize.  This required meticulous microscopic dissection and careful maneuvering of instruments so as to not injure the nerve roots.  This led to increased operative anesthesia time.    ATTENDING:  Samuel Osorio D.O.     FIRST ASSISTANT:  TIGRE Drummond     LEVEL OF INVOLVEMENT OF ATTENDING:  Full.     INDICATIONS:    Extensive and complicated history involving deficits from CIDP and prior lumbar surgeries.  Main complaint is low back pain with radiation into the left thigh down to the knee.  This likely corresponds to nerve root compression at L3-4 on left.  Ideally, he would be candidate for MIS lami/discectomy but given this is his 3rd surgery with likely scar tissue and prior infection, I rec L3-4 MIS TLIF.  He also has damaged L facet joint.    Consents were obtained.  All risks, benefits and alternatives to surgery were discussed.       PROCEDURE IN DETAIL:  The patient was  correctly identified, taken to the   Operating Room where the Anesthesia team administered general endotracheal   anesthesia.  She was placed in the prone position on a spinal Terell table.    All pressure points were padded.  Her low back was pre-scrubbed with alcohol and   then incisions were planned using AP and lateral fluoroscopy.  The L3 and L4   pedicles were identified and the lateral aspects of the L3-4 pedicles were   identified using AP fluoroscopy and marked accordingly.  Two small incisions   were planned over the L3-4 level on each side.     The area was then prepped and draped in the typical sterile fashion.  A timeout   was performed.  Prophylactic IV antibiotics were given.  The incisions were   infiltrated with local anesthetic.  The incisions were made using a #10 blade   scalpel and Bovie cautery was used to cut through the lumbodorsal fascia.     First, attention was directed at placing the percutaneous pedicle screws.  Under   fluoroscopic guidance, a Jamshidi needle was used at the left L3 pedicle, it   was docked at the 9 o'clock position of the pedicle and gently hammered in a   lateral to medial direction using fluoroscopic guidance to ensure that the   needle remained within the pedicle.  The same was done on the right L3 side and   then using lateral fluoroscopy, the depth of the Jamshidi needle was confirmed.    Next, the Jamshidi needles were advanced to a depth of one-third of the   vertebral body.  Then, K-wires were placed through the Jamshidi needles and   half-way into the vertebral body.  The Jamshidi needles were then removed and the   K-wires were bent and held out of place.     Next, the same technique was used for L4.  The L4 was used, starting first on   the left side at 9 o'clock position and 3 o'clock position on the right,   Jamshidi needles were placed using AP fluoroscopy confirming that they stayed   within the pedicle, then a depth was confirmed using lateral  fluoroscopy.    Ultimately, the Jamshidi needles were advanced to a third of the vertebral body   and then K-wires were placed into a depth of half of the body.  Jamshidi needles   were removed and the K-wires were bent inferiorly and secured out of place.     Next, the attention was directed at the facetectomy and decompression.  The   Metrex dilating tube system was then assembled over the right L3-4 facet joint.  Care was taken because of previous laminectomies not to plunge into thecal sac or nerve roots.  Once   it was confirmed to be in good place under fluoroscopy, it was locked in place   with the locking arm.  Next, the muscle, fascia, and thick scar overlying the facet and   lamina was then removed using Bovie cautery, exposing the inferior lateral   aspect of the lamina the pedicle, and the facet joint.  Next, using a high-speed   drill, the facet joint was drilled out identifying the inferior and superior   articulating processes.  The ground-up drilled bone and bone fragments was then collected and saved   for autograft at later time.  Drilling continued to be carried out until the   entire inferior articulating process as well as the lamina and pars were   removed, the superior articulating process was also drilled out until the   lateral edge of the thecal sac was identified.  Remaining lamina was removed until the attachment of the ligamentum flavum superiorly.  The lateral recess was filled with dense scar tissue and adherent to the exiting L3 and descending L4 nerve root.  Ligamentum and scar was removed in piecemeal fashion until the thecal sac and traversing nerve root were identified. This allowed for adequate decompression of the thecal sac. The nerve roots were scarred into place and difficult to retract.     Next, a nerve root retractor was placed and the thecal sac was medialized exposing the L3-4 disk   space.  The overlying epidural veins were cauterized and then the disk space was    incised with a #15 blade scalpel.  Disk material was removed using pituitary   rongeurs, followed by placement of different sized agustina.  The agustina were   placed in a lateral to medial direction and used to loosen up the disk material.    Loose fragments were then removed with pituitary rongeurs and then the   vertebral body endplates were then meticulously prepared with a combination of   rasp and ring curettes.  Once the vertebral bodies were felt to be well   prepared, a mixture of the ground-up bone autograft and Vivigen allograft was then packed into   the disk space followed by placement of the expandable titanium cage, which was   packed with graft mixture.  The cage was then placed and malleted into the L3-4 space   and then expanded under fluoroscopic guidance until it was snug and appeared to   be in good position.  Next, the tubular retractor was then medialized and   attention was directed at completing the bilateral laminectomy and decompression   at the L3-4 level.  Hemostasis was obtained with FloSeal and the area was   irrigated with copious amounts of antibiotic solution.     The tube was then removed, taking care to bipolar cauterized any bleeding areas on the way out.    Next, the pedicle screws were placed over the guidewires, first starting with   the left L3 followed by the right L3 and then bilateral L4.  The screws were   advanced over the K-wires and then once in final position, the K-wires were   removed.  The bone quality was good and the screws appeared to be within the   pedicle and in good position as confirmed by flouro.  Once these screws were in place, the titanium alison haley was then used to place the alison in the screw heads and then using a   reduction method, the rods were secured in place with locking screw heads.     Once the rods were final tightened, the screw towers were then broken off and the area was irrigated with copious amounts of antibiotic solution.  The incisions  were then closed in layered fashion, first with UR-6 sutures in the fascial layer followed by 2-0 Vicryls in the deep   dermal layer followed by Dermabond on the skin.  Island dressings were placed   over the wounds and then the patient was extubated and taken to Recovery Room without any incident.     COMPLICATIONS:  None.     ESTIMATED BLOOD LOSS:  100 mL.     INCISION:  Bilateral lumbar.     WOUND CLASSIFICATION:  Clean.     DRAINS:  None.     ANESTHESIA:  General.     CONDITION:  Stable.     PROGNOSIS:  Good.

## 2020-06-05 ENCOUNTER — PATIENT MESSAGE (OUTPATIENT)
Dept: NEUROSURGERY | Facility: CLINIC | Age: 55
End: 2020-06-05

## 2020-06-05 ENCOUNTER — TELEPHONE (OUTPATIENT)
Dept: NEUROSURGERY | Facility: CLINIC | Age: 55
End: 2020-06-05

## 2020-06-05 VITALS
RESPIRATION RATE: 16 BRPM | WEIGHT: 315 LBS | OXYGEN SATURATION: 96 % | HEART RATE: 92 BPM | SYSTOLIC BLOOD PRESSURE: 140 MMHG | HEIGHT: 77 IN | BODY MASS INDEX: 37.19 KG/M2 | TEMPERATURE: 99 F | DIASTOLIC BLOOD PRESSURE: 76 MMHG

## 2020-06-05 LAB
POCT GLUCOSE: 119 MG/DL (ref 70–110)
POCT GLUCOSE: 128 MG/DL (ref 70–110)

## 2020-06-05 PROCEDURE — 25000003 PHARM REV CODE 250: Performed by: PHYSICIAN ASSISTANT

## 2020-06-05 PROCEDURE — 97116 GAIT TRAINING THERAPY: CPT | Mod: CQ

## 2020-06-05 PROCEDURE — 94761 N-INVAS EAR/PLS OXIMETRY MLT: CPT

## 2020-06-05 PROCEDURE — 97535 SELF CARE MNGMENT TRAINING: CPT | Mod: CO

## 2020-06-05 PROCEDURE — 97110 THERAPEUTIC EXERCISES: CPT | Mod: CQ

## 2020-06-05 PROCEDURE — 97530 THERAPEUTIC ACTIVITIES: CPT | Mod: CO

## 2020-06-05 RX ORDER — LIDOCAINE 50 MG/G
2 PATCH TOPICAL
Status: DISCONTINUED | OUTPATIENT
Start: 2020-06-05 | End: 2020-06-05 | Stop reason: HOSPADM

## 2020-06-05 RX ORDER — OXYCODONE AND ACETAMINOPHEN 10; 325 MG/1; MG/1
1 TABLET ORAL EVERY 4 HOURS PRN
Qty: 42 TABLET | Refills: 0 | Status: SHIPPED | OUTPATIENT
Start: 2020-06-05 | End: 2020-06-06 | Stop reason: SDUPTHER

## 2020-06-05 RX ORDER — DOCUSATE SODIUM 100 MG/1
100 CAPSULE, LIQUID FILLED ORAL 2 TIMES DAILY
Refills: 0
Start: 2020-06-05

## 2020-06-05 RX ORDER — ASPIRIN 81 MG/1
81 TABLET ORAL DAILY
Refills: 0
Start: 2020-06-13

## 2020-06-05 RX ORDER — AMOXICILLIN 500 MG
1 CAPSULE ORAL DAILY
Start: 2020-06-13

## 2020-06-05 RX ORDER — POLYETHYLENE GLYCOL 3350 17 G/17G
17 POWDER, FOR SOLUTION ORAL DAILY
Refills: 0
Start: 2020-06-05 | End: 2020-10-05

## 2020-06-05 RX ORDER — OXYCODONE AND ACETAMINOPHEN 10; 325 MG/1; MG/1
1 TABLET ORAL EVERY 4 HOURS PRN
Qty: 42 TABLET | Refills: 0 | Status: SHIPPED | OUTPATIENT
Start: 2020-06-05 | End: 2020-06-05

## 2020-06-05 RX ORDER — ADHESIVE BANDAGE
30 BANDAGE TOPICAL DAILY PRN
Status: DISCONTINUED | OUTPATIENT
Start: 2020-06-05 | End: 2020-06-05 | Stop reason: HOSPADM

## 2020-06-05 RX ORDER — METHOCARBAMOL 750 MG/1
750 TABLET, FILM COATED ORAL 3 TIMES DAILY PRN
Qty: 60 TABLET | Refills: 1 | Status: SHIPPED | OUTPATIENT
Start: 2020-06-05 | End: 2020-10-05 | Stop reason: ALTCHOICE

## 2020-06-05 RX ORDER — LIDOCAINE 50 MG/G
2 PATCH TOPICAL EVERY 12 HOURS PRN
Qty: 15 PATCH | Refills: 0 | Status: SHIPPED | OUTPATIENT
Start: 2020-06-05 | End: 2020-06-06 | Stop reason: SDUPTHER

## 2020-06-05 RX ORDER — PSEUDOEPHEDRINE/ACETAMINOPHEN 30MG-500MG
100 TABLET ORAL
Status: COMPLETED | OUTPATIENT
Start: 2020-06-05 | End: 2020-06-05

## 2020-06-05 RX ORDER — SYRING-NEEDL,DISP,INSUL,0.3 ML 29 G X1/2"
296 SYRINGE, EMPTY DISPOSABLE MISCELLANEOUS
Status: COMPLETED | OUTPATIENT
Start: 2020-06-05 | End: 2020-06-05

## 2020-06-05 RX ADMIN — EPLERENONE 25 MG: 25 TABLET, FILM COATED ORAL at 10:06

## 2020-06-05 RX ADMIN — METHOCARBAMOL TABLETS 750 MG: 750 TABLET, COATED ORAL at 10:06

## 2020-06-05 RX ADMIN — MAGNESIUM CITRATE 296 ML: 1.75 LIQUID ORAL at 11:06

## 2020-06-05 RX ADMIN — BISACODYL 10 MG: 10 SUPPOSITORY RECTAL at 11:06

## 2020-06-05 RX ADMIN — HYDROCHLOROTHIAZIDE 12.5 MG: 12.5 TABLET ORAL at 10:06

## 2020-06-05 RX ADMIN — LIDOCAINE 2 PATCH: 50 PATCH TOPICAL at 11:06

## 2020-06-05 RX ADMIN — SODIUM CHLORIDE 500 ML: 9 INJECTION, SOLUTION INTRAVENOUS at 11:06

## 2020-06-05 RX ADMIN — METOPROLOL SUCCINATE 100 MG: 50 TABLET, EXTENDED RELEASE ORAL at 10:06

## 2020-06-05 RX ADMIN — DOCUSATE SODIUM 100 MG: 100 CAPSULE, LIQUID FILLED ORAL at 10:06

## 2020-06-05 RX ADMIN — MUPIROCIN 1 G: 20 OINTMENT TOPICAL at 10:06

## 2020-06-05 RX ADMIN — VALSARTAN 320 MG: 80 TABLET, FILM COATED ORAL at 10:06

## 2020-06-05 RX ADMIN — HYDRALAZINE HYDROCHLORIDE 25 MG: 25 TABLET, FILM COATED ORAL at 10:06

## 2020-06-05 RX ADMIN — OXYCODONE HYDROCHLORIDE AND ACETAMINOPHEN 1 TABLET: 10; 325 TABLET ORAL at 02:06

## 2020-06-05 RX ADMIN — Medication 100 ML: at 11:06

## 2020-06-05 RX ADMIN — AMLODIPINE BESYLATE 10 MG: 5 TABLET ORAL at 10:06

## 2020-06-05 RX ADMIN — SODIUM CHLORIDE: 0.9 INJECTION, SOLUTION INTRAVENOUS at 07:06

## 2020-06-05 RX ADMIN — POLYETHYLENE GLYCOL 3350 17 G: 17 POWDER, FOR SOLUTION ORAL at 10:06

## 2020-06-05 NOTE — PLAN OF CARE
06/05/20 1201   Final Note   Assessment Type Final Discharge Note   Anticipated Discharge Disposition Home-Health   Hospital Follow Up  Appt(s) scheduled? Yes   Discharge plans and expectations educations in teach back method with documentation complete? Yes   Right Care Referral Info   Post Acute Recommendation Home-care   Referral Type home health    Facility Name [facility]

## 2020-06-05 NOTE — SUBJECTIVE & OBJECTIVE
Interval History: pain controlled, passing gas. Discharge home today with HH and appropriate HME.     Medications:  Continuous Infusions:   sodium chloride 0.9% 75 mL/hr at 06/05/20 0705     Scheduled Meds:   amLODIPine  10 mg Oral Daily    docusate sodium  100 mg Oral BID    dutasteride  0.5 mg Oral QHS    eplerenone  25 mg Oral Daily    glycerin 99.5%  100 mL Rectal ED 1 Time    And    magnesium citrate  296 mL Rectal ED 1 Time    And    sodium chloride 0.9%  500 mL Rectal ED 1 Time    hydrALAZINE  25 mg Oral BID    hydroCHLOROthiazide  12.5 mg Oral Daily    lidocaine  2 patch Transdermal Q24H    methocarbamoL  750 mg Oral TID    metoprolol succinate  100 mg Oral Daily    mupirocin  1 g Nasal BID    polyethylene glycol  17 g Oral Daily    rosuvastatin  10 mg Oral QHS    tamsulosin  0.4 mg Oral QHS    valsartan  320 mg Oral Daily     PRN Meds:acetaminophen, aluminum-magnesium hydroxide-simethicone, bisacodyL, dextrose 50%, dextrose 50%, glucagon (human recombinant), glucose, glucose, hydrALAZINE, insulin aspart U-100, labetalol, magnesium hydroxide 400 mg/5 ml, ondansetron, oxyCODONE-acetaminophen, oxyCODONE-acetaminophen, promethazine (PHENERGAN) IVPB, senna-docusate 8.6-50 mg, sodium chloride 0.9%     Review of Systems  Objective:     Weight: (!) 168.3 kg (371 lb 0.6 oz)  Body mass index is 44 kg/m².  Vital Signs (Most Recent):  Temp: 98.6 °F (37 °C) (06/05/20 0813)  Pulse: 93 (06/05/20 0813)  Resp: 17 (06/05/20 0813)  BP: (!) 145/74 (06/05/20 0813)  SpO2: 98 % (06/05/20 0922) Vital Signs (24h Range):  Temp:  [98.4 °F (36.9 °C)-99.3 °F (37.4 °C)] 98.6 °F (37 °C)  Pulse:  [82-93] 93  Resp:  [17-19] 17  SpO2:  [93 %-98 %] 98 %  BP: (132-162)/(70-81) 145/74     Date 06/05/20 0700 - 06/06/20 0659   Shift 3294-1793 9177-5571 9862-2126 24 Hour Total   INTAKE   P.O. 240   240   Shift Total(mL/kg) 240(1.4)   240(1.4)   OUTPUT   Shift Total(mL/kg)       Weight (kg) 168.3 168.3 168.3 168.3                         Neurosurgery Physical Exam   General: well developed, well nourished, no distress  Neurologic: Alert and oriented. Thought content appropriate.  GCS: Motor: 6/Verbal: 5/Eyes: 4 GCS Total: 15  Cranial nerves: II-XII grossly intact  Neck: supple, without obvious masses or lesions  Skin: grossly intact in all 4 extremities without obvious rashes or lesions  Motor Strength:    Strength   Iliopsoas Quadriceps Knee  Flexion Tibialis  anterior Gastro- cnemius EHL   Lower: R 5/5 5/5 5/5 5/5 5/5 4/5     L 5/5 5/5 5/5 1/5 5/5 1/5   Sensory: intact to light touch B/L UE and LE; decreased sensation to right anterior thigh      Bilateral lumbar Incisions: c/d/i. Island dressings removed. Skin edges completely approximated with dermabond.     Significant Labs:  No results for input(s): GLU, NA, K, CL, CO2, BUN, CREATININE, CALCIUM, MG in the last 48 hours.  Recent Labs   Lab 06/04/20  0512   WBC 13.92*   HGB 12.6*   HCT 37.8*        No results for input(s): LABPT, INR, APTT in the last 48 hours.  Microbiology Results (last 7 days)     ** No results found for the last 168 hours. **            Significant Diagnostics:  No new imaging for review

## 2020-06-05 NOTE — NURSING
Patient discharged per MD order. IV removed; catheter tip intact. No distress noted. Discharge instructions explained; patient verbalized understanding. VSS. Afebrile. No complaint of pain, n/v, diarrhea, or SOB. RX provided. Wife at the bedside assisting patient with ADLs. Awaiting transport for discharge.

## 2020-06-05 NOTE — PLAN OF CARE
[facility]    HOME HEALTH ORDERS  FACE TO FACE ENCOUNTER    Patient Name: Manfred Shah  YOB: 1965    PCP: Brandy Ramirez NP   PCP Address: [id number] [place] [address] [address] / [place] [address]  PCP Phone Number: [phone]  PCP Fax: [phone]    Encounter Date: 06/05/2020    Admit to Home Health    Diagnoses:  Active Hospital Problems    Diagnosis  POA    *S/P lumbar spinal fusion [Z98.1]  Not Applicable     6/3/2020: MIS L3-4 TLIF with Dr. Osorio       Lumbar radiculopathy [M54.16]  Yes    Spinal stenosis of lumbar region [M48.061]  Yes    Left lumbar radiculopathy [M54.16]  Yes    Herniated lumbar intervertebral disc [M51.26]  Yes    Foot drop, left foot [M21.372]  Yes     due to neurological condition/[place]        Resolved Hospital Problems   No resolved problems to display.       Future Appointments   Date Time Provider Department Center   7/23/2020  2:00 PM Samuel Osorio DO [facility]   9/3/2020  3:00 PM Jon Kiser MD [facility] CARDIO [place] [facility]   9/23/2020  7:30 AM HOSPITAL LAB, [facility] On license of [facility] On [facility]   10/5/2020  3:30 PM CECY Avila     Follow-up Information     Angelica Horta PA-C.    Specialty:  Neurosurgery  Contact information:  [address] [facility]  [address]  [facility] [address]  [phone]             Samuel Osorio DO On 7/23/2020.    Specialty:  Neurosurgery  Why:  Appointment scheduled for July 23rd at 2pm  Contact information:  [address]  [address]  [place] [address]  [phone]             [facility].    Specialties:  Home Health Services, Hospice and Palliative Medicine  Why:  Home Health- call number provided for questions or concerns regarding home health  Contact information:  [address] [address]  [phone]                     I have seen and examined this patient face to face today. My clinical findings that support the need for the home  health skilled services and home bound status are the following:  Weakness/numbness causing balance and gait disturbance due to Weakness/Debility and Surgery making it taxing to leave home.  Requiring assistive device to leave home due to unsteady gait caused by  Weakness/Debility and Surgery.  Medical restrictions requiring assistance of another human to leave home due to  Post surgery monitoring.    Allergies:Review of patient's allergies indicates:  No Known Allergies    Diet: regular diet    Activities: no lifting, Driving, or Strenuous exercise for 6 weeks. No lifting >10lbs. No extreme bending or twisting of the spine. LSO brace whenever out of bed     Nursing:   SN to complete comprehensive assessment including routine vital signs. Instruct on disease process and s/s of complications to report to MD. Review/verify medication list sent home with the patient at time of discharge  and instruct patient/caregiver as needed. Frequency may be adjusted depending on start of care date.    Notify MD if SBP > 160 or < 90; DBP > 90 or < 50; HR > 120 or < 50; Temp > 101; Other:         CONSULTS:    Physical Therapy to evaluate and treat. Evaluate for home safety and equipment needs; Establish/upgrade home exercise program. Perform / instruct on therapeutic exercises, gait training, transfer training, and Range of Motion.  Occupational Therapy to evaluate and treat. Evaluate home environment for safety and equipment needs. Perform/Instruct on transfers, ADL training, ROM, and therapeutic exercises.    MISCELLANEOUS CARE:  N/A    WOUND CARE ORDERS  Monitor wound for signs of infections      Medications: Review discharge medications with patient and family and provide education.      Current Discharge Medication List      START taking these medications    Details   docusate sodium (COLACE) 100 MG capsule Take 1 capsule (100 mg total) by mouth 2 (two) times daily.  Refills: 0    Associated Diagnoses: Herniated lumbar  intervertebral disc; S/P lumbar spinal fusion; Acute postoperative pain      lidocaine (LIDODERM) 5 % Place 2 patches onto the skin every 12 (twelve) hours as needed. Apply 1 patch to each anterior thigh as needed for pain. Apply for 12 hours, remove for 12 hours  Qty: 15 patch, Refills: 0    Associated Diagnoses: Herniated lumbar intervertebral disc; S/P lumbar spinal fusion; Acute postoperative pain      oxyCODONE-acetaminophen (PERCOCET)  mg per tablet Take 1 tablet by mouth every 4 (four) hours as needed (pain 8-10/10).  Qty: 42 tablet, Refills: 0    Comments: Quantity prescribed more than 7 day supply? No  Associated Diagnoses: Herniated lumbar intervertebral disc; S/P lumbar spinal fusion; Acute postoperative pain      polyethylene glycol (GLYCOLAX) 17 gram PwPk Take 17 g by mouth once daily.  Refills: 0    Associated Diagnoses: Herniated lumbar intervertebral disc; S/P lumbar spinal fusion; Acute postoperative pain         CONTINUE these medications which have CHANGED    Details   aspirin (ECOTRIN) 81 MG EC tablet Take 1 tablet (81 mg total) by mouth once daily.  Refills: 0    Associated Diagnoses: Herniated lumbar intervertebral disc; S/P lumbar spinal fusion; Acute postoperative pain      methocarbamoL (ROBAXIN) 750 MG Tab Take 1 tablet (750 mg total) by mouth 3 (three) times daily as needed (muscle spasms).  Qty: 60 tablet, Refills: 1    Associated Diagnoses: Herniated lumbar intervertebral disc; S/P lumbar spinal fusion; Acute postoperative pain      omega-3 fatty acids/fish oil (FISH OIL-OMEGA-3 FATTY ACIDS) 300-1,000 mg capsule Take 1 capsule by mouth once daily.    Associated Diagnoses: Herniated lumbar intervertebral disc; S/P lumbar spinal fusion; Acute postoperative pain         CONTINUE these medications which have NOT CHANGED    Details   amLODIPine (NORVASC) 10 MG tablet Take 1 tablet by mouth once daily  Qty: 30 tablet, Refills: 0    Associated Diagnoses: Resistant hypertension       cholecalciferol, vitamin D3, (VITAMIN D3) 25 mcg (1,000 unit) capsule Take 2,000 Units by mouth once daily.      eplerenone (INSPRA) 25 MG Tab Take 1 tablet (25 mg total) by mouth once daily.  Qty: 90 tablet, Refills: 3      hydrALAZINE (APRESOLINE) 25 MG tablet Take 1 tablet by mouth twice daily  Qty: 60 tablet, Refills: 0    Associated Diagnoses: Resistant hypertension      ELVIS 0.5-0.4 mg CM24 TAKE 1 CAPSULE BY MOUTH ONCE DAILY  Qty: 30 capsule, Refills: 11    Associated Diagnoses: Benign nodular prostatic hyperplasia without lower urinary tract symptoms      lysine 500 mg Tab Take 500 mg by mouth once daily.      metoprolol succinate (TOPROL-XL) 100 MG 24 hr tablet Take 1 tablet by mouth once daily  Qty: 30 tablet, Refills: 2    Associated Diagnoses: Resistant hypertension      multivitamin (THERAGRAN) per tablet Take 1 tablet by mouth once daily.      rosuvastatin (CRESTOR) 10 MG tablet Take 1 tablet (10 mg total) by mouth every evening.  Qty: 90 tablet, Refills: 1    Associated Diagnoses: Hyperlipidemia, unspecified hyperlipidemia type      valsartan-hydrochlorothiazide (DIOVAN-HCT) 320-12.5 mg per tablet Take 1 tablet by mouth once daily  Qty: 30 tablet, Refills: 0    Associated Diagnoses: Essential hypertension         STOP taking these medications       HYDROcodone-acetaminophen (NORCO)  mg per tablet Comments:   Reason for Stopping:               I certify that this patient is confined to his home and needs physical therapy and occupational therapy.

## 2020-06-05 NOTE — PT/OT/SLP PROGRESS
"Occupational Therapy   Treatment    Name: Manfred Shah  MRN: [id number]  Admitting Diagnosis:  S/P lumbar spinal fusion  2 Days Post-Op    Recommendations:     Discharge Recommendations: home health OT(with family assist)  Discharge Equipment Recommendations:  (bariatric RW)  Barriers to discharge:       Assessment:     Manfred Shah is a 54 y.o. male with a medical diagnosis of S/P lumbar spinal fusion.  He presents with the following performance deficits affecting function: weakness, impaired endurance, impaired functional mobilty, gait instability, impaired self care skills, impaired balance, decreased coordination, decreased upper extremity function, decreased lower extremity function, pain, decreased safety awareness, decreased ROM, impaired sensation, orthopedic precautions.     Pt progressing well toward OT goals. Pt discharging home today- all necessary OT education provided to patient and spouse.     Rehab Prognosis:  Good; patient would benefit from acute skilled OT services to address these deficits and reach maximum level of function.       Plan:     Patient to be seen daily to address the above listed problems via self-care/home management, therapeutic activities  · Plan of Care Expires: 06/18/20  · Plan of Care Reviewed with: patient    Subjective   " I am about to get out of here."  Pt agreeable to therapy.   Pain/Comfort:  · Pain Rating 1: (did not rate with number)  · Location - Side 1: Bilateral  · Location - Orientation 1: lower  · Location 1: (buttocks, lower back)  · Pain Addressed 1: Pre-medicate for activity, Reposition, Cessation of Activity    Objective:     Communicated with: Nurse Castellon prior to session.  Patient found HOB elevated with (no lines in prep for pt d/c) upon OT entry to room.    General Precautions: Standard, fall   Orthopedic Precautions:spinal precautions   Braces: LSO, AFO     Occupational Performance:     Bed Mobility:    · Patient completed Rolling/Turning to Right " with contact guard assistance with bed rail  · Patient completed Scooting with stand by assistance to scoot to EOB and along EOB  · Patient completed Supine to Sit with minimum assistance with trunk via log roll technique  · Patient completed Sit to Supine with minimum assistance via log roll technique    Functional Mobility/Transfers: Not performed, pt reports just getting back into bed after using BSC for prolonged time. Pt now awaiting for transport for discharge.       Activities of Daily Living:  · Upper Body Dressing: set-up assist to usama LSO brace; spouse present    · Lower Body Dressing: maximal assistance to usama shoes and L AFO seated EOB    [facility] 6 Click ADL: 19    Treatment & Education:  · Pt educated on spinal precautions, log rolling technique, LSO on at all times with exception of HOB <30 degrees.   · Educated on donning/doffing LSO brace properly and keeping LSO brace within reach when removed to avoid any OOB activity without it.   · Importance of OOB activity and OOB>chair 6 hours/day, 1-2 hour increments as tolerated.  · Educated on DME recs; utilizing AE to assist with managing own care and promote independence at home. Discussed body mechanics, techniques, and other alternatives to increase ability to perform toileting/self-cath at home to avoid excessive bending and twisting.   · Provided pt with purple foam wedge and pressure relief cushion for comfort at home.   · Pt spouse at bedside. Both spouse and patient verbalized understanding of all education provided this date. All questions/concerns answered within OT scope of practice. Pt and spouse denied any other concerns from OT standpoint.    Patient left HOB elevated with all lines intact, call button in reach and nurse Cande notifiedEducation:   and spouse present. Pt awaiting transport for discharge.    GOALS:   Multidisciplinary Problems     Occupational Therapy Goals     Not on file          Multidisciplinary Problems (Resolved)         Problem: Occupational Therapy Goal    Goal Priority Disciplines Outcome Interventions   Occupational Therapy Goal   (Resolved)     OT, PT/OT Met    Description:  Goals to be met by: 06/18/20     Patient will increase functional independence with ADLs by performing:    UE Dressing with LSO with Set-up Assistance.  Grooming while standing at sink with Supervision.  Toileting (self-cath bladder; bowel) from bedside commode with Supervision for hygiene and clothing management.   Rolling to Bilateral with Supervision.   Supine to sit with Supervision.  Step transfer with Supervision  Toilet transfer to bedside commode with Supervision.  Upper extremity exercise program x15 reps per handout, with independence.  Pt will independently identify all spinal precautions in order to increase safety with all ADLs.                       Time Tracking:     OT Date of Treatment: 06/05/20  OT Start Time: 1414  OT Stop Time: 1442  OT Total Time (min): 28 min    Billable Minutes:Self Care/Home Management 14  Therapeutic Activity 14    ORTEGA Tello  6/5/2020

## 2020-06-05 NOTE — PLAN OF CARE
Problem: Adult Inpatient Plan of Care  Goal: Plan of Care Review  Outcome: Ongoing, Progressing     Problem: Adult Inpatient Plan of Care  Goal: Absence of Hospital-Acquired Illness or Injury  Outcome: Ongoing, Progressing     Problem: Adult Inpatient Plan of Care  Goal: Optimal Comfort and Wellbeing  Outcome: Ongoing, Progressing     Problem: Fall Injury Risk  Goal: Absence of Fall and Fall-Related Injury  Outcome: Ongoing, Progressing

## 2020-06-05 NOTE — NURSING
Patient complaint of constipation. Suppository provided; patient denies relief. Brown bomb enema administered. Patient able to have and small to moderate watery bowel movement. Free from abd pain or discomfort. abd is soft and non-tender. Stool brown and soft; no red tinge noted. Will continue to monitor, will continue with plan of care.

## 2020-06-05 NOTE — DISCHARGE INSTRUCTIONS
Please follow ONLY the instructions that are checked below.    Activity Restrictions:  [x]  Return to work will be determined on an individual basis.  [x]  No lifting greater than 10 pounds.  [x]  Avoid bending and twisting the area of your surgery more than 45 degrees from neutral position in any direction.  [x]  No driving or operating machinery:  [x]  until cleared by your surgeon.  [x]  while taking narcotic pain medications or muscle relaxants.  []  No cervical collar, soft collar, or lumbar brace required.  []  Wear your brace at all times. You may be given an extra brace or soft collar to wear when showering.  [x]  Wear your brace at all times except when flat in bed.  []  Wear brace for comfort only.  [x]  Increase ambulation over the next 2 weeks so that you are walking 2 miles per day at 2 weeks post-operatively.  [x]  Walk on paved surfaces only. It is okay to walk up and down stairs while holding onto a side rail.  [x]  No sexual activity for 2-3 weeks.    Discharge Medication/Follow-up:  [x]  Please refer to discharge medication reconciliation form.  [x]  Do not take ANY non-steroidal anti-inflammatory drugs (NSAIDS), including the following: ibuprofen, naprosyn, Aleve, Advil, Indocin, Mobic, or Celebrex for:  []  4 weeks  [x]  8 weeks  []  6 months  [x]  Prescriptions for appropriate medication will be given upon discharge.   [x]  Pain control:             [x]  Muscle relaxer:            [x]  Take docusate (Colace 100 mg): take one capsule a day as needed for constipation. You can get this over the counter.  [x]  Follow-up appointment:  [x]  10-14 days post-op for wound check by physician assistant/nurse  [x]  4-6 weeks with MD:  [x]  with x-rays  []  without x-rays  []  An appointment will be mailed to you.    Wound Care:  []  Remove dressing or bandaid in    days.  [x]  No bandage required. Keep your incision open to the air.  [x]  You may shower on the 4th day after your surgery. Have the force of  water hit you opposite from the incision. Pat the incision dry after your shower; do not scrub the incision.  [x]  You cannot take a bath until 8 weeks after surgery.  []  Apply bacitracin to incision twice a day for    more days.    Call your doctor or go to the Emergency Room for any signs of infection, including: increased redness, drainage, pain, or fever (temperature ?101.5 for 24 hours). Call your doctor or go to the Emergency Room if there are any localized neurological changes; problems with speech, vision, numbness, tingling, weakness, or severe headache; or for other concerns.    Special Instructions:  [x]  No use of tobacco products.  [x]  Diet: Please eat a regular diet as tolerated.  []  Other diet:              Specific physician instructions:           Physicians need 3 days' notice for pain medicine to be refilled. Pain medicine will only be refilled between 8 AM and 5 PM, Monday through Friday, due to Food and Drug Administration regulation of documentation.    If you have any questions about this form, please call [phone].    Form No. 30214 (Revised 10/31/2013)

## 2020-06-05 NOTE — PLAN OF CARE
Problem: Occupational Therapy Goal  Goal: Occupational Therapy Goal  Description  Goals to be met by: 06/18/20     Patient will increase functional independence with ADLs by performing:    UE Dressing with LSO with Set-up Assistance. MET  Grooming while standing at sink with Supervision.  Toileting (self-cath bladder; bowel) from bedside commode with Supervision for hygiene and clothing management.   Rolling to Bilateral with Supervision.   Supine to sit with Supervision.  Step transfer with Supervision  Toilet transfer to bedside commode with Supervision.  Upper extremity exercise program x15 reps per handout, with independence.  Pt will independently identify all spinal precautions in order to increase safety with all ADLs. MET      6/5/2020 1814 by ORTEGA Carranza  Outcome: Ongoing, Progressing     Pt progressing well toward OT goals. Pt discharging home today- all necessary OT education provided to patient and spouse.

## 2020-06-05 NOTE — PROGRESS NOTES
WRITTEN HEALTHCARE DISCHARGE INFORMATION      Things that YOU are RESPONSIBLE for to Manage Your Care At Home:     1. Getting your prescriptions filled.  2. Taking you medications as directed. DO NOT MISS ANY DOSES!  3. Going to your follow-up doctor appointments. This is important because it allows the doctor to monitor your progress and to determine if any changes need to be made to your treatment plan.     If you are unable to make your follow up appointments, please call the number listed and reschedule this appointment.      ____________HELP AT HOME____________________     Experiencing any SIGNS or SYMPTOMS: YOU CAN     Schedule a same day appopintment with your Primary Care Doctor or  you can call [facility] On Call Nurse Care Line for 24/7 assistance at [phone]     If you are experience any signs or symptoms that have become severe, Call 911 and come to your nearest Emergency Room.     Thank you for choosing [facility] and allowing us to care for you.   From your care management team:      You should receive a call from [facility] within 48-72 hours to help manage your care after discharge. Please try to make sure that you answer your phone for this important phone call.    Follow-up Information     Angelica Horta PA-C On 6/19/2020.    Specialty:  Neurosurgery  Why:  Appointment scheduled for June 19th at 2pm  Contact information:  [address] [facility]  [address]  [facility] [address]  [phone]             Samuel Osorio DO On 7/23/2020.    Specialty:  Neurosurgery  Why:  Appointment scheduled for July 23rd at 2pm  Contact information:  [address]  [address]  [facility] [address]  [phone]             [facility].    Specialties:  Home Health Services, Hospice and Palliative Medicine  Why:  [facility]- call number provided for questions or concerns regarding home health  Contact information:  [address] [address]  [phone]             [facility].    Specialty:  DME Provider  Why:  DME- provider of bedside commode call number provided for questions or concerns regarding equipment   Contact information:  [address] [place] [address] [phone]

## 2020-06-05 NOTE — TELEPHONE ENCOUNTER
----- Message from Nany See sent at 6/4/2020 11:30 AM CDT -----  Contact: [facility] is requesting a nurse or doctor to contact him , he has already sent a portal message , please read message.     Thanks

## 2020-06-05 NOTE — DISCHARGE SUMMARY
[facility]  Neurosurgery  Discharge Summary      Patient Name: Manfred Shah  MRN: [id number]  Admission Date: 6/3/2020  Hospital Length of Stay: 2 days  Discharge Date: 6/5/2020  Attending Physician: No att. providers found   Discharging Provider: Angelica Horta PA-C  Primary Care Provider: Brandy Ramirez NP    HPI:   Manfred Shah is a 54 y.o. 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  Main complaint is low back pain with radiation into the left thigh down to the knee.  This likely corresponds to nerve root compression at L3-4 on left.  Ideally, he would be candidate for MIS lami/discectomy but given this is his 3rd surgery with likely scar tissue and prior infection, I rec L3-4 MIS TLIF.  He also has damaged L facet joint.    Procedure(s) (LRB):  FUSION, SPINE, LUMBAR, TLIF, WITH PERCUTANEOUS INSTRUMENTATION  (L3-4 MIS TLIF) Flouro Micrcoscope [place] Neuromonitoring JENNIFER JENNINGS [phone] Spine Wave (Bilateral)     Hospital Course: 6/3: MIS L3-4 TLIF  6/4: PT rec HH. Pain well controlled. Post-op x-ray shows satisfactory placement of hardware.  Urinating and tolerating regular diet.  Anticipate discharge home tomorrow with home health  6/5: pain controlled, passing gas. Discharge home today with HH and appropriate HME.         Pending Diagnostic Studies:     None        Final Active Diagnoses:    Diagnosis Date Noted POA    PRINCIPAL PROBLEM:  S/P lumbar spinal fusion [Z98.1] 06/03/2020 Not Applicable    Lumbar radiculopathy [M54.16] 06/03/2020 Yes    Spinal stenosis of lumbar region [M48.061] 05/27/2020 Yes    Left lumbar radiculopathy [M54.16] 04/28/2020 Yes    Herniated lumbar intervertebral disc [M51.26] 04/28/2020 Yes    Foot drop, left foot [M21.372] 10/08/2018 Yes      Problems Resolved During this Admission:      Discharged Condition: stable    Disposition: Home or Self Care    Follow Up:  Follow-up  "Information     Angelica Horta PA-C On 6/19/2020.    Specialty:  Neurosurgery  Why:  Appointment scheduled for June 19th at 2pm, can be virtual if desired   Contact information:  [address]  [address]  [facility] [address]  [phone]             Samuel Osorio DO On 7/23/2020.    Specialty:  Neurosurgery  Why:  Appointment scheduled for July 23rd at 2pm  Contact information:  [address]  [address]  [facility] [address]  [phone]             [email or web address] [place]    Specialties:  Home Health Services, Hospice and Palliative Medicine  Why:  Home Health- call number provided for questions or concerns regarding home health  Contact information:  [address] [address]  [phone]             [email or web address]    Specialty:  DME Provider  Why:  DME- provider of bedside commode call number provided for questions or concerns regarding equipment   Contact information:  [address] [place] [address]  [phone]                 Patient Instructions:      WALKER FOR HOME USE     Order Specific Question Answer Comments   Type of Walker: Adult (5'4"-6'6")    With wheels? Yes    Height: 6' 5" (1.956 m)    Weight: 168.3 kg (371 lb 0.6 oz)    Length of need (1-99 months): 99    Does patient have medical equipment at home? rollator    Please check all that apply: Patient's condition impairs ambulation.    Please check all that apply: Patient needs help to get in and out of chair.    Please check all that apply: Altered sensory perception.    Please check all that apply: Patient is unable to safely ambulate without equipment.    Please check all that apply: Walker will be used for gait training.      3 IN 1 COMMODE FOR HOME USE     Order Specific Question Answer Comments   Type: Heavy duty    Height: 6' 5" (1.956 m)    Weight: 168.3 kg (371 lb 0.6 oz)    Does patient have medical equipment at home? rollator    Length of need (1-99 months): 99      HOSPITAL BED FOR HOME " "USE     Order Specific Question Answer Comments   Type: Heavy duty (350-500#)    Length of need (1-99 months): 99    Does patient have medical equipment at home? rollator    Height: 6' 5" (1.956 m)    Weight: 168.3 kg (371 lb 0.6 oz)    Please check all that apply: Patient requires positioning of the body in ways not feasible in an ordinary bed due to a medical condition which is expected to last at least one month.    Please check all that apply: Patient requires a bed height different than a fixed height hospital bed to permit transfers to chair, wheelchair, or standing.      Diet Adult Regular     Pelvic Rest     Other restrictions (specify):     Lifting restrictions     No driving until:     Notify your health care provider if you experience any of the following:  temperature >100.4     Notify your health care provider if you experience any of the following:  persistent nausea and vomiting or diarrhea     Notify your health care provider if you experience any of the following:  severe uncontrolled pain     Notify your health care provider if you experience any of the following:  redness, tenderness, or signs of infection (pain, swelling, redness, odor or green/yellow discharge around incision site)     Notify your health care provider if you experience any of the following:  difficulty breathing or increased cough     Notify your health care provider if you experience any of the following:  severe persistent headache     Notify your health care provider if you experience any of the following:  worsening rash     Notify your health care provider if you experience any of the following:  persistent dizziness, light-headedness, or visual disturbances     Notify your health care provider if you experience any of the following:  increased confusion or weakness     No dressing needed     Medications:  Reconciled Home Medications:      Medication List      START taking these medications    docusate sodium 100 MG " capsule  Commonly known as:  COLACE  Take 1 capsule (100 mg total) by mouth 2 (two) times daily.     lidocaine 5 %  Commonly known as:  LIDODERM  Place 2 patches onto the skin every 12 (twelve) hours as needed. Apply 1 patch to each anterior thigh as needed for pain. Apply for 12 hours, remove for 12 hours     oxyCODONE-acetaminophen  mg per tablet  Commonly known as:  PERCOCET  Take 1 tablet by mouth every 4 (four) hours as needed (pain 8-10/10).     polyethylene glycol 17 gram Pwpk  Commonly known as:  GLYCOLAX  Take 17 g by mouth once daily.        CHANGE how you take these medications    aspirin 81 MG EC tablet  Commonly known as:  ECOTRIN  Take 1 tablet (81 mg total) by mouth once daily.  Start taking on:  June 13, 2020  What changed:  These instructions start on June 13, 2020. If you are unsure what to do until then, ask your doctor or other care provider.     fish oil-omega-3 fatty acids 300-1,000 mg capsule  Take 1 capsule by mouth once daily.  Start taking on:  June 13, 2020  What changed:    · how much to take  · These instructions start on June 13, 2020. If you are unsure what to do until then, ask your doctor or other care provider.     ELVIS 0.5-0.4 mg Cm24  Generic drug:  dutasteride-tamsulosin  TAKE 1 CAPSULE BY MOUTH ONCE DAILY  What changed:    · when to take this  · additional instructions     methocarbamoL 750 MG Tab  Commonly known as:  ROBAXIN  Take 1 tablet (750 mg total) by mouth 3 (three) times daily as needed (muscle spasms).  What changed:    · how much to take  · how to take this  · when to take this  · reasons to take this        CONTINUE taking these medications    amLODIPine 10 MG tablet  Commonly known as:  NORVASC  Take 1 tablet by mouth once daily     eplerenone 25 MG Tab  Commonly known as:  INSPRA  Take 1 tablet (25 mg total) by mouth once daily.     hydrALAZINE 25 MG tablet  Commonly known as:  APRESOLINE  Take 1 tablet by mouth twice daily     lysine 500 mg Tab  Commonly  known as:  L-LYSINE  Take 500 mg by mouth once daily.     metoprolol succinate 100 MG 24 hr tablet  Commonly known as:  TOPROL-XL  Take 1 tablet by mouth once daily     multivitamin per tablet  Commonly known as:  THERAGRAN  Take 1 tablet by mouth once daily.     rosuvastatin 10 MG tablet  Commonly known as:  CRESTOR  Take 1 tablet (10 mg total) by mouth every evening.     valsartan-hydrochlorothiazide 320-12.5 mg per tablet  Commonly known as:  DIOVAN-HCT  Take 1 tablet by mouth once daily     VITAMIN D3 25 mcg (1,000 unit) capsule  Generic drug:  cholecalciferol (vitamin D3)  Take 2,000 Units by mouth once daily.        STOP taking these medications    HYDROcodone-acetaminophen  mg per tablet  Commonly known as:  TEREZA Horta PA-C  Neurosurgery  [facility] [place]

## 2020-06-05 NOTE — PROGRESS NOTES
[facility]  Neurosurgery  Progress Note    Subjective:     History of Present Illness: Manfred Shah is a 54 y.o. 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  Main complaint is low back pain with radiation into the left thigh down to the knee.  This likely corresponds to nerve root compression at L3-4 on left.  Ideally, he would be candidate for MIS lami/discectomy but given this is his 3rd surgery with likely scar tissue and prior infection, I rec L3-4 MIS TLIF.  He also has damaged L facet joint.    Post-Op Info:  Procedure(s) (LRB):  FUSION, SPINE, LUMBAR, TLIF, WITH PERCUTANEOUS INSTRUMENTATION  (L3-4 MIS TLIF) [facility] [place] [facility] JENNIFER JENNINGS [phone] Spine Wave (Bilateral)   2 Days Post-Op     Interval History: pain controlled, passing gas. Discharge home today with HH and appropriate HME.     Medications:  Continuous Infusions:   sodium chloride 0.9% 75 mL/hr at 06/05/20 0705     Scheduled Meds:   amLODIPine  10 mg Oral Daily    docusate sodium  100 mg Oral BID    dutasteride  0.5 mg Oral QHS    eplerenone  25 mg Oral Daily    glycerin 99.5%  100 mL Rectal ED 1 Time    And    magnesium citrate  296 mL Rectal ED 1 Time    And    sodium chloride 0.9%  500 mL Rectal ED 1 Time    hydrALAZINE  25 mg Oral BID    hydroCHLOROthiazide  12.5 mg Oral Daily    lidocaine  2 patch Transdermal Q24H    methocarbamoL  750 mg Oral TID    metoprolol succinate  100 mg Oral Daily    mupirocin  1 g Nasal BID    polyethylene glycol  17 g Oral Daily    rosuvastatin  10 mg Oral QHS    tamsulosin  0.4 mg Oral QHS    valsartan  320 mg Oral Daily     PRN Meds:acetaminophen, aluminum-magnesium hydroxide-simethicone, bisacodyL, dextrose 50%, dextrose 50%, glucagon (human recombinant), glucose, glucose, hydrALAZINE, insulin aspart U-100, labetalol, magnesium hydroxide 400 mg/5 ml, ondansetron,  oxyCODONE-acetaminophen, oxyCODONE-acetaminophen, promethazine (PHENERGAN) IVPB, senna-docusate 8.6-50 mg, sodium chloride 0.9%     Review of Systems  Objective:     Weight: (!) 168.3 kg (371 lb 0.6 oz)  Body mass index is 44 kg/m².  Vital Signs (Most Recent):  Temp: 98.6 °F (37 °C) (06/05/20 0813)  Pulse: 93 (06/05/20 0813)  Resp: 17 (06/05/20 0813)  BP: (!) 145/74 (06/05/20 0813)  SpO2: 98 % (06/05/20 0922) Vital Signs (24h Range):  Temp:  [98.4 °F (36.9 °C)-99.3 °F (37.4 °C)] 98.6 °F (37 °C)  Pulse:  [82-93] 93  Resp:  [17-19] 17  SpO2:  [93 %-98 %] 98 %  BP: (132-162)/(70-81) 145/74     Date 06/05/20 0700 - 06/06/20 0659   Shift 9934-0342 1595-0852 3141-2814 24 Hour Total   INTAKE   P.O. 240   240   Shift Total(mL/kg) 240(1.4)   240(1.4)   OUTPUT   Shift Total(mL/kg)       Weight (kg) 168.3 168.3 168.3 168.3                        Neurosurgery Physical Exam   General: well developed, well nourished, no distress  Neurologic: Alert and oriented. Thought content appropriate.  GCS: Motor: 6/Verbal: 5/Eyes: 4 GCS Total: 15  Cranial nerves: II-XII grossly intact  Neck: supple, without obvious masses or lesions  Skin: grossly intact in all 4 extremities without obvious rashes or lesions  Motor Strength:    Strength   Iliopsoas Quadriceps Knee  Flexion Tibialis  anterior Gastro- cnemius EHL   Lower: R 5/5 5/5 5/5 5/5 5/5 4/5     L 5/5 5/5 5/5 1/5 5/5 1/5   Sensory: intact to light touch B/L UE and LE; decreased sensation to right anterior thigh      Bilateral lumbar Incisions: c/d/i. Island dressings removed. Skin edges completely approximated with dermabond.     Significant Labs:  No results for input(s): GLU, NA, K, CL, CO2, BUN, CREATININE, CALCIUM, MG in the last 48 hours.  Recent Labs   Lab 06/04/20  0512   WBC 13.92*   HGB 12.6*   HCT 37.8*        No results for input(s): LABPT, INR, APTT in the last 48 hours.  Microbiology Results (last 7 days)     ** No results found for the last 168 hours. **             Significant Diagnostics:  No new imaging for review     Assessment/Plan:     * S/P lumbar spinal fusion  Manfred Shah  is a 53y/o male who s/p MIS L3-4 TLIF, POD#2.       -Post-op xrays show satisfactory placement of hardware   -Neurologically intact   -PT/OT/OOB x 6hours per day; can be divided into 2 hour intervals in the chair  ---PT recommending HH  -TEDs/SCDs/IS  -regular diet  -Percocet prn for pain with IV dilaudid for breakthrough   -Robaxin for muscle spasms  -LSO brace at all times except when lying in bed. Avoid extreme bending/twisting of the spine and no lifting >10lbs     Dispo: discharge home with HH today. Discharge instructions reviewed with patient. Rx printed          Angelica Horta PA-C  Neurosurgery  [facility]

## 2020-06-05 NOTE — PLAN OF CARE
Problem: Physical Therapy Goal  Goal: Physical Therapy Goal  Description  Goals to be met by:      Patient will increase functional independence with mobility by performin. Supine to sit with Modified [place]  2. Sit to supine with Modified [place]  3. Sit to stand transfer with Modified [place]  4. Bed to chair transfer with Modified [place] using Rolling Walker  5. Gait  x 150 feet with Modified [place] using BRW  * adhering to spine precautions throughout  *LSO on with all activity     Outcome: Ongoing, Progressing   Pt required min A for supine>sit, Min A for sit>stand transfer with bed elevated, RW, and ambulated ~106ft with RW, CGA-SBA.  Pt educated on spinal precautions, adjusting BScommode, and proper Bariatric RW to purchase. Pt verbalized understanding. Likely d/c today with questions/concerns answered/met at this time.

## 2020-06-05 NOTE — PROGRESS NOTES
Home health orders uploaded to [facility] and sent to [facility] also message sent to advise that the pt is discharging to home on today

## 2020-06-05 NOTE — NURSING
Report received from Cathy KRAMER. Patient remains free from falls and injury. No distress noted. VSS. Questions encouraged and answered. Patient verbalized understanding. Bed locked and in low position; safety maintained. Call light in reach. White board updated, and explained. No complaint of pain, n/v, diarrhea, or SOB. Patient requesting update on plan of care for the day. Will continue to monitor, will continue with plan of care.

## 2020-06-05 NOTE — PT/OT/SLP PROGRESS
Physical Therapy Treatment    Patient Name:  Manfred Shah   MRN:  [id number]    Recommendations:     Discharge Recommendations:  home health PT   Discharge Equipment Recommendations: none   Barriers to discharge: None    Assessment:     Manfred Shah is a 54 y.o. male admitted with a medical diagnosis of S/P lumbar spinal fusion.  He presents with the following impairments/functional limitations:  weakness, impaired endurance, impaired self care skills, impaired balance, decreased safety awareness, decreased coordination, decreased ROM, impaired skin, pain, edema, gait instability, decreased lower extremity function, impaired functional mobilty, impaired sensation.      Pt required min A for supine>sit, Min A for sit>stand transfer with bed elevated, RW, and ambulated ~106ft with RW, CGA-SBA.  Pt educated on spinal precautions, adjusting BScommode, and proper Bariatric RW to purchase. Pt verbalized understanding. Likely d/c today with questions/concerns answered/met at this time.    Rehab Prognosis: Fair; patient would benefit from acute skilled PT services to address these deficits and reach maximum level of function.    Recent Surgery: Procedure(s) (LRB):  FUSION, SPINE, LUMBAR, TLIF, WITH PERCUTANEOUS INSTRUMENTATION  (L3-4 MIS TLIF) Flouro Micrcoscope [place] Neuromonitoring JENNIFER JENNINGS [phone] Spine Wave (Bilateral) 2 Days Post-Op    Plan:     During this hospitalization, patient to be seen daily to address the identified rehab impairments via gait training, therapeutic activities, therapeutic exercises, neuromuscular re-education and progress toward the following goals:    · Plan of Care Expires:  06/11/20    Subjective     Chief Complaint: not having a BM  Patient/Family Comments/goals: Pt reports his insurance paid for a rollator and is not able to pay for a rolling walker.  Pain/Comfort:  · Pain Rating 1: 6/10  · Location - Side 1: Bilateral  · Location 1: back(buttocks)  · Pain Addressed 1:  Pre-medicate for activity, Reposition, Distraction      Objective:     Communicated with pt's nurse, Jina, prior to session.  Patient found HOB elevated with spouse present with peripheral IV(lidocaine patches to BLEs) upon PT entry to room.     General Precautions: Standard, fall   Orthopedic Precautions:spinal precautions   Braces: LSO, AFO(Left AFO)     Functional Mobility:  · Bed Mobility:     · Rolling Right: contact guard assistance with HR  · Scooting: SBA to scoot to EOB  · Supine to Sit: Min A for trunk with HR (log roll)  · Sit to Supine: stand by assistance  · Transfers: from elevated EOB     · Sit to Stand:  minimum assistance with rolling walker  · Gait: Pt ambualted ~106ft with RW, CGA-SBA with L AFO. Pt with fwd flexed posture and decreased L foot clearance. LSO brace with ambulation. Decreased lydia, step length, velocity of limb, postural control, endurance  · Balance: good sitting and fair+ standing      AM-PAC 6 CLICK MOBILITY  Turning over in bed (including adjusting bedclothes, sheets and blankets)?: 4  Sitting down on and standing up from a chair with arms (e.g., wheelchair, bedside commode, etc.): 3  Moving from lying on back to sitting on the side of the bed?: 3  Moving to and from a bed to a chair (including a wheelchair)?: 3  Need to walk in hospital room?: 3  Climbing 3-5 steps with a railing?: 2  Basic Mobility Total Score: 18       Therapeutic Activities and Exercises:   Reviewed spinal precautions and log rolling with verbalization of understanding.  Assisted pt with donning of LSO brace and educated pt on proper placement. Max A for assistance with donning L ankle AFO sitting EOB.  Educated pt on adjusting BScommode to proper height. Educated pt on purchasing Bariatric RW for safety/use while at home. Pt and spouse verbalized understanding.  Educated pt to have LSO brace on when >30*, pt and spouse verbalized understanding.      Patient left HOB elevated ~25* with all lines  intact, call button in reach and pt's nurse, Jina, notified..    GOALS:   Multidisciplinary Problems     Physical Therapy Goals        Problem: Physical Therapy Goal    Goal Priority Disciplines Outcome Goal Variances Interventions   Physical Therapy Goal     PT, PT/OT Ongoing, Progressing     Description:  Goals to be met by:      Patient will increase functional independence with mobility by performin. Supine to sit with Modified [place]  2. Sit to supine with Modified [place]  3. Sit to stand transfer with Modified [place]  4. Bed to chair transfer with Modified [place] using Rolling Walker  5. Gait  x 150 feet with Modified [place] using BRW  * adhering to spine precautions throughout  *LSO on with all activity                      Time Tracking:     PT Received On: 20  PT Start Time: 1055     PT Stop Time: 1125  PT Total Time (min): 30 min     Billable Minutes: Gait Training 15 and Therapeutic Activity 15    Treatment Type: Treatment  PT/PTA: PTA     PTA Visit Number: 1     Charlene Hairston, PTA  2020

## 2020-06-05 NOTE — ASSESSMENT & PLAN NOTE
Manfred Shah  is a 55y/o male who s/p MIS L3-4 TLIF, POD#2.       -Post-op xrays show satisfactory placement of hardware   -Neurologically intact   -PT/OT/OOB x 6hours per day; can be divided into 2 hour intervals in the chair  ---PT recommending HH  -TEDs/SCDs/IS  -regular diet  -Percocet prn for pain with IV dilaudid for breakthrough   -Robaxin for muscle spasms  -LSO brace at all times except when lying in bed. Avoid extreme bending/twisting of the spine and no lifting >10lbs     Dispo: discharge home with HH today. Discharge instructions reviewed with patient. Rx printed

## 2020-06-05 NOTE — PROGRESS NOTES
Message received from Willa Castillo to advise that the pt does not have a qualifying diagnosis for a hospital bed and that the pt already has a rolling walker at home and cannot get another one.  Pt can get bariatric BSC ordered. TN to follow up with pt to see if he wants BSC.    1047- met with pt to discuss d/c plans and update on equipment ordered, able to get and not able to get.  Pt does report that he has a rollator but that he wanted a rolling walker because the rollator does not fit through his bedroom door. TN advised that due to pt having a rollator the insurance will not cover for him to receive a rolling walker.  Pt verbalized understanding regarding walker and states that he will purchase one.  TN also instructed pt that he could contact the provider of the rollator and ask to swap out if able, but pt wants to keep rollator as well. Pt also advised that the MD ordered a hospital bed and that he does not qualify for that based on diagnosis.  Pt and spouse verbalized understanding. Also ordered was a Bariatric BSC of which pt states he does want.  Spouse inquired if that would be delivered to the home or hospital. TN informed that if we have one on hand here at the hospital that it can be delivered to the room prior to discharge providing that they have the means to transport it to the home because if it is delivered from the [facility] provider it may or may be delivered to the home on today. Pt reports that they will be able to transport BSC home and would rather get it prior to discharging so he will for sure have it for tonight if needed.  Lastly, TN advised that home health has been ordered and that the provider will be [facility]. Pt in agreement with this.    1057- call placed to Raiza to request that Bariatric BSC be pulled from depot and delivered to pt in preparation for d/c to home on today    1102- pts nurse Castellon advised that once BSC delivered pt can d/c to home from   [facility].

## 2020-06-06 DIAGNOSIS — Z98.1 S/P LUMBAR SPINAL FUSION: ICD-10-CM

## 2020-06-06 DIAGNOSIS — M51.26 HERNIATED LUMBAR INTERVERTEBRAL DISC: ICD-10-CM

## 2020-06-06 DIAGNOSIS — G89.18 ACUTE POSTOPERATIVE PAIN: ICD-10-CM

## 2020-06-06 PROCEDURE — G0180 MD CERTIFICATION HHA PATIENT: HCPCS | Mod: ,,, | Performed by: NEUROLOGICAL SURGERY

## 2020-06-06 PROCEDURE — G0180 PR HOME HEALTH MD CERTIFICATION: ICD-10-PCS | Mod: ,,, | Performed by: NEUROLOGICAL SURGERY

## 2020-06-06 RX ORDER — OXYCODONE AND ACETAMINOPHEN 10; 325 MG/1; MG/1
1 TABLET ORAL EVERY 4 HOURS PRN
Qty: 42 TABLET | Refills: 0 | Status: SHIPPED | OUTPATIENT
Start: 2020-06-06 | End: 2020-10-05

## 2020-06-06 RX ORDER — LIDOCAINE 50 MG/G
2 PATCH TOPICAL EVERY 12 HOURS PRN
Qty: 15 PATCH | Refills: 0 | Status: SHIPPED | OUTPATIENT
Start: 2020-06-06 | End: 2020-10-05

## 2020-06-06 NOTE — PT/OT/SLP DISCHARGE
Occupational Therapy Discharge Summary    Manfred Shah  MRN: [id number]   Principal Problem: S/P lumbar spinal fusion      Patient Discharged from acute Occupational Therapy on 06/05/20.  Please refer to prior OT notes for functional status.    Assessment:      Patient appropriate for care in another setting.    Objective:     GOALS:   Multidisciplinary Problems     Occupational Therapy Goals        Problem: Occupational Therapy Goal    Goal Priority Disciplines Outcome Interventions   Occupational Therapy Goal     OT, PT/OT Ongoing, Progressing    Description:  Goals to be met by: 06/18/20     Patient will increase functional independence with ADLs by performing:    UE Dressing with LSO with Set-up Assistance.  Grooming while standing at sink with Supervision.  Toileting (self-cath bladder; bowel) from bedside commode with Supervision for hygiene and clothing management.   Rolling to Bilateral with Supervision.   Supine to sit with Supervision.  Step transfer with Supervision  Toilet transfer to bedside commode with Supervision.  Upper extremity exercise program x15 reps per handout, with independence.  Pt will independently identify all spinal precautions in order to increase safety with all ADLs.                        Reasons for Discontinuation of Therapy Services  Transfer to alternate level of care.      Plan:     Patient Discharged to: Home with Home Health Service    Anahy Munoz OT  6/6/2020

## 2020-06-11 ENCOUNTER — DOCUMENT SCAN (OUTPATIENT)
Dept: HOME HEALTH SERVICES | Facility: HOSPITAL | Age: 55
End: 2020-06-11
Payer: COMMERCIAL

## 2020-06-12 ENCOUNTER — DOCUMENT SCAN (OUTPATIENT)
Dept: HOME HEALTH SERVICES | Facility: HOSPITAL | Age: 55
End: 2020-06-12
Payer: COMMERCIAL

## 2020-06-15 ENCOUNTER — EXTERNAL HOME HEALTH (OUTPATIENT)
Dept: HOME HEALTH SERVICES | Facility: HOSPITAL | Age: 55
End: 2020-06-15
Payer: COMMERCIAL

## 2020-06-19 ENCOUNTER — OFFICE VISIT (OUTPATIENT)
Dept: NEUROSURGERY | Facility: CLINIC | Age: 55
End: 2020-06-19
Payer: COMMERCIAL

## 2020-06-19 VITALS
HEART RATE: 96 BPM | RESPIRATION RATE: 16 BRPM | SYSTOLIC BLOOD PRESSURE: 149 MMHG | DIASTOLIC BLOOD PRESSURE: 60 MMHG | BODY MASS INDEX: 37.19 KG/M2 | WEIGHT: 315 LBS | HEIGHT: 77 IN

## 2020-06-19 DIAGNOSIS — M43.27 FUSION OF SPINE, LUMBOSACRAL REGION: ICD-10-CM

## 2020-06-19 PROCEDURE — 99024 PR POST-OP FOLLOW-UP VISIT: ICD-10-PCS | Mod: S$GLB,,, | Performed by: PHYSICIAN ASSISTANT

## 2020-06-19 PROCEDURE — 99999 PR PBB SHADOW E&M-EST. PATIENT-LVL IV: ICD-10-PCS | Mod: PBBFAC,,, | Performed by: PHYSICIAN ASSISTANT

## 2020-06-19 PROCEDURE — 99024 POSTOP FOLLOW-UP VISIT: CPT | Mod: S$GLB,,, | Performed by: PHYSICIAN ASSISTANT

## 2020-06-19 PROCEDURE — 99999 PR PBB SHADOW E&M-EST. PATIENT-LVL IV: CPT | Mod: PBBFAC,,, | Performed by: PHYSICIAN ASSISTANT

## 2020-06-19 NOTE — PROGRESS NOTES
Wound Check   Neurosurgery     Manfred Shah is a 54 y.o. male who presents to clinic today for 2 week wound check, s/p MIS L3-4 TLIF with Dr. Osorio.  Denies fevers, chills, night sweats or N/V. Further denies wound drainage or swelling. Pt has not required pain medication since surgery. Denies any back or leg pain.  He takes a muscle relaxer each night at bed time.    Left pinky finger is still numb since surgery. He admits some pre-op numbness in this location, but it would come and go--particularly noticeable at night. However, since surgery, it has remained constant.       Physical Exam:   General: well developed, well nourished, no distress  Neurologic: Alert and oriented. Thought content appropriate.   GCS: Motor: 6/Verbal: 5/Eyes: 4 GCS Total: 15   Mental Status: Awake, Alert, Oriented x3   Cranial nerves: face symmetric, tongue midline, pupils equal, round, reactive to light with accomodation, EOMI.   Motor Strength: moves all extremities with good strength and tone with the exception of known chronic left footdrop  Sensation: response to light touch throughout.  Decreased to left 5th finger  Ambulating with rolling walker in LSO brace     B/l lumbar incision are clean, dry and intact with no signs of erythema, swelling or purulent drainage. Dissolvable sutures are intact. All skin edges are completely approximated with Dermabond.       Vitals:    06/19/20 1426   BP: (!) 149/60   Pulse: 96   Resp: 16             Assessment/Plan:   Manfred Shah is a 54 y.o. male who presents for 2 week wound check, s/p MIS L3-4 TLIF with Dr. Osorio.  He reports doing very well from surgery with 0 pain in his back or legs.  He is anxious to return to his normal activities but understands the importance of following the restrictions.  We discussed his return to his [facility], and he understands that he should not handle any animals, ride in the boats, or perform any strenuous activities.  Left C8 numbness is likely  from positioning and may improve over time.  He should continue his hand exercises.    -Keep incision open to air   -LSO brace whenever out of bed   -Call when you need a refill of pain medication  -Can shower and get incision wet, just pat dry and no vigorous scrubbing. Do not submerge incision for another 4 weeks.   -No lifting more than 10 lbs or excessive bending/twisting.   -Can drive after 4 weeks  -Follow up with Dr. Osorio in 4 weeks with new xrays  -Encouraged patient to call if they have any questions or concerns prior to next follow up appt        Angelica Horta PA-C  [facility]  Department of Neurosurgery  [phone]

## 2020-06-19 NOTE — PATIENT INSTRUCTIONS
-Keep incision open to air   -LSO brace whenever out of bed   -Call when you need a refill of pain medication  -Can shower and get incision wet, just pat dry and no vigorous scrubbing. Do not submerge incision for another 4 weeks.   -No lifting more than 10 lbs or excessive bending/twisting.   -Can drive after 4 weeks  -Follow up with Dr. Osorio in 4 weeks with new xrays  -Please call with any questions or concerns prior to your next appointment.

## 2020-07-02 ENCOUNTER — PATIENT MESSAGE (OUTPATIENT)
Dept: FAMILY MEDICINE | Facility: CLINIC | Age: 55
End: 2020-07-02

## 2020-07-02 DIAGNOSIS — I1A.0 RESISTANT HYPERTENSION: ICD-10-CM

## 2020-07-02 RX ORDER — AMLODIPINE BESYLATE 10 MG/1
10 TABLET ORAL DAILY
Qty: 30 TABLET | Refills: 0 | Status: SHIPPED | OUTPATIENT
Start: 2020-07-02 | End: 2020-07-30 | Stop reason: SDUPTHER

## 2020-07-02 RX ORDER — METOPROLOL SUCCINATE 100 MG/1
100 TABLET, EXTENDED RELEASE ORAL DAILY
Qty: 30 TABLET | Refills: 0 | Status: SHIPPED | OUTPATIENT
Start: 2020-07-02 | End: 2020-07-30 | Stop reason: SDUPTHER

## 2020-07-22 ENCOUNTER — HOSPITAL ENCOUNTER (OUTPATIENT)
Dept: RADIOLOGY | Facility: HOSPITAL | Age: 55
Discharge: HOME | End: 2020-07-22
Attending: PHYSICIAN ASSISTANT
Payer: COMMERCIAL

## 2020-07-22 DIAGNOSIS — M43.27 FUSION OF SPINE, LUMBOSACRAL REGION: ICD-10-CM

## 2020-07-22 PROCEDURE — 72100 X-RAY EXAM L-S SPINE 2/3 VWS: CPT | Mod: 26,,, | Performed by: RADIOLOGY

## 2020-07-22 PROCEDURE — 72100 XR LUMBAR SPINE AP AND LATERAL: ICD-10-PCS | Mod: 26,,, | Performed by: RADIOLOGY

## 2020-07-22 PROCEDURE — 72100 X-RAY EXAM L-S SPINE 2/3 VWS: CPT | Mod: TC,FY,PO

## 2020-07-23 ENCOUNTER — OFFICE VISIT (OUTPATIENT)
Dept: NEUROSURGERY | Facility: CLINIC | Age: 55
End: 2020-07-23
Payer: COMMERCIAL

## 2020-07-23 VITALS
DIASTOLIC BLOOD PRESSURE: 74 MMHG | SYSTOLIC BLOOD PRESSURE: 154 MMHG | HEART RATE: 75 BPM | TEMPERATURE: 98 F | WEIGHT: 315 LBS | BODY MASS INDEX: 37.19 KG/M2 | RESPIRATION RATE: 15 BRPM | HEIGHT: 77 IN

## 2020-07-23 DIAGNOSIS — M43.27 FUSION OF SPINE, LUMBOSACRAL REGION: ICD-10-CM

## 2020-07-23 DIAGNOSIS — Z98.1 S/P LUMBAR SPINAL FUSION: Primary | ICD-10-CM

## 2020-07-23 PROCEDURE — 99999 PR PBB SHADOW E&M-EST. PATIENT-LVL V: ICD-10-PCS | Mod: PBBFAC,,, | Performed by: NEUROLOGICAL SURGERY

## 2020-07-23 PROCEDURE — 99024 PR POST-OP FOLLOW-UP VISIT: ICD-10-PCS | Mod: S$GLB,,, | Performed by: NEUROLOGICAL SURGERY

## 2020-07-23 PROCEDURE — 99999 PR PBB SHADOW E&M-EST. PATIENT-LVL V: CPT | Mod: PBBFAC,,, | Performed by: NEUROLOGICAL SURGERY

## 2020-07-23 PROCEDURE — 99024 POSTOP FOLLOW-UP VISIT: CPT | Mod: S$GLB,,, | Performed by: NEUROLOGICAL SURGERY

## 2020-07-23 NOTE — PROGRESS NOTES
CHIEF COMPLAINT:  Chief Complaint   Patient presents with    Post-op Evaluation       Interval history 7/23/2020:  Mr Shah returns for scheduled follow up appointment, now 7 weeks s/p MIS L3-4 TLIF. He continues to report resolution of left knee and thigh pain and back pain. He is able to walk more with a cane and his walker. Home health PT worked with him for 3 weeks. Denies any new numbness or weakness. Continues to wear his LSO brace for support     Interval history 6/3/2020:  Patient presents today for MIS L3-4 TLIF with Dr. Osorio. He reports resolution of left knee and thigh pain following treatment for left knee tendonitis by orthopedics. Still reports mild, achy low back pain. Still reports subjective left leg weakness that affects ambulation. Uses a rolling walker for support. Has difficulty moving around his home. Chronic left foot drop managed with AFO brace.      INTERVAL HISTORY (5/11/20):  No significant changes in sxs.  Leg pain is still severe and he is eager to have surgery.     Initially underwent MIS decompression by Dr Ramires and had second surgery at [facility] by Dr Rodriguez which was supposed to be redo decompression.  Second surgery reportedly lasted 13 hrs and resulted in 1 week admission.  He reportedly suffered nerve damage that left him having to straight cath every 3.5 hrs due to incontinence.     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  He underwent IVIG (last dose on 4/3/19) and regain strength in his legs but still has residual L foot drop for which he wears an AFO     He also reports a spinal abscess that required a drain for 6 weeks.        HPI:  Manfred Shah is a 54 y.o.  male with below listed PMH, who   Presents with low back pain that radiates down his left thigh to his knee.  It does not go past the knee  And seems to skip the buttocks.  Pain is made worse by ambulating.  It is most severe in the morning  and described as almost unbearable.  He ends up using a walker for support.  Sitting in a recliner and using ice helps make the pain better.  He feels like his thigh strength has diminished and does not support his weight reliably.  He also has some tingling in the thigh.  He has been diagnosed with tendinitis in the left knee and is having that evaluated tomorrow by Orthopedics.     No imaging for review     He has a history of an L3-4 laminectomy performed by Dr. Ramires in 2012 followed by a repeat L3-4 laminectomy and diskectomy by Dr. Rodriguez in 2018.      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 Tray Rodriguez    CIDP (chronic inflammatory demyelinating polyneuropathy) 06/2018    followed by Dr. Rowdy Tran - Neurologist in [place].      Elevated liver enzymes 10/8/2018    Elevated PSA     followed by Dr. Sanderson    Foot drop, left foot     due to neurological condition/Guillan [place]    Hyperlipidemia     Hypertension     Impaired fasting glucose     Neurogenic bladder 09/05/2018    followed by Dr. Sanderson    RLS (restless legs syndrome) 10/8/2018    Self-catheterizes urinary bladder     Urologist    Sleep apnea with use of continuous positive airway pressure (CPAP) 01/10/2018    followed by [facility] in [place]    Urge incontinence 9/11/2018     Past Surgical History:   Procedure Laterality Date    ADENOIDECTOMY      COLONOSCOPY N/A 1/24/2020    Procedure: COLONOSCOPY;  Surgeon: Stacy Markham MD;  [facility];  Service: Endoscopy;  Laterality: N/A;    HERNIA REPAIR      LUMBAR LAMINECTOMY  2012    [facility]    LUMBAR LAMINECTOMY  08/2018    Dr. Rodriguez - [facility]; complications from surgery - abscess to spinal cord - discharge summary scanned to media  file    PLANTAR FASCIA RELEASE      TONSILLECTOMY      TRANSFORAMINAL LUMBAR INTERBODY FUSION (TLIF) OF SPINE WITH PERCUTANEOUS INSTRUMENTATION Bilateral 6/3/2020    Procedure: FUSION, SPINE, LUMBAR, TLIF, WITH PERCUTANEOUS INSTRUMENTATION  (L3-4 MIS TLIF) Georgeso Micrcoscope Terell Neuromonitoring JENNIFER JENNINGS [phone] Spine Wave;  Surgeon: Samuel Osorio DO;  Location: [facility] OR;  Service: Neurosurgery;  Laterality: Bilateral;  SPINEWAVE JENNIFER KRAMER [phone] TEXTED HIM @ 9:45AM ON 5-  PRE-OP BY RN 5----BMI--44---COVID NEGATIVE  CLEAR    VASECTOMY      X-STOP IMPLANTATION       Family History   Problem Relation Age of Onset    Cancer Mother         Breast Cancer    Heart disease Mother         pacemaker    Hyperlipidemia Mother         taking Crestor    Hypertension Mother         taking Amlodipine    Diabetes Mother         Prediabetes    Diabetes Father     Cancer Father         unknown type of cancer    Hypertension Father     Rheum arthritis Father     No Known Problems Sister     Prostate cancer Neg Hx     Kidney disease Neg Hx      Social History     Tobacco Use    Smoking status: Never Smoker    Smokeless tobacco: Never Used   Substance Use Topics    Alcohol use: Yes     Frequency: Monthly or less     Drinks per session: 1 or 2     Binge frequency: Never     Comment: OCCASS    Drug use: No        Review of Systems   All other systems reviewed and are negative.      OBJECTIVE:   Vital Signs:  Temp: 97.6 °F (36.4 °C) (07/23/20 1359)  Pulse: 75 (07/23/20 1359)  Resp: 15 (07/23/20 1359)  BP: (!) 154/74 (07/23/20 1359)    Physical Exam:  Constitutional: Patient sitting comfortably in chair. Appears well developed and well nourished.  Skin: Exposed areas are intact without abnormal markings, rashes or other lesions.  HEENT: Normocephalic. Normal conjunctivae.  Cardiovascular: Normal rate and regular rhythm.  Respiratory: Chest wall rises and falls symmetrically,  without signs of respiratory distress.  Abdomen: Soft and non-tender.  Extremities: Warm and without edema. Calves supple, non-tender.  Psych/Behavior: Normal affect.    Neurological:    Mental status: Alert and oriented. Conversational and appropriate.       Cranial Nerves: VFF to confrontation. PERRL. EOMI without nystagmus. Facial STLT normal and symmetric. Strong, symmetric muscles of mastication. Facial strength full and symmetric. Hearing equal bilaterally to finger rub. Palate and uvula rise and fall normally in midline. Shoulder shrug 5/5 strength. Tongue midline.     Motor:    Upper:  Deltoids Triceps Biceps WE WF  FA    R 5/5 5/5 5/5 5/5 5/5 5/5 5/5    L 5/5 5/5 5/5 5/5 5/5 5/5 5/5      Lower:  HF KE KF DF PF EHL    R 5/5 5/5 5/5 5/5 5/5 5/5    L 5/5 5/5 5/5 1/5 5/5 1/5     Sensory: Intact sensation to light touch and pinprick in all extremities.     Reflexes:      DTR: 2+ knees and biceps symmetrically.     Salter's: Negative.     Babinski's: Negative.     Clonus: Negative.    Cerebellar: Finger-to-nose and rapid alternating movements normal.     Gait:  Stable, fluid.    Spine:    Posture: Head well aligned over pelvis and shoulders in front and side views.  No focal or global spinal deformity visible on inspection.     Cervical:      ROM: Full with flexion, extension, lateral rotation and ear-to-shoulder bend.      Midline TTP: Negative.     Spurling's test: Negative.     Lhermitte's: Negative.    Thoracic:     Midline TTP: Negative    Lumbar:     Midline TTP: Negative     Straight Leg Test: Negative     Crossed Straight Leg Test: Negative    Other:     SI joint TTP: Negative.     Tenderness with external/internal hip rotation: Negative.    Diagnostic Results:  All imaging was independently reviewed by me.    AP and Lateral X-ray lumbar spine, dated 7/22:  1. Good hardware position, stable from xrays taken during post op hospital course      ASSESSMENT/PLAN:     Problem List Items Addressed This  Visit        Orthopedic    S/P lumbar spinal fusion - Primary    Relevant Orders    Ambulatory referral/consult to Physical/Occupational Therapy      Other Visit Diagnoses     Fusion of spine, lumbosacral region        Relevant Orders    X-Ray Lumbar Spine AP And Lateral          VISIT SUMMARY: Mr Shah is a 54M that is s/p L3-4 MIS TLIF 7 weeks ago, returns for routine follow up. He continues to do well with resolution of back pain and leg pain. His wound is well healed. Hardware in excellent position and stable compared to prior xray post op.     PATIENT EDUCATION:  More than half the clinic visit was spent showing with patient the pertinent findings on imaging and educating the patient about natural history of the pathology.   We discussed options for treatment as well as the risks and benefits of each option.  All questions were answered.     The patient understands and agrees with the following plan of care.    - Referral to [facility]  - RTC in 8 wks with flex/ex  - Work letter written                .

## 2020-07-23 NOTE — LETTER
July 23, 2020      [facility]  [address]   [place] [address]  Phone: [phone]  Fax: [phone]       Patient: Manfred Shah   YOB: 1965  Date of Visit: 07/23/2020    To Whom It May Concern:    Vipul Shah  was at [facility] on 07/23/2020.  He underwent back surgery on 6/3/20.  He is recovering well as expected but should return to work with the following restrictions: no lifting more than 20lbs, no twisting or bending greater than 45 degrees, and should be allowed to have rest breaks as needed.  I would like to reserve the right to adjust his restrictions as needed.     If you have any questions or concerns, or if I can be of further assistance, please do not hesitate to contact me.    Sincerely,    Samuel Osorio, DO

## 2020-07-30 ENCOUNTER — DOCUMENT SCAN (OUTPATIENT)
Dept: HOME HEALTH SERVICES | Facility: HOSPITAL | Age: 55
End: 2020-07-30
Payer: COMMERCIAL

## 2020-07-30 ENCOUNTER — PATIENT MESSAGE (OUTPATIENT)
Dept: FAMILY MEDICINE | Facility: CLINIC | Age: 55
End: 2020-07-30

## 2020-07-30 DIAGNOSIS — I1A.0 RESISTANT HYPERTENSION: ICD-10-CM

## 2020-07-30 RX ORDER — AMLODIPINE BESYLATE 10 MG/1
10 TABLET ORAL DAILY
Qty: 90 TABLET | Refills: 0 | Status: SHIPPED | OUTPATIENT
Start: 2020-07-30 | End: 2020-10-19

## 2020-07-30 RX ORDER — HYDRALAZINE HYDROCHLORIDE 25 MG/1
25 TABLET, FILM COATED ORAL 2 TIMES DAILY
Qty: 180 TABLET | Refills: 0 | Status: SHIPPED | OUTPATIENT
Start: 2020-07-30 | End: 2021-01-19

## 2020-07-30 RX ORDER — METOPROLOL SUCCINATE 100 MG/1
100 TABLET, EXTENDED RELEASE ORAL DAILY
Qty: 90 TABLET | Refills: 0 | Status: SHIPPED | OUTPATIENT
Start: 2020-07-30 | End: 2020-10-19

## 2020-09-17 ENCOUNTER — HOSPITAL ENCOUNTER (OUTPATIENT)
Dept: RADIOLOGY | Facility: HOSPITAL | Age: 55
Discharge: HOME | End: 2020-09-17
Attending: NEUROLOGICAL SURGERY
Payer: COMMERCIAL

## 2020-09-17 ENCOUNTER — OFFICE VISIT (OUTPATIENT)
Dept: NEUROSURGERY | Facility: CLINIC | Age: 55
End: 2020-09-17
Payer: COMMERCIAL

## 2020-09-17 ENCOUNTER — HOSPITAL ENCOUNTER (OUTPATIENT)
Dept: RADIOLOGY | Facility: HOSPITAL | Age: 55
Discharge: HOME | End: 2020-09-17
Attending: PHYSICIAN ASSISTANT
Payer: COMMERCIAL

## 2020-09-17 VITALS
TEMPERATURE: 98 F | HEART RATE: 82 BPM | SYSTOLIC BLOOD PRESSURE: 160 MMHG | WEIGHT: 315 LBS | BODY MASS INDEX: 44.76 KG/M2 | OXYGEN SATURATION: 96 % | DIASTOLIC BLOOD PRESSURE: 80 MMHG

## 2020-09-17 DIAGNOSIS — Z98.1 S/P LUMBAR SPINAL FUSION: ICD-10-CM

## 2020-09-17 DIAGNOSIS — Z98.1 S/P LUMBAR SPINAL FUSION: Primary | ICD-10-CM

## 2020-09-17 DIAGNOSIS — M43.27 FUSION OF SPINE, LUMBOSACRAL REGION: ICD-10-CM

## 2020-09-17 PROCEDURE — 72100 XR LUMBAR SPINE AP AND LAT WITH FLEX/EXT: ICD-10-PCS | Mod: 26,,, | Performed by: RADIOLOGY

## 2020-09-17 PROCEDURE — 99024 POSTOP FOLLOW-UP VISIT: CPT | Mod: S$GLB,,, | Performed by: PHYSICIAN ASSISTANT

## 2020-09-17 PROCEDURE — 72100 X-RAY EXAM L-S SPINE 2/3 VWS: CPT | Mod: 26,,, | Performed by: RADIOLOGY

## 2020-09-17 PROCEDURE — 72120 X-RAY BEND ONLY L-S SPINE: CPT | Mod: 26,,, | Performed by: RADIOLOGY

## 2020-09-17 PROCEDURE — 99999 PR PBB SHADOW E&M-EST. PATIENT-LVL IV: ICD-10-PCS | Mod: PBBFAC,,, | Performed by: PHYSICIAN ASSISTANT

## 2020-09-17 PROCEDURE — 99024 PR POST-OP FOLLOW-UP VISIT: ICD-10-PCS | Mod: S$GLB,,, | Performed by: PHYSICIAN ASSISTANT

## 2020-09-17 PROCEDURE — 72120 XR LUMBAR SPINE AP AND LAT WITH FLEX/EXT: ICD-10-PCS | Mod: 26,,, | Performed by: RADIOLOGY

## 2020-09-17 PROCEDURE — 99999 PR PBB SHADOW E&M-EST. PATIENT-LVL IV: CPT | Mod: PBBFAC,,, | Performed by: PHYSICIAN ASSISTANT

## 2020-09-17 PROCEDURE — 72100 X-RAY EXAM L-S SPINE 2/3 VWS: CPT | Mod: TC,FY

## 2020-09-17 PROCEDURE — 72100 XR LUMBAR SPINE AP AND LATERAL: ICD-10-PCS | Mod: 26,,, | Performed by: RADIOLOGY

## 2020-09-17 NOTE — PROGRESS NOTES
CHIEF COMPLAINT:  Chief Complaint   Patient presents with    Follow-up     Interval history 9/17/2020:  Patient has been doing very well since surgery. Left leg slightly stronger. Discontinued LSO brace. Held off on PT for fear of re-injury.     Interval history 7/23/2020:  Mr Shah returns for scheduled follow up appointment, now 7 weeks s/p MIS L3-4 TLIF. He continues to report resolution of left knee and thigh pain and back pain. He is able to walk more with a cane and his walker. Home health PT worked with him for 3 weeks. Denies any new numbness or weakness. Continues to wear his LSO brace for support     Interval history 6/3/2020:  Patient presents today for MIS L3-4 TLIF with Dr. Osorio. He reports resolution of left knee and thigh pain following treatment for left knee tendonitis by orthopedics. Still reports mild, achy low back pain. Still reports subjective left leg weakness that affects ambulation. Uses a rolling walker for support. Has difficulty moving around his home. Chronic left foot drop managed with AFO brace.      INTERVAL HISTORY (5/11/20):  No significant changes in sxs.  Leg pain is still severe and he is eager to have surgery.     Initially underwent MIS decompression by Dr Ramires and had second surgery at [facility] by Dr Rodriguez which was supposed to be redo decompression.  Second surgery reportedly lasted 13 hrs and resulted in 1 week admission.  He reportedly suffered nerve damage that left him having to straight cath every 3.5 hrs due to incontinence.     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  He underwent IVIG (last dose on 4/3/19) and regain strength in his legs but still has residual L foot drop for which he wears an AFO     He also reports a spinal abscess that required a drain for 6 weeks.        HPI:  Manfred Shah is a 54 y.o.  male with below listed PMH, who   Presents with low back pain that radiates down  his left thigh to his knee.  It does not go past the knee  And seems to skip the buttocks.  Pain is made worse by ambulating.  It is most severe in the morning and described as almost unbearable.  He ends up using a walker for support.  Sitting in a recliner and using ice helps make the pain better.  He feels like his thigh strength has diminished and does not support his weight reliably.  He also has some tingling in the thigh.  He has been diagnosed with tendinitis in the left knee and is having that evaluated tomorrow by Orthopedics.     No imaging for review     He has a history of an L3-4 laminectomy performed by Dr. Ramires in 2012 followed by a repeat L3-4 laminectomy and diskectomy by Dr. Rodriguez in 2018.      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 Tray Rodriguez    CIDP (chronic inflammatory demyelinating polyneuropathy) 06/2018    followed by Dr. Rowdy Tran - Neurologist in [place].      Elevated liver enzymes 10/8/2018    Elevated PSA     followed by Dr. Sanderson    Foot drop, left foot     due to neurological condition/Guillan [place]    Hyperlipidemia     Hypertension     Impaired fasting glucose     Neurogenic bladder 09/05/2018    followed by Dr. Sanderson    RLS (restless legs syndrome) 10/8/2018    Self-catheterizes urinary bladder     Urologist    Sleep apnea with use of continuous positive airway pressure (CPAP) 01/10/2018    followed by [facility] in [place]    Urge incontinence 9/11/2018     Past Surgical History:   Procedure Laterality Date    ADENOIDECTOMY      COLONOSCOPY N/A 1/24/2020    Procedure: COLONOSCOPY;  Surgeon: Stacy Markham MD;  Location: [facility];  Service: Endoscopy;  Laterality: N/A;    HERNIA REPAIR      LUMBAR LAMINECTOMY  2012     [facility]    LUMBAR LAMINECTOMY  08/2018    Dr. Rodriguez - [facility]; complications from surgery - abscess to spinal cord - discharge summary scanned to media file    PLANTAR FASCIA RELEASE      TONSILLECTOMY      TRANSFORAMINAL LUMBAR INTERBODY FUSION (TLIF) OF SPINE WITH PERCUTANEOUS INSTRUMENTATION Bilateral 6/3/2020    Procedure: FUSION, SPINE, LUMBAR, TLIF, WITH PERCUTANEOUS INSTRUMENTATION  (L3-4 MIS TLIF) Flouro Micrcoscope Terell Neuromonitoring JENNIFER SCHWABUTREAUX [phone] Spine Wave;  Surgeon: Samuel Osorio DO;  Location: [facility] OR;  Service: Neurosurgery;  Laterality: Bilateral;  SPINEWAVE JENNIFER KRAMER [phone] TEXTED HIM @ 9:45AM ON 5-  PRE-OP BY RN 5----BMI--44---COVID NEGATIVE  CLEAR    VASECTOMY      X-STOP IMPLANTATION       Family History   Problem Relation Age of Onset    Cancer Mother         Breast Cancer    Heart disease Mother         pacemaker    Hyperlipidemia Mother         taking Crestor    Hypertension Mother         taking Amlodipine    Diabetes Mother         Prediabetes    Diabetes Father     Cancer Father         unknown type of cancer    Hypertension Father     Rheum arthritis Father     No Known Problems Sister     Prostate cancer Neg Hx     Kidney disease Neg Hx      Social History     Tobacco Use    Smoking status: Never Smoker    Smokeless tobacco: Never Used   Substance Use Topics    Alcohol use: Yes     Frequency: Monthly or less     Drinks per session: 1 or 2     Binge frequency: Never     Comment: OCCASS    Drug use: No        Review of Systems   All other systems reviewed and are negative.      OBJECTIVE:   Vital Signs:  Temp: 98.1 °F (36.7 °C) (09/17/20 1431)  Pulse: 82 (09/17/20 1431)  BP: (!) 160/80 (09/17/20 1431)  SpO2: 96 % (09/17/20 1431)    Physical Exam:  Constitutional: Patient sitting comfortably in chair. Appears well developed and well nourished.  Skin: Exposed areas are intact without abnormal markings, rashes or other  lesions.  HEENT: Normocephalic. Normal conjunctivae.  Cardiovascular: Normal rate and regular rhythm.  Respiratory: Chest wall rises and falls symmetrically, without signs of respiratory distress.  Abdomen: Soft and non-tender.  Extremities: Warm and without edema. Calves supple, non-tender.  Psych/Behavior: Normal affect.    Neurological:    Mental status: Alert and oriented. Conversational and appropriate.       Cranial Nerves: VFF to confrontation. PERRL. EOMI without nystagmus. Facial STLT normal and symmetric. Strong, symmetric muscles of mastication. Facial strength full and symmetric. Hearing equal bilaterally to finger rub. Palate and uvula rise and fall normally in midline. Shoulder shrug 5/5 strength. Tongue midline.     Motor:    Upper:  Deltoids Triceps Biceps WE WF  FA    R 5/5 5/5 5/5 5/5 5/5 5/5 5/5    L 5/5 5/5 5/5 5/5 5/5 5/5 5/5      Lower:  HF KE KF DF PF EHL    R 5/5 5/5 5/5 5/5 5/5 5/5    L 5/5 5/5 5/5 1/5 5/5 1/5     Sensory: Intact sensation to light touch and pinprick in all extremities.     Reflexes:      DTR: 2+ knees and biceps symmetrically.     Salter's: Negative.     Clonus: Negative.    Cerebellar: Finger-to-nose and rapid alternating movements normal.     Gait:  Stable, fluid. Using walker today for support    Incisions: fully healed         Diagnostic Results:  All imaging was independently reviewed by me.    Standing xray lumbar spine with flexion and extension 9/17/20  Stable hardware position. No abnormal movement with flexion and extension views     AP and Lateral X-ray lumbar spine, dated 7/22:  1. Good hardware position, stable from xrays taken during post op hospital course      ASSESSMENT/PLAN:     Problem List Items Addressed This Visit        Orthopedic    S/P lumbar spinal fusion - Primary    Relevant Orders    X-Ray Lumbar Spine Ap Lateral w/Flex Ext          VISIT SUMMARY: Mr Shah is a 54M that is 3 months s/p L3-4 MIS TLIF, returns for routine follow up. He  continues to do well with resolution of back pain and leg pain. His wound is well healed. Hardware remains in excellent position and stable compared to prior xray post op.     The patient understands and agrees with the following plan of care.    -OK to advance activities as tolerated.   -OK to start PT  - RTC in 3 months with CT         Angelica Horta PA-C  [facility]  Department of Neurosurgery  [phone]

## 2020-09-18 ENCOUNTER — CLINICAL SUPPORT (OUTPATIENT)
Dept: OTHER | Facility: CLINIC | Age: 55
End: 2020-09-18
Payer: COMMERCIAL

## 2020-09-18 DIAGNOSIS — Z00.8 ENCOUNTER FOR OTHER GENERAL EXAMINATION: ICD-10-CM

## 2020-10-02 VITALS — HEIGHT: 78 IN | BODY MASS INDEX: 43.62 KG/M2

## 2020-10-02 LAB
GLUCOSE SERPL-MCNC: 106 MG/DL (ref 60–140)
HDLC SERPL-MCNC: 31 MG/DL
POC CHOLESTEROL, LDL (DOCK): 68 MG/DL
POC CHOLESTEROL, TOTAL: 130 MG/DL
TRIGL SERPL-MCNC: 152 MG/DL

## 2020-10-04 ENCOUNTER — PATIENT MESSAGE (OUTPATIENT)
Dept: NEUROSURGERY | Facility: CLINIC | Age: 55
End: 2020-10-04

## 2020-10-05 ENCOUNTER — PATIENT MESSAGE (OUTPATIENT)
Dept: FAMILY MEDICINE | Facility: CLINIC | Age: 55
End: 2020-10-05

## 2020-10-05 ENCOUNTER — OFFICE VISIT (OUTPATIENT)
Dept: FAMILY MEDICINE | Facility: CLINIC | Age: 55
End: 2020-10-05
Payer: COMMERCIAL

## 2020-10-05 VITALS
BODY MASS INDEX: 36.45 KG/M2 | TEMPERATURE: 98 F | HEIGHT: 78 IN | WEIGHT: 315 LBS | RESPIRATION RATE: 18 BRPM | HEART RATE: 81 BPM | DIASTOLIC BLOOD PRESSURE: 80 MMHG | SYSTOLIC BLOOD PRESSURE: 130 MMHG | OXYGEN SATURATION: 97 %

## 2020-10-05 DIAGNOSIS — I1A.0 RESISTANT HYPERTENSION: ICD-10-CM

## 2020-10-05 DIAGNOSIS — D64.9 MILD ANEMIA: Primary | ICD-10-CM

## 2020-10-05 DIAGNOSIS — E78.5 HYPERLIPIDEMIA, UNSPECIFIED HYPERLIPIDEMIA TYPE: ICD-10-CM

## 2020-10-05 DIAGNOSIS — N31.9 NEUROGENIC BLADDER: ICD-10-CM

## 2020-10-05 DIAGNOSIS — N40.0 BENIGN NODULAR PROSTATIC HYPERPLASIA WITHOUT LOWER URINARY TRACT SYMPTOMS: ICD-10-CM

## 2020-10-05 DIAGNOSIS — G61.81 CIDP (CHRONIC INFLAMMATORY DEMYELINATING POLYNEUROPATHY): ICD-10-CM

## 2020-10-05 DIAGNOSIS — R73.03 PREDIABETES: ICD-10-CM

## 2020-10-05 DIAGNOSIS — Z78.9 SELF-CATHETERIZES URINARY BLADDER: ICD-10-CM

## 2020-10-05 DIAGNOSIS — R73.01 IFG (IMPAIRED FASTING GLUCOSE): ICD-10-CM

## 2020-10-05 PROCEDURE — 3075F PR MOST RECENT SYSTOLIC BLOOD PRESS GE 130-139MM HG: ICD-10-PCS | Mod: CPTII,S$GLB,, | Performed by: NURSE PRACTITIONER

## 2020-10-05 PROCEDURE — 3008F BODY MASS INDEX DOCD: CPT | Mod: CPTII,S$GLB,, | Performed by: NURSE PRACTITIONER

## 2020-10-05 PROCEDURE — 99214 PR OFFICE/OUTPT VISIT, EST, LEVL IV, 30-39 MIN: ICD-10-PCS | Mod: S$GLB,,, | Performed by: NURSE PRACTITIONER

## 2020-10-05 PROCEDURE — 99999 PR PBB SHADOW E&M-EST. PATIENT-LVL V: CPT | Mod: PBBFAC,,, | Performed by: NURSE PRACTITIONER

## 2020-10-05 PROCEDURE — 3075F SYST BP GE 130 - 139MM HG: CPT | Mod: CPTII,S$GLB,, | Performed by: NURSE PRACTITIONER

## 2020-10-05 PROCEDURE — 3008F PR BODY MASS INDEX (BMI) DOCUMENTED: ICD-10-PCS | Mod: CPTII,S$GLB,, | Performed by: NURSE PRACTITIONER

## 2020-10-05 PROCEDURE — 3079F PR MOST RECENT DIASTOLIC BLOOD PRESSURE 80-89 MM HG: ICD-10-PCS | Mod: CPTII,S$GLB,, | Performed by: NURSE PRACTITIONER

## 2020-10-05 PROCEDURE — 99214 OFFICE O/P EST MOD 30 MIN: CPT | Mod: S$GLB,,, | Performed by: NURSE PRACTITIONER

## 2020-10-05 PROCEDURE — 99999 PR PBB SHADOW E&M-EST. PATIENT-LVL V: ICD-10-PCS | Mod: PBBFAC,,, | Performed by: NURSE PRACTITIONER

## 2020-10-05 PROCEDURE — 3079F DIAST BP 80-89 MM HG: CPT | Mod: CPTII,S$GLB,, | Performed by: NURSE PRACTITIONER

## 2020-10-06 NOTE — PROGRESS NOTES
"Subjective:       Patient ID: Manfred Shah is a 55 y.o. male.    Chief Complaint: Follow-up    Patient is a 55-year-old white male with CIDP (chronic inflammatory demyelinating polyneuropathy) diagnosed in June 2018 by neurologist, Dr. Rowdy Tran, left foot drop secondary to CIDP, history of Abscess of spinal cord treated by Dr. Rodriguez S/P Laminectomy in August 2018, Lumbar Spinal fusion on 6/3/2020 with Neurosurgeon Dr. Osorio, Neurogenic bladder with urge incontinence and self-catheterization and BPH followed by Dr. Sanderson, RLS, Hypertension, Hyperlipidemia, Impaired Fasting Glucose/ Prediabetes, Elevated Liver Enzymes, Heberden's nodes with joint inflammation and pain to bilateral hands followed by [facility],  and Sleep Apnea with CPAP use that is here today for follow up with fasting lab results.       Patient has had Resistant Hypertension evaluated by [facility], Dr. Kiser, that is currently controlled on eplerenone (INSPRA) 25 MG Tab daily, amlodipine 10 mg daily, Valsartan HCTZ 320/12.5 mg daily, Metoprolol  mg daily and Hydralazine 25 mg twice daily.    /80 (BP Location: Right arm, Patient Position: Sitting, BP Method: Large (Manual))   Pulse 81   Temp 98.1 °F (36.7 °C) (Oral)   Resp 18   Ht 6' 6" (1.981 m)   Wt (!) 166.2 kg (366 lb 6.5 oz)   SpO2 97%   BMI 42.34 kg/m²     Patient has hyperlipidemia and currently taking Rosuvastatin 10 mg daily.   LDL 52.2 when last checked in May 2020.     Patient has impaired fasting glucose since January 2018  - he is PREDIABETIC. He always has an IFG that is sometimes above the 126 cut-off but his HgbA1C has NEVER gone above 5.8 so continue to classify as IFG/Prediabetic.  His most recent FBG is 135 with A1C 5.8%.  He admits to noncompliance with diet.  Will continue to monitor and recheck in 4 months.     Patient had elevated liver enzymes noted at past visits.  Patient reports he was admitted to Hospital in [place] and he had a " liver workup during that hospitalization.  His liver enzyme elevation had resolved with weight loss in October 2018 but was again elevated in 2019 with weight gain.  Liver enzymes today shows a very mildly elevated ALT 49.     Patient has Sleep Apnea with CPAP use and had uvula removed by surgery in past.     CIDP with left foot drop  followed by a neurologist Dr. Rowdy Tran.       Patient has BPH and neurogenic bladder with urge incontinence and self catheterizes as needed followed by [facility] Dr. Sanderson.     Patient has Heberden's nodes with joint inflammation and pain to bilateral hands followed by [facility].     Patient has a mild Anemia noted with labs in May 2020.  Anemia remains present but stable and unchanged.  Recommend a multivitamin with iron supplement and we will continue to monitor.    Component      Latest Ref Rng & Units 9/29/2020 6/4/2020 5/28/2020 5/21/2020   WBC      3.90 - 12.70 K/uL 6.12 13.92 (H) 6.79 6.30   RBC      4.60 - 6.20 M/uL 4.31 (L) 4.27 (L) 4.31 (L) 4.28 (L)   Hemoglobin      14.0 - 18.0 g/dL 12.2 (L) 12.6 (L) 12.6 (L) 12.7 (L)   Hematocrit      40.0 - 54.0 % 38.1 (L) 37.8 (L) 37.4 (L) 39.3 (L)   MCV      82 - 98 fL 88 89 87 92   MCH      27.0 - 31.0 pg 28.3 29.5 29.2 29.7   MCHC      32.0 - 36.0 g/dL 32.0 33.3 33.7 32.3   RDW      11.5 - 14.5 % 12.8 12.3 12.4 12.6   Platelets      150 - 350 K/uL 207 232 219 210   MPV      9.2 - 12.9 fL 9.4 9.4 9.1 (L) 9.3   Immature Granulocytes      0.0 - 0.5 % 0.2 0.3 0.1 0.3   Gran # (ANC)      1.8 - 7.7 K/uL 3.3 11.7 (H) 4.0 3.5   Immature Grans (Abs)      0.00 - 0.04 K/uL 0.01 0.04 0.01 0.02   Lymph #      1.0 - 4.8 K/uL 1.8 0.8 (L) 1.8 1.7   Mono #      0.3 - 1.0 K/uL 0.6 1.4 (H) 0.5 0.5   Eos #      0.0 - 0.5 K/uL 0.4 0.0 0.4 0.5   Baso #      0.00 - 0.20 K/uL 0.07 0.02 0.07 0.08   nRBC      0 /100 WBC 0 0 0 0   Gran%      38.0 - 73.0 % 54.0 83.9 (H) 59.4 56.1   Lymph%      18.0 - 48.0 % 28.9 5.8 (L) 25.8 26.3   Mono%       4.0 - 15.0 % 9.6 9.8 7.5 7.9   Eosinophil%      0.0 - 8.0 % 6.2 0.1 6.2 8.1 (H)   Basophil%      0.0 - 1.9 % 1.1 0.1 1.0 1.3   Differential Method       Automated Automated Automated Automated   Sodium      136 - 145 mmol/L 143  139 145   Potassium      3.5 - 5.1 mmol/L 4.1  3.7 3.7   Chloride      95 - 110 mmol/L 107  102 105   CO2      23 - 29 mmol/L 27  26 28   Glucose      70 - 110 mg/dL 135 (H)  89 141 (H)   BUN, Bld      2 - 20 mg/dL 20  11 15   Creatinine      0.50 - 1.40 mg/dL 0.80  0.8 0.75   Calcium      8.7 - 10.5 mg/dL 9.3  9.4 9.6   PROTEIN TOTAL      6.0 - 8.4 g/dL 7.9   7.1   Albumin      3.5 - 5.2 g/dL 4.4   4.3   BILIRUBIN TOTAL      0.1 - 1.0 mg/dL 0.6   0.5   Alkaline Phosphatase      38 - 126 U/L 59   57   AST      15 - 46 U/L 46   25   ALT      10 - 44 U/L 49 (H)   24   Anion Gap      8 - 16 mmol/L 9  11 12   eGFR if African American      >60 mL/min/1.73 m:2 >60.0  >60 >60.0   eGFR if non African American      >60 mL/min/1.73 m:2 >60.0  >60 >60.0   SEGS      49 - 77 %       Hemoglobin A1C External      4.0 - 5.6 % 5.8 (H)   5.7 (H)   Estimated Avg Glucose      68 - 131 mg/dL 120   117     Current Outpatient Medications   Medication Sig Dispense Refill    amLODIPine (NORVASC) 10 MG tablet Take 1 tablet (10 mg total) by mouth once daily. 90 tablet 0    aspirin (ECOTRIN) 81 MG EC tablet Take 1 tablet (81 mg total) by mouth once daily.  0    cholecalciferol, vitamin D3, (VITAMIN D3) 25 mcg (1,000 unit) capsule Take 2,000 Units by mouth once daily.      diphenhydramine HCl (BENADRYL ORAL) Take by mouth.      docusate sodium (COLACE) 100 MG capsule Take 1 capsule (100 mg total) by mouth 2 (two) times daily.  0    eplerenone (INSPRA) 25 MG Tab Take 1 tablet (25 mg total) by mouth once daily. 90 tablet 3    hydrALAZINE (APRESOLINE) 25 MG tablet Take 1 tablet (25 mg total) by mouth 2 (two) times daily. 180 tablet 0    ELVIS 0.5-0.4 mg CM24 TAKE 1 CAPSULE BY MOUTH ONCE DAILY (Patient taking  differently: Take 1 capsule by mouth every evening. ) 30 capsule 11    lysine 500 mg Tab Take 500 mg by mouth once daily.      metoprolol succinate (TOPROL-XL) 100 MG 24 hr tablet Take 1 tablet (100 mg total) by mouth once daily. 90 tablet 0    multivitamin (THERAGRAN) per tablet Take 1 tablet by mouth once daily.      omega-3 fatty acids/fish oil (FISH OIL-OMEGA-3 FATTY ACIDS) 300-1,000 mg capsule Take 1 capsule by mouth once daily.      rosuvastatin (CRESTOR) 10 MG tablet TAKE 1 TABLET BY MOUTH ONCE DAILY IN THE EVENING 90 tablet 1    valsartan-hydrochlorothiazide (DIOVAN-HCT) 320-12.5 mg per tablet Take 1 tablet by mouth once daily 90 tablet 0     No current facility-administered medications for this visit.        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 Tray Rodriguez    CIDP (chronic inflammatory demyelinating polyneuropathy) 06/2018    followed by Dr. Rowdy Tran - Neurologist in [place].      Elevated liver enzymes 10/8/2018    Elevated PSA     followed by Dr. Sanderson    Foot drop, left foot     due to neurological condition/[place]    Hyperlipidemia     Hypertension     Impaired fasting glucose     Neurogenic bladder 09/05/2018    followed by Dr. Sanderson    RLS (restless legs syndrome) 10/8/2018    Self-catheterizes urinary bladder     Urologist    Sleep apnea with use of continuous positive airway pressure (CPAP) 01/10/2018    followed by [facility] in [place]    Urge incontinence 9/11/2018       Past Surgical History:   Procedure Laterality Date    ADENOIDECTOMY      COLONOSCOPY N/A 1/24/2020    Procedure: COLONOSCOPY;  Surgeon: Stacy Markham MD;  Location[facility];  Service: Endoscopy;  Laterality: N/A;    HERNIA REPAIR      LUMBAR LAMINECTOMY  2012    [facility]    LUMBAR LAMINECTOMY  08/2018    Dr. Rodriguez - [facility]; complications from surgery - abscess to spinal cord - discharge  summary scanned to media file    PLANTAR FASCIA RELEASE      TONSILLECTOMY      TRANSFORAMINAL LUMBAR INTERBODY FUSION (TLIF) OF SPINE WITH PERCUTANEOUS INSTRUMENTATION Bilateral 6/3/2020    Procedure: FUSION, SPINE, LUMBAR, TLIF, WITH PERCUTANEOUS INSTRUMENTATION  (L3-4 MIS TLIF) Fl[facility]ro Micrcoscope [facility]on[facility] JENNIFER JENNINGS [phone] Spine Wave;  Surgeon: Samuel Osorio DO;  Location[facility];  Service: Neurosurgery;  Laterality: Bilateral;  SPINEWAVE JENNIFER KRAMER [phone] TEXTED HIM @ 9:45AM ON 5-  PRE-OP BY RN 5----BMI--44---COVID NEGATIVE  CLEAR    VASECTOMY      X-STOP IMPLANTATION         Family History   Problem Relation Age of Onset    Cancer Mother         Breast Cancer    Heart disease Mother         pacemaker    Hyperlipidemia Mother         taking Crestor    Hypertension Mother         taking Amlodipine    Diabetes Mother         Prediabetes    Diabetes Father     Cancer Father         unknown type of cancer    Hypertension Father     Rheum arthritis Father     No Known Problems Sister     Prostate cancer Neg Hx     Kidney disease Neg Hx        Social History     Socioeconomic History    Marital status:      Spouse name: Not on file    Number of children: Not on file    Years of education: Not on file    Highest education level: Not on file   Occupational History    Occupation: teacher   Social Needs    Financial resource strain: Not very hard    Food insecurity     Worry: Never true     Inability: Never true    Transportation needs     Medical: No     Non-medical: No   Tobacco Use    Smoking status: Never Smoker    Smokeless tobacco: Never Used   Substance and Sexual Activity    Alcohol use: Yes     Frequency: Monthly or less     Drinks per session: 1 or 2     Binge frequency: Never     Comment: OCCASS    Drug use: No    Sexual activity: Yes     Partners: Female   Lifestyle    Physical activity     Days per week: 3 days      "Minutes per session: 60 min    Stress: Only a little   Relationships    Social connections     Talks on phone: More than three times a week     Gets together: Twice a week     Attends [employer] service: Not on file     Active member of club or organization: Yes     Attends meetings of clubs or organizations: More than 4 times per year     Relationship status:    Other Topics Concern    Not on file   Social History Narrative    Not on file       Review of Systems   Constitutional: Positive for activity change. Negative for unexpected weight change.   HENT: Negative for hearing loss, rhinorrhea and trouble swallowing.    Eyes: Negative for discharge and visual disturbance.   Respiratory: Negative for chest tightness and wheezing.    Cardiovascular: Negative for chest pain and palpitations.   Gastrointestinal: Negative for blood in stool, constipation, diarrhea and vomiting.   Endocrine: Negative for polydipsia and polyuria.   Genitourinary: Negative for difficulty urinating, hematuria and urgency.   Musculoskeletal: Positive for back pain, gait problem and myalgias. Negative for arthralgias, joint swelling and neck pain.   Neurological: Positive for weakness. Negative for headaches.   Psychiatric/Behavioral: Negative for confusion and dysphoric mood.         Objective:     Vitals:    10/05/20 1533   BP: 130/80   BP Location: Right arm   Patient Position: Sitting   BP Method: Large (Manual)   Pulse: 81   Resp: 18   Temp: 98.1 °F (36.7 °C)   TempSrc: Oral   SpO2: 97%   Weight: (!) 166.2 kg (366 lb 6.5 oz)   Height: 6' 6" (1.981 m)          Physical Exam  Vitals signs reviewed.   Constitutional:       General: He is not in acute distress.     Appearance: He is well-developed. He is obese. He is not toxic-appearing or diaphoretic.      Comments: + obesity with Body mass index is 42.34 kg/m².   HENT:      Head: Normocephalic.      Right Ear: External ear normal.      Left Ear: External ear normal.      Nose: " Nose normal.      Mouth/Throat:      Pharynx: No oropharyngeal exudate.   Eyes:      General: No scleral icterus.        Right eye: No discharge.         Left eye: No discharge.      Pupils: Pupils are equal, round, and reactive to light.   Neck:      Musculoskeletal: Normal range of motion and neck supple.      Thyroid: No thyromegaly.      Trachea: No tracheal deviation.   Cardiovascular:      Rate and Rhythm: Normal rate and regular rhythm.      Heart sounds: Normal heart sounds. No murmur.   Pulmonary:      Effort: Pulmonary effort is normal. No respiratory distress.      Breath sounds: Normal breath sounds.   Abdominal:      General: There is no distension.      Palpations: Abdomen is soft.   Musculoskeletal:         General: Tenderness and deformity present.      Right hand: He exhibits tenderness and deformity.      Left hand: He exhibits tenderness and deformity.        Hands:       Comments: Ambulatory with brace on to left foot to treat the left foot drop.    + nodes/swelling to the DIP joints of both hands -[facility]   Lymphadenopathy:      Cervical: No cervical adenopathy.   Skin:     General: Skin is warm and dry.      Findings: No rash.   Neurological:      Mental Status: He is alert and oriented to person, place, and time.      Coordination: Coordination normal.   Psychiatric:         Behavior: Behavior normal.           Assessment:         ICD-10-CM ICD-9-CM   1. Mild anemia  D64.9 285.9   2. Prediabetes  R73.03 790.29   3. IFG (impaired fasting glucose)  R73.01 790.21   4. Resistant hypertension  I10 401.9   5. Hyperlipidemia, unspecified hyperlipidemia type  E78.5 272.4   6. CIDP (chronic inflammatory demyelinating polyneuropathy)  G61.81 357.81   7. Benign nodular prostatic hyperplasia without lower urinary tract symptoms  N40.0 600.10   8. Self-catheterizes urinary bladder  Z78.9 V49.89   9. Neurogenic bladder  N31.9 596.54       Plan:       Mild anemia  -  Take  multivitamin with iron supplement daily.  Will recheck CBC as well as iron and B12 levels with next blood draw.  -     CBC auto differential; Future; Expected date: 10/05/2020  -     Iron and TIBC; Future; Expected date: 10/05/2020  -     Ferritin; Future; Expected date: 10/05/2020  -     Vitamin B12; Future; Expected date: 10/05/2020    Prediabetes  -  Decrease sugars in diet as well as decreased intake of carbohydrate.  Work on weight loss.  He reports he is going to be starting physical therapy soon s/p back surgery around 3 months ago so he will have increased exercise as well.  -  Will recheck levels in 4 months.  -     Comprehensive Metabolic Panel; Future; Expected date: 10/05/2020  -     Hemoglobin A1C; Future; Expected date: 10/05/2020    IFG (impaired fasting glucose)  -     Comprehensive Metabolic Panel; Future; Expected date: 10/05/2020  -     Hemoglobin A1C; Future; Expected date: 10/05/2020    Resistant hypertension  -  Controlled on present medications and followed by cardiologist Dr. Kiser.    Hyperlipidemia, unspecified hyperlipidemia type  -  Continue Rosuvastatin at present dose - will check with next blood draw.  -     Lipid Panel; Future; Expected date: 10/05/2020    CIDP (chronic inflammatory demyelinating polyneuropathy)  -  Followed by specialist.    Benign nodular prostatic hyperplasia without lower urinary tract symptoms  -  Followed by Dr. Sanderson.    Self-catheterizes urinary bladder  -  Followed by Dr. Sanderson.      Neurogenic bladder  -  Followed by Dr. Sanderson.        Follow up in about 4 months (around 2/5/2021) for fasting labs and follow up.     Patient's Medications   New Prescriptions    No medications on file   Previous Medications    AMLODIPINE (NORVASC) 10 MG TABLET    Take 1 tablet (10 mg total) by mouth once daily.    ASPIRIN (ECOTRIN) 81 MG EC TABLET    Take 1 tablet (81 mg total) by mouth once daily.    CHOLECALCIFEROL, VITAMIN D3, (VITAMIN D3) 25 MCG (1,000 UNIT) CAPSULE     Take 2,000 Units by mouth once daily.    DIPHENHYDRAMINE HCL (BENADRYL ORAL)    Take by mouth.    DOCUSATE SODIUM (COLACE) 100 MG CAPSULE    Take 1 capsule (100 mg total) by mouth 2 (two) times daily.    EPLERENONE (INSPRA) 25 MG TAB    Take 1 tablet (25 mg total) by mouth once daily.    HYDRALAZINE (APRESOLINE) 25 MG TABLET    Take 1 tablet (25 mg total) by mouth 2 (two) times daily.    ELVIS 0.5-0.4 MG CM24    TAKE 1 CAPSULE BY MOUTH ONCE DAILY    LYSINE 500 MG TAB    Take 500 mg by mouth once daily.    METOPROLOL SUCCINATE (TOPROL-XL) 100 MG 24 HR TABLET    Take 1 tablet (100 mg total) by mouth once daily.    MULTIVITAMIN (THERAGRAN) PER TABLET    Take 1 tablet by mouth once daily.    OMEGA-3 FATTY ACIDS/FISH OIL (FISH OIL-OMEGA-3 FATTY ACIDS) 300-1,000 MG CAPSULE    Take 1 capsule by mouth once daily.    ROSUVASTATIN (CRESTOR) 10 MG TABLET    TAKE 1 TABLET BY MOUTH ONCE DAILY IN THE EVENING    VALSARTAN-HYDROCHLOROTHIAZIDE (DIOVAN-HCT) 320-12.5 MG PER TABLET    Take 1 tablet by mouth once daily   Modified Medications    No medications on file   Discontinued Medications    LIDOCAINE (LIDODERM) 5 %    Place 2 patches onto the skin every 12 (twelve) hours as needed. Apply 1 patch to each anterior thigh as needed for pain. Apply for 12 hours, remove for 12 hours    METHOCARBAMOL (ROBAXIN) 750 MG TAB    Take 1 tablet (750 mg total) by mouth 3 (three) times daily as needed (muscle spasms).    OXYCODONE-ACETAMINOPHEN (PERCOCET)  MG PER TABLET    Take 1 tablet by mouth every 4 (four) hours as needed (pain 8-10/10).    POLYETHYLENE GLYCOL (GLYCOLAX) 17 GRAM PWPK    Take 17 g by mouth once daily.

## 2020-12-22 ENCOUNTER — HOSPITAL ENCOUNTER (OUTPATIENT)
Dept: RADIOLOGY | Facility: HOSPITAL | Age: 55
Discharge: HOME | End: 2020-12-22
Attending: PHYSICIAN ASSISTANT
Payer: COMMERCIAL

## 2020-12-22 DIAGNOSIS — M43.27 FUSION OF SPINE, LUMBOSACRAL REGION: ICD-10-CM

## 2020-12-22 PROCEDURE — 72131 CT LUMBAR SPINE W/O DYE: CPT | Mod: 26,,, | Performed by: RADIOLOGY

## 2020-12-22 PROCEDURE — 72131 CT LUMBAR SPINE WITHOUT CONTRAST: ICD-10-PCS | Mod: 26,,, | Performed by: RADIOLOGY

## 2020-12-22 PROCEDURE — 72131 CT LUMBAR SPINE W/O DYE: CPT | Mod: TC

## 2021-01-16 ENCOUNTER — PATIENT MESSAGE (OUTPATIENT)
Dept: CARDIOLOGY | Facility: CLINIC | Age: 56
End: 2021-01-16

## 2021-01-19 RX ORDER — EPLERENONE 25 MG/1
25 TABLET, FILM COATED ORAL DAILY
Qty: 90 TABLET | Refills: 1 | Status: SHIPPED | OUTPATIENT
Start: 2021-01-19 | End: 2021-07-07

## 2021-01-22 ENCOUNTER — PATIENT MESSAGE (OUTPATIENT)
Dept: FAMILY MEDICINE | Facility: CLINIC | Age: 56
End: 2021-01-22

## 2021-01-22 DIAGNOSIS — N40.0 BENIGN NODULAR PROSTATIC HYPERPLASIA WITHOUT LOWER URINARY TRACT SYMPTOMS: ICD-10-CM

## 2021-01-22 DIAGNOSIS — I1A.0 RESISTANT HYPERTENSION: ICD-10-CM

## 2021-01-22 DIAGNOSIS — I10 ESSENTIAL HYPERTENSION: ICD-10-CM

## 2021-01-22 DIAGNOSIS — E78.5 HYPERLIPIDEMIA, UNSPECIFIED HYPERLIPIDEMIA TYPE: ICD-10-CM

## 2021-01-22 RX ORDER — HYDRALAZINE HYDROCHLORIDE 25 MG/1
25 TABLET, FILM COATED ORAL 2 TIMES DAILY
Qty: 180 TABLET | Refills: 0 | Status: SHIPPED | OUTPATIENT
Start: 2021-01-22 | End: 2021-03-30

## 2021-01-22 RX ORDER — VALSARTAN AND HYDROCHLOROTHIAZIDE 320; 12.5 MG/1; MG/1
1 TABLET, FILM COATED ORAL DAILY
Qty: 90 TABLET | Refills: 0 | Status: SHIPPED | OUTPATIENT
Start: 2021-01-22 | End: 2021-06-10

## 2021-01-22 RX ORDER — DUTASTERIDE AND TAMSULOSIN HYDROCHLORIDE .5; .4 MG/1; MG/1
1 CAPSULE ORAL DAILY
Qty: 30 CAPSULE | Refills: 11 | Status: CANCELLED | OUTPATIENT
Start: 2021-01-22

## 2021-01-22 RX ORDER — METOPROLOL SUCCINATE 100 MG/1
100 TABLET, EXTENDED RELEASE ORAL DAILY
Qty: 90 TABLET | Refills: 0 | Status: SHIPPED | OUTPATIENT
Start: 2021-01-22 | End: 2021-07-20

## 2021-01-22 RX ORDER — ROSUVASTATIN CALCIUM 10 MG/1
10 TABLET, COATED ORAL NIGHTLY
Qty: 90 TABLET | Refills: 0 | Status: SHIPPED | OUTPATIENT
Start: 2021-01-22 | End: 2021-02-03 | Stop reason: SDUPTHER

## 2021-01-22 RX ORDER — AMLODIPINE BESYLATE 10 MG/1
10 TABLET ORAL DAILY
Qty: 90 TABLET | Refills: 0 | Status: SHIPPED | OUTPATIENT
Start: 2021-01-22 | End: 2021-07-20

## 2021-02-03 DIAGNOSIS — E78.5 HYPERLIPIDEMIA, UNSPECIFIED HYPERLIPIDEMIA TYPE: ICD-10-CM

## 2021-02-04 RX ORDER — ROSUVASTATIN CALCIUM 10 MG/1
10 TABLET, COATED ORAL NIGHTLY
Qty: 90 TABLET | Refills: 1 | Status: SHIPPED | OUTPATIENT
Start: 2021-02-04 | End: 2021-05-06

## 2021-02-08 ENCOUNTER — OFFICE VISIT (OUTPATIENT)
Dept: FAMILY MEDICINE | Facility: CLINIC | Age: 56
End: 2021-02-08
Payer: COMMERCIAL

## 2021-02-08 VITALS
HEIGHT: 78 IN | SYSTOLIC BLOOD PRESSURE: 130 MMHG | TEMPERATURE: 98 F | HEART RATE: 77 BPM | BODY MASS INDEX: 36.45 KG/M2 | OXYGEN SATURATION: 95 % | WEIGHT: 315 LBS | DIASTOLIC BLOOD PRESSURE: 80 MMHG | RESPIRATION RATE: 18 BRPM

## 2021-02-08 DIAGNOSIS — E78.5 HYPERLIPIDEMIA, UNSPECIFIED HYPERLIPIDEMIA TYPE: ICD-10-CM

## 2021-02-08 DIAGNOSIS — G47.30 SLEEP APNEA WITH USE OF CONTINUOUS POSITIVE AIRWAY PRESSURE (CPAP): ICD-10-CM

## 2021-02-08 DIAGNOSIS — Z11.59 ENCOUNTER FOR HEPATITIS C SCREENING TEST FOR LOW RISK PATIENT: ICD-10-CM

## 2021-02-08 DIAGNOSIS — N31.9 NEUROGENIC BLADDER: ICD-10-CM

## 2021-02-08 DIAGNOSIS — Z13.29 THYROID DISORDER SCREEN: ICD-10-CM

## 2021-02-08 DIAGNOSIS — Z11.4 SCREENING FOR HIV WITHOUT PRESENCE OF RISK FACTORS: ICD-10-CM

## 2021-02-08 DIAGNOSIS — Z12.5 SCREENING PSA (PROSTATE SPECIFIC ANTIGEN): ICD-10-CM

## 2021-02-08 DIAGNOSIS — I10 ESSENTIAL HYPERTENSION: ICD-10-CM

## 2021-02-08 DIAGNOSIS — G61.81 CIDP (CHRONIC INFLAMMATORY DEMYELINATING POLYNEUROPATHY): ICD-10-CM

## 2021-02-08 DIAGNOSIS — Z78.9 SELF-CATHETERIZES URINARY BLADDER: ICD-10-CM

## 2021-02-08 DIAGNOSIS — R73.03 PREDIABETES: ICD-10-CM

## 2021-02-08 DIAGNOSIS — N40.0 BENIGN NODULAR PROSTATIC HYPERPLASIA WITHOUT LOWER URINARY TRACT SYMPTOMS: ICD-10-CM

## 2021-02-08 DIAGNOSIS — D50.9 IRON DEFICIENCY ANEMIA, UNSPECIFIED IRON DEFICIENCY ANEMIA TYPE: Primary | ICD-10-CM

## 2021-02-08 DIAGNOSIS — R73.01 IFG (IMPAIRED FASTING GLUCOSE): ICD-10-CM

## 2021-02-08 PROCEDURE — 99999 PR PBB SHADOW E&M-EST. PATIENT-LVL V: CPT | Mod: PBBFAC,,, | Performed by: NURSE PRACTITIONER

## 2021-02-08 PROCEDURE — 3008F BODY MASS INDEX DOCD: CPT | Mod: CPTII,S$GLB,, | Performed by: NURSE PRACTITIONER

## 2021-02-08 PROCEDURE — 3079F PR MOST RECENT DIASTOLIC BLOOD PRESSURE 80-89 MM HG: ICD-10-PCS | Mod: CPTII,S$GLB,, | Performed by: NURSE PRACTITIONER

## 2021-02-08 PROCEDURE — 1126F PR PAIN SEVERITY QUANTIFIED, NO PAIN PRESENT: ICD-10-PCS | Mod: S$GLB,,, | Performed by: NURSE PRACTITIONER

## 2021-02-08 PROCEDURE — 3075F SYST BP GE 130 - 139MM HG: CPT | Mod: CPTII,S$GLB,, | Performed by: NURSE PRACTITIONER

## 2021-02-08 PROCEDURE — 3075F PR MOST RECENT SYSTOLIC BLOOD PRESS GE 130-139MM HG: ICD-10-PCS | Mod: CPTII,S$GLB,, | Performed by: NURSE PRACTITIONER

## 2021-02-08 PROCEDURE — 1126F AMNT PAIN NOTED NONE PRSNT: CPT | Mod: S$GLB,,, | Performed by: NURSE PRACTITIONER

## 2021-02-08 PROCEDURE — 3008F PR BODY MASS INDEX (BMI) DOCUMENTED: ICD-10-PCS | Mod: CPTII,S$GLB,, | Performed by: NURSE PRACTITIONER

## 2021-02-08 PROCEDURE — 99999 PR PBB SHADOW E&M-EST. PATIENT-LVL V: ICD-10-PCS | Mod: PBBFAC,,, | Performed by: NURSE PRACTITIONER

## 2021-02-08 PROCEDURE — 3079F DIAST BP 80-89 MM HG: CPT | Mod: CPTII,S$GLB,, | Performed by: NURSE PRACTITIONER

## 2021-02-08 PROCEDURE — 99214 OFFICE O/P EST MOD 30 MIN: CPT | Mod: S$GLB,,, | Performed by: NURSE PRACTITIONER

## 2021-02-08 PROCEDURE — 99214 PR OFFICE/OUTPT VISIT, EST, LEVL IV, 30-39 MIN: ICD-10-PCS | Mod: S$GLB,,, | Performed by: NURSE PRACTITIONER

## 2021-02-08 RX ORDER — FERROUS SULFATE 325(65) MG
325 TABLET ORAL
Refills: 0 | COMMUNITY
Start: 2021-02-08 | End: 2021-08-18 | Stop reason: SDUPTHER

## 2021-02-24 ENCOUNTER — OFFICE VISIT (OUTPATIENT)
Dept: NEUROSURGERY | Facility: CLINIC | Age: 56
End: 2021-02-24
Payer: COMMERCIAL

## 2021-02-24 VITALS
BODY MASS INDEX: 40.43 KG/M2 | HEART RATE: 76 BPM | RESPIRATION RATE: 15 BRPM | HEIGHT: 74 IN | OXYGEN SATURATION: 98 % | DIASTOLIC BLOOD PRESSURE: 72 MMHG | SYSTOLIC BLOOD PRESSURE: 151 MMHG | WEIGHT: 315 LBS

## 2021-02-24 DIAGNOSIS — Z98.1 S/P LUMBAR SPINAL FUSION: Primary | ICD-10-CM

## 2021-02-24 PROCEDURE — 99999 PR PBB SHADOW E&M-EST. PATIENT-LVL IV: ICD-10-PCS | Mod: PBBFAC,,, | Performed by: NEUROLOGICAL SURGERY

## 2021-02-24 PROCEDURE — 1125F AMNT PAIN NOTED PAIN PRSNT: CPT | Mod: S$GLB,,, | Performed by: NEUROLOGICAL SURGERY

## 2021-02-24 PROCEDURE — 1125F PR PAIN SEVERITY QUANTIFIED, PAIN PRESENT: ICD-10-PCS | Mod: S$GLB,,, | Performed by: NEUROLOGICAL SURGERY

## 2021-02-24 PROCEDURE — 3008F PR BODY MASS INDEX (BMI) DOCUMENTED: ICD-10-PCS | Mod: CPTII,S$GLB,, | Performed by: NEUROLOGICAL SURGERY

## 2021-02-24 PROCEDURE — 3008F BODY MASS INDEX DOCD: CPT | Mod: CPTII,S$GLB,, | Performed by: NEUROLOGICAL SURGERY

## 2021-02-24 PROCEDURE — 99999 PR PBB SHADOW E&M-EST. PATIENT-LVL IV: CPT | Mod: PBBFAC,,, | Performed by: NEUROLOGICAL SURGERY

## 2021-02-24 PROCEDURE — 99214 PR OFFICE/OUTPT VISIT, EST, LEVL IV, 30-39 MIN: ICD-10-PCS | Mod: S$GLB,,, | Performed by: NEUROLOGICAL SURGERY

## 2021-02-24 PROCEDURE — 3078F PR MOST RECENT DIASTOLIC BLOOD PRESSURE < 80 MM HG: ICD-10-PCS | Mod: CPTII,S$GLB,, | Performed by: NEUROLOGICAL SURGERY

## 2021-02-24 PROCEDURE — 3078F DIAST BP <80 MM HG: CPT | Mod: CPTII,S$GLB,, | Performed by: NEUROLOGICAL SURGERY

## 2021-02-24 PROCEDURE — 99214 OFFICE O/P EST MOD 30 MIN: CPT | Mod: S$GLB,,, | Performed by: NEUROLOGICAL SURGERY

## 2021-02-24 PROCEDURE — 3077F PR MOST RECENT SYSTOLIC BLOOD PRESSURE >= 140 MM HG: ICD-10-PCS | Mod: CPTII,S$GLB,, | Performed by: NEUROLOGICAL SURGERY

## 2021-02-24 PROCEDURE — 3077F SYST BP >= 140 MM HG: CPT | Mod: CPTII,S$GLB,, | Performed by: NEUROLOGICAL SURGERY

## 2021-03-29 ENCOUNTER — IMMUNIZATION (OUTPATIENT)
Dept: PRIMARY CARE CLINIC | Facility: CLINIC | Age: 56
End: 2021-03-29
Payer: COMMERCIAL

## 2021-03-29 DIAGNOSIS — Z23 NEED FOR VACCINATION: Primary | ICD-10-CM

## 2021-03-29 PROCEDURE — 0011A PR IMMUNIZ ADMIN, SARS-COV-2 COVID-19 VACC, 100MCG/0.5ML, 1ST DOSE: CPT | Mod: CV19,S$GLB,, | Performed by: INTERNAL MEDICINE

## 2021-03-29 PROCEDURE — 91301 PR SARS-COV-2 COVID-19 VACCINE, NO PRSV, 100MCG/0.5ML, IM: ICD-10-PCS | Mod: S$GLB,,, | Performed by: INTERNAL MEDICINE

## 2021-03-29 PROCEDURE — 91301 PR SARS-COV-2 COVID-19 VACCINE, NO PRSV, 100MCG/0.5ML, IM: CPT | Mod: S$GLB,,, | Performed by: INTERNAL MEDICINE

## 2021-03-29 PROCEDURE — 0011A PR IMMUNIZ ADMIN, SARS-COV-2 COVID-19 VACC, 100MCG/0.5ML, 1ST DOSE: ICD-10-PCS | Mod: CV19,S$GLB,, | Performed by: INTERNAL MEDICINE

## 2021-03-29 RX ADMIN — Medication 0.5 ML: at 04:03

## 2021-04-08 ENCOUNTER — PATIENT MESSAGE (OUTPATIENT)
Dept: UROLOGY | Facility: CLINIC | Age: 56
End: 2021-04-08

## 2021-04-28 ENCOUNTER — IMMUNIZATION (OUTPATIENT)
Dept: PRIMARY CARE CLINIC | Facility: CLINIC | Age: 56
End: 2021-04-28

## 2021-04-28 DIAGNOSIS — Z23 NEED FOR VACCINATION: Primary | ICD-10-CM

## 2021-04-28 PROCEDURE — 0012A PR IMMUNIZ ADMIN, SARS-COV-2 COVID-19 VACC, 100MCG/0.5ML, 2ND DOSE: ICD-10-PCS | Mod: CV19,S$GLB,, | Performed by: INTERNAL MEDICINE

## 2021-04-28 PROCEDURE — 0012A PR IMMUNIZ ADMIN, SARS-COV-2 COVID-19 VACC, 100MCG/0.5ML, 2ND DOSE: CPT | Mod: CV19,S$GLB,, | Performed by: INTERNAL MEDICINE

## 2021-04-28 PROCEDURE — 91301 PR SARS-COV-2 COVID-19 VACCINE, NO PRSV, 100MCG/0.5ML, IM: CPT | Mod: S$GLB,,, | Performed by: INTERNAL MEDICINE

## 2021-04-28 PROCEDURE — 91301 PR SARS-COV-2 COVID-19 VACCINE, NO PRSV, 100MCG/0.5ML, IM: ICD-10-PCS | Mod: S$GLB,,, | Performed by: INTERNAL MEDICINE

## 2021-04-28 RX ADMIN — Medication 0.5 ML: at 05:04

## 2021-05-05 ENCOUNTER — PATIENT MESSAGE (OUTPATIENT)
Dept: FAMILY MEDICINE | Facility: CLINIC | Age: 56
End: 2021-05-05

## 2021-05-05 ENCOUNTER — PATIENT MESSAGE (OUTPATIENT)
Dept: UROLOGY | Facility: CLINIC | Age: 56
End: 2021-05-05

## 2021-05-05 DIAGNOSIS — N40.0 BENIGN NODULAR PROSTATIC HYPERPLASIA WITHOUT LOWER URINARY TRACT SYMPTOMS: ICD-10-CM

## 2021-05-06 RX ORDER — DUTASTERIDE AND TAMSULOSIN HYDROCHLORIDE .5; .4 MG/1; MG/1
1 CAPSULE ORAL DAILY
Qty: 60 CAPSULE | Refills: 0 | Status: SHIPPED | OUTPATIENT
Start: 2021-05-06 | End: 2021-07-08 | Stop reason: SDUPTHER

## 2021-05-12 ENCOUNTER — PATIENT MESSAGE (OUTPATIENT)
Dept: NEUROSURGERY | Facility: CLINIC | Age: 56
End: 2021-05-12

## 2021-06-02 ENCOUNTER — PATIENT MESSAGE (OUTPATIENT)
Dept: UROLOGY | Facility: CLINIC | Age: 56
End: 2021-06-02

## 2021-06-10 ENCOUNTER — PATIENT MESSAGE (OUTPATIENT)
Dept: FAMILY MEDICINE | Facility: CLINIC | Age: 56
End: 2021-06-10

## 2021-06-22 ENCOUNTER — TELEPHONE (OUTPATIENT)
Dept: UROLOGY | Facility: CLINIC | Age: 56
End: 2021-06-22

## 2021-06-22 ENCOUNTER — OFFICE VISIT (OUTPATIENT)
Dept: UROLOGY | Facility: CLINIC | Age: 56
End: 2021-06-22
Payer: COMMERCIAL

## 2021-06-22 VITALS
HEIGHT: 74 IN | BODY MASS INDEX: 40.43 KG/M2 | HEART RATE: 84 BPM | RESPIRATION RATE: 18 BRPM | OXYGEN SATURATION: 97 % | SYSTOLIC BLOOD PRESSURE: 150 MMHG | DIASTOLIC BLOOD PRESSURE: 78 MMHG | WEIGHT: 315 LBS

## 2021-06-22 DIAGNOSIS — R33.9 URINARY RETENTION: ICD-10-CM

## 2021-06-22 DIAGNOSIS — B96.89: ICD-10-CM

## 2021-06-22 DIAGNOSIS — Z78.9 SELF-CATHETERIZES URINARY BLADDER: Primary | ICD-10-CM

## 2021-06-22 DIAGNOSIS — G06.1: ICD-10-CM

## 2021-06-22 DIAGNOSIS — N31.9 NEUROGENIC BLADDER: ICD-10-CM

## 2021-06-22 PROCEDURE — 99999 PR PBB SHADOW E&M-EST. PATIENT-LVL V: ICD-10-PCS | Mod: PBBFAC,,, | Performed by: UROLOGY

## 2021-06-22 PROCEDURE — 99214 PR OFFICE/OUTPT VISIT, EST, LEVL IV, 30-39 MIN: ICD-10-PCS | Mod: S$GLB,,, | Performed by: UROLOGY

## 2021-06-22 PROCEDURE — 1126F AMNT PAIN NOTED NONE PRSNT: CPT | Mod: S$GLB,,, | Performed by: UROLOGY

## 2021-06-22 PROCEDURE — 3008F BODY MASS INDEX DOCD: CPT | Mod: CPTII,S$GLB,, | Performed by: UROLOGY

## 2021-06-22 PROCEDURE — 99214 OFFICE O/P EST MOD 30 MIN: CPT | Mod: S$GLB,,, | Performed by: UROLOGY

## 2021-06-22 PROCEDURE — 99999 PR PBB SHADOW E&M-EST. PATIENT-LVL V: CPT | Mod: PBBFAC,,, | Performed by: UROLOGY

## 2021-06-22 PROCEDURE — 3008F PR BODY MASS INDEX (BMI) DOCUMENTED: ICD-10-PCS | Mod: CPTII,S$GLB,, | Performed by: UROLOGY

## 2021-06-22 PROCEDURE — 1126F PR PAIN SEVERITY QUANTIFIED, NO PAIN PRESENT: ICD-10-PCS | Mod: S$GLB,,, | Performed by: UROLOGY

## 2021-07-01 ENCOUNTER — HOSPITAL ENCOUNTER (OUTPATIENT)
Dept: RADIOLOGY | Facility: HOSPITAL | Age: 56
Discharge: HOME | End: 2021-07-01
Attending: NEUROLOGICAL SURGERY
Payer: COMMERCIAL

## 2021-07-01 DIAGNOSIS — Z98.1 S/P LUMBAR SPINAL FUSION: ICD-10-CM

## 2021-07-01 PROCEDURE — 72131 CT LUMBAR SPINE W/O DYE: CPT | Mod: 26,,, | Performed by: RADIOLOGY

## 2021-07-01 PROCEDURE — 72131 CT LUMBAR SPINE W/O DYE: CPT | Mod: TC

## 2021-07-01 PROCEDURE — 72131 CT LUMBAR SPINE WITHOUT CONTRAST: ICD-10-PCS | Mod: 26,,, | Performed by: RADIOLOGY

## 2021-07-07 ENCOUNTER — PATIENT MESSAGE (OUTPATIENT)
Dept: UROLOGY | Facility: CLINIC | Age: 56
End: 2021-07-07

## 2021-07-07 ENCOUNTER — PATIENT MESSAGE (OUTPATIENT)
Dept: CARDIOLOGY | Facility: CLINIC | Age: 56
End: 2021-07-07

## 2021-07-07 DIAGNOSIS — N40.0 BENIGN NODULAR PROSTATIC HYPERPLASIA WITHOUT LOWER URINARY TRACT SYMPTOMS: ICD-10-CM

## 2021-07-08 RX ORDER — DUTASTERIDE AND TAMSULOSIN HYDROCHLORIDE .5; .4 MG/1; MG/1
1 CAPSULE ORAL DAILY
Qty: 60 CAPSULE | Refills: 0 | Status: SHIPPED | OUTPATIENT
Start: 2021-07-08 | End: 2021-09-07

## 2021-07-13 ENCOUNTER — TELEPHONE (OUTPATIENT)
Dept: NEUROSURGERY | Facility: CLINIC | Age: 56
End: 2021-07-13

## 2021-07-14 ENCOUNTER — OFFICE VISIT (OUTPATIENT)
Dept: NEUROSURGERY | Facility: CLINIC | Age: 56
End: 2021-07-14
Payer: COMMERCIAL

## 2021-07-14 VITALS
WEIGHT: 315 LBS | HEIGHT: 74 IN | SYSTOLIC BLOOD PRESSURE: 135 MMHG | BODY MASS INDEX: 40.43 KG/M2 | TEMPERATURE: 98 F | HEART RATE: 84 BPM | DIASTOLIC BLOOD PRESSURE: 90 MMHG

## 2021-07-14 DIAGNOSIS — M43.27 FUSION OF SPINE, LUMBOSACRAL REGION: ICD-10-CM

## 2021-07-14 PROCEDURE — 99999 PR PBB SHADOW E&M-EST. PATIENT-LVL IV: CPT | Mod: PBBFAC,,, | Performed by: NEUROLOGICAL SURGERY

## 2021-07-14 PROCEDURE — 99213 OFFICE O/P EST LOW 20 MIN: CPT | Mod: S$GLB,,, | Performed by: NEUROLOGICAL SURGERY

## 2021-07-14 PROCEDURE — 99999 PR PBB SHADOW E&M-EST. PATIENT-LVL IV: ICD-10-PCS | Mod: PBBFAC,,, | Performed by: NEUROLOGICAL SURGERY

## 2021-07-14 PROCEDURE — 3008F PR BODY MASS INDEX (BMI) DOCUMENTED: ICD-10-PCS | Mod: CPTII,S$GLB,, | Performed by: NEUROLOGICAL SURGERY

## 2021-07-14 PROCEDURE — 3008F BODY MASS INDEX DOCD: CPT | Mod: CPTII,S$GLB,, | Performed by: NEUROLOGICAL SURGERY

## 2021-07-14 PROCEDURE — 99213 PR OFFICE/OUTPT VISIT, EST, LEVL III, 20-29 MIN: ICD-10-PCS | Mod: S$GLB,,, | Performed by: NEUROLOGICAL SURGERY

## 2021-08-13 ENCOUNTER — CLINICAL SUPPORT (OUTPATIENT)
Dept: OTHER | Facility: CLINIC | Age: 56
End: 2021-08-13
Payer: COMMERCIAL

## 2021-08-13 DIAGNOSIS — Z00.8 ENCOUNTER FOR OTHER GENERAL EXAMINATION: ICD-10-CM

## 2021-08-14 VITALS — BODY MASS INDEX: 43.88 KG/M2 | HEIGHT: 77 IN

## 2021-08-14 LAB
GLUCOSE SERPL-MCNC: 135 MG/DL (ref 60–140)
HDLC SERPL-MCNC: 32 MG/DL
POC CHOLESTEROL, LDL (DOCK): 64 MG/DL
POC CHOLESTEROL, TOTAL: 126 MG/DL
TRIGL SERPL-MCNC: 151 MG/DL

## 2021-08-15 ENCOUNTER — PATIENT MESSAGE (OUTPATIENT)
Dept: FAMILY MEDICINE | Facility: CLINIC | Age: 56
End: 2021-08-15

## 2021-08-18 ENCOUNTER — OFFICE VISIT (OUTPATIENT)
Dept: FAMILY MEDICINE | Facility: CLINIC | Age: 56
End: 2021-08-18
Payer: COMMERCIAL

## 2021-08-18 VITALS
OXYGEN SATURATION: 97 % | DIASTOLIC BLOOD PRESSURE: 82 MMHG | WEIGHT: 315 LBS | RESPIRATION RATE: 18 BRPM | BODY MASS INDEX: 37.19 KG/M2 | TEMPERATURE: 98 F | SYSTOLIC BLOOD PRESSURE: 132 MMHG | HEIGHT: 77 IN | HEART RATE: 84 BPM

## 2021-08-18 DIAGNOSIS — R73.01 IFG (IMPAIRED FASTING GLUCOSE): ICD-10-CM

## 2021-08-18 DIAGNOSIS — M19.041 PRIMARY OSTEOARTHRITIS OF BOTH HANDS: ICD-10-CM

## 2021-08-18 DIAGNOSIS — D63.8 ANEMIA OF CHRONIC DISEASE: ICD-10-CM

## 2021-08-18 DIAGNOSIS — Z78.9 SELF-CATHETERIZES URINARY BLADDER: ICD-10-CM

## 2021-08-18 DIAGNOSIS — Z00.00 ANNUAL PHYSICAL EXAM: Primary | ICD-10-CM

## 2021-08-18 DIAGNOSIS — R73.03 PREDIABETES: ICD-10-CM

## 2021-08-18 DIAGNOSIS — G47.30 SLEEP APNEA WITH USE OF CONTINUOUS POSITIVE AIRWAY PRESSURE (CPAP): ICD-10-CM

## 2021-08-18 DIAGNOSIS — G25.81 RLS (RESTLESS LEGS SYNDROME): ICD-10-CM

## 2021-08-18 DIAGNOSIS — M54.16 LEFT LUMBAR RADICULOPATHY: ICD-10-CM

## 2021-08-18 DIAGNOSIS — I10 ESSENTIAL HYPERTENSION: ICD-10-CM

## 2021-08-18 DIAGNOSIS — M19.042 PRIMARY OSTEOARTHRITIS OF BOTH HANDS: ICD-10-CM

## 2021-08-18 DIAGNOSIS — Z98.890 H/O LAMINECTOMY: ICD-10-CM

## 2021-08-18 DIAGNOSIS — D50.9 IRON DEFICIENCY ANEMIA, UNSPECIFIED IRON DEFICIENCY ANEMIA TYPE: ICD-10-CM

## 2021-08-18 DIAGNOSIS — N40.0 BENIGN NODULAR PROSTATIC HYPERPLASIA WITHOUT LOWER URINARY TRACT SYMPTOMS: ICD-10-CM

## 2021-08-18 DIAGNOSIS — G61.81 CIDP (CHRONIC INFLAMMATORY DEMYELINATING POLYNEUROPATHY): ICD-10-CM

## 2021-08-18 DIAGNOSIS — E78.5 HYPERLIPIDEMIA, UNSPECIFIED HYPERLIPIDEMIA TYPE: ICD-10-CM

## 2021-08-18 DIAGNOSIS — N31.9 NEUROGENIC BLADDER: ICD-10-CM

## 2021-08-18 PROCEDURE — 99396 PREV VISIT EST AGE 40-64: CPT | Mod: S$GLB,,, | Performed by: NURSE PRACTITIONER

## 2021-08-18 PROCEDURE — 99396 PR PREVENTIVE VISIT,EST,40-64: ICD-10-PCS | Mod: S$GLB,,, | Performed by: NURSE PRACTITIONER

## 2021-08-18 PROCEDURE — 1160F RVW MEDS BY RX/DR IN RCRD: CPT | Mod: CPTII,S$GLB,, | Performed by: NURSE PRACTITIONER

## 2021-08-18 PROCEDURE — 3008F BODY MASS INDEX DOCD: CPT | Mod: CPTII,S$GLB,, | Performed by: NURSE PRACTITIONER

## 2021-08-18 PROCEDURE — 3044F HG A1C LEVEL LT 7.0%: CPT | Mod: CPTII,S$GLB,, | Performed by: NURSE PRACTITIONER

## 2021-08-18 PROCEDURE — 3079F DIAST BP 80-89 MM HG: CPT | Mod: CPTII,S$GLB,, | Performed by: NURSE PRACTITIONER

## 2021-08-18 PROCEDURE — 1126F PR PAIN SEVERITY QUANTIFIED, NO PAIN PRESENT: ICD-10-PCS | Mod: CPTII,S$GLB,, | Performed by: NURSE PRACTITIONER

## 2021-08-18 PROCEDURE — 1159F PR MEDICATION LIST DOCUMENTED IN MEDICAL RECORD: ICD-10-PCS | Mod: CPTII,S$GLB,, | Performed by: NURSE PRACTITIONER

## 2021-08-18 PROCEDURE — 3044F PR MOST RECENT HEMOGLOBIN A1C LEVEL <7.0%: ICD-10-PCS | Mod: CPTII,S$GLB,, | Performed by: NURSE PRACTITIONER

## 2021-08-18 PROCEDURE — 1126F AMNT PAIN NOTED NONE PRSNT: CPT | Mod: CPTII,S$GLB,, | Performed by: NURSE PRACTITIONER

## 2021-08-18 PROCEDURE — 99999 PR PBB SHADOW E&M-EST. PATIENT-LVL IV: ICD-10-PCS | Mod: PBBFAC,,, | Performed by: NURSE PRACTITIONER

## 2021-08-18 PROCEDURE — 99999 PR PBB SHADOW E&M-EST. PATIENT-LVL IV: CPT | Mod: PBBFAC,,, | Performed by: NURSE PRACTITIONER

## 2021-08-18 PROCEDURE — 3008F PR BODY MASS INDEX (BMI) DOCUMENTED: ICD-10-PCS | Mod: CPTII,S$GLB,, | Performed by: NURSE PRACTITIONER

## 2021-08-18 PROCEDURE — 3079F PR MOST RECENT DIASTOLIC BLOOD PRESSURE 80-89 MM HG: ICD-10-PCS | Mod: CPTII,S$GLB,, | Performed by: NURSE PRACTITIONER

## 2021-08-18 PROCEDURE — 1160F PR REVIEW ALL MEDS BY PRESCRIBER/CLIN PHARMACIST DOCUMENTED: ICD-10-PCS | Mod: CPTII,S$GLB,, | Performed by: NURSE PRACTITIONER

## 2021-08-18 PROCEDURE — 3075F PR MOST RECENT SYSTOLIC BLOOD PRESS GE 130-139MM HG: ICD-10-PCS | Mod: CPTII,S$GLB,, | Performed by: NURSE PRACTITIONER

## 2021-08-18 PROCEDURE — 3075F SYST BP GE 130 - 139MM HG: CPT | Mod: CPTII,S$GLB,, | Performed by: NURSE PRACTITIONER

## 2021-08-18 PROCEDURE — 1159F MED LIST DOCD IN RCRD: CPT | Mod: CPTII,S$GLB,, | Performed by: NURSE PRACTITIONER

## 2021-08-18 RX ORDER — ROPINIROLE 2 MG/1
TABLET, FILM COATED ORAL
COMMUNITY
End: 2021-09-09 | Stop reason: SDUPTHER

## 2021-08-18 RX ORDER — FERROUS SULFATE 325(65) MG
650 TABLET ORAL
Refills: 0 | COMMUNITY
Start: 2021-08-18

## 2021-09-03 ENCOUNTER — PATIENT MESSAGE (OUTPATIENT)
Dept: NEUROSURGERY | Facility: CLINIC | Age: 56
End: 2021-09-03

## 2021-09-06 ENCOUNTER — PATIENT MESSAGE (OUTPATIENT)
Dept: CARDIOLOGY | Facility: CLINIC | Age: 56
End: 2021-09-06

## 2021-09-06 ENCOUNTER — PATIENT MESSAGE (OUTPATIENT)
Dept: FAMILY MEDICINE | Facility: CLINIC | Age: 56
End: 2021-09-06

## 2021-09-06 ENCOUNTER — PATIENT MESSAGE (OUTPATIENT)
Dept: UROLOGY | Facility: CLINIC | Age: 56
End: 2021-09-06

## 2021-09-06 DIAGNOSIS — I1A.0 RESISTANT HYPERTENSION: ICD-10-CM

## 2021-09-06 DIAGNOSIS — I10 ESSENTIAL HYPERTENSION: ICD-10-CM

## 2021-09-06 RX ORDER — VALSARTAN AND HYDROCHLOROTHIAZIDE 320; 12.5 MG/1; MG/1
1 TABLET, FILM COATED ORAL DAILY
Qty: 90 TABLET | Refills: 0 | Status: CANCELLED | OUTPATIENT
Start: 2021-09-06 | End: 2022-09-06

## 2021-09-06 RX ORDER — HYDRALAZINE HYDROCHLORIDE 25 MG/1
25 TABLET, FILM COATED ORAL 2 TIMES DAILY
Qty: 180 TABLET | Refills: 1 | Status: CANCELLED | OUTPATIENT
Start: 2021-09-06

## 2021-09-06 RX ORDER — ROPINIROLE 2 MG/1
TABLET, FILM COATED ORAL
Status: CANCELLED | OUTPATIENT
Start: 2021-09-06

## 2021-09-09 ENCOUNTER — PATIENT MESSAGE (OUTPATIENT)
Dept: FAMILY MEDICINE | Facility: CLINIC | Age: 56
End: 2021-09-09

## 2021-09-09 DIAGNOSIS — I10 ESSENTIAL HYPERTENSION: ICD-10-CM

## 2021-09-09 DIAGNOSIS — E78.5 HYPERLIPIDEMIA, UNSPECIFIED HYPERLIPIDEMIA TYPE: ICD-10-CM

## 2021-09-09 RX ORDER — VALSARTAN AND HYDROCHLOROTHIAZIDE 320; 12.5 MG/1; MG/1
1 TABLET, FILM COATED ORAL DAILY
Qty: 90 TABLET | Refills: 0 | Status: SHIPPED | OUTPATIENT
Start: 2021-09-09 | End: 2021-12-08 | Stop reason: SDUPTHER

## 2021-09-09 RX ORDER — EPLERENONE 25 MG/1
25 TABLET, FILM COATED ORAL DAILY
Qty: 90 TABLET | Refills: 0 | Status: SHIPPED | OUTPATIENT
Start: 2021-09-09 | End: 2021-11-18

## 2021-09-09 RX ORDER — ROPINIROLE 2 MG/1
TABLET, FILM COATED ORAL
Qty: 90 TABLET | Refills: 0 | Status: SHIPPED | OUTPATIENT
Start: 2021-09-09 | End: 2021-12-10 | Stop reason: SDUPTHER

## 2021-09-09 RX ORDER — ROSUVASTATIN CALCIUM 10 MG/1
10 TABLET, COATED ORAL NIGHTLY
Qty: 90 TABLET | Refills: 0 | Status: SHIPPED | OUTPATIENT
Start: 2021-09-09 | End: 2022-01-19

## 2021-09-30 ENCOUNTER — PATIENT MESSAGE (OUTPATIENT)
Dept: CARDIOLOGY | Facility: CLINIC | Age: 56
End: 2021-09-30

## 2021-10-09 ENCOUNTER — PATIENT MESSAGE (OUTPATIENT)
Dept: FAMILY MEDICINE | Facility: CLINIC | Age: 56
End: 2021-10-09

## 2021-10-11 ENCOUNTER — PATIENT MESSAGE (OUTPATIENT)
Dept: FAMILY MEDICINE | Facility: CLINIC | Age: 56
End: 2021-10-11

## 2021-10-11 DIAGNOSIS — E66.01 CLASS 3 SEVERE OBESITY DUE TO EXCESS CALORIES WITH SERIOUS COMORBIDITY AND BODY MASS INDEX (BMI) OF 45.0 TO 49.9 IN ADULT: Primary | ICD-10-CM

## 2021-10-22 ENCOUNTER — PATIENT MESSAGE (OUTPATIENT)
Dept: FAMILY MEDICINE | Facility: CLINIC | Age: 56
End: 2021-10-22
Payer: COMMERCIAL

## 2021-10-22 DIAGNOSIS — I1A.0 RESISTANT HYPERTENSION: ICD-10-CM

## 2021-10-24 RX ORDER — AMLODIPINE BESYLATE 10 MG/1
10 TABLET ORAL DAILY
Qty: 90 TABLET | Refills: 0 | Status: CANCELLED | OUTPATIENT
Start: 2021-10-24

## 2021-10-24 RX ORDER — METOPROLOL SUCCINATE 100 MG/1
100 TABLET, EXTENDED RELEASE ORAL DAILY
Qty: 90 TABLET | Refills: 0 | Status: CANCELLED | OUTPATIENT
Start: 2021-10-24

## 2021-11-11 ENCOUNTER — TELEPHONE (OUTPATIENT)
Dept: BARIATRICS | Facility: CLINIC | Age: 56
End: 2021-11-11
Payer: COMMERCIAL

## 2021-11-11 ENCOUNTER — PATIENT MESSAGE (OUTPATIENT)
Dept: FAMILY MEDICINE | Facility: CLINIC | Age: 56
End: 2021-11-11
Payer: COMMERCIAL

## 2021-11-11 DIAGNOSIS — R73.03 PREDIABETES: Primary | ICD-10-CM

## 2021-11-11 DIAGNOSIS — R73.01 IFG (IMPAIRED FASTING GLUCOSE): ICD-10-CM

## 2021-11-12 ENCOUNTER — PATIENT MESSAGE (OUTPATIENT)
Dept: FAMILY MEDICINE | Facility: CLINIC | Age: 56
End: 2021-11-12
Payer: COMMERCIAL

## 2021-11-16 ENCOUNTER — TELEPHONE (OUTPATIENT)
Dept: BARIATRICS | Facility: CLINIC | Age: 56
End: 2021-11-16
Payer: COMMERCIAL

## 2021-11-17 ENCOUNTER — PATIENT MESSAGE (OUTPATIENT)
Dept: CARDIOLOGY | Facility: CLINIC | Age: 56
End: 2021-11-17
Payer: COMMERCIAL

## 2021-11-18 RX ORDER — EPLERENONE 25 MG/1
25 TABLET, FILM COATED ORAL DAILY
Qty: 90 TABLET | Refills: 0 | Status: SHIPPED | OUTPATIENT
Start: 2021-11-18 | End: 2022-04-11

## 2021-11-19 ENCOUNTER — TELEPHONE (OUTPATIENT)
Dept: BARIATRICS | Facility: CLINIC | Age: 56
End: 2021-11-19
Payer: COMMERCIAL

## 2021-12-07 ENCOUNTER — PATIENT MESSAGE (OUTPATIENT)
Dept: FAMILY MEDICINE | Facility: CLINIC | Age: 56
End: 2021-12-07
Payer: COMMERCIAL

## 2021-12-07 DIAGNOSIS — I10 ESSENTIAL HYPERTENSION: ICD-10-CM

## 2021-12-08 RX ORDER — VALSARTAN AND HYDROCHLOROTHIAZIDE 320; 12.5 MG/1; MG/1
1 TABLET, FILM COATED ORAL DAILY
Qty: 90 TABLET | Refills: 1 | Status: SHIPPED | OUTPATIENT
Start: 2021-12-08 | End: 2022-06-09

## 2021-12-09 ENCOUNTER — PATIENT MESSAGE (OUTPATIENT)
Dept: FAMILY MEDICINE | Facility: CLINIC | Age: 56
End: 2021-12-09
Payer: COMMERCIAL

## 2021-12-10 RX ORDER — ROPINIROLE 2 MG/1
TABLET, FILM COATED ORAL
Qty: 90 TABLET | Refills: 1 | Status: SHIPPED | OUTPATIENT
Start: 2021-12-10 | End: 2022-12-23 | Stop reason: HOSPADM

## 2022-01-08 ENCOUNTER — PATIENT MESSAGE (OUTPATIENT)
Dept: NEUROSURGERY | Facility: CLINIC | Age: 57
End: 2022-01-08
Payer: COMMERCIAL

## 2022-01-10 ENCOUNTER — PATIENT MESSAGE (OUTPATIENT)
Dept: NEUROSURGERY | Facility: CLINIC | Age: 57
End: 2022-01-10
Payer: COMMERCIAL

## 2022-01-13 ENCOUNTER — PATIENT MESSAGE (OUTPATIENT)
Dept: FAMILY MEDICINE | Facility: CLINIC | Age: 57
End: 2022-01-13
Payer: COMMERCIAL

## 2022-01-19 ENCOUNTER — PATIENT MESSAGE (OUTPATIENT)
Dept: FAMILY MEDICINE | Facility: CLINIC | Age: 57
End: 2022-01-19
Payer: COMMERCIAL

## 2022-01-19 DIAGNOSIS — E78.5 HYPERLIPIDEMIA, UNSPECIFIED HYPERLIPIDEMIA TYPE: ICD-10-CM

## 2022-01-19 RX ORDER — ROSUVASTATIN CALCIUM 10 MG/1
10 TABLET, COATED ORAL NIGHTLY
Qty: 90 TABLET | Refills: 0 | OUTPATIENT
Start: 2022-01-19

## 2022-01-21 ENCOUNTER — PATIENT MESSAGE (OUTPATIENT)
Dept: UROLOGY | Facility: CLINIC | Age: 57
End: 2022-01-21
Payer: COMMERCIAL

## 2022-02-09 ENCOUNTER — PATIENT MESSAGE (OUTPATIENT)
Dept: UROLOGY | Facility: CLINIC | Age: 57
End: 2022-02-09
Payer: COMMERCIAL

## 2022-02-14 ENCOUNTER — PATIENT OUTREACH (OUTPATIENT)
Dept: ADMINISTRATIVE | Facility: OTHER | Age: 57
End: 2022-02-14
Payer: COMMERCIAL

## 2022-02-14 NOTE — PROGRESS NOTES
Health Maintenance Due   Topic Date Due    Shingles Vaccine (1 of 2) Never done    COVID-19 Vaccine (3 - Booster for Moderna series) 10/28/2021     Updates were requested from care everywhere.  Chart was reviewed for overdue Proactive [facility] Encounters ([facility]) topics (CRS, Breast Cancer Screening, Eye exam)  Health Maintenance has been updated.  [facility] immunization registry triggered.  Immunizations were reconciled.

## 2022-02-15 ENCOUNTER — OFFICE VISIT (OUTPATIENT)
Dept: CARDIOLOGY | Facility: CLINIC | Age: 57
End: 2022-02-15
Payer: COMMERCIAL

## 2022-02-15 VITALS
OXYGEN SATURATION: 90 % | WEIGHT: 315 LBS | HEART RATE: 87 BPM | DIASTOLIC BLOOD PRESSURE: 79 MMHG | HEIGHT: 77 IN | SYSTOLIC BLOOD PRESSURE: 132 MMHG | BODY MASS INDEX: 37.19 KG/M2

## 2022-02-15 DIAGNOSIS — Z98.890 H/O LAMINECTOMY: ICD-10-CM

## 2022-02-15 DIAGNOSIS — I10 ESSENTIAL HYPERTENSION: Primary | ICD-10-CM

## 2022-02-15 DIAGNOSIS — N31.9 NEUROGENIC BLADDER: ICD-10-CM

## 2022-02-15 DIAGNOSIS — E78.5 HYPERLIPIDEMIA, UNSPECIFIED HYPERLIPIDEMIA TYPE: ICD-10-CM

## 2022-02-15 DIAGNOSIS — Z78.9 SELF-CATHETERIZES URINARY BLADDER: ICD-10-CM

## 2022-02-15 DIAGNOSIS — Z98.1 S/P LUMBAR SPINAL FUSION: ICD-10-CM

## 2022-02-15 DIAGNOSIS — G47.30 SLEEP APNEA WITH USE OF CONTINUOUS POSITIVE AIRWAY PRESSURE (CPAP): ICD-10-CM

## 2022-02-15 PROCEDURE — 3078F DIAST BP <80 MM HG: CPT | Mod: CPTII,S$GLB,, | Performed by: INTERNAL MEDICINE

## 2022-02-15 PROCEDURE — 99999 PR PBB SHADOW E&M-EST. PATIENT-LVL IV: CPT | Mod: PBBFAC,,, | Performed by: INTERNAL MEDICINE

## 2022-02-15 PROCEDURE — 3075F PR MOST RECENT SYSTOLIC BLOOD PRESS GE 130-139MM HG: ICD-10-PCS | Mod: CPTII,S$GLB,, | Performed by: INTERNAL MEDICINE

## 2022-02-15 PROCEDURE — 93000 EKG 12-LEAD: ICD-10-PCS | Mod: S$GLB,,, | Performed by: INTERNAL MEDICINE

## 2022-02-15 PROCEDURE — 1160F PR REVIEW ALL MEDS BY PRESCRIBER/CLIN PHARMACIST DOCUMENTED: ICD-10-PCS | Mod: CPTII,S$GLB,, | Performed by: INTERNAL MEDICINE

## 2022-02-15 PROCEDURE — 1159F MED LIST DOCD IN RCRD: CPT | Mod: CPTII,S$GLB,, | Performed by: INTERNAL MEDICINE

## 2022-02-15 PROCEDURE — 3044F PR MOST RECENT HEMOGLOBIN A1C LEVEL <7.0%: ICD-10-PCS | Mod: CPTII,S$GLB,, | Performed by: INTERNAL MEDICINE

## 2022-02-15 PROCEDURE — 3075F SYST BP GE 130 - 139MM HG: CPT | Mod: CPTII,S$GLB,, | Performed by: INTERNAL MEDICINE

## 2022-02-15 PROCEDURE — 99213 PR OFFICE/OUTPT VISIT, EST, LEVL III, 20-29 MIN: ICD-10-PCS | Mod: 25,S$GLB,, | Performed by: INTERNAL MEDICINE

## 2022-02-15 PROCEDURE — 3008F BODY MASS INDEX DOCD: CPT | Mod: CPTII,S$GLB,, | Performed by: INTERNAL MEDICINE

## 2022-02-15 PROCEDURE — 99999 PR PBB SHADOW E&M-EST. PATIENT-LVL IV: ICD-10-PCS | Mod: PBBFAC,,, | Performed by: INTERNAL MEDICINE

## 2022-02-15 PROCEDURE — 1160F RVW MEDS BY RX/DR IN RCRD: CPT | Mod: CPTII,S$GLB,, | Performed by: INTERNAL MEDICINE

## 2022-02-15 PROCEDURE — 1159F PR MEDICATION LIST DOCUMENTED IN MEDICAL RECORD: ICD-10-PCS | Mod: CPTII,S$GLB,, | Performed by: INTERNAL MEDICINE

## 2022-02-15 PROCEDURE — 3008F PR BODY MASS INDEX (BMI) DOCUMENTED: ICD-10-PCS | Mod: CPTII,S$GLB,, | Performed by: INTERNAL MEDICINE

## 2022-02-15 PROCEDURE — 99213 OFFICE O/P EST LOW 20 MIN: CPT | Mod: 25,S$GLB,, | Performed by: INTERNAL MEDICINE

## 2022-02-15 PROCEDURE — 93000 ELECTROCARDIOGRAM COMPLETE: CPT | Mod: S$GLB,,, | Performed by: INTERNAL MEDICINE

## 2022-02-15 PROCEDURE — 3078F PR MOST RECENT DIASTOLIC BLOOD PRESSURE < 80 MM HG: ICD-10-PCS | Mod: CPTII,S$GLB,, | Performed by: INTERNAL MEDICINE

## 2022-02-15 PROCEDURE — 3044F HG A1C LEVEL LT 7.0%: CPT | Mod: CPTII,S$GLB,, | Performed by: INTERNAL MEDICINE

## 2022-02-15 RX ORDER — SEMAGLUTIDE 1.34 MG/ML
0.5 INJECTION, SOLUTION SUBCUTANEOUS
COMMUNITY
End: 2022-02-16 | Stop reason: DRUGHIGH
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
## 2022-02-16 ENCOUNTER — OFFICE VISIT (OUTPATIENT)
Dept: NEUROSURGERY | Facility: CLINIC | Age: 57
End: 2022-02-16
Payer: COMMERCIAL

## 2022-02-16 ENCOUNTER — HOSPITAL ENCOUNTER (OUTPATIENT)
Dept: RADIOLOGY | Facility: HOSPITAL | Age: 57
Discharge: HOME | End: 2022-02-16
Attending: NEUROLOGICAL SURGERY
Payer: COMMERCIAL

## 2022-02-16 ENCOUNTER — OFFICE VISIT (OUTPATIENT)
Dept: FAMILY MEDICINE | Facility: CLINIC | Age: 57
End: 2022-02-16
Payer: COMMERCIAL

## 2022-02-16 VITALS
BODY MASS INDEX: 37.19 KG/M2 | HEIGHT: 77 IN | DIASTOLIC BLOOD PRESSURE: 82 MMHG | SYSTOLIC BLOOD PRESSURE: 137 MMHG | HEART RATE: 100 BPM | WEIGHT: 315 LBS

## 2022-02-16 VITALS
OXYGEN SATURATION: 97 % | HEART RATE: 95 BPM | HEIGHT: 77 IN | TEMPERATURE: 98 F | SYSTOLIC BLOOD PRESSURE: 124 MMHG | DIASTOLIC BLOOD PRESSURE: 70 MMHG | BODY MASS INDEX: 37.19 KG/M2 | WEIGHT: 315 LBS

## 2022-02-16 DIAGNOSIS — R73.01 IFG (IMPAIRED FASTING GLUCOSE): ICD-10-CM

## 2022-02-16 DIAGNOSIS — Z98.1 S/P LUMBAR SPINAL FUSION: Primary | ICD-10-CM

## 2022-02-16 DIAGNOSIS — N31.9 NEUROGENIC BLADDER: ICD-10-CM

## 2022-02-16 DIAGNOSIS — Z12.5 SCREENING PSA (PROSTATE SPECIFIC ANTIGEN): ICD-10-CM

## 2022-02-16 DIAGNOSIS — Z13.1 SCREENING FOR DIABETES MELLITUS (DM): ICD-10-CM

## 2022-02-16 DIAGNOSIS — G25.81 RLS (RESTLESS LEGS SYNDROME): ICD-10-CM

## 2022-02-16 DIAGNOSIS — E66.01 CLASS 3 SEVERE OBESITY DUE TO EXCESS CALORIES WITH SERIOUS COMORBIDITY AND BODY MASS INDEX (BMI) OF 40.0 TO 44.9 IN ADULT: ICD-10-CM

## 2022-02-16 DIAGNOSIS — M43.27 FUSION OF SPINE, LUMBOSACRAL REGION: ICD-10-CM

## 2022-02-16 DIAGNOSIS — Z00.00 ENCOUNTER FOR ANNUAL PHYSICAL EXAM: ICD-10-CM

## 2022-02-16 DIAGNOSIS — Z78.9 SELF-CATHETERIZES URINARY BLADDER: ICD-10-CM

## 2022-02-16 DIAGNOSIS — R73.03 PREDIABETES: ICD-10-CM

## 2022-02-16 DIAGNOSIS — Z13.29 THYROID DISORDER SCREENING: ICD-10-CM

## 2022-02-16 DIAGNOSIS — M19.041 PRIMARY OSTEOARTHRITIS OF BOTH HANDS: ICD-10-CM

## 2022-02-16 DIAGNOSIS — E78.5 HYPERLIPIDEMIA, UNSPECIFIED HYPERLIPIDEMIA TYPE: ICD-10-CM

## 2022-02-16 DIAGNOSIS — N40.0 BENIGN NODULAR PROSTATIC HYPERPLASIA WITHOUT LOWER URINARY TRACT SYMPTOMS: ICD-10-CM

## 2022-02-16 DIAGNOSIS — G47.30 SLEEP APNEA WITH USE OF CONTINUOUS POSITIVE AIRWAY PRESSURE (CPAP): ICD-10-CM

## 2022-02-16 DIAGNOSIS — D50.9 IRON DEFICIENCY ANEMIA, UNSPECIFIED IRON DEFICIENCY ANEMIA TYPE: ICD-10-CM

## 2022-02-16 DIAGNOSIS — M19.042 PRIMARY OSTEOARTHRITIS OF BOTH HANDS: ICD-10-CM

## 2022-02-16 DIAGNOSIS — I1A.0 RESISTANT HYPERTENSION: Primary | ICD-10-CM

## 2022-02-16 DIAGNOSIS — G61.81 CIDP (CHRONIC INFLAMMATORY DEMYELINATING POLYNEUROPATHY): ICD-10-CM

## 2022-02-16 PROCEDURE — 1160F PR REVIEW ALL MEDS BY PRESCRIBER/CLIN PHARMACIST DOCUMENTED: ICD-10-PCS | Mod: CPTII,S$GLB,, | Performed by: NEUROLOGICAL SURGERY

## 2022-02-16 PROCEDURE — 99999 PR PBB SHADOW E&M-EST. PATIENT-LVL IV: CPT | Mod: PBBFAC,,, | Performed by: NURSE PRACTITIONER

## 2022-02-16 PROCEDURE — 3044F HG A1C LEVEL LT 7.0%: CPT | Mod: CPTII,S$GLB,, | Performed by: NEUROLOGICAL SURGERY

## 2022-02-16 PROCEDURE — 99999 PR PBB SHADOW E&M-EST. PATIENT-LVL IV: ICD-10-PCS | Mod: PBBFAC,,, | Performed by: NURSE PRACTITIONER

## 2022-02-16 PROCEDURE — 3075F PR MOST RECENT SYSTOLIC BLOOD PRESS GE 130-139MM HG: ICD-10-PCS | Mod: CPTII,S$GLB,, | Performed by: NEUROLOGICAL SURGERY

## 2022-02-16 PROCEDURE — 99214 OFFICE O/P EST MOD 30 MIN: CPT | Mod: S$GLB,,, | Performed by: NURSE PRACTITIONER

## 2022-02-16 PROCEDURE — 1159F PR MEDICATION LIST DOCUMENTED IN MEDICAL RECORD: ICD-10-PCS | Mod: CPTII,S$GLB,, | Performed by: NEUROLOGICAL SURGERY

## 2022-02-16 PROCEDURE — 3008F PR BODY MASS INDEX (BMI) DOCUMENTED: ICD-10-PCS | Mod: CPTII,S$GLB,, | Performed by: NEUROLOGICAL SURGERY

## 2022-02-16 PROCEDURE — 99999 PR PBB SHADOW E&M-EST. PATIENT-LVL IV: CPT | Mod: PBBFAC,,, | Performed by: NEUROLOGICAL SURGERY

## 2022-02-16 PROCEDURE — 1159F MED LIST DOCD IN RCRD: CPT | Mod: CPTII,S$GLB,, | Performed by: NURSE PRACTITIONER

## 2022-02-16 PROCEDURE — 99999 PR PBB SHADOW E&M-EST. PATIENT-LVL IV: ICD-10-PCS | Mod: PBBFAC,,, | Performed by: NEUROLOGICAL SURGERY

## 2022-02-16 PROCEDURE — 3075F SYST BP GE 130 - 139MM HG: CPT | Mod: CPTII,S$GLB,, | Performed by: NEUROLOGICAL SURGERY

## 2022-02-16 PROCEDURE — 1159F MED LIST DOCD IN RCRD: CPT | Mod: CPTII,S$GLB,, | Performed by: NEUROLOGICAL SURGERY

## 2022-02-16 PROCEDURE — 3008F BODY MASS INDEX DOCD: CPT | Mod: CPTII,S$GLB,, | Performed by: NURSE PRACTITIONER

## 2022-02-16 PROCEDURE — 1160F PR REVIEW ALL MEDS BY PRESCRIBER/CLIN PHARMACIST DOCUMENTED: ICD-10-PCS | Mod: CPTII,S$GLB,, | Performed by: NURSE PRACTITIONER

## 2022-02-16 PROCEDURE — 3008F PR BODY MASS INDEX (BMI) DOCUMENTED: ICD-10-PCS | Mod: CPTII,S$GLB,, | Performed by: NURSE PRACTITIONER

## 2022-02-16 PROCEDURE — 1160F RVW MEDS BY RX/DR IN RCRD: CPT | Mod: CPTII,S$GLB,, | Performed by: NEUROLOGICAL SURGERY

## 2022-02-16 PROCEDURE — 3078F DIAST BP <80 MM HG: CPT | Mod: CPTII,S$GLB,, | Performed by: NURSE PRACTITIONER

## 2022-02-16 PROCEDURE — 99215 PR OFFICE/OUTPT VISIT, EST, LEVL V, 40-54 MIN: ICD-10-PCS | Mod: S$GLB,,, | Performed by: NEUROLOGICAL SURGERY

## 2022-02-16 PROCEDURE — 3044F HG A1C LEVEL LT 7.0%: CPT | Mod: CPTII,S$GLB,, | Performed by: NURSE PRACTITIONER

## 2022-02-16 PROCEDURE — 72131 CT LUMBAR SPINE WITHOUT CONTRAST: ICD-10-PCS | Mod: 26,,, | Performed by: INTERNAL MEDICINE

## 2022-02-16 PROCEDURE — 3078F PR MOST RECENT DIASTOLIC BLOOD PRESSURE < 80 MM HG: ICD-10-PCS | Mod: CPTII,S$GLB,, | Performed by: NURSE PRACTITIONER

## 2022-02-16 PROCEDURE — 99215 OFFICE O/P EST HI 40 MIN: CPT | Mod: S$GLB,,, | Performed by: NEUROLOGICAL SURGERY

## 2022-02-16 PROCEDURE — 3074F PR MOST RECENT SYSTOLIC BLOOD PRESSURE < 130 MM HG: ICD-10-PCS | Mod: CPTII,S$GLB,, | Performed by: NURSE PRACTITIONER

## 2022-02-16 PROCEDURE — 72131 CT LUMBAR SPINE W/O DYE: CPT | Mod: TC

## 2022-02-16 PROCEDURE — 1160F RVW MEDS BY RX/DR IN RCRD: CPT | Mod: CPTII,S$GLB,, | Performed by: NURSE PRACTITIONER

## 2022-02-16 PROCEDURE — 3079F PR MOST RECENT DIASTOLIC BLOOD PRESSURE 80-89 MM HG: ICD-10-PCS | Mod: CPTII,S$GLB,, | Performed by: NEUROLOGICAL SURGERY

## 2022-02-16 PROCEDURE — 99214 PR OFFICE/OUTPT VISIT, EST, LEVL IV, 30-39 MIN: ICD-10-PCS | Mod: S$GLB,,, | Performed by: NURSE PRACTITIONER

## 2022-02-16 PROCEDURE — 1159F PR MEDICATION LIST DOCUMENTED IN MEDICAL RECORD: ICD-10-PCS | Mod: CPTII,S$GLB,, | Performed by: NURSE PRACTITIONER

## 2022-02-16 PROCEDURE — 3074F SYST BP LT 130 MM HG: CPT | Mod: CPTII,S$GLB,, | Performed by: NURSE PRACTITIONER

## 2022-02-16 PROCEDURE — 3044F PR MOST RECENT HEMOGLOBIN A1C LEVEL <7.0%: ICD-10-PCS | Mod: CPTII,S$GLB,, | Performed by: NEUROLOGICAL SURGERY

## 2022-02-16 PROCEDURE — 3044F PR MOST RECENT HEMOGLOBIN A1C LEVEL <7.0%: ICD-10-PCS | Mod: CPTII,S$GLB,, | Performed by: NURSE PRACTITIONER

## 2022-02-16 PROCEDURE — 3079F DIAST BP 80-89 MM HG: CPT | Mod: CPTII,S$GLB,, | Performed by: NEUROLOGICAL SURGERY

## 2022-02-16 PROCEDURE — 72131 CT LUMBAR SPINE W/O DYE: CPT | Mod: 26,,, | Performed by: INTERNAL MEDICINE

## 2022-02-16 PROCEDURE — 3008F BODY MASS INDEX DOCD: CPT | Mod: CPTII,S$GLB,, | Performed by: NEUROLOGICAL SURGERY

## 2022-02-16 RX ORDER — SEMAGLUTIDE 1.34 MG/ML
1 INJECTION, SOLUTION SUBCUTANEOUS
Qty: 1 PEN | Refills: 5 | Status: SHIPPED | OUTPATIENT
Start: 2022-02-16 | End: 2022-11-08

## 2022-02-16 RX ORDER — METOPROLOL SUCCINATE 100 MG/1
100 TABLET, EXTENDED RELEASE ORAL DAILY
Qty: 90 TABLET | Refills: 1 | Status: SHIPPED | OUTPATIENT
Start: 2022-02-16 | End: 2022-10-21

## 2022-02-16 RX ORDER — ROSUVASTATIN CALCIUM 10 MG/1
10 TABLET, COATED ORAL NIGHTLY
Qty: 90 TABLET | Refills: 1 | Status: SHIPPED | OUTPATIENT
Start: 2022-02-16 | End: 2022-10-21

## 2022-02-16 RX ORDER — AMLODIPINE BESYLATE 10 MG/1
10 TABLET ORAL DAILY
Qty: 90 TABLET | Refills: 1 | Status: SHIPPED | OUTPATIENT
Start: 2022-02-16 | End: 2022-10-21

## 2022-02-16 RX ORDER — HYDRALAZINE HYDROCHLORIDE 25 MG/1
25 TABLET, FILM COATED ORAL 2 TIMES DAILY
Qty: 180 TABLET | Refills: 1 | Status: SHIPPED | OUTPATIENT
Start: 2022-02-16 | End: 2022-10-21

## 2022-02-16 NOTE — PROGRESS NOTES
"Subjective:       Patient ID: Manfred Shah is a 56 y.o. male.    Chief Complaint: Follow-up (6 month f/u/ had labs done)    HPI    Patient is a 56-year-old white male with CIDP (chronic inflammatory demyelinating polyneuropathy) diagnosed in June 2018 by neurologist, Dr. Rowdy Tran, left foot drop secondary to CIDP, history of Abscess of spinal cord treated by Dr. Rodriguez S/P Laminectomy in August 2018, Lumbar Spinal fusion on 6/3/2020 with Neurosurgeon Dr. Osorio, Neurogenic bladder with urge incontinence and self-catheterization and BPH followed by Dr. Sanderson, RLS, Hypertension, Hyperlipidemia, Impaired Fasting Glucose/ Prediabetes, Elevated Liver Enzymes, Heberden's nodes with joint inflammation and pain to bilateral hands followed by [facility],  and Sleep Apnea with CPAP use that is here today for 6 month follow up with fasting lab results.       Patient has had Resistant Hypertension evaluated by [facility], Dr. Kiser, that is currently controlled on eplerenone (INSPRA) 25 MG Tab daily, amlodipine 10 mg daily, Valsartan HCTZ 320/12.5 mg daily, Metoprolol  mg daily and Hydralazine 25 mg twice daily. Was seen by Dr. Kiser yesterday in which he informed patient edema is due to venous insufficiency possibly due to ropinirole.      /70   Pulse 95   Temp 98.2 °F (36.8 °C) (Temporal)   Ht 6' 5" (1.956 m)   Wt (!) 167.9 kg (370 lb 4.2 oz)   SpO2 97%   BMI 43.91 kg/m²        Patient has hyperlipidemia and currently taking Rosuvastatin 10 mg daily.   LDL 60.6.     Patient has impaired fasting glucose since January 2018  - he is PREDIABETIC. He always has an IFG that is sometimes above the 126 cut-off but his HgbA1C has NEVER gone above 5.8 so continue to classify as IFG/Prediabetic.  His most recent FBG is 124 with A1C 5.6%.  BMI is 43.91, previous weight 385, current weight 370. Patient has lost 15 lbs, reports he is attempting to modify his diet and increase his activity.  "     Patient is morbidly obese. Body mass index is 43.91 kg/m².  I started patient on Ozempic injections for weight control in November 2021 - he is currently on 0.5 mg injection weekly and lost 15 pounds.  Will increase to 1 mg injection weekly and recheck in 6 months.    Patient had elevated liver enzymes noted at past visits.  Patient reports he was admitted to Hospital in [place] and he had a liver workup during that hospitalization.  His liver enzyme elevation had resolved with weight loss in October 2018 but was again elevated in 2019 with weight gain.  Most recent liver enzymes shows mild elevation present but improved AST 46, ALT 47.  Will continue to work on lifestyle modifications.     Patient has Sleep Apnea with CPAP use and had uvula removed by surgery in past.     CIDP with left foot drop  followed by a neurologist Dr. Rowdy Tran.       Patient has BPH and neurogenic bladder with urge incontinence and self catheterizes as needed followed by [facility] Dr. Sanderson, appointment this Friday.     Patient has Heberden's nodes with joint inflammation and pain to bilateral hands evaluated by [facility] and determined Osteoarthritis.     Patient has a mild Anemia noted with labs in May 2020.  Anemia remains present, mildly improved, reports taking  Ferrous Sulfate 2 tablet daily. Most recent labs show Saturated Iron 23, Ferritin 103, Iron 67.        Component      Latest Ref Rng & Units 2/9/2022 8/11/2021 2/2/2021   WBC      3.90 - 12.70 K/uL 5.68 5.58 5.89   RBC      4.60 - 6.20 M/uL 4.30 (L) 4.29 (L) 4.34 (L)   Hemoglobin      14.0 - 18.0 g/dL 12.7 (L) 12.8 (L) 12.6 (L)   Hematocrit      40.0 - 54.0 % 38.7 (L) 38.1 (L) 38.5 (L)   MCV      82 - 98 fL 90 89 89   MCH      27.0 - 31.0 pg 29.5 29.8 29.0   MCHC      32.0 - 36.0 g/dL 32.8 33.6 32.7   RDW      11.5 - 14.5 % 13.3 12.9 12.9   Platelets      150 - 450 K/uL 220 217 204   MPV      9.2 - 12.9 fL 9.3 9.5 9.5   Immature Granulocytes      0.0 -  0.5 % 0.2 0.2 0.2   Gran # (ANC)      1.8 - 7.7 K/uL 2.8 2.9 3.5   Immature Grans (Abs)      0.00 - 0.04 K/uL 0.01 0.01 0.01   Lymph #      1.0 - 4.8 K/uL 2.0 1.7 1.4   Mono #      0.3 - 1.0 K/uL 0.5 0.6 0.5   Eos #      0.0 - 0.5 K/uL 0.4 0.3 0.4   Baso #      0.00 - 0.20 K/uL 0.05 0.06 0.07   nRBC      0 /100 WBC 0 0 0   Gran %      38.0 - 73.0 % 48.7 51.9 59.4   Lymph %      18.0 - 48.0 % 34.3 30.1 24.1   Mono %      4.0 - 15.0 % 9.0 10.8 9.0   Eosinophil %      0.0 - 8.0 % 6.9 5.9 6.1   Basophil %      0.0 - 1.9 % 0.9 1.1 1.2   Differential Method       Automated Automated Automated   Sodium      136 - 145 mmol/L 144 141 144   Potassium      3.5 - 5.1 mmol/L 4.0 3.8 4.1   Chloride      95 - 110 mmol/L 103 105 104   CO2      23 - 29 mmol/L 27 26 27   Glucose      70 - 110 mg/dL 124 (H) 133 (H) 131 (H)   BUN      2 - 20 mg/dL 17 18 20   Creatinine      0.50 - 1.40 mg/dL 0.80 0.80 0.81   Calcium      8.7 - 10.5 mg/dL 9.4 9.3 9.7   PROTEIN TOTAL      6.0 - 8.4 g/dL 7.7 7.3 7.4   Albumin      3.5 - 5.2 g/dL 4.4 4.4 4.2   BILIRUBIN TOTAL      0.1 - 1.0 mg/dL 0.5 0.3 0.4   Alkaline Phosphatase      38 - 126 U/L 56 55 56   AST      15 - 46 U/L 46 43 62 (H)   ALT      10 - 44 U/L 47 (H) 47 (H) 52 (H)   Anion Gap      8 - 16 mmol/L 14 10 13   eGFR if African American      >60 mL/min/1.73 m:2 >60.0 >60.0 >60.0   eGFR if non African American      >60 mL/min/1.73 m:2 >60.0 >60.0 >60.0   Cholesterol      120 - 199 mg/dL  119 (L) 123   Triglycerides      30 - 150 mg/dL  122 127   HDL      40 - 75 mg/dL  34 (L) 38 (L)   LDL Cholesterol External      63.0 - 159.0 mg/dL  60.6 (L) 59.6 (L)   HDL/Cholesterol Ratio      20.0 - 50.0 %  28.6 30.9   Total Cholesterol/HDL Ratio      2.0 - 5.0  3.5 3.2   Non-HDL Cholesterol      mg/dL  85 85   Iron      45 - 160 ug/dL 67 44 (L) 53   Transferrin      200 - 375 mg/dL 196 (L) 199 (L) 217   TIBC      250 - 450 ug/dL 290 295 321   Saturated Iron      20 - 50 % 23 15 (L) 17 (L)   Hemoglobin  A1C External      4.0 - 5.6 % 5.6 6.0 (H) 5.6   Estimated Avg Glucose      68 - 131 mg/dL 114 126 114   Ferritin      20.0 - 300.0 ng/mL 103  41     Past Medical History:   Diagnosis Date    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 Tray Rodriguez    CIDP (chronic inflammatory demyelinating polyneuropathy) 06/2018    followed by Dr. Rowdy Tran - Neurologist in [place].      Elevated liver enzymes 10/8/2018    Elevated PSA     followed by Dr. Sanderson    Foot drop, left foot     due to neurological condition/[place]    Hyperlipidemia     Hypertension     Impaired fasting glucose     Neurogenic bladder 09/05/2018    followed by Dr. Sanderson    RLS (restless legs syndrome) 10/8/2018    Self-catheterizes urinary bladder     Urologist    Sleep apnea with use of continuous positive airway pressure (CPAP) 01/10/2018    followed by [facility] in [place]    Urge incontinence 9/11/2018       Past Surgical History:   Procedure Laterality Date    ADENOIDECTOMY      COLONOSCOPY N/A 1/24/2020    Procedure: COLONOSCOPY;  Surgeon: Stacy Markham MD;  Location: [facility];  Service: Endoscopy;  Laterality: N/A;    HERNIA REPAIR      [facility]    LUMBAR LAMINECTOMY  08/2018    [facility]; complications from surgery - abscess to spinal cord - discharge summary scanned to media file    PLANTAR FASCIA RELEASE      TONSILLECTOMY      TRANSFORAMINAL LUMBAR INTERBODY FUSION (TLIF) OF SPINE WITH PERCUTANEOUS INSTRUMENTATION Bilateral 6/3/2020    Procedure: FUSION, SPINE, LUMBAR, TLIF, WITH PERCUTANEOUS INSTRUMENTATION  (L3-4 MIS TLIF) Fl[facility]ro Micrcoscope [place] [facility] JENNIFER JENNINGS [phone] Spine Wave;  Surgeon: Samuel Osorio DO;  [facility] OR;  Service: Neurosurgery;  Laterality: Bilateral;  SPINEWAVE JENNIFER WILLIAMS [phone] TEXTED HIM @ 9:45AM ON 5-  PRE-OP BY RN 5----BMI--44---COVID  NEGATIVE  CLEAR    VASECTOMY      X-STOP IMPLANTATION         Family History   Problem Relation Age of Onset    Cancer Mother         Breast Cancer    Heart disease Mother         pacemaker    Hyperlipidemia Mother         taking Crestor    Hypertension Mother         taking Amlodipine    Diabetes Mother         Prediabetes    Diabetes Father     Cancer Father         unknown type of cancer    Hypertension Father     Rheum arthritis Father     No Known Problems Sister     Prostate cancer Neg Hx     Kidney disease Neg Hx        Social History     Socioeconomic History    Marital status:    Occupational History    Occupation: teacher   Tobacco Use    Smoking status: Never Smoker    Smokeless tobacco: Never Used   Substance and Sexual Activity    Alcohol use: Yes     Comment: OCCASS    Drug use: No    Sexual activity: Yes     Partners: Female       Review of Systems   Constitutional: Negative for appetite change and fatigue.   HENT: Negative for congestion, hearing loss, postnasal drip, rhinorrhea, sinus pressure, sinus pain, sneezing and sore throat.    Eyes: Negative for pain and visual disturbance.   Respiratory: Negative.  Negative for cough and shortness of breath.    Cardiovascular: Negative for chest pain and leg swelling.   Gastrointestinal: Negative for abdominal pain, constipation, diarrhea, nausea and vomiting.   Endocrine: Negative.    Genitourinary: Negative for decreased urine volume, difficulty urinating, dysuria, frequency, hematuria and urgency.   Musculoskeletal: Positive for back pain, gait problem and myalgias. Negative for arthralgias.   Skin: Negative for color change.   Neurological: Positive for weakness. Negative for dizziness, syncope, light-headedness, numbness and headaches.   Psychiatric/Behavioral: Negative.          Objective:     Vitals:    02/16/22 1501 02/16/22 1544   BP:  124/70   Pulse: 95    Temp: 98.2 °F (36.8 °C)    TempSrc: Temporal    SpO2: 97%   "  Weight: (!) 167.9 kg (370 lb 4.2 oz)    Height: 6' 5" (1.956 m)           Physical Exam  Constitutional:       Appearance: Normal appearance.      Comments: Body mass index is 43.91 kg/m².     HENT:      Head: Normocephalic and atraumatic.      Right Ear: Tympanic membrane normal.      Left Ear: Tympanic membrane normal.      Nose: Nose normal.   Eyes:      Pupils: Pupils are equal, round, and reactive to light.   Cardiovascular:      Rate and Rhythm: Normal rate and regular rhythm.      Heart sounds: Normal heart sounds.   Pulmonary:      Effort: Pulmonary effort is normal.      Breath sounds: Normal breath sounds.   Musculoskeletal:         General: Normal range of motion.      Right hand: Deformity and tenderness present.      Left hand: Deformity and tenderness present.      Cervical back: Normal range of motion.      Right lower leg: No edema.      Left lower leg: No edema.      Left foot: Foot drop present.      Comments: + nodes/swelling to the DIP joints of both hands -followed by [facility]    Feet:      Comments: Ambulatory with brace on to left foot to treat the left foot drop  Skin:     General: Skin is warm and dry.   Neurological:      General: No focal deficit present.      Mental Status: He is alert and oriented to person, place, and time.   Psychiatric:         Mood and Affect: Mood normal.         Speech: Speech normal.         Behavior: Behavior is cooperative.           Assessment:         ICD-10-CM ICD-9-CM   1. Resistant hypertension  I10 401.9   2. Hyperlipidemia, unspecified hyperlipidemia type  E78.5 272.4   3. Class 3 severe obesity due to excess calories with serious comorbidity and body mass index (BMI) of 40.0 to 44.9 in adult  E66.01 278.01    Z68.41 V85.41   4. Iron deficiency anemia, unspecified iron deficiency anemia type  D50.9 280.9   5. IFG (impaired fasting glucose)  R73.01 790.21   6. Prediabetes  R73.03 790.29   7. Screening PSA (prostate specific antigen)  Z12.5 " V76.44   8. Screening for diabetes mellitus (DM)  Z13.1 V77.1   9. Thyroid disorder screening  Z13.29 V77.0   10. Encounter for annual physical exam  Z00.00 V70.0   11. Benign nodular prostatic hyperplasia without lower urinary tract symptoms  N40.0 600.10   12. Self-catheterizes urinary bladder  Z78.9 V49.89   13. Neurogenic bladder  N31.9 596.54   14. CIDP (chronic inflammatory demyelinating polyneuropathy)  G61.81 357.81   15. Sleep apnea with use of continuous positive airway pressure (CPAP)  G47.30 327.23   16. RLS (restless legs syndrome)  G25.81 333.94   17. Primary osteoarthritis of both hands  M19.041 715.14    M19.042        Plan:       Resistant hypertension  Continue current medication(s).  Follow up in 6 months.  -     amLODIPine (NORVASC) 10 MG tablet; Take 1 tablet (10 mg total) by mouth once daily.  Dispense: 90 tablet; Refill: 1  -     hydrALAZINE (APRESOLINE) 25 MG tablet; Take 1 tablet (25 mg total) by mouth 2 (two) times daily.  Dispense: 180 tablet; Refill: 1  -     metoprolol succinate (TOPROL-XL) 100 MG 24 hr tablet; Take 1 tablet (100 mg total) by mouth once daily.  Dispense: 90 tablet; Refill: 1    Hyperlipidemia, unspecified hyperlipidemia type  Continue current medication(s).  Follow up in 6 months.  -     rosuvastatin (CRESTOR) 10 MG tablet; Take 1 tablet (10 mg total) by mouth every evening.  Dispense: 90 tablet; Refill: 1  -     Comprehensive Metabolic Panel; Future; Expected date: 02/16/2022  -     Lipid Panel; Future; Expected date: 02/16/2022    Class 3 severe obesity due to excess calories with serious comorbidity and body mass index (BMI) of 40.0 to 44.9 in adult  -  INCREASE the Ozempic to 1 mg injection weeks - recommended structured weight loss program such as weight watchers.  -     semaglutide (OZEMPIC) 1 mg/dose (4 mg/3 mL); Inject 1 mg into the skin every 7 days.  Dispense: 1 pen; Refill: 5  -     Comprehensive Metabolic Panel; Future; Expected date: 02/16/2022    Iron  deficiency anemia, unspecified iron deficiency anemia type  -  Can go down to 1 iron tablet daily  -     CBC Auto Differential; Future; Expected date: 02/16/2022    IFG (impaired fasting glucose)  -  Cut back on sugars and work on weight.    Prediabetes    Screening PSA (prostate specific antigen)  -     PSA, Screening; Future; Expected date: 02/16/2022    Screening for diabetes mellitus (DM)  -     Hemoglobin A1C; Future; Expected date: 02/16/2022    Thyroid disorder screening  -     TSH; Future; Expected date: 02/16/2022    Encounter for annual physical exam  -     PSA, Screening; Future; Expected date: 02/16/2022  -     CBC Auto Differential; Future; Expected date: 02/16/2022  -     Comprehensive Metabolic Panel; Future; Expected date: 02/16/2022  -     Hemoglobin A1C; Future; Expected date: 02/16/2022  -     Lipid Panel; Future; Expected date: 02/16/2022  -     TSH; Future; Expected date: 02/16/2022    Benign nodular prostatic hyperplasia without lower urinary tract symptoms  -  Followed by Dr. Sanderson.    Self-catheterizes urinary bladder    Neurogenic bladder    CIDP (chronic inflammatory demyelinating polyneuropathy)  -  Followed by specialist    Sleep apnea with use of continuous positive airway pressure (CPAP)    RLS (restless legs syndrome)  -  Advised to wean off of/cut back on Requip as per cardiology recommendations.    Primary osteoarthritis of both hands      -encouraged diet and exercise  -patient will return for wellness exam and fasting labs  -per cardiology note, ropinirole could be the cause of edema. Instructed patient to decrease his ropinirole to a half of tablet=1 mg PO nightly to see if the edema will decrease.       Follow up in about 6 months (around 8/16/2022) for for annual wellness exam with fasting labs.     Patient's Medications   New Prescriptions    SEMAGLUTIDE (OZEMPIC) 1 MG/DOSE (4 MG/3 ML)    Inject 1 mg into the skin every 7 days.   Previous Medications    ASPIRIN (ECOTRIN) 81 MG  EC TABLET    Take 1 tablet (81 mg total) by mouth once daily.    CHOLECALCIFEROL, VITAMIN D3, (VITAMIN D3) 25 MCG (1,000 UNIT) CAPSULE    Take 2,000 Units by mouth once daily.    DIPHENHYDRAMINE HCL (BENADRYL ORAL)    Take by mouth.    DOCUSATE SODIUM (COLACE) 100 MG CAPSULE    Take 1 capsule (100 mg total) by mouth 2 (two) times daily.    EPLERENONE (INSPRA) 25 MG TAB    Take 1 tablet by mouth once daily    EPLERENONE (INSPRA) 25 MG TAB    Take 1 tablet (25 mg total) by mouth once daily.    FERROUS SULFATE (FEOSOL) 325 MG (65 MG IRON) TAB TABLET    Take 2 tablets (650 mg total) by mouth daily with breakfast.    ELVIS 0.5-0.4 MG CM24    Take 1 capsule by mouth once daily.    LYSINE 500 MG TAB    Take 500 mg by mouth once daily.    MULTIVITAMIN (THERAGRAN) PER TABLET    Take 1 tablet by mouth once daily.    OMEGA-3 FATTY ACIDS/FISH OIL (FISH OIL-OMEGA-3 FATTY ACIDS) 300-1,000 MG CAPSULE    Take 1 capsule by mouth once daily.    ROPINIROLE (REQUIP) 2 MG TABLET    1 tablet 1 to 3 hours before bedtime    VALSARTAN-HYDROCHLOROTHIAZIDE (DIOVAN-HCT) 320-12.5 MG PER TABLET    Take 1 tablet by mouth once daily.   Modified Medications    Modified Medication Previous Medication    AMLODIPINE (NORVASC) 10 MG TABLET amLODIPine (NORVASC) 10 MG tablet       Take 1 tablet (10 mg total) by mouth once daily.    Take 1 tablet by mouth once daily    HYDRALAZINE (APRESOLINE) 25 MG TABLET hydrALAZINE (APRESOLINE) 25 MG tablet       Take 1 tablet (25 mg total) by mouth 2 (two) times daily.    Take 1 tablet by mouth twice daily    METOPROLOL SUCCINATE (TOPROL-XL) 100 MG 24 HR TABLET metoprolol succinate (TOPROL-XL) 100 MG 24 hr tablet       Take 1 tablet (100 mg total) by mouth once daily.    Take 1 tablet by mouth once daily    ROSUVASTATIN (CRESTOR) 10 MG TABLET rosuvastatin (CRESTOR) 10 MG tablet       Take 1 tablet (10 mg total) by mouth every evening.    TAKE 1 TABLET BY MOUTH ONCE DAILY IN THE EVENING   Discontinued Medications     SEMAGLUTIDE (OZEMPIC) 0.25 MG OR 0.5 MG(2 MG/1.5 ML) PEN INJECTOR    Inject 0.5 mg into the skin every 7 days.

## 2022-02-16 NOTE — PROGRESS NOTES
CHIEF COMPLAINT:  2+ yr f/u    INTERVAL HISTORY (2/16/22):  Patient returns for 2+ year postop follow-up.  He continues to report that he is doing well and does not report any significant pain.  He still uses a cane and a Rollator for ambulating longer distances.  He is able to maintain his job as a  and remains very active.     INTERVAL HISTORY (2/24/21):  Patient returns for 1+ year postoperative follow-up.  He reports that he is doing very well and has essentially no pain.  He still uses a cane and a Rollator at times for ambulating longer distances.  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    INTERVAL HISTORY (02/16/2022):  Returns for routine 6 month postoperative follow-up.  He continues to report resolution of back and leg pain.  He is still using a cane and walker due to residual left lower extremity weakness that has persisted since his diagnosis of CIDP.  Overall he is pleased and looking forward to increasing his exercise and activity to attempt to improve his left lower extremity strength.    Interval history 7/23/2020:  Mr Shah returns for scheduled follow up appointment, now 7 weeks s/p MIS L3-4 TLIF. He continues to report resolution of left knee and thigh pain and back pain. He is able to walk more with a cane and his walker. Home health PT worked with him for 3 weeks. Denies any new numbness or weakness. Continues to wear his LSO brace for support     Interval history 6/3/2020:  Patient presents today for MIS L3-4 TLIF with Dr. Osorio. He reports resolution of left knee and thigh pain following treatment for left knee tendonitis by orthopedics. Still reports mild, achy low back pain. Still reports subjective left leg weakness that affects ambulation. Uses a rolling walker for support. Has difficulty moving around his home. Chronic left foot drop managed with AFO brace.      INTERVAL HISTORY (5/11/20):  No significant changes in sxs.  Leg pain is still severe  and he is eager to have surgery.     Initially underwent MIS decompression by Dr Ramires and had second surgery at [facility] by Dr Rodriguez which was supposed to be redo decompression.  Second surgery reportedly lasted 13 hrs and resulted in 1 week admission.  He reportedly suffered nerve damage that left him having to straight cath every 3.5 hrs due to incontinence.     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  He underwent IVIG (last dose on 4/3/19) and regain strength in his legs but still has residual L foot drop for which he wears an AFO     He also reports a spinal abscess that required a drain for 6 weeks.        HPI:  Manfred Shah is a 54 y.o.  male with below listed PMH, who   Presents with low back pain that radiates down his left thigh to his knee.  It does not go past the knee  And seems to skip the buttocks.  Pain is made worse by ambulating.  It is most severe in the morning and described as almost unbearable.  He ends up using a walker for support.  Sitting in a recliner and using ice helps make the pain better.  He feels like his thigh strength has diminished and does not support his weight reliably.  He also has some tingling in the thigh.  He has been diagnosed with tendinitis in the left knee and is having that evaluated tomorrow by Orthopedics.     No imaging for review     He has a history of an L3-4 laminectomy performed by Dr. Ramires in 2012 followed by a repeat L3-4 laminectomy and diskectomy by Dr. Rodriguez in 2018.      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 Tray Rodriguez    CIDP (chronic inflammatory demyelinating polyneuropathy) 06/2018    followed by Dr. Rowdy Tran - Neurologist in [place].      Elevated  liver enzymes 10/8/2018    Elevated PSA     followed by Dr. Sanderson    Foot drop, left foot     due to neurological condition/Guillan Prosperity    Hyperlipidemia     Hypertension     Impaired fasting glucose     Neurogenic bladder 09/05/2018    followed by Dr. Sanderson    RLS (restless legs syndrome) 10/8/2018    Self-catheterizes urinary bladder     Urologist    Sleep apnea with use of continuous positive airway pressure (CPAP) 01/10/2018    followed by [facility] in [place]    Urge incontinence 9/11/2018     Past Surgical History:   Procedure Laterality Date    ADENOIDECTOMY      COLONOSCOPY N/A 1/24/2020    Procedure: COLONOSCOPY;  Surgeon: Stacy Markham MD;  Location: [facility];  Service: Endoscopy;  Laterality: N/A;    HERNIA REPAIR      LUMBAR LAMINECTOMY  2012    [facility]    LUMBAR LAMINECTOMY  08/2018    Dr. Jennifer Nash [facility]; complications from surgery - abscess to spinal cord - discharge summary scanned to media file    PLANTAR FASCIA RELEASE      TONSILLECTOMY      TRANSFORAMINAL LUMBAR INTERBODY FUSION (TLIF) OF SPINE WITH PERCUTANEOUS INSTRUMENTATION Bilateral 6/3/2020    Procedure: FUSION, SPINE, LUMBAR, TLIF, WITH PERCUTANEOUS INSTRUMENTATION  (L3-4 MIS TLIF) [facility] [place] [facility] JENNIFERBENEDICTO JENNINGS [phone] Spine Wave;  Surgeon: Samuel Osorio DO;  [facility];  Service: Neurosurgery;  Laterality: Bilateral;  SPINEWAVE JENNIFER WILLIAMS [phone] TEXTED HIM @ 9:45AM ON 5-  PRE-OP BY RN 5----BMI--44---COVID NEGATIVE  CLEAR    VASECTOMY      X-STOP IMPLANTATION       Family History   Problem Relation Age of Onset    Cancer Mother         Breast Cancer    Heart disease Mother         pacemaker    Hyperlipidemia Mother         taking Crestor    Hypertension Mother         taking Amlodipine    Diabetes Mother         Prediabetes    Diabetes Father     Cancer Father         unknown type of cancer    Hypertension Father      Rheum arthritis Father     No Known Problems Sister     Prostate cancer Neg Hx     Kidney disease Neg Hx      Social History     Tobacco Use    Smoking status: Never Smoker    Smokeless tobacco: Never Used   Substance Use Topics    Alcohol use: Yes     Comment: OCCASS    Drug use: No        Review of Systems   Constitutional: Negative.    Respiratory: Negative for cough and shortness of breath.    Cardiovascular: Negative for chest pain, palpitations, claudication and leg swelling.   Gastrointestinal: Negative for abdominal pain, constipation and diarrhea.   Genitourinary: Negative for flank pain, frequency and urgency.   Musculoskeletal: Negative for back pain, falls, joint pain, myalgias and neck pain.   Skin: Negative.    Neurological: Negative.    Psychiatric/Behavioral: Negative.    All other systems reviewed and are negative.      OBJECTIVE:   Vital Signs:  Pulse: 100 (02/16/22 0959)  BP: 137/82 (02/16/22 0959)    Physical Exam:  Constitutional: Patient sitting comfortably in chair. Appears well developed and well nourished.  Skin: Exposed areas are intact without abnormal markings, rashes or other lesions.  HEENT: Normocephalic. Normal conjunctivae.  Cardiovascular: Normal rate and regular rhythm.  Respiratory: Chest wall rises and falls symmetrically, without signs of respiratory distress.  Abdomen: Soft and non-tender.  Extremities: Warm and without edema. Calves supple, non-tender.  Psych/Behavior: Normal affect.    Neurological:    Mental status: Alert and oriented. Conversational and appropriate.       Cranial Nerves: VFF to confrontation. PERRL. EOMI without nystagmus. Facial STLT normal and symmetric. Strong, symmetric muscles of mastication. Facial strength full and symmetric. Hearing equal bilaterally to finger rub. Palate and uvula rise and fall normally in midline. Shoulder shrug 5/5 strength. Tongue midline.     Motor:    Upper:  Deltoids Triceps Biceps WE WF  FA    R 5/5 5/5 5/5  5/5 5/5 5/5 5/5    L 5/5 5/5 5/5 5/5 5/5 5/5 5/5      Lower:  HF KE KF DF PF EHL    R 5/5 5/5 5/5 5/5 5/5 5/5    L 5/5 5/5 5/5 1/5 5/5 1/5     Sensory: Intact sensation to light touch and pinprick in all extremities.     Reflexes:      DTR: 2+ knees and biceps symmetrically.     Salter's: Negative.     Babinski's: Negative.     Clonus: Negative.    Cerebellar: Finger-to-nose and rapid alternating movements normal.     Gait:  Stable, fluid.    Spine:    Posture: Head well aligned over pelvis and shoulders in front and side views.  No focal or global spinal deformity visible on inspection.     Cervical:      ROM: Full with flexion, extension, lateral rotation and ear-to-shoulder bend.      Midline TTP: Negative.     Spurling's test: Negative.     Lhermitte's: Negative.    Thoracic:     Midline TTP: Negative    Lumbar:     Midline TTP: Negative     Straight Leg Test: Negative     Crossed Straight Leg Test: Negative    Other:     SI joint TTP: Negative.     Tenderness with external/internal hip rotation: Negative.    Diagnostic Results:  All imaging was independently reviewed by me.    Lumbar CT, 2/16/22:  1. Hardware in good position with good lordosis and alignment.  2. Subtle bony fusion within the interbody device    Lumbar CT, 7/1/21:  1. Hardware in good position with good lordosis and alignment.  2. Beginnings of bony fusion within the interbody device (subtle increase from prior).    Lumbar CT, 12/22/20:  1. Hardware in good position with good lordosis and alignment.  2. Beginnings of bony fusion within the interbody device.    AP and Lateral X-ray lumbar spine, dated 7/22:  1. Good hardware position, stable from xrays taken during post op hospital course      ASSESSMENT/PLAN:     Problem List Items Addressed This Visit        Orthopedic    S/P lumbar spinal fusion - Primary    Relevant Orders    CT Lumbar Spine Without Contrast        VISIT SUMMARY: Mr Shah is a 54M that is s/p L3-4 MIS TLIF. He continues  to do well with resolution of back pain and leg pain. Hardware in excellent position however CT shows only subtle interbody fusion.  No evidence of obvious pseudoarthrosis either clinically or radiographically.  At request of the patient, will f/u in 6 months with CT.    PATIENT EDUCATION:  More than half the clinic visit was spent showing with patient the pertinent findings on imaging and educating the patient about natural history of the pathology.   We discussed options for treatment as well as the risks and benefits of each option.  All questions were answered.     The patient understands and agrees with the following plan of care.    - RTC in 6 months with repeat CT to assess arthrodesis status    Time spent on this encounter: 60 minutes. This includes face-to-face time and non-face to face time preparing to see the patient (eg, review of tests), obtaining and/or reviewing separately obtained history, documenting clinical information in the electronic or other health record, independently interpreting results and communicating results to the patient/family/caregiver, or care coordinator.              .

## 2022-02-18 ENCOUNTER — OFFICE VISIT (OUTPATIENT)
Dept: UROLOGY | Facility: CLINIC | Age: 57
End: 2022-02-18
Payer: COMMERCIAL

## 2022-02-18 VITALS
BODY MASS INDEX: 37.19 KG/M2 | HEIGHT: 77 IN | TEMPERATURE: 98 F | HEART RATE: 89 BPM | SYSTOLIC BLOOD PRESSURE: 130 MMHG | DIASTOLIC BLOOD PRESSURE: 78 MMHG | OXYGEN SATURATION: 100 % | RESPIRATION RATE: 16 BRPM | WEIGHT: 315 LBS

## 2022-02-18 DIAGNOSIS — N52.03 COMBINED ARTERIAL INSUFFICIENCY AND CORPORO-VENOUS OCCLUSIVE ERECTILE DYSFUNCTION: ICD-10-CM

## 2022-02-18 DIAGNOSIS — R33.9 URINARY RETENTION: Primary | ICD-10-CM

## 2022-02-18 DIAGNOSIS — N31.9 NEUROGENIC BLADDER: ICD-10-CM

## 2022-02-18 PROCEDURE — 1160F RVW MEDS BY RX/DR IN RCRD: CPT | Mod: CPTII,S$GLB,, | Performed by: UROLOGY

## 2022-02-18 PROCEDURE — 1159F PR MEDICATION LIST DOCUMENTED IN MEDICAL RECORD: ICD-10-PCS | Mod: CPTII,S$GLB,, | Performed by: UROLOGY

## 2022-02-18 PROCEDURE — 3075F SYST BP GE 130 - 139MM HG: CPT | Mod: CPTII,S$GLB,, | Performed by: UROLOGY

## 2022-02-18 PROCEDURE — 99999 PR PBB SHADOW E&M-EST. PATIENT-LVL III: ICD-10-PCS | Mod: PBBFAC,,, | Performed by: UROLOGY

## 2022-02-18 PROCEDURE — 99999 PR PBB SHADOW E&M-EST. PATIENT-LVL III: CPT | Mod: PBBFAC,,, | Performed by: UROLOGY

## 2022-02-18 PROCEDURE — 99214 OFFICE O/P EST MOD 30 MIN: CPT | Mod: S$GLB,,, | Performed by: UROLOGY

## 2022-02-18 PROCEDURE — 3044F HG A1C LEVEL LT 7.0%: CPT | Mod: CPTII,S$GLB,, | Performed by: UROLOGY

## 2022-02-18 PROCEDURE — 1159F MED LIST DOCD IN RCRD: CPT | Mod: CPTII,S$GLB,, | Performed by: UROLOGY

## 2022-02-18 PROCEDURE — 3078F PR MOST RECENT DIASTOLIC BLOOD PRESSURE < 80 MM HG: ICD-10-PCS | Mod: CPTII,S$GLB,, | Performed by: UROLOGY

## 2022-02-18 PROCEDURE — 3044F PR MOST RECENT HEMOGLOBIN A1C LEVEL <7.0%: ICD-10-PCS | Mod: CPTII,S$GLB,, | Performed by: UROLOGY

## 2022-02-18 PROCEDURE — 3078F DIAST BP <80 MM HG: CPT | Mod: CPTII,S$GLB,, | Performed by: UROLOGY

## 2022-02-18 PROCEDURE — 3008F BODY MASS INDEX DOCD: CPT | Mod: CPTII,S$GLB,, | Performed by: UROLOGY

## 2022-02-18 PROCEDURE — 3008F PR BODY MASS INDEX (BMI) DOCUMENTED: ICD-10-PCS | Mod: CPTII,S$GLB,, | Performed by: UROLOGY

## 2022-02-18 PROCEDURE — 99214 PR OFFICE/OUTPT VISIT, EST, LEVL IV, 30-39 MIN: ICD-10-PCS | Mod: S$GLB,,, | Performed by: UROLOGY

## 2022-02-18 PROCEDURE — 1160F PR REVIEW ALL MEDS BY PRESCRIBER/CLIN PHARMACIST DOCUMENTED: ICD-10-PCS | Mod: CPTII,S$GLB,, | Performed by: UROLOGY

## 2022-02-18 PROCEDURE — 3075F PR MOST RECENT SYSTOLIC BLOOD PRESS GE 130-139MM HG: ICD-10-PCS | Mod: CPTII,S$GLB,, | Performed by: UROLOGY

## 2022-02-18 RX ORDER — DUTASTERIDE 0.5 MG/1
0.5 CAPSULE, LIQUID FILLED ORAL DAILY
Qty: 30 CAPSULE | Refills: 11 | Status: SHIPPED | OUTPATIENT
Start: 2022-02-18 | End: 2022-12-23 | Stop reason: SDUPTHER

## 2022-02-18 RX ORDER — TAMSULOSIN HYDROCHLORIDE 0.4 MG/1
0.4 CAPSULE ORAL DAILY
Qty: 30 CAPSULE | Refills: 11 | Status: SHIPPED | OUTPATIENT
Start: 2022-02-18 | End: 2022-12-23 | Stop reason: SDUPTHER

## 2022-02-18 RX ORDER — TADALAFIL 20 MG/1
20 TABLET ORAL DAILY
Qty: 30 TABLET | Refills: 11 | Status: SHIPPED | OUTPATIENT
Start: 2022-02-18 | End: 2023-03-20 | Stop reason: SDUPTHER

## 2022-02-18 NOTE — PROGRESS NOTES
Subjective:       Patient ID: Manfred Shah is a 56 y.o. male.    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  The patient and developed some sequelae from this including urinary retention as well as erectile dysfunction secondary to neuropathy.  The patient has we turned a lot of his sensory function however he still having trouble obtaining and maintaining erection.  The patient has dry ejaculation with orgasm.  Patient has been on Viagra which now is not working and has come to discuss further treatment.  We have discussed trying Cialis and will try this for 2 or 3 months if it does not work will considered TriMix injections.  Patient on intermittent catheterization with straight caths due to inability to void this since GBS.    Follow-up  This is a chronic (ED) problem. The current episode started today. The problem occurs constantly. The problem has been unchanged. Pertinent negatives include no abdominal pain, anorexia, arthralgias, change in bowel habit, chest pain, chills, congestion, coughing, diaphoresis, fatigue, fever, headaches, joint swelling, myalgias, nausea, neck pain, numbness, rash, sore throat, swollen glands, urinary symptoms, vertigo, visual change, vomiting or weakness. Nothing aggravates the symptoms. Treatments tried: Viagra. The treatment provided mild relief.     Review of Systems   Constitutional: Negative for activity change, appetite change, chills, diaphoresis, fatigue, fever and unexpected weight change.   HENT: Negative for congestion, hearing loss, sinus pressure, sore throat and trouble swallowing.    Eyes: Negative for photophobia, pain, discharge and visual disturbance.   Respiratory: Negative for apnea, cough and shortness of breath.    Cardiovascular: Negative for chest pain, palpitations and leg swelling.   Gastrointestinal: Negative for abdominal distention, abdominal pain, anal bleeding, anorexia, blood in stool, change in bowel  habit, constipation, diarrhea, nausea, rectal pain and vomiting.   Endocrine: Negative for cold intolerance, heat intolerance, polydipsia, polyphagia and polyuria.   Genitourinary: Negative for decreased urine volume, difficulty urinating, dysuria, enuresis, flank pain, frequency, genital sores, hematuria, penile discharge, penile pain, penile swelling, scrotal swelling, testicular pain and urgency.   Musculoskeletal: Negative for arthralgias, back pain, joint swelling, myalgias and neck pain.   Skin: Negative for color change, pallor, rash and wound.   Allergic/Immunologic: Negative for environmental allergies, food allergies and immunocompromised state.   Neurological: Negative for dizziness, vertigo, seizures, weakness, numbness and headaches.   Hematological: Negative for adenopathy. Does not bruise/bleed easily.   Psychiatric/Behavioral: Negative.        Objective:      Physical Exam  Vitals and nursing note reviewed.   Constitutional:       Appearance: Normal appearance. He is well-developed, normal weight and well-nourished.   HENT:      Head: Normocephalic and atraumatic.      Nose: Nose normal.      Mouth/Throat:      Mouth: Oropharynx is clear and moist.      Pharynx: No oropharyngeal exudate or posterior oropharyngeal erythema.   Eyes:      Extraocular Movements: Extraocular movements intact and EOM normal.      Conjunctiva/sclera: Conjunctivae normal.      Pupils: Pupils are equal, round, and reactive to light.   Cardiovascular:      Rate and Rhythm: Normal rate and regular rhythm.      Pulses: Intact distal pulses.      Heart sounds: Normal heart sounds.   Pulmonary:      Effort: Pulmonary effort is normal.      Breath sounds: Normal breath sounds.   Abdominal:      General: Bowel sounds are normal.      Palpations: Abdomen is soft.   Genitourinary:     Penis: Normal.       Testes: Normal.   Musculoskeletal:         General: Normal range of motion.      Cervical back: Normal range of motion and neck  supple.   Skin:     General: Skin is warm and dry.      Capillary Refill: Capillary refill takes less than 2 seconds.   Neurological:      Mental Status: He is alert and oriented to person, place, and time.      Deep Tendon Reflexes: Reflexes are normal and symmetric.   Psychiatric:         Mood and Affect: Mood and affect normal.         Behavior: Behavior normal.         Thought Content: Thought content normal.         Judgment: Judgment normal.         Assessment:       1. Urinary retention    2. Neurogenic bladder    3. Combined arterial insufficiency and corporo-venous occlusive erectile dysfunction        Plan:       Patient Instructions   Trial of Cialis  Continue Flomax and Avodart

## 2022-02-28 ENCOUNTER — PATIENT MESSAGE (OUTPATIENT)
Dept: UROLOGY | Facility: CLINIC | Age: 57
End: 2022-02-28
Payer: COMMERCIAL

## 2022-03-13 ENCOUNTER — PATIENT MESSAGE (OUTPATIENT)
Dept: FAMILY MEDICINE | Facility: CLINIC | Age: 57
End: 2022-03-13
Payer: COMMERCIAL

## 2022-03-14 ENCOUNTER — PATIENT MESSAGE (OUTPATIENT)
Dept: FAMILY MEDICINE | Facility: CLINIC | Age: 57
End: 2022-03-14
Payer: COMMERCIAL

## 2022-04-21 ENCOUNTER — TELEPHONE (OUTPATIENT)
Dept: UROLOGY | Facility: CLINIC | Age: 57
End: 2022-04-21
Payer: COMMERCIAL

## 2022-04-21 NOTE — TELEPHONE ENCOUNTER
----- Message from Feliberto Roberson sent at 4/21/2022 10:53 AM CDT -----  Contact: [facility]  .Type:  Needs Medical Advice    Who Called: [facility]    Would the patient rather a call back or a response via [facility]? Call back  Best Call Back Number: [phone]  Additional Information: [facility] is call regarding pt last progess notes from the office.  Please fax# [phone]

## 2022-04-22 ENCOUNTER — PATIENT MESSAGE (OUTPATIENT)
Dept: FAMILY MEDICINE | Facility: CLINIC | Age: 57
End: 2022-04-22
Payer: COMMERCIAL

## 2022-04-22 ENCOUNTER — PATIENT MESSAGE (OUTPATIENT)
Dept: CARDIOLOGY | Facility: CLINIC | Age: 57
End: 2022-04-22
Payer: COMMERCIAL

## 2022-04-22 DIAGNOSIS — I1A.0 RESISTANT HYPERTENSION: ICD-10-CM

## 2022-04-22 DIAGNOSIS — I10 ESSENTIAL HYPERTENSION: ICD-10-CM

## 2022-04-22 DIAGNOSIS — E78.5 HYPERLIPIDEMIA, UNSPECIFIED HYPERLIPIDEMIA TYPE: ICD-10-CM

## 2022-04-25 RX ORDER — METOPROLOL SUCCINATE 100 MG/1
TABLET, EXTENDED RELEASE ORAL
Qty: 90 TABLET | Refills: 0 | OUTPATIENT
Start: 2022-04-25

## 2022-04-25 RX ORDER — EPLERENONE 25 MG/1
25 TABLET, FILM COATED ORAL DAILY
Qty: 90 TABLET | Refills: 3 | Status: SHIPPED | OUTPATIENT
Start: 2022-04-25 | End: 2023-04-10

## 2022-04-25 RX ORDER — ROSUVASTATIN CALCIUM 10 MG/1
TABLET, COATED ORAL
Qty: 90 TABLET | Refills: 0 | OUTPATIENT
Start: 2022-04-25

## 2022-04-25 RX ORDER — AMLODIPINE BESYLATE 10 MG/1
TABLET ORAL
Qty: 90 TABLET | Refills: 0 | OUTPATIENT
Start: 2022-04-25

## 2022-04-25 NOTE — TELEPHONE ENCOUNTER
All 3 of these medications were sent to [facility] in February for 6 months and pharmacy confirmed receipt - call pharmacy and tell them to check their records - they should have these on file.

## 2022-04-27 ENCOUNTER — PATIENT MESSAGE (OUTPATIENT)
Dept: FAMILY MEDICINE | Facility: CLINIC | Age: 57
End: 2022-04-27
Payer: COMMERCIAL

## 2022-05-16 ENCOUNTER — PATIENT OUTREACH (OUTPATIENT)
Dept: ADMINISTRATIVE | Facility: OTHER | Age: 57
End: 2022-05-16
Payer: COMMERCIAL

## 2022-05-16 NOTE — PROGRESS NOTES
Health Maintenance Due   Topic Date Due    Shingles Vaccine (1 of 2) Never done    COVID-19 Vaccine (3 - Booster for Moderna series) 09/28/2021     Updates were requested from care everywhere.  Chart was reviewed for overdue Proactive [facility] Encounters (HOW[facility]) topics (CRS, Breast Cancer Screening, Eye exam)  Health Maintenance has been updated.  [facility] immunization registry triggered.  Immunizations were reconciled.

## 2022-05-18 ENCOUNTER — OFFICE VISIT (OUTPATIENT)
Dept: UROLOGY | Facility: CLINIC | Age: 57
End: 2022-05-18
Payer: COMMERCIAL

## 2022-05-18 VITALS
SYSTOLIC BLOOD PRESSURE: 134 MMHG | TEMPERATURE: 98 F | WEIGHT: 315 LBS | RESPIRATION RATE: 16 BRPM | DIASTOLIC BLOOD PRESSURE: 68 MMHG | HEIGHT: 77 IN | HEART RATE: 87 BPM | BODY MASS INDEX: 37.19 KG/M2 | OXYGEN SATURATION: 98 %

## 2022-05-18 DIAGNOSIS — N52.03 COMBINED ARTERIAL INSUFFICIENCY AND CORPORO-VENOUS OCCLUSIVE ERECTILE DYSFUNCTION: ICD-10-CM

## 2022-05-18 DIAGNOSIS — R35.0 URINARY FREQUENCY: Primary | ICD-10-CM

## 2022-05-18 DIAGNOSIS — N31.9 NEUROGENIC BLADDER: ICD-10-CM

## 2022-05-18 DIAGNOSIS — R33.9 URINARY RETENTION: ICD-10-CM

## 2022-05-18 DIAGNOSIS — R35.1 NOCTURIA: ICD-10-CM

## 2022-05-18 PROCEDURE — 3008F BODY MASS INDEX DOCD: CPT | Mod: CPTII,S$GLB,, | Performed by: UROLOGY

## 2022-05-18 PROCEDURE — 99214 OFFICE O/P EST MOD 30 MIN: CPT | Mod: S$GLB,,, | Performed by: UROLOGY

## 2022-05-18 PROCEDURE — 99999 PR PBB SHADOW E&M-EST. PATIENT-LVL V: CPT | Mod: PBBFAC,,, | Performed by: UROLOGY

## 2022-05-18 PROCEDURE — 1159F PR MEDICATION LIST DOCUMENTED IN MEDICAL RECORD: ICD-10-PCS | Mod: CPTII,S$GLB,, | Performed by: UROLOGY

## 2022-05-18 PROCEDURE — 1159F MED LIST DOCD IN RCRD: CPT | Mod: CPTII,S$GLB,, | Performed by: UROLOGY

## 2022-05-18 PROCEDURE — 1160F PR REVIEW ALL MEDS BY PRESCRIBER/CLIN PHARMACIST DOCUMENTED: ICD-10-PCS | Mod: CPTII,S$GLB,, | Performed by: UROLOGY

## 2022-05-18 PROCEDURE — 3078F DIAST BP <80 MM HG: CPT | Mod: CPTII,S$GLB,, | Performed by: UROLOGY

## 2022-05-18 PROCEDURE — 1160F RVW MEDS BY RX/DR IN RCRD: CPT | Mod: CPTII,S$GLB,, | Performed by: UROLOGY

## 2022-05-18 PROCEDURE — 99214 PR OFFICE/OUTPT VISIT, EST, LEVL IV, 30-39 MIN: ICD-10-PCS | Mod: S$GLB,,, | Performed by: UROLOGY

## 2022-05-18 PROCEDURE — 3075F PR MOST RECENT SYSTOLIC BLOOD PRESS GE 130-139MM HG: ICD-10-PCS | Mod: CPTII,S$GLB,, | Performed by: UROLOGY

## 2022-05-18 PROCEDURE — 3044F PR MOST RECENT HEMOGLOBIN A1C LEVEL <7.0%: ICD-10-PCS | Mod: CPTII,S$GLB,, | Performed by: UROLOGY

## 2022-05-18 PROCEDURE — 3075F SYST BP GE 130 - 139MM HG: CPT | Mod: CPTII,S$GLB,, | Performed by: UROLOGY

## 2022-05-18 PROCEDURE — 3078F PR MOST RECENT DIASTOLIC BLOOD PRESSURE < 80 MM HG: ICD-10-PCS | Mod: CPTII,S$GLB,, | Performed by: UROLOGY

## 2022-05-18 PROCEDURE — 3044F HG A1C LEVEL LT 7.0%: CPT | Mod: CPTII,S$GLB,, | Performed by: UROLOGY

## 2022-05-18 PROCEDURE — 99999 PR PBB SHADOW E&M-EST. PATIENT-LVL V: ICD-10-PCS | Mod: PBBFAC,,, | Performed by: UROLOGY

## 2022-05-18 PROCEDURE — 3008F PR BODY MASS INDEX (BMI) DOCUMENTED: ICD-10-PCS | Mod: CPTII,S$GLB,, | Performed by: UROLOGY

## 2022-05-18 NOTE — PATIENT INSTRUCTIONS
Stop Cialis  Get prescription for TriMix filled and bring to office  Follow-up in 1 week for intracorporeal penile injection training teaching.

## 2022-05-18 NOTE — PROGRESS NOTES
Subjective:       Patient ID: Manfred Shah is a 56 y.o. male.    Chief Complaint: Erectile Dysfunction    Mr. Shah  with a history of Guilliane [place] Disease approximately 4 years ago.  The patient underwent treatment and h a proximally 2 years of IvG treatment.  The patient developed erectile dysfunction after development of GBD.  He has been trying Cialis 20 mg daily for the last several months with minimal success.  Patient had 1 episode with good erection but otherwise has not been able to obtain a usable erection.  The patient presents today to discuss treatment with intracorporeal penile injection.  We will order the patient medication he will present back for a trial of injection therapy and teaching.    Erectile Dysfunction  This is a chronic problem. The problem is unchanged. The nature of his difficulty is achieving erection. He reports no anxiety, decreased libido or performance anxiety. He reports his erection duration to be less than 1 minute. Irritative symptoms include frequency and nocturia. Irritative symptoms do not include urgency. Obstructive symptoms do not include dribbling, incomplete emptying, an intermittent stream, a slower stream, straining or a weak stream. Pertinent negatives include no chills, dysuria, genital pain, hematuria, hesitancy or inability to urinate. Nothing aggravates the symptoms. Past treatments include sildenafil. The treatment provided no relief. He has been using treatment for 1 to 6 months. He has had no adverse reactions caused by medications. Risk factors include no AM erections, hypertension, nerve injury and penile trauma.     Review of Systems   Constitutional: Negative for activity change, appetite change, chills, diaphoresis, fatigue, fever and unexpected weight change.   HENT: Negative for congestion, hearing loss, sinus pressure and trouble swallowing.    Eyes: Negative for photophobia, pain, discharge and visual disturbance.   Respiratory: Negative for  apnea, cough and shortness of breath.    Cardiovascular: Negative for chest pain, palpitations and leg swelling.   Gastrointestinal: Negative for abdominal distention, abdominal pain, anal bleeding, blood in stool, constipation, diarrhea, nausea, rectal pain and vomiting.   Endocrine: Negative for cold intolerance, heat intolerance, polydipsia, polyphagia and polyuria.   Genitourinary: Positive for frequency and nocturia. Negative for decreased libido, decreased urine volume, difficulty urinating, dysuria, enuresis, flank pain, genital sores, hematuria, hesitancy, incomplete emptying, penile discharge, penile pain, penile swelling, scrotal swelling, testicular pain and urgency.   Musculoskeletal: Negative for arthralgias, back pain and myalgias.   Skin: Negative for color change, pallor, rash and wound.   Allergic/Immunologic: Negative for environmental allergies, food allergies and immunocompromised state.   Neurological: Negative for dizziness, seizures, weakness and headaches.   Hematological: Negative for adenopathy. Does not bruise/bleed easily.   Psychiatric/Behavioral: Negative.  The patient is not nervous/anxious.        Objective:      Physical Exam  Vitals and nursing note reviewed.   Constitutional:       Appearance: Normal appearance. He is well-developed. He is obese.   HENT:      Head: Normocephalic and atraumatic.      Right Ear: External ear normal.      Left Ear: External ear normal.      Nose: Nose normal.      Mouth/Throat:      Mouth: Mucous membranes are moist.      Pharynx: Oropharynx is clear. No oropharyngeal exudate or posterior oropharyngeal erythema.   Eyes:      General: No scleral icterus.        Right eye: No discharge.         Left eye: No discharge.      Extraocular Movements: Extraocular movements intact.      Conjunctiva/sclera: Conjunctivae normal.      Pupils: Pupils are equal, round, and reactive to light.   Cardiovascular:      Rate and Rhythm: Normal rate and regular rhythm.       Heart sounds: Normal heart sounds.   Pulmonary:      Effort: Pulmonary effort is normal.      Breath sounds: Normal breath sounds.   Abdominal:      General: Bowel sounds are normal.      Palpations: Abdomen is soft.      Tenderness: There is no right CVA tenderness or left CVA tenderness.   Genitourinary:     Penis: Normal.       Testes: Normal.   Musculoskeletal:         General: Normal range of motion.      Cervical back: Normal range of motion and neck supple.   Skin:     General: Skin is warm and dry.      Capillary Refill: Capillary refill takes less than 2 seconds.   Neurological:      Mental Status: He is alert and oriented to person, place, and time.      Deep Tendon Reflexes: Reflexes are normal and symmetric.   Psychiatric:         Mood and Affect: Mood normal.         Behavior: Behavior normal.         Thought Content: Thought content normal.         Judgment: Judgment normal.         Assessment:       1. Urinary frequency    2. Nocturia    3. Neurogenic bladder    4. Urinary retention    5. Combined arterial insufficiency and corporo-venous occlusive erectile dysfunction        Plan:       Patient Instructions   Stop Cialis  Get prescription for TriMix filled and bring to office  Follow-up in 1 week for intracorporeal penile injection training teaching.

## 2022-05-24 ENCOUNTER — OFFICE VISIT (OUTPATIENT)
Dept: UROLOGY | Facility: CLINIC | Age: 57
End: 2022-05-24
Payer: COMMERCIAL

## 2022-05-24 VITALS
TEMPERATURE: 98 F | WEIGHT: 315 LBS | RESPIRATION RATE: 16 BRPM | SYSTOLIC BLOOD PRESSURE: 130 MMHG | HEIGHT: 77 IN | BODY MASS INDEX: 37.19 KG/M2 | OXYGEN SATURATION: 97 % | DIASTOLIC BLOOD PRESSURE: 72 MMHG | HEART RATE: 86 BPM

## 2022-05-24 DIAGNOSIS — N52.9 ORGANIC ERECTILE DYSFUNCTION: ICD-10-CM

## 2022-05-24 PROCEDURE — 99999 PR PBB SHADOW E&M-EST. PATIENT-LVL V: CPT | Mod: PBBFAC,,, | Performed by: UROLOGY

## 2022-05-24 PROCEDURE — 3075F SYST BP GE 130 - 139MM HG: CPT | Mod: CPTII,S$GLB,, | Performed by: UROLOGY

## 2022-05-24 PROCEDURE — 3044F HG A1C LEVEL LT 7.0%: CPT | Mod: CPTII,S$GLB,, | Performed by: UROLOGY

## 2022-05-24 PROCEDURE — 99999 PR PBB SHADOW E&M-EST. PATIENT-LVL V: ICD-10-PCS | Mod: PBBFAC,,, | Performed by: UROLOGY

## 2022-05-24 PROCEDURE — 1159F MED LIST DOCD IN RCRD: CPT | Mod: CPTII,S$GLB,, | Performed by: UROLOGY

## 2022-05-24 PROCEDURE — 1160F PR REVIEW ALL MEDS BY PRESCRIBER/CLIN PHARMACIST DOCUMENTED: ICD-10-PCS | Mod: CPTII,S$GLB,, | Performed by: UROLOGY

## 2022-05-24 PROCEDURE — 3008F BODY MASS INDEX DOCD: CPT | Mod: CPTII,S$GLB,, | Performed by: UROLOGY

## 2022-05-24 PROCEDURE — 3078F DIAST BP <80 MM HG: CPT | Mod: CPTII,S$GLB,, | Performed by: UROLOGY

## 2022-05-24 PROCEDURE — 3075F PR MOST RECENT SYSTOLIC BLOOD PRESS GE 130-139MM HG: ICD-10-PCS | Mod: CPTII,S$GLB,, | Performed by: UROLOGY

## 2022-05-24 PROCEDURE — 3044F PR MOST RECENT HEMOGLOBIN A1C LEVEL <7.0%: ICD-10-PCS | Mod: CPTII,S$GLB,, | Performed by: UROLOGY

## 2022-05-24 PROCEDURE — 1159F PR MEDICATION LIST DOCUMENTED IN MEDICAL RECORD: ICD-10-PCS | Mod: CPTII,S$GLB,, | Performed by: UROLOGY

## 2022-05-24 PROCEDURE — 3008F PR BODY MASS INDEX (BMI) DOCUMENTED: ICD-10-PCS | Mod: CPTII,S$GLB,, | Performed by: UROLOGY

## 2022-05-24 PROCEDURE — 99214 PR OFFICE/OUTPT VISIT, EST, LEVL IV, 30-39 MIN: ICD-10-PCS | Mod: S$GLB,,, | Performed by: UROLOGY

## 2022-05-24 PROCEDURE — 1160F RVW MEDS BY RX/DR IN RCRD: CPT | Mod: CPTII,S$GLB,, | Performed by: UROLOGY

## 2022-05-24 PROCEDURE — 99214 OFFICE O/P EST MOD 30 MIN: CPT | Mod: S$GLB,,, | Performed by: UROLOGY

## 2022-05-24 PROCEDURE — 3078F PR MOST RECENT DIASTOLIC BLOOD PRESSURE < 80 MM HG: ICD-10-PCS | Mod: CPTII,S$GLB,, | Performed by: UROLOGY

## 2022-05-24 NOTE — PROGRESS NOTES
Subjective:       Patient ID: Manfred Shah is a 56 y.o. male.    Chief Complaint: No chief complaint on file.    Mr. Shah is 56-year-old gentleman with organic impotence since developing Guillain-Galeton syndrome.  The patient presents after failing multiple oral medications and is here for TriMix injection teaching and trial to determine dosage.    Erectile Dysfunction  This is a chronic problem. The current episode started more than 1 year ago. The problem is unchanged. The nature of his difficulty is achieving erection, maintaining erection and penetration. He reports no anxiety, decreased libido or performance anxiety. He reports his erection duration to be less than 1 minute. Irritative symptoms do not include frequency, nocturia or urgency. Obstructive symptoms do not include dribbling, incomplete emptying, an intermittent stream, a slower stream, straining or a weak stream. Pertinent negatives include no chills, dysuria, genital pain, hematuria, hesitancy or inability to urinate. Nothing aggravates the symptoms. Past treatments include sildenafil and tadalafil. The treatment provided significant relief. He has been using treatment for 1 to 2 years. He has had penile pain caused by medications. Risk factors include BPH and hypertension (Guillain-Galeton, neurogenic bladder).     Review of Systems   Constitutional: Negative for activity change, appetite change, chills, diaphoresis, fatigue, fever and unexpected weight change.   HENT: Negative for congestion, hearing loss, sinus pressure and trouble swallowing.    Eyes: Negative for photophobia, pain, discharge and visual disturbance.   Respiratory: Negative for apnea, cough and shortness of breath.    Cardiovascular: Negative for chest pain, palpitations and leg swelling.   Gastrointestinal: Negative for abdominal distention, abdominal pain, anal bleeding, blood in stool, constipation, diarrhea, nausea, rectal pain and vomiting.   Endocrine: Negative for cold  intolerance, heat intolerance, polydipsia, polyphagia and polyuria.   Genitourinary: Negative for decreased libido, decreased urine volume, difficulty urinating, dysuria, enuresis, flank pain, frequency, genital sores, hematuria, hesitancy, incomplete emptying, nocturia, penile discharge, penile pain, penile swelling, scrotal swelling, testicular pain and urgency.   Musculoskeletal: Negative for arthralgias, back pain and myalgias.   Skin: Negative for color change, pallor, rash and wound.   Allergic/Immunologic: Negative for environmental allergies, food allergies and immunocompromised state.   Neurological: Negative for dizziness, seizures, weakness and headaches.   Hematological: Negative for adenopathy. Does not bruise/bleed easily.   Psychiatric/Behavioral: Negative.  The patient is not nervous/anxious.        Objective:      Physical Exam  Vitals and nursing note reviewed.   Constitutional:       Appearance: Normal appearance. He is well-developed.   HENT:      Head: Normocephalic.      Right Ear: External ear normal.      Left Ear: External ear normal.      Nose: Nose normal.      Mouth/Throat:      Mouth: Mucous membranes are moist.      Pharynx: No oropharyngeal exudate or posterior oropharyngeal erythema.   Eyes:      General: No scleral icterus.        Right eye: No discharge.         Left eye: No discharge.      Extraocular Movements: Extraocular movements intact.      Conjunctiva/sclera: Conjunctivae normal.      Pupils: Pupils are equal, round, and reactive to light.   Cardiovascular:      Rate and Rhythm: Normal rate and regular rhythm.      Heart sounds: Normal heart sounds.   Pulmonary:      Effort: Pulmonary effort is normal.      Breath sounds: Normal breath sounds.   Abdominal:      General: Bowel sounds are normal.      Palpations: Abdomen is soft.      Tenderness: There is no right CVA tenderness or left CVA tenderness.   Genitourinary:     Penis: Normal.       Testes: Normal.    Musculoskeletal:         General: Normal range of motion.      Cervical back: Normal range of motion and neck supple.   Skin:     General: Skin is warm and dry.      Capillary Refill: Capillary refill takes less than 2 seconds.   Neurological:      Mental Status: He is alert and oriented to person, place, and time.      Deep Tendon Reflexes: Reflexes are normal and symmetric.   Psychiatric:         Mood and Affect: Mood normal.         Behavior: Behavior normal.         Thought Content: Thought content normal.         Judgment: Judgment normal.       The patient taught anatomy and area of injection on side of penis.  Demonstrated to patient where to inject.  Proximally 0.15 cc were injected.  Patient had strong erection which was stable in difficult to bend indicating appropriate response.  Patient informed not to use more than this at home and to actually start with his low bit smaller dose of 0.1 cc as he would be more stimulated at the house.  Assessment:       1. Organic erectile dysfunction        Plan:       Patient Instructions   Patient to start penile injection therapy with TriMix 0.1 cc prior to sexual activity.  If erections last longer than 4 hours notify me and present to the emergency room for medication.    Keep normal annual follow-up appointment for yearly PSA checks and evaluation.

## 2022-05-24 NOTE — PATIENT INSTRUCTIONS
Patient to start penile injection therapy with TriMix 0.1 cc prior to sexual activity.  If erections last longer than 4 hours notify me and present to the emergency room for medication.    Keep normal annual follow-up appointment for yearly PSA checks and evaluation.

## 2022-06-09 ENCOUNTER — PATIENT MESSAGE (OUTPATIENT)
Dept: FAMILY MEDICINE | Facility: CLINIC | Age: 57
End: 2022-06-09
Payer: COMMERCIAL

## 2022-06-09 DIAGNOSIS — I10 ESSENTIAL HYPERTENSION: ICD-10-CM

## 2022-06-09 RX ORDER — VALSARTAN AND HYDROCHLOROTHIAZIDE 320; 12.5 MG/1; MG/1
1 TABLET, FILM COATED ORAL DAILY
Qty: 90 TABLET | Refills: 1 | Status: CANCELLED | OUTPATIENT
Start: 2022-06-09 | End: 2023-06-09

## 2022-06-10 NOTE — TELEPHONE ENCOUNTER
I sent patient a Public Mobile message letting him know that on 06/09/22 that NP Brandy sent in a 90 days supply on his medication- I advised patient to contact pharmacy.

## 2022-07-28 ENCOUNTER — TELEPHONE (OUTPATIENT)
Dept: NEUROSURGERY | Facility: CLINIC | Age: 57
End: 2022-07-28
Payer: COMMERCIAL

## 2022-07-28 ENCOUNTER — PATIENT MESSAGE (OUTPATIENT)
Dept: NEUROSURGERY | Facility: CLINIC | Age: 57
End: 2022-07-28
Payer: COMMERCIAL

## 2022-07-28 NOTE — TELEPHONE ENCOUNTER
LM for pt, explained Dr. Osorio will not be available for clinic Wednesday 8/10/22. Therefore, I went ahead and moved pt to Friday 8/12/22 with his CT at 10:15 and appointment with Dr. Osorio for 11:00 am. Advised pt to call back to reschedule if needed. Letter mailed.

## 2022-08-02 ENCOUNTER — PATIENT MESSAGE (OUTPATIENT)
Dept: UROLOGY | Facility: CLINIC | Age: 57
End: 2022-08-02
Payer: COMMERCIAL

## 2022-08-06 ENCOUNTER — PATIENT MESSAGE (OUTPATIENT)
Dept: NEUROSURGERY | Facility: CLINIC | Age: 57
End: 2022-08-06
Payer: COMMERCIAL

## 2022-08-27 DIAGNOSIS — I10 ESSENTIAL HYPERTENSION: ICD-10-CM

## 2022-08-29 RX ORDER — VALSARTAN AND HYDROCHLOROTHIAZIDE 320; 12.5 MG/1; MG/1
1 TABLET, FILM COATED ORAL DAILY
Qty: 90 TABLET | Refills: 0 | Status: SHIPPED | OUTPATIENT
Start: 2022-08-29 | End: 2022-11-25

## 2022-08-29 NOTE — TELEPHONE ENCOUNTER
Spoke with patient in regards of message- patient said he had to cancel his appt today due he having to move out is rental today patient he was supposed to moved back in his house this week- patient said he not sure when he is able to come in he have a lot going on right now. - advised patient when he ready to come me so I can schedule his labs and appt to come in.

## 2022-09-21 ENCOUNTER — HOSPITAL ENCOUNTER (OUTPATIENT)
Dept: RADIOLOGY | Facility: HOSPITAL | Age: 57
Discharge: HOME | End: 2022-09-21
Attending: NEUROLOGICAL SURGERY
Payer: COMMERCIAL

## 2022-09-21 ENCOUNTER — OFFICE VISIT (OUTPATIENT)
Dept: NEUROSURGERY | Facility: CLINIC | Age: 57
End: 2022-09-21
Payer: COMMERCIAL

## 2022-09-21 VITALS
WEIGHT: 315 LBS | DIASTOLIC BLOOD PRESSURE: 74 MMHG | HEART RATE: 93 BPM | SYSTOLIC BLOOD PRESSURE: 126 MMHG | HEIGHT: 77 IN | BODY MASS INDEX: 37.19 KG/M2

## 2022-09-21 DIAGNOSIS — Z98.1 S/P LUMBAR SPINAL FUSION: ICD-10-CM

## 2022-09-21 DIAGNOSIS — Z98.1 S/P LUMBAR SPINAL FUSION: Primary | ICD-10-CM

## 2022-09-21 PROCEDURE — 72131 CT LUMBAR SPINE W/O DYE: CPT | Mod: 26,,, | Performed by: RADIOLOGY

## 2022-09-21 PROCEDURE — 72131 CT LUMBAR SPINE W/O DYE: CPT | Mod: TC

## 2022-09-21 PROCEDURE — 99999 PR PBB SHADOW E&M-EST. PATIENT-LVL IV: CPT | Mod: PBBFAC,,, | Performed by: NEUROLOGICAL SURGERY

## 2022-09-21 PROCEDURE — 99214 PR OFFICE/OUTPT VISIT, EST, LEVL IV, 30-39 MIN: ICD-10-PCS | Mod: S$GLB,,, | Performed by: NEUROLOGICAL SURGERY

## 2022-09-21 PROCEDURE — 1160F RVW MEDS BY RX/DR IN RCRD: CPT | Mod: CPTII,S$GLB,, | Performed by: NEUROLOGICAL SURGERY

## 2022-09-21 PROCEDURE — 3044F PR MOST RECENT HEMOGLOBIN A1C LEVEL <7.0%: ICD-10-PCS | Mod: CPTII,S$GLB,, | Performed by: NEUROLOGICAL SURGERY

## 2022-09-21 PROCEDURE — 3078F PR MOST RECENT DIASTOLIC BLOOD PRESSURE < 80 MM HG: ICD-10-PCS | Mod: CPTII,S$GLB,, | Performed by: NEUROLOGICAL SURGERY

## 2022-09-21 PROCEDURE — 3044F HG A1C LEVEL LT 7.0%: CPT | Mod: CPTII,S$GLB,, | Performed by: NEUROLOGICAL SURGERY

## 2022-09-21 PROCEDURE — 1159F PR MEDICATION LIST DOCUMENTED IN MEDICAL RECORD: ICD-10-PCS | Mod: CPTII,S$GLB,, | Performed by: NEUROLOGICAL SURGERY

## 2022-09-21 PROCEDURE — 1160F PR REVIEW ALL MEDS BY PRESCRIBER/CLIN PHARMACIST DOCUMENTED: ICD-10-PCS | Mod: CPTII,S$GLB,, | Performed by: NEUROLOGICAL SURGERY

## 2022-09-21 PROCEDURE — 3074F SYST BP LT 130 MM HG: CPT | Mod: CPTII,S$GLB,, | Performed by: NEUROLOGICAL SURGERY

## 2022-09-21 PROCEDURE — 3074F PR MOST RECENT SYSTOLIC BLOOD PRESSURE < 130 MM HG: ICD-10-PCS | Mod: CPTII,S$GLB,, | Performed by: NEUROLOGICAL SURGERY

## 2022-09-21 PROCEDURE — 99214 OFFICE O/P EST MOD 30 MIN: CPT | Mod: S$GLB,,, | Performed by: NEUROLOGICAL SURGERY

## 2022-09-21 PROCEDURE — 99999 PR PBB SHADOW E&M-EST. PATIENT-LVL IV: ICD-10-PCS | Mod: PBBFAC,,, | Performed by: NEUROLOGICAL SURGERY

## 2022-09-21 PROCEDURE — 1159F MED LIST DOCD IN RCRD: CPT | Mod: CPTII,S$GLB,, | Performed by: NEUROLOGICAL SURGERY

## 2022-09-21 PROCEDURE — 72131 CT LUMBAR SPINE WITHOUT CONTRAST: ICD-10-PCS | Mod: 26,,, | Performed by: RADIOLOGY

## 2022-09-21 PROCEDURE — 3078F DIAST BP <80 MM HG: CPT | Mod: CPTII,S$GLB,, | Performed by: NEUROLOGICAL SURGERY

## 2022-09-21 PROCEDURE — 3008F PR BODY MASS INDEX (BMI) DOCUMENTED: ICD-10-PCS | Mod: CPTII,S$GLB,, | Performed by: NEUROLOGICAL SURGERY

## 2022-09-21 PROCEDURE — 3008F BODY MASS INDEX DOCD: CPT | Mod: CPTII,S$GLB,, | Performed by: NEUROLOGICAL SURGERY

## 2022-09-21 NOTE — PROGRESS NOTES
CHIEF COMPLAINT:  F/u    INTERVAL HISTORY (9/21/22):  Continued long-term f/u re lumbar fusion.  Denies any back pain or discomfort.  New CT shows probable fusion R facet joint.    INTERVAL HISTORY (2/16/22):  Patient returns for 2+ year postop follow-up.  He continues to report that he is doing well and does not report any significant pain.  He still uses a cane and a Rollator for ambulating longer distances.  He is able to maintain his job as a  and remains very active.     INTERVAL HISTORY (2/24/21):  Patient returns for 1+ year postoperative follow-up.  He reports that he is doing very well and has essentially no pain.  He still uses a cane and a Rollator at times for ambulating longer distances.  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    INTERVAL HISTORY (09/21/2022):  Returns for routine 6 month postoperative follow-up.  He continues to report resolution of back and leg pain.  He is still using a cane and walker due to residual left lower extremity weakness that has persisted since his diagnosis of CIDP.  Overall he is pleased and looking forward to increasing his exercise and activity to attempt to improve his left lower extremity strength.    Interval history 7/23/2020:  Mr Shah returns for scheduled follow up appointment, now 7 weeks s/p MIS L3-4 TLIF. He continues to report resolution of left knee and thigh pain and back pain. He is able to walk more with a cane and his walker. Home health PT worked with him for 3 weeks. Denies any new numbness or weakness. Continues to wear his LSO brace for support     Interval history 6/3/2020:  Patient presents today for MIS L3-4 TLIF with Dr. Osorio. He reports resolution of left knee and thigh pain following treatment for left knee tendonitis by orthopedics. Still reports mild, achy low back pain. Still reports subjective left leg weakness that affects ambulation. Uses a rolling walker for support. Has difficulty moving  around his home. Chronic left foot drop managed with AFO brace.      INTERVAL HISTORY (5/11/20):  No significant changes in sxs.  Leg pain is still severe and he is eager to have surgery.     Initially underwent MIS decompression by Dr Ramires and had second surgery at [facility] by Dr Rodriguez which was supposed to be redo decompression.  Second surgery reportedly lasted 13 hrs and resulted in 1 week admission.  He reportedly suffered nerve damage that left him having to straight cath every 3.5 hrs due to incontinence.     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  He underwent IVIG (last dose on 4/3/19) and regain strength in his legs but still has residual L foot drop for which he wears an AFO     He also reports a spinal abscess that required a drain for 6 weeks.        HPI:  Manfred Shah is a 54 y.o.  male with below listed PMH, who   Presents with low back pain that radiates down his left thigh to his knee.  It does not go past the knee  And seems to skip the buttocks.  Pain is made worse by ambulating.  It is most severe in the morning and described as almost unbearable.  He ends up using a walker for support.  Sitting in a recliner and using ice helps make the pain better.  He feels like his thigh strength has diminished and does not support his weight reliably.  He also has some tingling in the thigh.  He has been diagnosed with tendinitis in the left knee and is having that evaluated tomorrow by Orthopedics.     No imaging for review     He has a history of an L3-4 laminectomy performed by Dr. Ramires in 2012 followed by a repeat L3-4 laminectomy and diskectomy by Dr. Rodriguez in 2018.      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 Rowdy Tran - Neurologist in [place].      Elevated liver enzymes 10/8/2018    Elevated PSA     followed by Dr. Sanderson    Foot drop, left foot     due to neurological condition/Gu[facility]an [place]    Hyperlipidemia     Hypertension     Impaired fasting glucose     Neurogenic bladder 09/05/2018    followed by Dr. Sanderson    RLS (restless legs syndrome) 10/8/2018    Self-catheterizes urinary bladder     Urologist    Sleep apnea with use of continuous positive airway pressure (CPAP) 01/10/2018    followed by [facility] in [place]    Urge incontinence 9/11/2018     Past Surgical History:   Procedure Laterality Date    ADENOIDECTOMY      COLONOSCOPY N/A 1/24/2020    Procedure: COLONOSCOPY;  Surgeon: Stacy Markham MD;  Location: [facility];  Service: Endoscopy;  Laterality: N/A;    HERNIA REPAIR      LUMBAR LAMINECTOMY  2012    [facility]    LUMBAR LAMINECTOMY  08/2018    Dr. Rodriguez - [facility]; complications from surgery - abscess to spinal cord - discharge summary scanned to media file    PLANTAR FASCIA RELEASE      TONSILLECTOMY      TRANSFORAMINAL LUMBAR INTERBODY FUSION (TLIF) OF SPINE WITH PERCUTANEOUS INSTRUMENTATION Bilateral 6/3/2020    Procedure: FUSION, SPINE, LUMBAR, TLIF, WITH PERCUTANEOUS INSTRUMENTATION  (L3-4 MIS TLIF) [facility]ro Micrc[facility] JENNIFER JENNINGS [phone] Spine Wave;  Surgeon: Samuel Osorio DO;  Location: [facility];  Service: Neurosurgery;  Laterality: Bilateral;  SPINEWAVE JENNIFER WILLIAMS [phone] TEXTED HIM @ 9:45AM ON 5-  PRE-OP BY RN 5----BMI--44---COVID NEGATIVE  CLEAR    VASECTOMY      X-STOP IMPLANTATION       Family History   Problem Relation Age of Onset    Cancer Mother         Breast Cancer    Heart disease Mother         pacemaker    Hyperlipidemia Mother         taking Crestor    Hypertension Mother         taking Amlodipine     Diabetes Mother         Prediabetes    Diabetes Father     Cancer Father         unknown type of cancer    Hypertension Father     Rheum arthritis Father     No Known Problems Sister     Prostate cancer Neg Hx     Kidney disease Neg Hx      Social History     Tobacco Use    Smoking status: Never    Smokeless tobacco: Never   Substance Use Topics    Alcohol use: Yes     Comment: OCCASS    Drug use: No        Review of Systems   Constitutional: Negative.    Respiratory:  Negative for cough and shortness of breath.    Cardiovascular:  Negative for chest pain, palpitations, claudication and leg swelling.   Gastrointestinal:  Negative for abdominal pain, constipation and diarrhea.   Genitourinary:  Negative for flank pain, frequency and urgency.   Musculoskeletal:  Negative for back pain, falls, joint pain, myalgias and neck pain.   Skin: Negative.    Neurological: Negative.    Psychiatric/Behavioral: Negative.     All other systems reviewed and are negative.    OBJECTIVE:   Vital Signs:  Pulse: 93 (09/21/22 1521)  BP: 126/74 (09/21/22 1521)    Physical Exam:  Constitutional: Patient sitting comfortably in chair. Appears well developed and well nourished.  Skin: Exposed areas are intact without abnormal markings, rashes or other lesions.  HEENT: Normocephalic. Normal conjunctivae.  Cardiovascular: Normal rate and regular rhythm.  Respiratory: Chest wall rises and falls symmetrically, without signs of respiratory distress.  Abdomen: Soft and non-tender.  Extremities: Warm and without edema. Calves supple, non-tender.  Psych/Behavior: Normal affect.    Neurological:    Mental status: Alert and oriented. Conversational and appropriate.       Cranial Nerves: VFF to confrontation. PERRL. EOMI without nystagmus. Facial STLT normal and symmetric. Strong, symmetric muscles of mastication. Facial strength full and symmetric. Hearing equal bilaterally to finger rub. Palate and uvula rise and fall normally in midline. Shoulder  shrug 5/5 strength. Tongue midline.     Motor:    Upper:  Deltoids Triceps Biceps WE WF  FA    R 5/5 5/5 5/5 5/5 5/5 5/5 5/5    L 5/5 5/5 5/5 5/5 5/5 5/5 5/5      Lower:  HF KE KF DF PF EHL    R 5/5 5/5 5/5 5/5 5/5 5/5    L 5/5 5/5 5/5 1/5 5/5 1/5     Sensory: Intact sensation to light touch and pinprick in all extremities.     Reflexes:      DTR: 2+ knees and biceps symmetrically.     Salter's: Negative.     Babinski's: Negative.     Clonus: Negative.    Cerebellar: Finger-to-nose and rapid alternating movements normal.     Gait:  Stable, fluid.    Spine:    Posture: Head well aligned over pelvis and shoulders in front and side views.  No focal or global spinal deformity visible on inspection.     Cervical:      ROM: Full with flexion, extension, lateral rotation and ear-to-shoulder bend.      Midline TTP: Negative.     Spurling's test: Negative.     Lhermitte's: Negative.    Thoracic:     Midline TTP: Negative    Lumbar:     Midline TTP: Negative     Straight Leg Test: Negative     Crossed Straight Leg Test: Negative    Other:     SI joint TTP: Negative.     Tenderness with external/internal hip rotation: Negative.    Diagnostic Results:  All imaging was independently reviewed by me.    Lumbar CT, 9/21/22:  1. Hardware in good position with good lordosis and alignment.  2. Subtle bony fusion within the interbody device and R L4-5 facet    Lumbar CT, 2/16/22:  1. Hardware in good position with good lordosis and alignment.  2. Subtle bony fusion within the interbody device    Lumbar CT, 7/1/21:  1. Hardware in good position with good lordosis and alignment.  2. Beginnings of bony fusion within the interbody device (subtle increase from prior).    Lumbar CT, 12/22/20:  1. Hardware in good position with good lordosis and alignment.  2. Beginnings of bony fusion within the interbody device.    AP and Lateral X-ray lumbar spine, dated 7/22:  1. Good hardware position, stable from xrays taken during post op hospital  course      ASSESSMENT/PLAN:     Problem List Items Addressed This Visit          Neuro    S/P lumbar spinal fusion - Primary     VISIT SUMMARY: Mr Shah is a 54M that is s/p L3-4 MIS TLIF. He continues to do well with resolution of back pain and leg pain. Hardware in excellent position subtle interbody fusion and R facet joint.  No evidence of obvious pseudoarthrosis either clinically or radiographically.      PATIENT EDUCATION:  More than half the clinic visit was spent showing with patient the pertinent findings on imaging and educating the patient about natural history of the pathology.   We discussed options for treatment as well as the risks and benefits of each option.  All questions were answered.     The patient understands and agrees with the following plan of care.    - RTC as needed    Time spent on this encounter: 60 minutes. This includes face-to-face time and non-face to face time preparing to see the patient (eg, review of tests), obtaining and/or reviewing separately obtained history, documenting clinical information in the electronic or other health record, independently interpreting results and communicating results to the patient/family/caregiver, or care coordinator.              .

## 2022-11-08 DIAGNOSIS — E66.01 CLASS 3 SEVERE OBESITY DUE TO EXCESS CALORIES WITH SERIOUS COMORBIDITY AND BODY MASS INDEX (BMI) OF 40.0 TO 44.9 IN ADULT: ICD-10-CM

## 2022-11-08 RX ORDER — SEMAGLUTIDE 1.34 MG/ML
1 INJECTION, SOLUTION SUBCUTANEOUS
Qty: 1 PEN | Refills: 1 | Status: SHIPPED | OUTPATIENT
Start: 2022-11-08 | End: 2023-01-23

## 2022-11-08 NOTE — TELEPHONE ENCOUNTER
Needs fasting labs and WELLNESS EXAM NOW - I spoke with patient on phone and he is aware - see what openings I have in December and schedule patient.

## 2022-11-30 ENCOUNTER — PATIENT MESSAGE (OUTPATIENT)
Dept: UROLOGY | Facility: CLINIC | Age: 57
End: 2022-11-30
Payer: COMMERCIAL

## 2022-12-23 ENCOUNTER — PATIENT MESSAGE (OUTPATIENT)
Dept: FAMILY MEDICINE | Facility: CLINIC | Age: 57
End: 2022-12-23
Payer: COMMERCIAL

## 2022-12-23 ENCOUNTER — OFFICE VISIT (OUTPATIENT)
Dept: FAMILY MEDICINE | Facility: CLINIC | Age: 57
End: 2022-12-23
Payer: COMMERCIAL

## 2022-12-23 VITALS
SYSTOLIC BLOOD PRESSURE: 144 MMHG | BODY MASS INDEX: 39.17 KG/M2 | WEIGHT: 315 LBS | DIASTOLIC BLOOD PRESSURE: 80 MMHG | HEIGHT: 75 IN | OXYGEN SATURATION: 97 % | HEART RATE: 96 BPM

## 2022-12-23 DIAGNOSIS — E11.9 TYPE 2 DIABETES MELLITUS WITHOUT COMPLICATION, WITHOUT LONG-TERM CURRENT USE OF INSULIN: ICD-10-CM

## 2022-12-23 DIAGNOSIS — G61.81 CIDP (CHRONIC INFLAMMATORY DEMYELINATING POLYNEUROPATHY): ICD-10-CM

## 2022-12-23 DIAGNOSIS — E78.5 HYPERLIPIDEMIA ASSOCIATED WITH TYPE 2 DIABETES MELLITUS: ICD-10-CM

## 2022-12-23 DIAGNOSIS — E11.59 HYPERTENSION ASSOCIATED WITH TYPE 2 DIABETES MELLITUS: ICD-10-CM

## 2022-12-23 DIAGNOSIS — I15.2 HYPERTENSION ASSOCIATED WITH TYPE 2 DIABETES MELLITUS: ICD-10-CM

## 2022-12-23 DIAGNOSIS — M54.16 LEFT LUMBAR RADICULOPATHY: ICD-10-CM

## 2022-12-23 DIAGNOSIS — E66.01 CLASS 3 SEVERE OBESITY DUE TO EXCESS CALORIES WITH SERIOUS COMORBIDITY AND BODY MASS INDEX (BMI) OF 45.0 TO 49.9 IN ADULT: ICD-10-CM

## 2022-12-23 DIAGNOSIS — N31.9 NEUROGENIC BLADDER: ICD-10-CM

## 2022-12-23 DIAGNOSIS — E11.69 HYPERLIPIDEMIA ASSOCIATED WITH TYPE 2 DIABETES MELLITUS: ICD-10-CM

## 2022-12-23 DIAGNOSIS — D63.8 ANEMIA OF CHRONIC DISEASE: ICD-10-CM

## 2022-12-23 DIAGNOSIS — Z98.1 S/P LUMBAR SPINAL FUSION: ICD-10-CM

## 2022-12-23 DIAGNOSIS — G47.30 SLEEP APNEA WITH USE OF CONTINUOUS POSITIVE AIRWAY PRESSURE (CPAP): ICD-10-CM

## 2022-12-23 DIAGNOSIS — M21.372 FOOT DROP, LEFT FOOT: ICD-10-CM

## 2022-12-23 DIAGNOSIS — R74.8 ELEVATED LIVER ENZYMES: ICD-10-CM

## 2022-12-23 DIAGNOSIS — Z78.9 SELF-CATHETERIZES URINARY BLADDER: ICD-10-CM

## 2022-12-23 DIAGNOSIS — Z00.00 ANNUAL PHYSICAL EXAM: Primary | ICD-10-CM

## 2022-12-23 PROCEDURE — 99999 PR PBB SHADOW E&M-EST. PATIENT-LVL V: ICD-10-PCS | Mod: PBBFAC,,, | Performed by: NURSE PRACTITIONER

## 2022-12-23 PROCEDURE — 3008F PR BODY MASS INDEX (BMI) DOCUMENTED: ICD-10-PCS | Mod: CPTII,S$GLB,, | Performed by: NURSE PRACTITIONER

## 2022-12-23 PROCEDURE — 1159F MED LIST DOCD IN RCRD: CPT | Mod: CPTII,S$GLB,, | Performed by: NURSE PRACTITIONER

## 2022-12-23 PROCEDURE — 3077F SYST BP >= 140 MM HG: CPT | Mod: CPTII,S$GLB,, | Performed by: NURSE PRACTITIONER

## 2022-12-23 PROCEDURE — 3008F BODY MASS INDEX DOCD: CPT | Mod: CPTII,S$GLB,, | Performed by: NURSE PRACTITIONER

## 2022-12-23 PROCEDURE — 99396 PREV VISIT EST AGE 40-64: CPT | Mod: S$GLB,,, | Performed by: NURSE PRACTITIONER

## 2022-12-23 PROCEDURE — 1160F PR REVIEW ALL MEDS BY PRESCRIBER/CLIN PHARMACIST DOCUMENTED: ICD-10-PCS | Mod: CPTII,S$GLB,, | Performed by: NURSE PRACTITIONER

## 2022-12-23 PROCEDURE — 1159F PR MEDICATION LIST DOCUMENTED IN MEDICAL RECORD: ICD-10-PCS | Mod: CPTII,S$GLB,, | Performed by: NURSE PRACTITIONER

## 2022-12-23 PROCEDURE — 3077F PR MOST RECENT SYSTOLIC BLOOD PRESSURE >= 140 MM HG: ICD-10-PCS | Mod: CPTII,S$GLB,, | Performed by: NURSE PRACTITIONER

## 2022-12-23 PROCEDURE — 99396 PR PREVENTIVE VISIT,EST,40-64: ICD-10-PCS | Mod: S$GLB,,, | Performed by: NURSE PRACTITIONER

## 2022-12-23 PROCEDURE — 3079F PR MOST RECENT DIASTOLIC BLOOD PRESSURE 80-89 MM HG: ICD-10-PCS | Mod: CPTII,S$GLB,, | Performed by: NURSE PRACTITIONER

## 2022-12-23 PROCEDURE — 99999 PR PBB SHADOW E&M-EST. PATIENT-LVL V: CPT | Mod: PBBFAC,,, | Performed by: NURSE PRACTITIONER

## 2022-12-23 PROCEDURE — 3044F PR MOST RECENT HEMOGLOBIN A1C LEVEL <7.0%: ICD-10-PCS | Mod: CPTII,S$GLB,, | Performed by: NURSE PRACTITIONER

## 2022-12-23 PROCEDURE — 3079F DIAST BP 80-89 MM HG: CPT | Mod: CPTII,S$GLB,, | Performed by: NURSE PRACTITIONER

## 2022-12-23 PROCEDURE — 1160F RVW MEDS BY RX/DR IN RCRD: CPT | Mod: CPTII,S$GLB,, | Performed by: NURSE PRACTITIONER

## 2022-12-23 PROCEDURE — 3044F HG A1C LEVEL LT 7.0%: CPT | Mod: CPTII,S$GLB,, | Performed by: NURSE PRACTITIONER

## 2022-12-23 NOTE — PATIENT INSTRUCTIONS
Send me message if you are taking Ropinirole (Requip) 2 mg tablet at bedtime or not - and if taking - how much? 1/2 tablet, whole tablet?    CHECK BP at home and send me readings in 1 week.    MESSAGE CECILIO for Cardiology follow up    Fasting labs and follow up in 3 months.
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
## 2023-01-04 ENCOUNTER — OFFICE VISIT (OUTPATIENT)
Dept: UROLOGY | Facility: CLINIC | Age: 58
End: 2023-01-04
Payer: COMMERCIAL

## 2023-01-04 ENCOUNTER — PATIENT MESSAGE (OUTPATIENT)
Dept: FAMILY MEDICINE | Facility: CLINIC | Age: 58
End: 2023-01-04
Payer: COMMERCIAL

## 2023-01-04 VITALS
SYSTOLIC BLOOD PRESSURE: 138 MMHG | WEIGHT: 315 LBS | HEIGHT: 75 IN | DIASTOLIC BLOOD PRESSURE: 90 MMHG | HEART RATE: 99 BPM | BODY MASS INDEX: 39.17 KG/M2

## 2023-01-04 DIAGNOSIS — R35.0 URINARY FREQUENCY: ICD-10-CM

## 2023-01-04 DIAGNOSIS — R33.9 URINARY RETENTION: ICD-10-CM

## 2023-01-04 DIAGNOSIS — N31.9 NEUROGENIC BLADDER: Primary | ICD-10-CM

## 2023-01-04 DIAGNOSIS — Z78.9 SELF-CATHETERIZES URINARY BLADDER: ICD-10-CM

## 2023-01-04 PROCEDURE — 99214 OFFICE O/P EST MOD 30 MIN: CPT | Mod: S$GLB,,, | Performed by: UROLOGY

## 2023-01-04 PROCEDURE — 3008F BODY MASS INDEX DOCD: CPT | Mod: CPTII,S$GLB,, | Performed by: UROLOGY

## 2023-01-04 PROCEDURE — 3008F PR BODY MASS INDEX (BMI) DOCUMENTED: ICD-10-PCS | Mod: CPTII,S$GLB,, | Performed by: UROLOGY

## 2023-01-04 PROCEDURE — 99999 PR PBB SHADOW E&M-EST. PATIENT-LVL IV: CPT | Mod: PBBFAC,,, | Performed by: UROLOGY

## 2023-01-04 PROCEDURE — 3075F PR MOST RECENT SYSTOLIC BLOOD PRESS GE 130-139MM HG: ICD-10-PCS | Mod: CPTII,S$GLB,, | Performed by: UROLOGY

## 2023-01-04 PROCEDURE — 3080F DIAST BP >= 90 MM HG: CPT | Mod: CPTII,S$GLB,, | Performed by: UROLOGY

## 2023-01-04 PROCEDURE — 1159F MED LIST DOCD IN RCRD: CPT | Mod: CPTII,S$GLB,, | Performed by: UROLOGY

## 2023-01-04 PROCEDURE — 3080F PR MOST RECENT DIASTOLIC BLOOD PRESSURE >= 90 MM HG: ICD-10-PCS | Mod: CPTII,S$GLB,, | Performed by: UROLOGY

## 2023-01-04 PROCEDURE — 1160F PR REVIEW ALL MEDS BY PRESCRIBER/CLIN PHARMACIST DOCUMENTED: ICD-10-PCS | Mod: CPTII,S$GLB,, | Performed by: UROLOGY

## 2023-01-04 PROCEDURE — 99214 PR OFFICE/OUTPT VISIT, EST, LEVL IV, 30-39 MIN: ICD-10-PCS | Mod: S$GLB,,, | Performed by: UROLOGY

## 2023-01-04 PROCEDURE — 1159F PR MEDICATION LIST DOCUMENTED IN MEDICAL RECORD: ICD-10-PCS | Mod: CPTII,S$GLB,, | Performed by: UROLOGY

## 2023-01-04 PROCEDURE — 99999 PR PBB SHADOW E&M-EST. PATIENT-LVL IV: ICD-10-PCS | Mod: PBBFAC,,, | Performed by: UROLOGY

## 2023-01-04 PROCEDURE — 3075F SYST BP GE 130 - 139MM HG: CPT | Mod: CPTII,S$GLB,, | Performed by: UROLOGY

## 2023-01-04 PROCEDURE — 1160F RVW MEDS BY RX/DR IN RCRD: CPT | Mod: CPTII,S$GLB,, | Performed by: UROLOGY

## 2023-01-04 NOTE — PATIENT INSTRUCTIONS
Patient doing well with intermittent catheterization.  Continue intermittent catheterization every 3-4 hours.  Continue current medication regimen and follow-up in 6 months.  Will plan after patient retires to do a InterStim test implant and see if the InterStim system will help him with urination.

## 2023-01-04 NOTE — PROGRESS NOTES
Jordan Bianchi presents today for   Chief Complaint   Patient presents with    Follow-up       Virtual/telephone visit    Depression Screening:  3 most recent PHQ Screens 9/29/2022   Little interest or pleasure in doing things Not at all   Feeling down, depressed, irritable, or hopeless Nearly every day   Total Score PHQ 2 3   Trouble falling or staying asleep, or sleeping too much Several days   Feeling tired or having little energy Several days   Poor appetite, weight loss, or overeating Several days   Feeling bad about yourself - or that you are a failure or have let yourself or your family down Several days   Trouble concentrating on things such as school, work, reading, or watching TV Not at all   Moving or speaking so slowly that other people could have noticed; or the opposite being so fidgety that others notice Not at all   Thoughts of being better off dead, or hurting yourself in some way Several days   PHQ 9 Score 8   How difficult have these problems made it for you to do your work, take care of your home and get along with others Somewhat difficult       Learning Assessment:  Learning Assessment 9/29/2022   PRIMARY LEARNER Patient   HIGHEST LEVEL OF EDUCATION - PRIMARY LEARNER  SOME COLLEGE   BARRIERS PRIMARY LEARNER NONE   PRIMARY LANGUAGE ENGLISH   LEARNER PREFERENCE PRIMARY DEMONSTRATION   ANSWERED BY patient   RELATIONSHIP SELF       Travel Screening:    Travel Screening     No screening recorded since 11/09/22 0000       Travel History   Travel since 10/10/22    No documented travel since 10/10/22         Health Maintenance reviewed and discussed and ordered per Provider. Health Maintenance Due   Topic Date Due    COVID-19 Vaccine (1) Never done    Flu Vaccine (1) Never done   . Coordination of Care:    1. Have you been to the ER, urgent care clinic since your last visit? Hospitalized since your last visit? No    2.  Have you seen or consulted any other health care providers outside of the Subjective:       Patient ID: Manfred Shah is a 57 y.o. male.    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  Patient is doing intermittent catheterization and this is working well.  The patient does have sensation when he needs to urinate but is still unable to empty his bladder.  Patient is willing to undergo bladder stimulation trial with InterStim after he retired from work.  We will plan this in the next year or so when the patient is ready.  Patient with erectile dysfunction responding to into intracorporeal penile injection therapy    Other  This is a chronic (Neurogenic bladder with urinary retention) problem. The current episode started more than 1 year ago. The problem occurs constantly. The problem has been waxing and waning. Pertinent negatives include no abdominal pain, anorexia, arthralgias, change in bowel habit, chest pain, chills, congestion, coughing, diaphoresis, fatigue, fever, headaches, joint swelling, myalgias, nausea, neck pain, numbness, rash, sore throat, swollen glands, urinary symptoms, vertigo, visual change, vomiting or weakness. Nothing aggravates the symptoms. He has tried nothing for the symptoms. The treatment provided significant relief.   Review of Systems   Constitutional:  Negative for activity change, appetite change, chills, diaphoresis, fatigue, fever and unexpected weight change.   HENT:  Negative for congestion, hearing loss, sinus pressure, sore throat and trouble swallowing.    Eyes:  Negative for photophobia, pain, discharge and visual disturbance.   Respiratory:  Negative for apnea, cough and shortness of breath.    Cardiovascular:  Negative for chest pain, palpitations and leg swelling.   Gastrointestinal:  Negative for abdominal distention, abdominal pain, anal bleeding, anorexia, blood in stool, change in bowel habit, constipation, diarrhea, nausea, rectal pain and vomiting.    Endocrine: Negative for cold intolerance, heat intolerance, polydipsia, polyphagia and polyuria.   Genitourinary:  Negative for decreased urine volume, difficulty urinating, dysuria, enuresis, flank pain, frequency, genital sores, hematuria, penile discharge, penile pain, penile swelling, scrotal swelling, testicular pain and urgency.   Musculoskeletal:  Negative for arthralgias, back pain, joint swelling, myalgias and neck pain.   Skin:  Negative for color change, pallor, rash and wound.   Allergic/Immunologic: Negative for environmental allergies, food allergies and immunocompromised state.   Neurological:  Negative for dizziness, vertigo, seizures, weakness, numbness and headaches.   Hematological:  Negative for adenopathy. Does not bruise/bleed easily.   Psychiatric/Behavioral: Negative.       Objective:      Physical Exam  Vitals and nursing note reviewed.   Constitutional:       Appearance: Normal appearance. He is well-developed. He is obese.   HENT:      Head: Normocephalic.      Right Ear: External ear normal.      Left Ear: External ear normal.      Nose: Nose normal.      Mouth/Throat:      Mouth: Mucous membranes are moist.      Pharynx: Oropharynx is clear. No oropharyngeal exudate or posterior oropharyngeal erythema.   Eyes:      General: No scleral icterus.        Right eye: No discharge.         Left eye: No discharge.      Extraocular Movements: Extraocular movements intact.      Conjunctiva/sclera: Conjunctivae normal.      Pupils: Pupils are equal, round, and reactive to light.   Cardiovascular:      Rate and Rhythm: Normal rate and regular rhythm.      Heart sounds: Normal heart sounds.   Pulmonary:      Effort: Pulmonary effort is normal.      Breath sounds: Normal breath sounds.   Abdominal:      General: Bowel sounds are normal.      Palpations: Abdomen is soft.      Tenderness: There is no right CVA tenderness or left CVA tenderness.   Genitourinary:     Penis: Normal.       Testes: Normal.  [address] [place] [address] Bret since your last visit? Include any pap smears or colon screening.  No   Musculoskeletal:         General: Normal range of motion.      Cervical back: Normal range of motion and neck supple.      Right lower leg: No edema.      Left lower leg: No edema.   Skin:     General: Skin is warm and dry.      Capillary Refill: Capillary refill takes less than 2 seconds.   Neurological:      Mental Status: He is alert and oriented to person, place, and time.      Deep Tendon Reflexes: Reflexes are normal and symmetric.   Psychiatric:         Behavior: Behavior normal.         Thought Content: Thought content normal.         Judgment: Judgment normal.       Assessment:       1. Neurogenic bladder    2. Urinary frequency    3. Urinary retention    4. Self-catheterizes urinary bladder        Plan:       Patient Instructions   Patient doing well with intermittent catheterization.  Continue intermittent catheterization every 3-4 hours.  Continue current medication regimen and follow-up in 6 months.  Will plan after patient retires to do a InterStim test implant and see if the InterStim system will help him with urination.

## 2023-01-05 ENCOUNTER — PATIENT MESSAGE (OUTPATIENT)
Dept: FAMILY MEDICINE | Facility: CLINIC | Age: 58
End: 2023-01-05
Payer: COMMERCIAL

## 2023-01-06 ENCOUNTER — PATIENT MESSAGE (OUTPATIENT)
Dept: FAMILY MEDICINE | Facility: CLINIC | Age: 58
End: 2023-01-06
Payer: COMMERCIAL

## 2023-01-06 VITALS — DIASTOLIC BLOOD PRESSURE: 70 MMHG | SYSTOLIC BLOOD PRESSURE: 128 MMHG

## 2023-01-06 VITALS — SYSTOLIC BLOOD PRESSURE: 134 MMHG | DIASTOLIC BLOOD PRESSURE: 83 MMHG

## 2023-01-07 ENCOUNTER — PATIENT MESSAGE (OUTPATIENT)
Dept: FAMILY MEDICINE | Facility: CLINIC | Age: 58
End: 2023-01-07
Payer: COMMERCIAL

## 2023-01-09 VITALS — SYSTOLIC BLOOD PRESSURE: 138 MMHG | DIASTOLIC BLOOD PRESSURE: 77 MMHG

## 2023-01-10 ENCOUNTER — PATIENT MESSAGE (OUTPATIENT)
Dept: FAMILY MEDICINE | Facility: CLINIC | Age: 58
End: 2023-01-10
Payer: COMMERCIAL

## 2023-01-10 VITALS — SYSTOLIC BLOOD PRESSURE: 132 MMHG | DIASTOLIC BLOOD PRESSURE: 83 MMHG

## 2023-01-10 DIAGNOSIS — I1A.0 RESISTANT HYPERTENSION: ICD-10-CM

## 2023-01-10 RX ORDER — AMLODIPINE BESYLATE 10 MG/1
10 TABLET ORAL DAILY
Qty: 90 TABLET | Refills: 1 | Status: SHIPPED | OUTPATIENT
Start: 2023-01-10 | End: 2023-06-26

## 2023-01-12 ENCOUNTER — PATIENT MESSAGE (OUTPATIENT)
Dept: FAMILY MEDICINE | Facility: CLINIC | Age: 58
End: 2023-01-12
Payer: COMMERCIAL

## 2023-01-12 VITALS — SYSTOLIC BLOOD PRESSURE: 139 MMHG | DIASTOLIC BLOOD PRESSURE: 83 MMHG

## 2023-01-14 ENCOUNTER — PATIENT MESSAGE (OUTPATIENT)
Dept: FAMILY MEDICINE | Facility: CLINIC | Age: 58
End: 2023-01-14
Payer: COMMERCIAL

## 2023-01-15 ENCOUNTER — PATIENT MESSAGE (OUTPATIENT)
Dept: FAMILY MEDICINE | Facility: CLINIC | Age: 58
End: 2023-01-15
Payer: COMMERCIAL

## 2023-01-16 ENCOUNTER — PATIENT MESSAGE (OUTPATIENT)
Dept: FAMILY MEDICINE | Facility: CLINIC | Age: 58
End: 2023-01-16
Payer: COMMERCIAL

## 2023-01-17 VITALS — DIASTOLIC BLOOD PRESSURE: 73 MMHG | SYSTOLIC BLOOD PRESSURE: 115 MMHG

## 2023-03-22 ENCOUNTER — OFFICE VISIT (OUTPATIENT)
Dept: FAMILY MEDICINE | Facility: CLINIC | Age: 58
End: 2023-03-22
Payer: COMMERCIAL

## 2023-03-22 VITALS
BODY MASS INDEX: 39.17 KG/M2 | HEIGHT: 75 IN | TEMPERATURE: 99 F | DIASTOLIC BLOOD PRESSURE: 70 MMHG | OXYGEN SATURATION: 96 % | HEART RATE: 94 BPM | WEIGHT: 315 LBS | SYSTOLIC BLOOD PRESSURE: 136 MMHG

## 2023-03-22 DIAGNOSIS — G47.30 SLEEP APNEA WITH USE OF CONTINUOUS POSITIVE AIRWAY PRESSURE (CPAP): ICD-10-CM

## 2023-03-22 DIAGNOSIS — E66.01 CLASS 3 SEVERE OBESITY DUE TO EXCESS CALORIES WITH SERIOUS COMORBIDITY AND BODY MASS INDEX (BMI) OF 45.0 TO 49.9 IN ADULT: ICD-10-CM

## 2023-03-22 DIAGNOSIS — E11.9 TYPE 2 DIABETES MELLITUS WITHOUT COMPLICATION, WITHOUT LONG-TERM CURRENT USE OF INSULIN: Primary | ICD-10-CM

## 2023-03-22 DIAGNOSIS — N31.9 NEUROGENIC BLADDER: ICD-10-CM

## 2023-03-22 DIAGNOSIS — D63.8 ANEMIA OF CHRONIC DISEASE: ICD-10-CM

## 2023-03-22 DIAGNOSIS — E78.5 HYPERLIPIDEMIA ASSOCIATED WITH TYPE 2 DIABETES MELLITUS: ICD-10-CM

## 2023-03-22 DIAGNOSIS — I15.2 HYPERTENSION ASSOCIATED WITH TYPE 2 DIABETES MELLITUS: ICD-10-CM

## 2023-03-22 DIAGNOSIS — G61.81 CIDP (CHRONIC INFLAMMATORY DEMYELINATING POLYNEUROPATHY): ICD-10-CM

## 2023-03-22 DIAGNOSIS — Z98.1 S/P LUMBAR SPINAL FUSION: ICD-10-CM

## 2023-03-22 DIAGNOSIS — E11.69 HYPERLIPIDEMIA ASSOCIATED WITH TYPE 2 DIABETES MELLITUS: ICD-10-CM

## 2023-03-22 DIAGNOSIS — E11.59 HYPERTENSION ASSOCIATED WITH TYPE 2 DIABETES MELLITUS: ICD-10-CM

## 2023-03-22 DIAGNOSIS — Z78.9 SELF-CATHETERIZES URINARY BLADDER: ICD-10-CM

## 2023-03-22 DIAGNOSIS — M21.372 FOOT DROP, LEFT FOOT: ICD-10-CM

## 2023-03-22 PROBLEM — G06.1: Status: RESOLVED | Noted: 2018-08-15 | Resolved: 2023-03-22

## 2023-03-22 PROBLEM — B96.89: Status: RESOLVED | Noted: 2018-08-15 | Resolved: 2023-03-22

## 2023-03-22 PROCEDURE — 99214 PR OFFICE/OUTPT VISIT, EST, LEVL IV, 30-39 MIN: ICD-10-PCS | Mod: S$GLB,,, | Performed by: NURSE PRACTITIONER

## 2023-03-22 PROCEDURE — 3075F PR MOST RECENT SYSTOLIC BLOOD PRESS GE 130-139MM HG: ICD-10-PCS | Mod: CPTII,S$GLB,, | Performed by: NURSE PRACTITIONER

## 2023-03-22 PROCEDURE — 1159F PR MEDICATION LIST DOCUMENTED IN MEDICAL RECORD: ICD-10-PCS | Mod: CPTII,S$GLB,, | Performed by: NURSE PRACTITIONER

## 2023-03-22 PROCEDURE — 99214 OFFICE O/P EST MOD 30 MIN: CPT | Mod: S$GLB,,, | Performed by: NURSE PRACTITIONER

## 2023-03-22 PROCEDURE — 1160F PR REVIEW ALL MEDS BY PRESCRIBER/CLIN PHARMACIST DOCUMENTED: ICD-10-PCS | Mod: CPTII,S$GLB,, | Performed by: NURSE PRACTITIONER

## 2023-03-22 PROCEDURE — 3078F DIAST BP <80 MM HG: CPT | Mod: CPTII,S$GLB,, | Performed by: NURSE PRACTITIONER

## 2023-03-22 PROCEDURE — 3008F PR BODY MASS INDEX (BMI) DOCUMENTED: ICD-10-PCS | Mod: CPTII,S$GLB,, | Performed by: NURSE PRACTITIONER

## 2023-03-22 PROCEDURE — 1159F MED LIST DOCD IN RCRD: CPT | Mod: CPTII,S$GLB,, | Performed by: NURSE PRACTITIONER

## 2023-03-22 PROCEDURE — 3075F SYST BP GE 130 - 139MM HG: CPT | Mod: CPTII,S$GLB,, | Performed by: NURSE PRACTITIONER

## 2023-03-22 PROCEDURE — 3044F HG A1C LEVEL LT 7.0%: CPT | Mod: CPTII,S$GLB,, | Performed by: NURSE PRACTITIONER

## 2023-03-22 PROCEDURE — 99999 PR PBB SHADOW E&M-EST. PATIENT-LVL V: CPT | Mod: PBBFAC,,, | Performed by: NURSE PRACTITIONER

## 2023-03-22 PROCEDURE — 99999 PR PBB SHADOW E&M-EST. PATIENT-LVL V: ICD-10-PCS | Mod: PBBFAC,,, | Performed by: NURSE PRACTITIONER

## 2023-03-22 PROCEDURE — 3008F BODY MASS INDEX DOCD: CPT | Mod: CPTII,S$GLB,, | Performed by: NURSE PRACTITIONER

## 2023-03-22 PROCEDURE — 3078F PR MOST RECENT DIASTOLIC BLOOD PRESSURE < 80 MM HG: ICD-10-PCS | Mod: CPTII,S$GLB,, | Performed by: NURSE PRACTITIONER

## 2023-03-22 PROCEDURE — 1160F RVW MEDS BY RX/DR IN RCRD: CPT | Mod: CPTII,S$GLB,, | Performed by: NURSE PRACTITIONER

## 2023-03-22 PROCEDURE — 3044F PR MOST RECENT HEMOGLOBIN A1C LEVEL <7.0%: ICD-10-PCS | Mod: CPTII,S$GLB,, | Performed by: NURSE PRACTITIONER

## 2023-03-22 RX ORDER — SEMAGLUTIDE 1.34 MG/ML
INJECTION, SOLUTION SUBCUTANEOUS
Qty: 1 EACH | Refills: 5 | Status: SHIPPED | OUTPATIENT
Start: 2023-03-22 | End: 2023-08-18

## 2023-03-22 NOTE — PROGRESS NOTES
"Subjective:       Patient ID: Manfred Shah is a 57 y.o. male.    Chief Complaint: Follow-up (3 months F/U)    HPI    Patient is a 57-year-old white male with CIDP (chronic inflammatory demyelinating polyneuropathy) diagnosed in June 2018 by neurologist, Dr. Rowdy Tran, left foot drop secondary to CIDP, history of Abscess of spinal cord treated by Dr. Rodriguez S/P Laminectomy in August 2018, Lumbar Spinal fusion on 6/3/2020 with Neurosurgeon Dr. Osorio, Neurogenic bladder with urge incontinence and self-catheterization and BPH followed by Dr. Sanderson, RLS, Hypertension, Hyperlipidemia, Impaired Fasting Glucose/ Prediabetes, Elevated Liver Enzymes, Heberden's nodes with joint inflammation and pain to bilateral hands followed by [facility],  and Sleep Apnea with CPAP use that is here today for 3 month follow up with fasting lab results.       Resistant Hypertension   evaluated by [facility], Dr. Kiser, that is currently controlled on eplerenone (INSPRA) 25 MG Tab daily, amlodipine 10 mg daily, Valsartan HCTZ 320/12.5 mg daily, Metoprolol  mg daily and Hydralazine 25 mg twice daily.   OVERDUE to see Dr. Kiser since AUGUST 2022 = Scheduled to see Dr. Kiser May 2023  Dr. Kiser had advised that patient edema is due to venous insufficiency possibly due to ropinirole so medication was stopped.  /70 (BP Location: Right arm, Patient Position: Sitting, BP Method: Large (Manual))   Pulse 94   Temp 98.6 °F (37 °C) (Temporal)   Ht 6' 3" (1.905 m)   Wt (!) 170 kg (374 lb 10.8 oz)   SpO2 96%   BMI 46.83 kg/m²   Patient compliant with BP meds.        Hyperlipidemia   currently taking Rosuvastatin 10 mg daily.     LDL 64.4 -in December 2022 - monitored yearly     TYPE 2 DIABETES  IFG/Prediabetes since January 2018    He always has an IFG that is sometimes above the 126 cut-off but his HgbA1C had NEVER gone above 5.8 so continued to classify as IFG/Prediabetic.    HOWEVER, in November 2021 - I " started patient on Ozempic 0.5 mg injection per his request for prediabetes and obesity.  I increased the Ozempic to 1 mg injection in Feb. 2022  Even on Ozempic 1 mg injection weekly, FBG was still high at 119 with A1C 5.4% - so diagnosed with Type 2 Diabetes.  Body mass index is 46.83 kg/m².       Morbidly obese.   Body mass index is 46.83 kg/m².  Started patient on Ozempic injections for weight control in November 2021 and lost 15 pounds in Feb. 2022 then became noncompliant with medication and follow up.  In December 2022 - Taking Ozempic 1 mg injection weekly consistently and have lost 25 pounds in past 3 months.       History of elevated liver enzymes   Patient reports he was admitted to Hospital in [place] and he had a liver workup during that hospitalization.  His liver enzyme elevation had resolved with weight loss in October 2018 but was again elevated in 2019 with weight gain.    AST down 41 from 53  ALT down 43 from 54  Will continue to work on lifestyle modifications.     Sleep Apnea with CPAP use   and had uvula removed by surgery in past.     CIDP with left foot drop    followed by a neurologist Dr. Rowdy Tran.       BPH and neurogenic bladder with urge incontinence and self catheterizes as needed   Followed by [facility] Dr. Maria E Archer's nodes with joint inflammation and pain to bilateral hands   evaluated by [facility] and determined Osteoarthritis.     Chronic Anemia  Since May 2020.    Stable.     Component      Latest Ref Rng & Units 3/15/2023 12/15/2022 2/9/2022 8/11/2021   Sodium      136 - 145 mmol/L 141 142 144 141   Potassium      3.5 - 5.1 mmol/L 3.9 3.6 4.0 3.8   Chloride      95 - 110 mmol/L 107 108 103 105   CO2      23 - 29 mmol/L 29 27 27 26   Glucose      70 - 110 mg/dL 119 (H) 146 (H) 124 (H) 133 (H)   BUN      2 - 20 mg/dL 19 17 17 18   Creatinine      0.50 - 1.40 mg/dL 0.72 0.86 0.80 0.80   Calcium      8.7 - 10.5 mg/dL 9.0 8.7 9.4 9.3   PROTEIN TOTAL       "6.0 - 8.4 g/dL 7.3 7.0 7.7 7.3   Albumin      3.5 - 5.2 g/dL 4.4 4.1 4.4 4.4   BILIRUBIN TOTAL      0.1 - 1.0 mg/dL 0.7 0.6 0.5 0.3   Alkaline Phosphatase      38 - 126 U/L 53 51 56 55   AST      15 - 46 U/L 41 53 (H) 46 43   ALT      10 - 44 U/L 43 54 (H) 47 (H) 47 (H)   Anion Gap      8 - 16 mmol/L 5 (L) 7 (L) 14 10   eGFR      >60 mL/min/1.73 m:2 >60.0 >60.0     Hemoglobin A1C External      4.0 - 5.6 % 5.4 5.7 (H) 5.6 6.0 (H)   Estimated Avg Glucose      68 - 131 mg/dL 108 117 114 126       Review of Systems   Constitutional:  Negative for activity change, appetite change, chills, diaphoresis, fatigue and fever.   HENT:  Negative for congestion, dental problem and drooling.    Eyes:  Negative for discharge, redness and itching.   Respiratory:  Negative for cough, choking, shortness of breath and stridor.    Cardiovascular:  Negative for chest pain, palpitations and leg swelling.   Gastrointestinal:  Negative for abdominal distention, abdominal pain, diarrhea and nausea.   Endocrine: Negative for polydipsia, polyphagia and polyuria.   Genitourinary:  Negative for difficulty urinating and flank pain.   Skin:  Negative for pallor, rash and wound.   Allergic/Immunologic: Negative for environmental allergies.   Neurological:  Negative for dizziness and headaches.   Hematological:  Negative for adenopathy.   Psychiatric/Behavioral:  Negative for agitation, behavioral problems and confusion.        Objective:     Vitals:    03/22/23 1436   BP: 136/70   BP Location: Right arm   Patient Position: Sitting   BP Method: Large (Manual)   Pulse: 94   Temp: 98.6 °F (37 °C)   TempSrc: Temporal   SpO2: 96%   Weight: (!) 170 kg (374 lb 10.8 oz)   Height: 6' 3" (1.905 m)          Physical Exam  Constitutional:       General: He is not in acute distress.     Appearance: Normal appearance. He is obese. He is not ill-appearing, toxic-appearing or diaphoretic.      Comments: Body mass index is 46.83 kg/m².   HENT:      Head: " Normocephalic and atraumatic.      Right Ear: Tympanic membrane, ear canal and external ear normal. There is no impacted cerumen.      Left Ear: Tympanic membrane, ear canal and external ear normal. There is no impacted cerumen.      Nose: Nose normal. No congestion.      Mouth/Throat:      Mouth: Mucous membranes are moist.      Pharynx: No posterior oropharyngeal erythema.   Eyes:      General:         Right eye: No discharge.         Left eye: No discharge.      Extraocular Movements: Extraocular movements intact.      Conjunctiva/sclera: Conjunctivae normal.   Cardiovascular:      Rate and Rhythm: Normal rate and regular rhythm.      Pulses: Normal pulses.      Heart sounds: Normal heart sounds.   Pulmonary:      Effort: Pulmonary effort is normal. No respiratory distress.      Breath sounds: Normal breath sounds. No stridor. No wheezing or rales.   Abdominal:      General: Bowel sounds are normal. There is no distension.      Palpations: Abdomen is soft.   Genitourinary:     Comments: Followed by Urology  Musculoskeletal:         General: No swelling or tenderness. Normal range of motion.      Right hand: Deformity present.      Left hand: Deformity present.      Cervical back: Normal range of motion. No tenderness.      Right lower leg: Edema present.      Left lower leg: Edema present.      Left foot: Foot drop present.      Comments: Traced edema to bilateral lower extremities  + nodes/swelling to the DIP joints of both hands -followed by [facility]    Feet:      Comments: Ambulatory with brace on to left foot to treat the left foot drop  Skin:     General: Skin is warm and dry.      Capillary Refill: Capillary refill takes less than 2 seconds.   Neurological:      General: No focal deficit present.      Mental Status: He is alert and oriented to person, place, and time.   Psychiatric:         Mood and Affect: Mood normal.         Speech: Speech normal.         Behavior: Behavior normal. Behavior  is cooperative.         Thought Content: Thought content normal.         Assessment:         ICD-10-CM ICD-9-CM   1. Type 2 diabetes mellitus without complication, without long-term current use of insulin  E11.9 250.00   2. Hyperlipidemia associated with type 2 diabetes mellitus  E11.69 250.80    E78.5 272.4   3. Hypertension associated with type 2 diabetes mellitus  E11.59 250.80    I15.2 401.9   4. Class 3 severe obesity due to excess calories with serious comorbidity and body mass index (BMI) of 45.0 to 49.9 in adult  E66.01 278.01    Z68.42 V85.42   5. CIDP (chronic inflammatory demyelinating polyneuropathy)  G61.81 357.81   6. Foot drop, left foot  M21.372 736.79   7. S/P lumbar spinal fusion  Z98.1 V45.4   8. Neurogenic bladder  N31.9 596.54   9. Self-catheterizes urinary bladder  Z78.9 V49.89   10. Sleep apnea with use of continuous positive airway pressure (CPAP)  G47.30 327.23   11. Anemia of chronic disease  D63.8 285.29       Plan:       Type 2 diabetes mellitus without complication, without long-term current use of insulin  Continue Ozempic 1 mg daily  Recheck in 6 months.  -     Comprehensive Metabolic Panel; Future; Expected date: 03/22/2023  -     Hemoglobin A1C; Future; Expected date: 03/22/2023  -     semaglutide (OZEMPIC) 1 mg/dose (4 mg/3 mL); INJECT 1MG INTO THE SKIN EVERY 7 DAYS AS DIRECTED  Dispense: 1 each; Refill: 5    Hyperlipidemia associated with type 2 diabetes mellitus  Continue current medication(s).  Follow up in 9 months.    Hypertension associated with type 2 diabetes mellitus  Continue current medication(s).  Follow up in 6 months.    Class 3 severe obesity due to excess calories with serious comorbidity and body mass index (BMI) of 45.0 to 49.9 in adult  Continue to work on weight loss.  -     semaglutide (OZEMPIC) 1 mg/dose (4 mg/3 mL); INJECT 1MG INTO THE SKIN EVERY 7 DAYS AS DIRECTED  Dispense: 1 each; Refill: 5    CIDP (chronic inflammatory demyelinating  polyneuropathy)  Condition followed/treated by Specialist.  Please follow up with Specialist as advised.    Foot drop, left foot  Condition followed/treated by Specialist.  Please follow up with Specialist as advised.    S/P lumbar spinal fusion  Condition followed/treated by Specialist.  Please follow up with Specialist as advised.    Neurogenic bladder  Condition followed/treated by Specialist.  Please follow up with Specialist as advised.    Self-catheterizes urinary bladder  Condition followed/treated by Specialist.  Please follow up with Specialist as advised.    Sleep apnea with use of continuous positive airway pressure (CPAP)  Use CPAP nightly    Anemia of chronic disease  Stable.      Follow up in about 6 months (around 9/22/2023) for fasting labs and follow up.     Patient's Medications   New Prescriptions    No medications on file   Previous Medications    AMLODIPINE (NORVASC) 10 MG TABLET    Take 1 tablet (10 mg total) by mouth once daily.    ASPIRIN (ECOTRIN) 81 MG EC TABLET    Take 1 tablet (81 mg total) by mouth once daily.    CHOLECALCIFEROL, VITAMIN D3, (VITAMIN D3) 25 MCG (1,000 UNIT) CAPSULE    Take 2,000 Units by mouth once daily.    DIPHENHYDRAMINE HCL (BENADRYL ORAL)    Take by mouth.    DOCUSATE SODIUM (COLACE) 100 MG CAPSULE    Take 1 capsule (100 mg total) by mouth 2 (two) times daily.    DUTASTERIDE-TAMSULOSIN 0.5-0.4 MG CM24    Take 1 capsule by mouth once daily    EPLERENONE (INSPRA) 25 MG TAB    Take 1 tablet (25 mg total) by mouth once daily.    FERROUS SULFATE (FEOSOL) 325 MG (65 MG IRON) TAB TABLET    Take 2 tablets (650 mg total) by mouth daily with breakfast.    HYDRALAZINE (APRESOLINE) 25 MG TABLET    Take 1 tablet (25 mg total) by mouth 2 (two) times daily.    LYSINE 500 MG TAB    Take 500 mg by mouth once daily.    METOPROLOL SUCCINATE (TOPROL-XL) 100 MG 24 HR TABLET    Take 1 tablet (100 mg total) by mouth once daily.    MULTIVITAMIN (THERAGRAN) PER TABLET    Take 1 tablet by  mouth once daily.    OMEGA-3 FATTY ACIDS/FISH OIL (FISH OIL-OMEGA-3 FATTY ACIDS) 300-1,000 MG CAPSULE    Take 1 capsule by mouth once daily.    PEP INJECTION    Inject .25 ml as directed     For compounding pharmacy use:   Add PAPAVERINE 30 mcg  Add PHENTOLAMINE 10 mg  Add ALPROSTADIL 100 mcg    ROSUVASTATIN (CRESTOR) 10 MG TABLET    Take 1 tablet (10 mg total) by mouth every evening.    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 Tray Rodriguez    CIDP (chronic inflammatory demyelinating polyneuropathy) 06/2018    followed by Dr. Rowdy Tran - Neurologist in [place].      Elevated liver enzymes 10/8/2018    Elevated PSA     followed by Dr. Sanderson    Foot drop, left foot     due to neurological condition/Guillan Mears    Hyperlipidemia     Hypertension     Impaired fasting glucose     Neurogenic bladder 09/05/2018    followed by Dr. Sanderson    RLS (restless legs syndrome) 10/8/2018    Self-catheterizes urinary bladder     Urologist    Sleep apnea with use of continuous positive airway pressure (CPAP) 01/10/2018    followed by [facility] in [place]    Type 2 diabetes mellitus without complication, without long-term current use of insulin 12/23/2022    Urge incontinence 9/11/2018       Past Surgical History:   Procedure Laterality Date    ADENOIDECTOMY      COLONOSCOPY N/A 1/24/2020    Procedure: COLONOSCOPY;  Surgeon:  Stacy Markham MD;  [facility];  Service: Endoscopy;  Laterality: N/A;    HERNIA REPAIR      LUMBAR LAMINECTOMY  2012    [facility]    LUMBAR LAMINECTOMY  08/2018    Dr. Jennifer [facility]; complications from surgery - abscess to spinal cord - discharge summary scanned to media file    PLANTAR FASCIA RELEASE      TONSILLECTOMY      TRANSFORAMINAL LUMBAR INTERBODY FUSION (TLIF) OF SPINE WITH PERCUTANEOUS INSTRUMENTATION Bilateral 6/3/2020    Procedure: FUSION, SPINE, LUMBAR, TLIF, WITH PERCUTANEOUS INSTRUMENTATION  (L3-4 MIS TLIF) [facility] Mic[facility] JENNIFER MICHAELA [phone] Spine Wave;  Surgeon: Samuel Osorio DO;  Location: [facility] OR;  Service: Neurosurgery;  Laterality: Bilateral;  SPINEWAVE JENNIFER KRAMER [phone] TEXTED HIM @ 9:45AM ON 5-  PRE-OP BY RN 5----BMI--44---COVID NEGATIVE  CLEAR    VASECTOMY      X-STOP IMPLANTATION         Family History   Problem Relation Age of Onset    Cancer Mother         Breast Cancer    Heart disease Mother         pacemaker    Hyperlipidemia Mother         taking Crestor    Hypertension Mother         taking Amlodipine    Diabetes Mother         Prediabetes    Diabetes Father     Cancer Father         unknown type of cancer    Hypertension Father     Rheum arthritis Father     No Known Problems Sister     Prostate cancer Neg Hx     Kidney disease Neg Hx        Social History     Socioeconomic History    Marital status:    Occupational History    Occupation: teacher   Tobacco Use    Smoking status: Never     Passive exposure: Past    Smokeless tobacco: Never   Substance and Sexual Activity    Alcohol use: Yes     Comment: O[facility]ASS    Drug use: No    Sexual activity: Yes     Partners: Female     Social Determinants of Health     Financial Resource Strain: Low Risk     Difficulty of Paying Living Expenses: Not very hard   Food Insecurity: No Food Insecurity    Worried About Running Out of Food in the Last Year: Never  true    Ran Out of Food in the Last Year: Never true   Transportation Needs: No Transportation Needs    Lack of Transportation (Medical): No    Lack of Transportation (Non-Medical): No   Physical Activity: Insufficiently Active    Days of Exercise per Week: 1 day    Minutes of Exercise per Session: 30 min   Stress: Stress Concern Present    Feeling of Stress : To some extent   Social Connections: Unknown    Frequency of Communication with Friends and Family: Three times a week    Frequency of Social Gatherings with Friends and Family: Once a week    Active Member of Clubs or Organizations: Yes    Attends Club or Organization Meetings: More than 4 times per year    Marital Status:    Housing Stability: High Risk    Unable to Pay for Housing in the Last Year: No    Number of Places Lived in the Last Year: 3    Unstable Housing in the Last Year: No

## 2023-04-11 ENCOUNTER — PATIENT MESSAGE (OUTPATIENT)
Dept: ADMINISTRATIVE | Facility: HOSPITAL | Age: 58
End: 2023-04-11
Payer: COMMERCIAL

## 2023-04-20 DIAGNOSIS — E78.5 HYPERLIPIDEMIA, UNSPECIFIED HYPERLIPIDEMIA TYPE: ICD-10-CM

## 2023-04-21 DIAGNOSIS — E78.5 HYPERLIPIDEMIA, UNSPECIFIED HYPERLIPIDEMIA TYPE: ICD-10-CM

## 2023-04-21 RX ORDER — ROSUVASTATIN CALCIUM 10 MG/1
10 TABLET, COATED ORAL NIGHTLY
Qty: 90 TABLET | Refills: 0 | Status: CANCELLED | OUTPATIENT
Start: 2023-04-21

## 2023-04-24 ENCOUNTER — PATIENT MESSAGE (OUTPATIENT)
Dept: FAMILY MEDICINE | Facility: CLINIC | Age: 58
End: 2023-04-24
Payer: COMMERCIAL

## 2023-04-24 RX ORDER — ROSUVASTATIN CALCIUM 10 MG/1
TABLET, COATED ORAL
Qty: 90 TABLET | Refills: 0 | OUTPATIENT
Start: 2023-04-24

## 2023-04-24 NOTE — TELEPHONE ENCOUNTER
Filled in another message same day.    rosuvastatin (CRESTOR) 10 MG tablet 90 tablet 1 4/24/2023  No   Sig - Route: Take 1 tablet (10 mg total) by mouth every evening. - Oral   Sent to pharmacy as: rosuvastatin (CRESTOR) 10 MG tablet   Class: Normal   Order: [id number]   Date/Time Signed: 4/24/2023 14:34       E-Prescribing Status: Receipt confirmed by pharmacy (4/24/2023  2:35 PM CDT)     Pharmacy    [facility] - [place]  [id number] [address]

## 2023-04-24 NOTE — TELEPHONE ENCOUNTER
I already filled this medication in another request same day    rosuvastatin (CRESTOR) 10 MG tablet 90 tablet 1 4/24/2023  No   Sig - Route: Take 1 tablet (10 mg total) by mouth every evening. - Oral   Sent to pharmacy as: rosuvastatin (CRESTOR) 10 MG tablet   Class: Normal   Order: [id number]   Date/Time Signed: 4/24/2023 14:34       E-Prescribing Status: Receipt confirmed by pharmacy (4/24/2023  2:35 PM CDT)     Pharmacy    [facility] - [place], [place] - [id number] [facility] [address]

## 2023-04-25 ENCOUNTER — PATIENT MESSAGE (OUTPATIENT)
Dept: FAMILY MEDICINE | Facility: CLINIC | Age: 58
End: 2023-04-25
Payer: COMMERCIAL

## 2023-05-02 ENCOUNTER — OFFICE VISIT (OUTPATIENT)
Dept: CARDIOLOGY | Facility: CLINIC | Age: 58
End: 2023-05-02
Payer: COMMERCIAL

## 2023-05-02 ENCOUNTER — PATIENT MESSAGE (OUTPATIENT)
Dept: CARDIOLOGY | Facility: CLINIC | Age: 58
End: 2023-05-02

## 2023-05-02 VITALS
WEIGHT: 315 LBS | HEART RATE: 85 BPM | HEIGHT: 77 IN | OXYGEN SATURATION: 95 % | SYSTOLIC BLOOD PRESSURE: 122 MMHG | BODY MASS INDEX: 37.19 KG/M2 | DIASTOLIC BLOOD PRESSURE: 78 MMHG

## 2023-05-02 DIAGNOSIS — Z78.9 SELF-CATHETERIZES URINARY BLADDER: ICD-10-CM

## 2023-05-02 DIAGNOSIS — E11.9 TYPE 2 DIABETES MELLITUS WITHOUT COMPLICATION, WITHOUT LONG-TERM CURRENT USE OF INSULIN: ICD-10-CM

## 2023-05-02 DIAGNOSIS — M48.061 SPINAL STENOSIS OF LUMBAR REGION, UNSPECIFIED WHETHER NEUROGENIC CLAUDICATION PRESENT: ICD-10-CM

## 2023-05-02 DIAGNOSIS — N31.9 NEUROGENIC BLADDER: ICD-10-CM

## 2023-05-02 DIAGNOSIS — E78.5 HYPERLIPIDEMIA ASSOCIATED WITH TYPE 2 DIABETES MELLITUS: ICD-10-CM

## 2023-05-02 DIAGNOSIS — R60.0 LOCALIZED EDEMA: ICD-10-CM

## 2023-05-02 DIAGNOSIS — E11.69 HYPERLIPIDEMIA ASSOCIATED WITH TYPE 2 DIABETES MELLITUS: ICD-10-CM

## 2023-05-02 DIAGNOSIS — I1A.0 RESISTANT HYPERTENSION: Primary | ICD-10-CM

## 2023-05-02 DIAGNOSIS — G61.81 CIDP (CHRONIC INFLAMMATORY DEMYELINATING POLYNEUROPATHY): ICD-10-CM

## 2023-05-02 DIAGNOSIS — Z98.890 H/O LAMINECTOMY: ICD-10-CM

## 2023-05-02 PROCEDURE — 3074F SYST BP LT 130 MM HG: CPT | Mod: CPTII,S$GLB,, | Performed by: INTERNAL MEDICINE

## 2023-05-02 PROCEDURE — 3044F PR MOST RECENT HEMOGLOBIN A1C LEVEL <7.0%: ICD-10-PCS | Mod: CPTII,S$GLB,, | Performed by: INTERNAL MEDICINE

## 2023-05-02 PROCEDURE — 3078F PR MOST RECENT DIASTOLIC BLOOD PRESSURE < 80 MM HG: ICD-10-PCS | Mod: CPTII,S$GLB,, | Performed by: INTERNAL MEDICINE

## 2023-05-02 PROCEDURE — 1159F PR MEDICATION LIST DOCUMENTED IN MEDICAL RECORD: ICD-10-PCS | Mod: CPTII,S$GLB,, | Performed by: INTERNAL MEDICINE

## 2023-05-02 PROCEDURE — 1160F RVW MEDS BY RX/DR IN RCRD: CPT | Mod: CPTII,S$GLB,, | Performed by: INTERNAL MEDICINE

## 2023-05-02 PROCEDURE — 99999 PR PBB SHADOW E&M-EST. PATIENT-LVL IV: ICD-10-PCS | Mod: PBBFAC,,, | Performed by: INTERNAL MEDICINE

## 2023-05-02 PROCEDURE — 3008F BODY MASS INDEX DOCD: CPT | Mod: CPTII,S$GLB,, | Performed by: INTERNAL MEDICINE

## 2023-05-02 PROCEDURE — 99213 PR OFFICE/OUTPT VISIT, EST, LEVL III, 20-29 MIN: ICD-10-PCS | Mod: 25,S$GLB,, | Performed by: INTERNAL MEDICINE

## 2023-05-02 PROCEDURE — 93000 ELECTROCARDIOGRAM COMPLETE: CPT | Mod: S$GLB,,, | Performed by: INTERNAL MEDICINE

## 2023-05-02 PROCEDURE — 1159F MED LIST DOCD IN RCRD: CPT | Mod: CPTII,S$GLB,, | Performed by: INTERNAL MEDICINE

## 2023-05-02 PROCEDURE — 99999 PR PBB SHADOW E&M-EST. PATIENT-LVL IV: CPT | Mod: PBBFAC,,, | Performed by: INTERNAL MEDICINE

## 2023-05-02 PROCEDURE — 3078F DIAST BP <80 MM HG: CPT | Mod: CPTII,S$GLB,, | Performed by: INTERNAL MEDICINE

## 2023-05-02 PROCEDURE — 3008F PR BODY MASS INDEX (BMI) DOCUMENTED: ICD-10-PCS | Mod: CPTII,S$GLB,, | Performed by: INTERNAL MEDICINE

## 2023-05-02 PROCEDURE — 99213 OFFICE O/P EST LOW 20 MIN: CPT | Mod: 25,S$GLB,, | Performed by: INTERNAL MEDICINE

## 2023-05-02 PROCEDURE — 3044F HG A1C LEVEL LT 7.0%: CPT | Mod: CPTII,S$GLB,, | Performed by: INTERNAL MEDICINE

## 2023-05-02 PROCEDURE — 3074F PR MOST RECENT SYSTOLIC BLOOD PRESSURE < 130 MM HG: ICD-10-PCS | Mod: CPTII,S$GLB,, | Performed by: INTERNAL MEDICINE

## 2023-05-02 PROCEDURE — 1160F PR REVIEW ALL MEDS BY PRESCRIBER/CLIN PHARMACIST DOCUMENTED: ICD-10-PCS | Mod: CPTII,S$GLB,, | Performed by: INTERNAL MEDICINE

## 2023-05-02 PROCEDURE — 93000 EKG 12-LEAD: ICD-10-PCS | Mod: S$GLB,,, | Performed by: INTERNAL MEDICINE

## 2023-05-02 NOTE — PROGRESS NOTES
Subjective:      Patient ID: Manfred Shah is a 57 y.o. male.    Chief Complaint: Follow-up and Hypertension    HPI:  Rides bike    Teaches    Legs are stronger.    Pt has had back surgery for spinal cord abscess and multiple back surgeries.  Last surgery was to fix problems from first operation by Dr Osorio.    Pt is no longer seeing neurologist for demyelinating polyneuropathy.  Pt finished immunoglobulin infusions.  Pt will go back if legs get weak again.    Review of Systems   Cardiovascular:  Negative for chest pain, claudication, dyspnea on exertion, irregular heartbeat, leg swelling, near-syncope, orthopnea, palpitations and syncope.        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 Tray Rodriguez    CIDP (chronic inflammatory demyelinating polyneuropathy) 06/2018    followed by Dr. Rowdy Tran - Neurologist in [place].      Elevated liver enzymes 10/8/2018    Elevated PSA     followed by Dr. Sanderson    Foot drop, left foot     due to neurological condition/[place]    Hyperlipidemia     Hypertension     Impaired fasting glucose     Neurogenic bladder 09/05/2018    followed by Dr. Sanderson    RLS (restless legs syndrome) 10/8/2018    Self-catheterizes urinary bladder     Urologist    Sleep apnea with use of continuous positive airway pressure (CPAP) 01/10/2018    followed by [facility] in [place]    Type 2 diabetes mellitus without complication, without long-term current use of insulin 12/23/2022    Urge incontinence 9/11/2018        Past Surgical History:   Procedure Laterality Date    ADENOIDECTOMY      COLONOSCOPY N/A 1/24/2020    Procedure: COLONOSCOPY;  Surgeon: Stacy Markham MD;  Location: [facility];  Service: Endoscopy;  Laterality: N/A;    HERNIA REPAIR      LUMBAR LAMINECTOMY  2012    [facility]    LUMBAR LAMINECTOMY  08/2018    Dr. Rodriguez  - [facility]; complications from surgery - abscess to spinal cord - discharge summary scanned to media file    PLANTAR FASCIA RELEASE      TONSILLECTOMY      TRANSFORAMINAL LUMBAR INTERBODY FUSION (TLIF) OF SPINE WITH PERCUTANEOUS INSTRUMENTATION Bilateral 6/3/2020    Procedure: FUSION, SPINE, LUMBAR, TLIF, WITH PERCUTANEOUS INSTRUMENTATION  (L3-4 MIS TLIF) Flouro Micrcoscope Terell Neuromonitoring JENNIFER MICHAELA [phone] Spine Wave;  Surgeon: Samuel Osorio DO;  Location: [facility] OR;  Service: Neurosurgery;  Laterality: Bilateral;  SPINEWAVE JENNIFER KRAMER [phone] TEXTED HIM @ 9:45AM ON 5-  PRE-OP BY RN 5----BMI--44---COVID NEGATIVE  CLEAR    VASECTOMY      X-STOP IMPLANTATION         Family History   Problem Relation Age of Onset    Cancer Mother         Breast Cancer    Heart disease Mother         pacemaker    Hyperlipidemia Mother         taking Crestor    Hypertension Mother         taking Amlodipine    Diabetes Mother         Prediabetes    Diabetes Father     Cancer Father         unknown type of cancer    Hypertension Father     Rheum arthritis Father     No Known Problems Sister     Prostate cancer Neg Hx     Kidney disease Neg Hx        Social History     Socioeconomic History    Marital status:    Occupational History    Occupation: teacher   Tobacco Use    Smoking status: Never     Passive exposure: Past    Smokeless tobacco: Never   Substance and Sexual Activity    Alcohol use: Yes     Comment: OCCASS    Drug use: No    Sexual activity: Yes     Partners: Female     Social Determinants of Health     Financial Resource Strain: Low Risk     Difficulty of Paying Living Expenses: Not very hard   Food Insecurity: No Food Insecurity    Worried About Running Out of Food in the Last Year: Never true    Ran Out of Food in the Last Year: Never true   Transportation Needs: No Transportation Needs    Lack of Transportation (Medical): No    Lack of Transportation (Non-Medical): No   Physical  Activity: Insufficiently Active    Days of Exercise per Week: 1 day    Minutes of Exercise per Session: 30 min   Stress: Stress Concern Present    Feeling of Stress : To some extent   Social Connections: Unknown    Frequency of Communication with Friends and Family: Three times a week    Frequency of Social Gatherings with Friends and Family: Once a week    Active Member of Clubs or Organizations: Yes    Attends Club or Organization Meetings: More than 4 times per year    Marital Status:    Housing Stability: High Risk    Unable to Pay for Housing in the Last Year: No    Number of Places Lived in the Last Year: 3    Unstable Housing in the Last Year: No       Current Outpatient Medications on File Prior to Visit   Medication Sig Dispense Refill    amLODIPine (NORVASC) 10 MG tablet Take 1 tablet (10 mg total) by mouth once daily. 90 tablet 1    aspirin (ECOTRIN) 81 MG EC tablet Take 1 tablet (81 mg total) by mouth once daily.  0    cholecalciferol, vitamin D3, (VITAMIN D3) 25 mcg (1,000 unit) capsule Take 2,000 Units by mouth once daily.      diphenhydramine HCl (BENADRYL ORAL) Take by mouth.      docusate sodium (COLACE) 100 MG capsule Take 1 capsule (100 mg total) by mouth 2 (two) times daily.  0    dutasteride-tamsulosin 0.5-0.4 mg CM24 Take 1 capsule by mouth once daily 90 capsule 3    eplerenone (INSPRA) 25 MG Tab Take 1 tablet by mouth once daily 90 tablet 0    ferrous sulfate (FEOSOL) 325 mg (65 mg iron) Tab tablet Take 2 tablets (650 mg total) by mouth daily with breakfast.  0    hydrALAZINE (APRESOLINE) 25 MG tablet Take 1 tablet by mouth twice daily 180 tablet 1    lysine 500 mg Tab Take 500 mg by mouth once daily.      metoprolol succinate (TOPROL-XL) 100 MG 24 hr tablet Take 1 tablet by mouth once daily 90 tablet 1    multivitamin (THERAGRAN) per tablet Take 1 tablet by mouth once daily.      omega-3 fatty acids/fish oil (FISH OIL-OMEGA-3 FATTY ACIDS) 300-1,000 mg capsule Take 1 capsule by mouth  "once daily.      pep injection Inject .25 ml as directed     For compounding pharmacy use:   Add PAPAVERINE 30 mcg  Add PHENTOLAMINE 10 mg  Add ALPROSTADIL 100 mcg 2 vial 11    rosuvastatin (CRESTOR) 10 MG tablet Take 1 tablet (10 mg total) by mouth every evening. 90 tablet 1    semaglutide (OZEMPIC) 1 mg/dose (4 mg/3 mL) INJECT 1MG INTO THE SKIN EVERY 7 DAYS AS DIRECTED 1 each 5    tadalafiL (CIALIS) 20 MG Tab Take 1 tablet (20 mg total) by mouth once daily as directed 30 tablet 11    valsartan-hydrochlorothiazide (DIOVAN-HCT) 320-12.5 mg per tablet Take 1 tablet by mouth once daily 90 tablet 0     No current facility-administered medications on file prior to visit.       Review of patient's allergies indicates:  No Known Allergies  Objective:     Vitals:    05/02/23 1540   BP: 122/78   BP Location: Right arm   Patient Position: Sitting   BP Method: Large (Automatic)   Pulse: 85   SpO2: 95%   Weight: (!) 169.3 kg (373 lb 5.6 oz)   Height: 6' 5" (1.956 m)        Physical Exam  Constitutional:       General: He is not in acute distress.     Appearance: He is well-developed. He is not diaphoretic.   Eyes:      General: No scleral icterus.  Neck:      Vascular: No carotid bruit or JVD.   Cardiovascular:      Rate and Rhythm: Regular rhythm.      Heart sounds: Normal heart sounds. No murmur heard.    No friction rub. No gallop.   Pulmonary:      Effort: Pulmonary effort is normal. No respiratory distress.      Breath sounds: Normal breath sounds.   Musculoskeletal:      Right lower leg: Edema (trace pitting pedal edema bilaterally) present.      Left lower leg: Edema present.   Skin:     General: Skin is warm and dry.   Neurological:      Mental Status: He is alert and oriented to person, place, and time.   Psychiatric:         Behavior: Behavior normal.         Thought Content: Thought content normal.         Judgment: Judgment normal.      ECG today reviewed by me: SHAMAR RICHARDSON      Lab Visit on 03/15/2023   Component " Date Value Ref Range Status    Hemoglobin A1C 03/15/2023 5.4  4.0 - 5.6 % Final    Estimated Avg Glucose 03/15/2023 108  68 - 131 mg/dL Final    Sodium 03/15/2023 141  136 - 145 mmol/L Final    Potassium 03/15/2023 3.9  3.5 - 5.1 mmol/L Final    Chloride 03/15/2023 107  95 - 110 mmol/L Final    CO2 03/15/2023 29  23 - 29 mmol/L Final    Glucose 03/15/2023 119 (H)  70 - 110 mg/dL Final    BUN 03/15/2023 19  2 - 20 mg/dL Final    Creatinine 03/15/2023 0.72  0.50 - 1.40 mg/dL Final    Calcium 03/15/2023 9.0  8.7 - 10.5 mg/dL Final    Total Protein 03/15/2023 7.3  6.0 - 8.4 g/dL Final    Albumin 03/15/2023 4.4  3.5 - 5.2 g/dL Final    Total Bilirubin 03/15/2023 0.7  0.1 - 1.0 mg/dL Final    Alkaline Phosphatase 03/15/2023 53  38 - 126 U/L Final    AST 03/15/2023 41  15 - 46 U/L Final    ALT 03/15/2023 43  10 - 44 U/L Final    Anion Gap 03/15/2023 5 (L)  8 - 16 mmol/L Final    eGFR 03/15/2023 >60.0  >60 mL/min/1.73 m^2 Final   Lab Visit on 12/15/2022   Component Date Value Ref Range Status    PSA, Screen 12/15/2022 1.2  0.00 - 4.00 ng/mL Final    WBC 12/15/2022 4.57  3.90 - 12.70 K/uL Final    RBC 12/15/2022 4.18 (L)  4.60 - 6.20 M/uL Final    Hemoglobin 12/15/2022 12.5 (L)  14.0 - 18.0 g/dL Final    Hematocrit 12/15/2022 37.1 (L)  40.0 - 54.0 % Final    MCV 12/15/2022 89  82 - 98 fL Final    MCH 12/15/2022 29.9  27.0 - 31.0 pg Final    MCHC 12/15/2022 33.7  32.0 - 36.0 g/dL Final    RDW 12/15/2022 12.6  11.5 - 14.5 % Final    Platelets 12/15/2022 193  150 - 450 K/uL Final    MPV 12/15/2022 9.4  9.2 - 12.9 fL Final    Immature Granulocytes 12/15/2022 0.2  0.0 - 0.5 % Final    Gran # (ANC) 12/15/2022 2.4  1.8 - 7.7 K/uL Final    Immature Grans (Abs) 12/15/2022 0.01  0.00 - 0.04 K/uL Final    Lymph # 12/15/2022 1.4  1.0 - 4.8 K/uL Final    Mono # 12/15/2022 0.4  0.3 - 1.0 K/uL Final    Eos # 12/15/2022 0.3  0.0 - 0.5 K/uL Final    Baso # 12/15/2022 0.04  0.00 - 0.20 K/uL Final    nRBC 12/15/2022 0  0 /100 WBC Final    Gran  % 12/15/2022 52.5  38.0 - 73.0 % Final    Lymph % 12/15/2022 30.4  18.0 - 48.0 % Final    Mono % 12/15/2022 9.0  4.0 - 15.0 % Final    Eosinophil % 12/15/2022 7.0  0.0 - 8.0 % Final    Basophil % 12/15/2022 0.9  0.0 - 1.9 % Final    Differential Method 12/15/2022 Automated   Final    Sodium 12/15/2022 142  136 - 145 mmol/L Final    Potassium 12/15/2022 3.6  3.5 - 5.1 mmol/L Final    Chloride 12/15/2022 108  95 - 110 mmol/L Final    CO2 12/15/2022 27  23 - 29 mmol/L Final    Glucose 12/15/2022 146 (H)  70 - 110 mg/dL Final    BUN 12/15/2022 17  2 - 20 mg/dL Final    Creatinine 12/15/2022 0.86  0.50 - 1.40 mg/dL Final    Calcium 12/15/2022 8.7  8.7 - 10.5 mg/dL Final    Total Protein 12/15/2022 7.0  6.0 - 8.4 g/dL Final    Albumin 12/15/2022 4.1  3.5 - 5.2 g/dL Final    Total Bilirubin 12/15/2022 0.6  0.1 - 1.0 mg/dL Final    Alkaline Phosphatase 12/15/2022 51  38 - 126 U/L Final    AST 12/15/2022 53 (H)  15 - 46 U/L Final    ALT 12/15/2022 54 (H)  10 - 44 U/L Final    Anion Gap 12/15/2022 7 (L)  8 - 16 mmol/L Final    eGFR 12/15/2022 >60.0  >60 mL/min/1.73 m^2 Final    Hemoglobin A1C 12/15/2022 5.7 (H)  4.0 - 5.6 % Final    Estimated Avg Glucose 12/15/2022 117  68 - 131 mg/dL Final    Cholesterol 12/15/2022 120  120 - 199 mg/dL Final    Triglycerides 12/15/2022 93  30 - 150 mg/dL Final    HDL 12/15/2022 37 (L)  40 - 75 mg/dL Final    LDL Cholesterol 12/15/2022 64.4  63.0 - 159.0 mg/dL Final    HDL/Cholesterol Ratio 12/15/2022 30.8  20.0 - 50.0 % Final    Total Cholesterol/HDL Ratio 12/15/2022 3.2  2.0 - 5.0 Final    Non-HDL Cholesterol 12/15/2022 83  mg/dL Final    TSH 12/15/2022 1.860  0.400 - 4.000 uIU/mL Final   (    Assessment:     1. Resistant hypertension    2. Hyperlipidemia associated with type 2 diabetes mellitus    3. Spinal stenosis of lumbar region, unspecified whether neurogenic claudication present    4. CIDP (chronic inflammatory demyelinating polyneuropathy)    5. H/O laminectomy    6. Neurogenic  bladder    7. Self-catheterizes urinary bladder    8. Localized edema    9. Type 2 diabetes mellitus without complication, without long-term current use of insulin      Plan:   Manfred was seen today for follow-up and hypertension.    Diagnoses and all orders for this visit:    Resistant hypertension  -     IN OFFICE EKG 12-LEAD (to Elton)    Hyperlipidemia associated with type 2 diabetes mellitus  -     IN OFFICE EKG 12-LEAD (to Muse)    Spinal stenosis of lumbar region, unspecified whether neurogenic claudication present  -     IN OFFICE EKG 12-LEAD (to Muse)    CIDP (chronic inflammatory demyelinating polyneuropathy)  -     IN OFFICE EKG 12-LEAD (to Muse)    H/O laminectomy  -     IN OFFICE EKG 12-LEAD (to Muse)    Neurogenic bladder  -     IN OFFICE EKG 12-LEAD (to Elton)    Self-catheterizes urinary bladder  -     IN OFFICE EKG 12-LEAD (to Elton)    Localized edema  -     IN OFFICE EKG 12-LEAD (to Muse)    Type 2 diabetes mellitus without complication, without long-term current use of insulin  -     IN OFFICE EKG 12-LEAD (to Muse)     Pt is doing well    F/u with CECY Ramirez    F/u with Dr Sanderson    [facility]    RTC one year    Agree with Ozempic    No follow-ups on file.

## 2023-05-30 ENCOUNTER — PATIENT MESSAGE (OUTPATIENT)
Dept: ADMINISTRATIVE | Facility: HOSPITAL | Age: 58
End: 2023-05-30
Payer: COMMERCIAL

## 2023-05-30 ENCOUNTER — PATIENT OUTREACH (OUTPATIENT)
Dept: ADMINISTRATIVE | Facility: HOSPITAL | Age: 58
End: 2023-05-30
Payer: COMMERCIAL

## 2023-06-05 DIAGNOSIS — I10 ESSENTIAL HYPERTENSION: ICD-10-CM

## 2023-06-06 RX ORDER — VALSARTAN AND HYDROCHLOROTHIAZIDE 320; 12.5 MG/1; MG/1
1 TABLET, FILM COATED ORAL DAILY
Qty: 90 TABLET | Refills: 0 | Status: SHIPPED | OUTPATIENT
Start: 2023-06-06 | End: 2023-09-01

## 2023-06-21 ENCOUNTER — PATIENT MESSAGE (OUTPATIENT)
Dept: ADMINISTRATIVE | Facility: HOSPITAL | Age: 58
End: 2023-06-21
Payer: COMMERCIAL

## 2023-07-05 RX ORDER — EPLERENONE 25 MG/1
TABLET, FILM COATED ORAL
Qty: 90 TABLET | Refills: 3 | Status: SHIPPED | OUTPATIENT
Start: 2023-07-05

## 2023-07-25 ENCOUNTER — OFFICE VISIT (OUTPATIENT)
Dept: UROLOGY | Facility: CLINIC | Age: 58
End: 2023-07-25
Payer: COMMERCIAL

## 2023-07-25 ENCOUNTER — PATIENT MESSAGE (OUTPATIENT)
Dept: UROLOGY | Facility: CLINIC | Age: 58
End: 2023-07-25

## 2023-07-25 VITALS
DIASTOLIC BLOOD PRESSURE: 82 MMHG | SYSTOLIC BLOOD PRESSURE: 147 MMHG | HEART RATE: 96 BPM | OXYGEN SATURATION: 96 % | BODY MASS INDEX: 37.19 KG/M2 | HEIGHT: 77 IN | WEIGHT: 315 LBS

## 2023-07-25 DIAGNOSIS — N31.9 NEUROGENIC BLADDER: Primary | ICD-10-CM

## 2023-07-25 DIAGNOSIS — R35.0 URINARY FREQUENCY: ICD-10-CM

## 2023-07-25 DIAGNOSIS — R33.9 URINARY RETENTION: ICD-10-CM

## 2023-07-25 PROBLEM — N52.8 OTHER MALE ERECTILE DYSFUNCTION: Status: ACTIVE | Noted: 2022-05-24

## 2023-07-25 PROCEDURE — 99999 PR PBB SHADOW E&M-EST. PATIENT-LVL IV: CPT | Mod: PBBFAC,,, | Performed by: UROLOGY

## 2023-07-25 PROCEDURE — 3008F BODY MASS INDEX DOCD: CPT | Mod: CPTII,S$GLB,, | Performed by: UROLOGY

## 2023-07-25 PROCEDURE — 3044F HG A1C LEVEL LT 7.0%: CPT | Mod: CPTII,S$GLB,, | Performed by: UROLOGY

## 2023-07-25 PROCEDURE — 99214 PR OFFICE/OUTPT VISIT, EST, LEVL IV, 30-39 MIN: ICD-10-PCS | Mod: S$GLB,,, | Performed by: UROLOGY

## 2023-07-25 PROCEDURE — 99999 PR PBB SHADOW E&M-EST. PATIENT-LVL IV: ICD-10-PCS | Mod: PBBFAC,,, | Performed by: UROLOGY

## 2023-07-25 PROCEDURE — 3077F SYST BP >= 140 MM HG: CPT | Mod: CPTII,S$GLB,, | Performed by: UROLOGY

## 2023-07-25 PROCEDURE — 3077F PR MOST RECENT SYSTOLIC BLOOD PRESSURE >= 140 MM HG: ICD-10-PCS | Mod: CPTII,S$GLB,, | Performed by: UROLOGY

## 2023-07-25 PROCEDURE — 3008F PR BODY MASS INDEX (BMI) DOCUMENTED: ICD-10-PCS | Mod: CPTII,S$GLB,, | Performed by: UROLOGY

## 2023-07-25 PROCEDURE — 1159F MED LIST DOCD IN RCRD: CPT | Mod: CPTII,S$GLB,, | Performed by: UROLOGY

## 2023-07-25 PROCEDURE — 99214 OFFICE O/P EST MOD 30 MIN: CPT | Mod: S$GLB,,, | Performed by: UROLOGY

## 2023-07-25 PROCEDURE — 1159F PR MEDICATION LIST DOCUMENTED IN MEDICAL RECORD: ICD-10-PCS | Mod: CPTII,S$GLB,, | Performed by: UROLOGY

## 2023-07-25 PROCEDURE — 3079F PR MOST RECENT DIASTOLIC BLOOD PRESSURE 80-89 MM HG: ICD-10-PCS | Mod: CPTII,S$GLB,, | Performed by: UROLOGY

## 2023-07-25 PROCEDURE — 3079F DIAST BP 80-89 MM HG: CPT | Mod: CPTII,S$GLB,, | Performed by: UROLOGY

## 2023-07-25 PROCEDURE — 3044F PR MOST RECENT HEMOGLOBIN A1C LEVEL <7.0%: ICD-10-PCS | Mod: CPTII,S$GLB,, | Performed by: UROLOGY

## 2023-07-25 NOTE — PATIENT INSTRUCTIONS
Continue intermittent catheterization  Will try pep injection with increase dosage in office if works continue with the new dosage.

## 2023-07-25 NOTE — PROGRESS NOTES
Subjective:      Patient ID: Manfred Shah is a 57 y.o. male.    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  Patient is doing intermittent catheterization and this is working well.  The patient does have sensation when he needs to urinate but is still unable to empty his bladder.  Patient is willing to undergo bladder stimulation trial with InterStim after he retired from work.  We will plan this in the next year or so when the patient is ready.  Patient with erectile dysfunction responding to into intracorporeal penile injection therapy    Follow-up  This is a chronic (NGB, ED) problem. The current episode started more than 1 year ago. The problem occurs constantly. The problem has been unchanged. Pertinent negatives include no abdominal pain, anorexia, arthralgias, change in bowel habit, chest pain, chills, congestion, coughing, diaphoresis, fatigue, fever, headaches, joint swelling, myalgias, nausea, neck pain, numbness, rash, sore throat, swollen glands, urinary symptoms, vertigo, visual change, vomiting or weakness. Nothing aggravates the symptoms. Treatments tried: Intermittent catheterization working well for bladder, pep injection has not been working well for erections.  Patient here for further. The treatment provided significant relief.   Review of Systems   Constitutional:  Negative for activity change, appetite change, chills, diaphoresis, fatigue, fever and unexpected weight change.   HENT:  Negative for congestion, hearing loss, sinus pressure, sore throat and trouble swallowing.    Eyes:  Negative for photophobia, pain, discharge and visual disturbance.   Respiratory:  Negative for apnea, cough and shortness of breath.    Cardiovascular:  Negative for chest pain, palpitations and leg swelling.   Gastrointestinal:  Negative for abdominal distention, abdominal pain, anal bleeding, anorexia, blood in stool, change in bowel  habit, constipation, diarrhea, nausea, rectal pain and vomiting.   Endocrine: Negative for cold intolerance, heat intolerance, polydipsia, polyphagia and polyuria.   Genitourinary:  Negative for decreased urine volume, difficulty urinating, dysuria, enuresis, flank pain, frequency, genital sores, hematuria, penile discharge, penile pain, penile swelling, scrotal swelling, testicular pain and urgency.   Musculoskeletal:  Negative for arthralgias, back pain, joint swelling, myalgias and neck pain.   Skin:  Negative for color change, pallor, rash and wound.   Allergic/Immunologic: Negative for environmental allergies, food allergies and immunocompromised state.   Neurological:  Negative for dizziness, vertigo, seizures, weakness, numbness and headaches.   Hematological:  Negative for adenopathy. Does not bruise/bleed easily.   Psychiatric/Behavioral: Negative.      Objective:     Physical Exam  Vitals and nursing note reviewed.   Constitutional:       General: He is not in acute distress.     Appearance: Normal appearance. He is well-developed. He is not ill-appearing, toxic-appearing or diaphoretic.   HENT:      Head: Normocephalic.      Right Ear: External ear normal.      Left Ear: External ear normal.      Nose: Nose normal.      Mouth/Throat:      Mouth: Mucous membranes are moist.      Pharynx: Oropharynx is clear. No oropharyngeal exudate or posterior oropharyngeal erythema.   Eyes:      Extraocular Movements: Extraocular movements intact.      Conjunctiva/sclera: Conjunctivae normal.      Pupils: Pupils are equal, round, and reactive to light.   Cardiovascular:      Rate and Rhythm: Normal rate and regular rhythm.      Heart sounds: Normal heart sounds.   Pulmonary:      Effort: Pulmonary effort is normal.      Breath sounds: Normal breath sounds.   Abdominal:      General: Bowel sounds are normal.      Palpations: Abdomen is soft.      Tenderness: There is no right CVA tenderness or left CVA tenderness.    Genitourinary:     Penis: Normal.       Testes: Normal.   Musculoskeletal:         General: Normal range of motion.      Cervical back: Normal range of motion and neck supple.      Right lower leg: No edema.      Left lower leg: No edema.   Skin:     General: Skin is warm and dry.      Capillary Refill: Capillary refill takes less than 2 seconds.      Findings: No lesion or rash.   Neurological:      General: No focal deficit present.      Mental Status: He is alert and oriented to person, place, and time.      Gait: Gait normal.      Deep Tendon Reflexes: Reflexes are normal and symmetric. Reflexes normal.   Psychiatric:         Mood and Affect: Mood normal.         Behavior: Behavior normal.         Thought Content: Thought content normal.         Judgment: Judgment normal.      Patient's penis cleaned with alcohol.  Injection of TriMix into penis performed and right lateral corporal body and 0.4 mg were used with 80% or more function.  Patient will increase his dose to 0.4 prior to sexual activity.  We will re-evaluate in 6 months to see how this is been going.  Patient can increase by 0.05 ml dosages as necessary  Assessment:      1. Neurogenic bladder    2. Urinary frequency    3. Urinary retention      Plan:     Patient Instructions   Continue intermittent catheterization  Will try pep injection with increase dosage in office if works continue with the new dosage.

## 2023-08-18 DIAGNOSIS — E11.9 TYPE 2 DIABETES MELLITUS WITHOUT COMPLICATION, WITHOUT LONG-TERM CURRENT USE OF INSULIN: ICD-10-CM

## 2023-08-18 DIAGNOSIS — E66.01 CLASS 3 SEVERE OBESITY DUE TO EXCESS CALORIES WITH SERIOUS COMORBIDITY AND BODY MASS INDEX (BMI) OF 45.0 TO 49.9 IN ADULT: ICD-10-CM

## 2023-08-18 RX ORDER — SEMAGLUTIDE 1.34 MG/ML
INJECTION, SOLUTION SUBCUTANEOUS
Qty: 3 ML | Refills: 0 | Status: SHIPPED | OUTPATIENT
Start: 2023-08-18 | End: 2023-09-21

## 2023-08-31 ENCOUNTER — PATIENT OUTREACH (OUTPATIENT)
Dept: ADMINISTRATIVE | Facility: HOSPITAL | Age: 58
End: 2023-08-31
Payer: COMMERCIAL

## 2023-08-31 DIAGNOSIS — I10 ESSENTIAL HYPERTENSION: ICD-10-CM

## 2023-08-31 NOTE — PROGRESS NOTES
Care Everywhere updates requested and reviewed.  Immunizations reconciled. Media reports reviewed.  Duplicate HM overrides and  orders removed.  Overdue HM topic chart audit and/or requested.  Overdue lab testing linked to upcoming lab appointments if applies.          Health Maintenance Due   Topic Date Due    Diabetes Urine Screening  Never done    Pneumococcal Vaccines (Age 0-64) (1 - PCV) Never done    Foot Exam  Never done    Eye Exam  Never done    Hemoglobin A1c  09/15/2023

## 2023-09-01 RX ORDER — VALSARTAN AND HYDROCHLOROTHIAZIDE 320; 12.5 MG/1; MG/1
1 TABLET, FILM COATED ORAL DAILY
Qty: 90 TABLET | Refills: 0 | Status: SHIPPED | OUTPATIENT
Start: 2023-09-01 | End: 2023-11-30

## 2023-09-06 ENCOUNTER — PATIENT MESSAGE (OUTPATIENT)
Dept: FAMILY MEDICINE | Facility: CLINIC | Age: 58
End: 2023-09-06
Payer: COMMERCIAL

## 2023-09-07 ENCOUNTER — PATIENT MESSAGE (OUTPATIENT)
Dept: FAMILY MEDICINE | Facility: CLINIC | Age: 58
End: 2023-09-07
Payer: COMMERCIAL

## 2023-09-18 ENCOUNTER — OFFICE VISIT (OUTPATIENT)
Dept: FAMILY MEDICINE | Facility: CLINIC | Age: 58
End: 2023-09-18
Payer: COMMERCIAL

## 2023-09-18 VITALS
WEIGHT: 315 LBS | TEMPERATURE: 98 F | SYSTOLIC BLOOD PRESSURE: 130 MMHG | OXYGEN SATURATION: 98 % | HEIGHT: 77 IN | DIASTOLIC BLOOD PRESSURE: 74 MMHG | HEART RATE: 86 BPM | BODY MASS INDEX: 37.19 KG/M2

## 2023-09-18 DIAGNOSIS — M54.16 LUMBAR RADICULOPATHY: ICD-10-CM

## 2023-09-18 DIAGNOSIS — Z98.1 S/P LUMBAR SPINAL FUSION: ICD-10-CM

## 2023-09-18 DIAGNOSIS — E66.01 CLASS 3 SEVERE OBESITY DUE TO EXCESS CALORIES WITH SERIOUS COMORBIDITY AND BODY MASS INDEX (BMI) OF 40.0 TO 44.9 IN ADULT: ICD-10-CM

## 2023-09-18 DIAGNOSIS — E78.5 HYPERLIPIDEMIA ASSOCIATED WITH TYPE 2 DIABETES MELLITUS: ICD-10-CM

## 2023-09-18 DIAGNOSIS — M21.372 FOOT DROP, LEFT FOOT: ICD-10-CM

## 2023-09-18 DIAGNOSIS — I15.2 HYPERTENSION ASSOCIATED WITH TYPE 2 DIABETES MELLITUS: ICD-10-CM

## 2023-09-18 DIAGNOSIS — Z23 NEED FOR STREPTOCOCCUS PNEUMONIAE VACCINATION: ICD-10-CM

## 2023-09-18 DIAGNOSIS — N31.9 NEUROGENIC BLADDER: ICD-10-CM

## 2023-09-18 DIAGNOSIS — E11.9 TYPE 2 DIABETES MELLITUS WITHOUT COMPLICATION, WITHOUT LONG-TERM CURRENT USE OF INSULIN: Primary | ICD-10-CM

## 2023-09-18 DIAGNOSIS — G61.81 CIDP (CHRONIC INFLAMMATORY DEMYELINATING POLYNEUROPATHY): ICD-10-CM

## 2023-09-18 DIAGNOSIS — E11.69 HYPERLIPIDEMIA ASSOCIATED WITH TYPE 2 DIABETES MELLITUS: ICD-10-CM

## 2023-09-18 DIAGNOSIS — G47.30 SLEEP APNEA WITH USE OF CONTINUOUS POSITIVE AIRWAY PRESSURE (CPAP): ICD-10-CM

## 2023-09-18 DIAGNOSIS — D63.8 ANEMIA OF CHRONIC DISEASE: ICD-10-CM

## 2023-09-18 DIAGNOSIS — Z78.9 SELF-CATHETERIZES URINARY BLADDER: ICD-10-CM

## 2023-09-18 DIAGNOSIS — E11.59 HYPERTENSION ASSOCIATED WITH TYPE 2 DIABETES MELLITUS: ICD-10-CM

## 2023-09-18 PROCEDURE — 90677 PNEUMOCOCCAL CONJUGATE VACCINE 20-VALENT: ICD-10-PCS | Mod: S$GLB,,, | Performed by: NURSE PRACTITIONER

## 2023-09-18 PROCEDURE — 3008F BODY MASS INDEX DOCD: CPT | Mod: CPTII,S$GLB,, | Performed by: NURSE PRACTITIONER

## 2023-09-18 PROCEDURE — 99999 PR PBB SHADOW E&M-EST. PATIENT-LVL V: CPT | Mod: PBBFAC,,, | Performed by: NURSE PRACTITIONER

## 2023-09-18 PROCEDURE — 3044F HG A1C LEVEL LT 7.0%: CPT | Mod: CPTII,S$GLB,, | Performed by: NURSE PRACTITIONER

## 2023-09-18 PROCEDURE — 90471 IMMUNIZATION ADMIN: CPT | Mod: S$GLB,,, | Performed by: NURSE PRACTITIONER

## 2023-09-18 PROCEDURE — 1159F PR MEDICATION LIST DOCUMENTED IN MEDICAL RECORD: ICD-10-PCS | Mod: CPTII,S$GLB,, | Performed by: NURSE PRACTITIONER

## 2023-09-18 PROCEDURE — 1160F PR REVIEW ALL MEDS BY PRESCRIBER/CLIN PHARMACIST DOCUMENTED: ICD-10-PCS | Mod: CPTII,S$GLB,, | Performed by: NURSE PRACTITIONER

## 2023-09-18 PROCEDURE — 90677 PCV20 VACCINE IM: CPT | Mod: S$GLB,,, | Performed by: NURSE PRACTITIONER

## 2023-09-18 PROCEDURE — 3075F SYST BP GE 130 - 139MM HG: CPT | Mod: CPTII,S$GLB,, | Performed by: NURSE PRACTITIONER

## 2023-09-18 PROCEDURE — 99214 PR OFFICE/OUTPT VISIT, EST, LEVL IV, 30-39 MIN: ICD-10-PCS | Mod: 25,S$GLB,, | Performed by: NURSE PRACTITIONER

## 2023-09-18 PROCEDURE — 99214 OFFICE O/P EST MOD 30 MIN: CPT | Mod: 25,S$GLB,, | Performed by: NURSE PRACTITIONER

## 2023-09-18 PROCEDURE — 3075F PR MOST RECENT SYSTOLIC BLOOD PRESS GE 130-139MM HG: ICD-10-PCS | Mod: CPTII,S$GLB,, | Performed by: NURSE PRACTITIONER

## 2023-09-18 PROCEDURE — 90471 PNEUMOCOCCAL CONJUGATE VACCINE 20-VALENT: ICD-10-PCS | Mod: S$GLB,,, | Performed by: NURSE PRACTITIONER

## 2023-09-18 PROCEDURE — 3078F PR MOST RECENT DIASTOLIC BLOOD PRESSURE < 80 MM HG: ICD-10-PCS | Mod: CPTII,S$GLB,, | Performed by: NURSE PRACTITIONER

## 2023-09-18 PROCEDURE — 99999 PR PBB SHADOW E&M-EST. PATIENT-LVL V: ICD-10-PCS | Mod: PBBFAC,,, | Performed by: NURSE PRACTITIONER

## 2023-09-18 PROCEDURE — 3044F PR MOST RECENT HEMOGLOBIN A1C LEVEL <7.0%: ICD-10-PCS | Mod: CPTII,S$GLB,, | Performed by: NURSE PRACTITIONER

## 2023-09-18 PROCEDURE — 3078F DIAST BP <80 MM HG: CPT | Mod: CPTII,S$GLB,, | Performed by: NURSE PRACTITIONER

## 2023-09-18 PROCEDURE — 1159F MED LIST DOCD IN RCRD: CPT | Mod: CPTII,S$GLB,, | Performed by: NURSE PRACTITIONER

## 2023-09-18 PROCEDURE — 3008F PR BODY MASS INDEX (BMI) DOCUMENTED: ICD-10-PCS | Mod: CPTII,S$GLB,, | Performed by: NURSE PRACTITIONER

## 2023-09-18 PROCEDURE — 1160F RVW MEDS BY RX/DR IN RCRD: CPT | Mod: CPTII,S$GLB,, | Performed by: NURSE PRACTITIONER

## 2023-09-18 NOTE — PROGRESS NOTES
"Subjective:       Patient ID: Manfred Shah is a 58 y.o. male.    Chief Complaint: Follow-up (6 months F/U)        Patient is a 58-year-old white male with CIDP (chronic inflammatory demyelinating polyneuropathy) diagnosed in June 2018 by neurologist, Dr. Rowdy Tran, left foot drop secondary to CIDP, history of Abscess of spinal cord treated by Dr. Rodriguez S/P Laminectomy in August 2018, Lumbar Spinal fusion on 6/3/2020 with Neurosurgeon Dr. Osorio, Neurogenic bladder with urge incontinence and self-catheterization and BPH followed by Dr. Sanderson, RLS, Hypertension, Hyperlipidemia, Impaired Fasting Glucose/ Prediabetes, Elevated Liver Enzymes, Heberden's nodes with joint inflammation and pain to bilateral hands followed by [facility],  and Sleep Apnea with CPAP use that is here today for 3 month follow up with fasting lab results.       Resistant Hypertension   evaluated by [facility], Dr. Kiser, that is currently controlled on eplerenone (INSPRA) 25 MG Tab daily, amlodipine 10 mg daily, Valsartan HCTZ 320/12.5 mg daily, Metoprolol  mg daily and Hydralazine 25 mg twice daily.   Last seen Dr. Kiser May 2023  Dr. Kiser had advised that patient edema is due to venous insufficiency possibly due to ropinirole so medication was stopped.  /74 (BP Location: Left arm, Patient Position: Sitting, BP Method: Large (Manual))   Pulse 86   Temp 98.2 °F (36.8 °C) (Temporal)   Ht 6' 5" (1.956 m)   Wt (!) 166 kg (365 lb 15.4 oz)   SpO2 98%   BMI 43.40 kg/m²         Hyperlipidemia   currently taking Rosuvastatin 10 mg daily.     LDL 64.4 -in December 2022 - monitored yearly     TYPE 2 DIABETES  IFG/Prediabetes since January 2018    He always has an IFG that is sometimes above the 126 cut-off but his HgbA1C had NEVER gone above 5.8 so continued to classify as IFG/Prediabetic.    HOWEVER, in November 2021 - I started patient on Ozempic 0.5 mg injection per his request for prediabetes and obesity.  I " increased the Ozempic to 1 mg injection in Feb. 2022  Even on Ozempic 1 mg injection weekly, FBG was still high at 119 with A1C 5.4% - so diagnosed with Type 2 Diabetes.  Body mass index is 43.4 kg/m².  Continue Ozempic 0.1 mg injection weekly        Morbidly obese.   Body mass index is 43.4 kg/m².  Started patient on Ozempic injections for weight control in November 2021 and lost 15 pounds in Feb. 2022 then became noncompliant with medication and follow up.  In December 2022 - Taking Ozempic 1 mg injection weekly consistently and have lost 33 pounds in past 3 months.        History of elevated liver enzymes   Patient reports he was admitted to Hospital in [place] and he had a liver workup during that hospitalization.  His liver enzyme elevation had resolved with weight loss in October 2018 but was again elevated in 2019 with weight gain.    AST now 43  ALT down 45  Will continue to work on lifestyle modifications.     Sleep Apnea with CPAP use   and had uvula removed by surgery in past.  On CPAP nightly     CIDP with left foot drop    followed by a neurologist Dr. Rowdy Tran in past - no longer seeing specialist unless he begins to have issues with leg weakness again.     BPH and neurogenic bladder with urge incontinence and self catheterizes as needed   Followed by [facility] Dr. Maria E Archer's nodes with joint inflammation and pain to bilateral hands   evaluated by [facility] and determined Osteoarthritis.     Chronic Anemia  Since May 2020.    Stable.    Component      Latest Ref Rng 12/15/2022 3/15/2023 9/13/2023   Sodium      136 - 145 mmol/L 142  141  141    Potassium      3.5 - 5.1 mmol/L 3.6  3.9  3.8    Chloride      95 - 110 mmol/L 108  107  102    CO2      23 - 29 mmol/L 27  29  28    Glucose      70 - 110 mg/dL 146 (H)  119 (H)  114 (H)    BUN      2 - 20 mg/dL 17  19  14    Creatinine      0.50 - 1.40 mg/dL 0.86  0.72  0.83    Calcium      8.7 - 10.5 mg/dL 8.7  9.0  8.8    PROTEIN  "TOTAL      6.0 - 8.4 g/dL 7.0  7.3  7.4    Albumin      3.5 - 5.2 g/dL 4.1  4.4  4.0    BILIRUBIN TOTAL      0.1 - 1.0 mg/dL 0.6  0.7  0.6    Alkaline Phosphatase      38 - 126 U/L 51  53  55    AST      15 - 46 U/L 53 (H)  41  43    ALT      10 - 44 U/L 54 (H)  43  45 (H)    Anion Gap      8 - 16 mmol/L 7 (L)  5 (L)  11    Hemoglobin A1C External      4.0 - 5.6 % 5.7 (H)  5.4  5.2    Estimated Avg Glucose      68 - 131 mg/dL 117  108  103    eGFR      >60 mL/min/1.73 m^2 >60.0  >60.0  >60.0         Review of Systems   Respiratory: Negative.     Cardiovascular: Negative.    Gastrointestinal: Negative.          Objective:     Vitals:    09/18/23 1441   BP: 130/74   BP Location: Left arm   Patient Position: Sitting   BP Method: Large (Manual)   Pulse: 86   Temp: 98.2 °F (36.8 °C)   TempSrc: Temporal   SpO2: 98%   Weight: (!) 166 kg (365 lb 15.4 oz)   Height: 6' 5" (1.956 m)          Physical Exam  Constitutional:       General: He is not in acute distress.     Appearance: Normal appearance. He is obese. He is not ill-appearing, toxic-appearing or diaphoretic.      Comments: Body mass index is 43.4 kg/m².     HENT:      Head: Normocephalic and atraumatic.      Right Ear: Tympanic membrane, ear canal and external ear normal. There is no impacted cerumen.      Left Ear: Tympanic membrane, ear canal and external ear normal. There is no impacted cerumen.      Nose: Nose normal. No congestion.      Mouth/Throat:      Mouth: Mucous membranes are moist.      Pharynx: No posterior oropharyngeal erythema.   Eyes:      General:         Right eye: No discharge.         Left eye: No discharge.      Extraocular Movements: Extraocular movements intact.      Conjunctiva/sclera: Conjunctivae normal.   Cardiovascular:      Rate and Rhythm: Normal rate and regular rhythm.      Pulses: Normal pulses.      Heart sounds: Normal heart sounds.   Pulmonary:      Effort: Pulmonary effort is normal. No respiratory distress.      Breath sounds: " Normal breath sounds. No stridor. No wheezing or rales.   Abdominal:      General: Bowel sounds are normal. There is no distension.      Palpations: Abdomen is soft.   Genitourinary:     Comments: Followed by Urology  Musculoskeletal:         General: No swelling or tenderness. Normal range of motion.      Right hand: Deformity present.      Left hand: Deformity present.      Cervical back: Normal range of motion. No tenderness.      Right lower leg: Edema present.      Left lower leg: Edema present.      Left foot: Foot drop present.      Comments: Traced edema to bilateral lower extremities  + nodes/swelling to the DIP joints of both hands -followed by [facility]    Feet:      Comments: Ambulatory with brace on to left foot to treat the left foot drop  Skin:     General: Skin is warm and dry.      Capillary Refill: Capillary refill takes less than 2 seconds.   Neurological:      General: No focal deficit present.      Mental Status: He is alert and oriented to person, place, and time.   Psychiatric:         Mood and Affect: Mood normal.         Speech: Speech normal.         Behavior: Behavior normal. Behavior is cooperative.         Thought Content: Thought content normal.           Assessment:         ICD-10-CM ICD-9-CM   1. Type 2 diabetes mellitus without complication, without long-term current use of insulin  E11.9 250.00   2. Hyperlipidemia associated with type 2 diabetes mellitus  E11.69 250.80    E78.5 272.4   3. Hypertension associated with type 2 diabetes mellitus  E11.59 250.80    I15.2 401.9   4. Class 3 severe obesity due to excess calories with serious comorbidity and body mass index (BMI) of 40.0 to 44.9 in adult  E66.01 278.01    Z68.41 V85.41   5. Sleep apnea with use of continuous positive airway pressure (CPAP)  G47.30 327.23   6. CIDP (chronic inflammatory demyelinating polyneuropathy)  G61.81 357.81   7. Foot drop, left foot  M21.372 736.79   8. Lumbar radiculopathy  M54.16 724.4    9. S/P lumbar spinal fusion  Z98.1 V45.4   10. Neurogenic bladder  N31.9 596.54   11. Self-catheterizes urinary bladder  Z78.9 V49.89   12. Anemia of chronic disease  D63.8 285.29   13. Need for Streptococcus pneumoniae vaccination  Z23 V03.82       Plan:       Type 2 diabetes mellitus without complication, without long-term current use of insulin  Continue Ozempic present dose  Follow up in Feb.   -     Microalbumin/Creatinine Ratio, Urine; Future; Expected date: 09/18/2023  -     (In Office Administered) Pneumococcal Conjugate Vaccine (20 Valent) (IM)  -     Comprehensive Metabolic Panel; Future; Expected date: 09/18/2023  -     Hemoglobin A1C; Future; Expected date: 09/18/2023    Hyperlipidemia associated with type 2 diabetes mellitus  Stay on same medication  Recheck in feb.  -     Lipid Panel; Future; Expected date: 09/18/2023    Hypertension associated with type 2 diabetes mellitus  Controlled on present meds  Recheck in Feb.  -     CBC Auto Differential; Future; Expected date: 09/18/2023  -     TSH; Future; Expected date: 09/18/2023    Class 3 severe obesity due to excess calories with serious comorbidity and body mass index (BMI) of 40.0 to 44.9 in adult  Work on diet and exercise    Sleep apnea with use of continuous positive airway pressure (CPAP)  Use CPAP nightly    CIDP (chronic inflammatory demyelinating polyneuropathy)  stable    Foot drop, left foot  Wearing foot brace    Lumbar radiculopathy  Stable.    S/P lumbar spinal fusion    Neurogenic bladder  Self-caths  Followed by Urology    Self-catheterizes urinary bladder    Anemia of chronic disease  Stable.    Need for Streptococcus pneumoniae vaccination  -     (In Office Administered) Pneumococcal Conjugate Vaccine (20 Valent) (IM)      Follow up in about 5 months (around 2/18/2024) for fasting labs and WELLNESS EXAM.     Patient's Medications   New Prescriptions    No medications on file   Previous Medications    AMLODIPINE (NORVASC) 10 MG TABLET     Take 1 tablet by mouth once daily    ASPIRIN (ECOTRIN) 81 MG EC TABLET    Take 1 tablet (81 mg total) by mouth once daily.    CHOLECALCIFEROL, VITAMIN D3, (VITAMIN D3) 25 MCG (1,000 UNIT) CAPSULE    Take 2,000 Units by mouth once daily.    DIPHENHYDRAMINE HCL (BENADRYL ORAL)    Take by mouth.    DOCUSATE SODIUM (COLACE) 100 MG CAPSULE    Take 1 capsule (100 mg total) by mouth 2 (two) times daily.    EPLERENONE (INSPRA) 25 MG TAB    Take 1 tablet by mouth once daily    FERROUS SULFATE (FEOSOL) 325 MG (65 MG IRON) TAB TABLET    Take 2 tablets (650 mg total) by mouth daily with breakfast.    FINASTERIDE (PROSCAR) 5 MG TABLET    Take 1 tablet (5 mg total) by mouth once daily.    HYDRALAZINE (APRESOLINE) 25 MG TABLET    Take 1 tablet by mouth twice daily    LYSINE 500 MG TAB    Take 500 mg by mouth once daily.    METOPROLOL SUCCINATE (TOPROL-XL) 100 MG 24 HR TABLET    Take 1 tablet by mouth once daily    MULTIVITAMIN (THERAGRAN) PER TABLET    Take 1 tablet by mouth once daily.    OMEGA-3 FATTY ACIDS/FISH OIL (FISH OIL-OMEGA-3 FATTY ACIDS) 300-1,000 MG CAPSULE    Take 1 capsule by mouth once daily.    PEP INJECTION    Inject .25 ml as directed     For compounding pharmacy use:   Add PAPAVERINE 30 mcg  Add PHENTOLAMINE 10 mg  Add ALPROSTADIL 100 mcg    ROSUVASTATIN (CRESTOR) 10 MG TABLET    Take 1 tablet (10 mg total) by mouth every evening.    SEMAGLUTIDE (OZEMPIC) 1 MG/DOSE (4 MG/3 ML)    INJECT 1MG INTO THE SKIN EVERY 7 DAYS AS    TADALAFIL (CIALIS) 20 MG TAB    Take 1 tablet (20 mg total) by mouth once daily as directed    TAMSULOSIN (FLOMAX) 0.4 MG CAP    Take 1 capsule (0.4 mg total) by mouth once daily.    VALSARTAN-HYDROCHLOROTHIAZIDE (DIOVAN-HCT) 320-12.5 MG PER TABLET    Take 1 tablet by mouth once daily   Modified Medications    No medications on file   Discontinued Medications    ALBUTEROL (PROVENTIL/VENTOLIN HFA) 90 MCG/ACTUATION INHALER    Inhale 1 puff into the lungs every 6 (six) hours as needed for  shortness of breath or wheezing for 7 days    AZITHROMYCIN (Z-ARLINE) 250 MG TABLET    Take as directed on package    BENZONATATE (TESSALON) 100 MG CAPSULE    Take 1 capsule (100 mg total) by mouth 3 (three) times daily as needed for cough for 7 days       Past Medical History:   Diagnosis Date    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 Tray Rodriguez    CIDP (chronic inflammatory demyelinating polyneuropathy) 06/2018    followed by Dr. Rowdy Tran - Neurologist in [place].      Elevated liver enzymes 10/8/2018    Elevated PSA     followed by Dr. Sanderson    Foot drop, left foot     due to neurological condition/[place]    Hyperlipidemia     Hypertension     Impaired fasting glucose     Neurogenic bladder 09/05/2018    followed by Dr. Sanderson    RLS (restless legs syndrome) 10/8/2018    Self-catheterizes urinary bladder     Urologist    Sleep apnea with use of continuous positive airway pressure (CPAP) 01/10/2018    followed by [facility] in [place]    Type 2 diabetes mellitus without complication, without long-term current use of insulin 12/23/2022    Urge incontinence 9/11/2018       Past Surgical History:   Procedure Laterality Date    ADENOIDECTOMY      COLONOSCOPY N/A 1/24/2020    Procedure: COLONOSCOPY;  Surgeon: Stacy Markham MD;  Location: [facility];  Service: Endoscopy;  Laterality: N/A;    HERNIA REPAIR      LUMBAR LAMINECTOMY  2012    [facility]    LUMBAR LAMINECTOMY  08/2018    Dr. Rodriguez [facility]; complications from surgery - abscess to spinal cord - discharge summary scanned to media file    PLANTAR FASCIA RELEASE      TONSILLECTOMY      TRANSFORAMINAL LUMBAR INTERBODY FUSION (TLIF) OF SPINE WITH PERCUTANEOUS INSTRUMENTATION Bilateral 6/3/2020    Procedure: FUSION, SPINE, LUMBAR, TLIF, WITH PERCUTANEOUS INSTRUMENTATION  (L3-4 MIS TLIF) [facility] JENNIFER JENNINGS [phone] Spine Wave;  Surgeon: Samuel Osorio DO;   Location[facility] OR;  Service: Neurosurgery;  Laterality: Bilateral;  SPINEWAVE JENNIFER KRAMER [phone] TEXTED HIM @ 9:45AM ON 5-  PRE-OP BY RN 5----BMI--44---COVID NEGATIVE  CLEAR    VASECTOMY      X-STOP IMPLANTATION         Family History   Problem Relation Age of Onset    Cancer Mother         Breast Cancer    Heart disease Mother         pacemaker    Hyperlipidemia Mother         taking Crestor    Hypertension Mother         taking Amlodipine    Diabetes Mother         Prediabetes    Diabetes Father     Cancer Father         unknown type of cancer    Hypertension Father     Rheum arthritis Father     No Known Problems Sister     Prostate cancer Neg Hx     Kidney disease Neg Hx        Social History     Socioeconomic History    Marital status:    Occupational History    Occupation: teacher   Tobacco Use    Smoking status: Never     Passive exposure: Past    Smokeless tobacco: Never   Substance and Sexual Activity    Alcohol use: Yes     Comment: OCCASS    Drug use: No    Sexual activity: Yes     Partners: Female     Social Determinants of Health     Financial Resource Strain: Low Risk  (12/22/2022)    Overall Financial Resource Strain (CARDIA)     Difficulty of Paying Living Expenses: Not very hard   Food Insecurity: No Food Insecurity (12/22/2022)    Hunger Vital Sign     Worried About Running Out of Food in the Last Year: Never true     Ran Out of Food in the Last Year: Never true   Transportation Needs: No Transportation Needs (12/22/2022)    PRAPARE - Transportation     Lack of Transportation (Medical): No     Lack of Transportation (Non-Medical): No   Physical Activity: Insufficiently Active (12/22/2022)    Exercise Vital Sign     Days of Exercise per Week: 1 day     Minutes of Exercise per Session: 30 min   Stress: Stress Concern Present (12/22/2022)    [place] [employer] - Occupational Stress Questionnaire     Feeling of Stress : To some extent   Social  Connections: Unknown (12/22/2022)    Social Connection and Isolation Panel [NHANES]     Frequency of Communication with Friends and Family: Three times a week     Frequency of Social Gatherings with Friends and Family: Once a week     Active Member of Clubs or Organizations: Yes     Attends Club or Organization Meetings: More than 4 times per year     Marital Status:    Housing Stability: High Risk (12/22/2022)    Housing Stability Vital Sign     Unable to Pay for Housing in the Last Year: No     Number of Places Lived in the Last Year: 3     Unstable Housing in the Last Year: No

## 2023-09-21 DIAGNOSIS — E66.01 CLASS 3 SEVERE OBESITY DUE TO EXCESS CALORIES WITH SERIOUS COMORBIDITY AND BODY MASS INDEX (BMI) OF 45.0 TO 49.9 IN ADULT: ICD-10-CM

## 2023-09-21 DIAGNOSIS — E11.9 TYPE 2 DIABETES MELLITUS WITHOUT COMPLICATION, WITHOUT LONG-TERM CURRENT USE OF INSULIN: ICD-10-CM

## 2023-09-21 RX ORDER — SEMAGLUTIDE 1.34 MG/ML
INJECTION, SOLUTION SUBCUTANEOUS
Qty: 3 ML | Refills: 5 | Status: SHIPPED | OUTPATIENT
Start: 2023-09-21 | End: 2024-03-21

## 2023-09-25 DIAGNOSIS — I1A.0 RESISTANT HYPERTENSION: ICD-10-CM

## 2023-09-25 RX ORDER — AMLODIPINE BESYLATE 10 MG/1
TABLET ORAL
Qty: 90 TABLET | Refills: 0 | Status: SHIPPED | OUTPATIENT
Start: 2023-09-25 | End: 2023-12-28

## 2023-09-28 ENCOUNTER — CLINICAL SUPPORT (OUTPATIENT)
Dept: OTHER | Facility: CLINIC | Age: 58
End: 2023-09-28

## 2023-09-28 DIAGNOSIS — Z00.8 ENCOUNTER FOR OTHER GENERAL EXAMINATION: ICD-10-CM

## 2023-09-29 VITALS
BODY MASS INDEX: 37.19 KG/M2 | DIASTOLIC BLOOD PRESSURE: 82 MMHG | WEIGHT: 315 LBS | SYSTOLIC BLOOD PRESSURE: 148 MMHG | HEIGHT: 77 IN

## 2023-09-29 LAB
GLUCOSE SERPL-MCNC: 94 MG/DL (ref 60–140)
HDLC SERPL-MCNC: 33 MG/DL
POC CHOLESTEROL, LDL (DOCK): 63 MG/DL
POC CHOLESTEROL, TOTAL: 119 MG/DL
TRIGL SERPL-MCNC: 126 MG/DL

## 2023-10-30 DIAGNOSIS — I1A.0 RESISTANT HYPERTENSION: ICD-10-CM

## 2023-10-30 DIAGNOSIS — E78.5 HYPERLIPIDEMIA, UNSPECIFIED HYPERLIPIDEMIA TYPE: ICD-10-CM

## 2023-10-31 RX ORDER — ROSUVASTATIN CALCIUM 10 MG/1
10 TABLET, COATED ORAL NIGHTLY
Qty: 90 TABLET | Refills: 1 | Status: SHIPPED | OUTPATIENT
Start: 2023-10-31

## 2023-10-31 RX ORDER — HYDRALAZINE HYDROCHLORIDE 25 MG/1
TABLET, FILM COATED ORAL
Qty: 180 TABLET | Refills: 1 | Status: SHIPPED | OUTPATIENT
Start: 2023-10-31

## 2023-10-31 RX ORDER — METOPROLOL SUCCINATE 100 MG/1
TABLET, EXTENDED RELEASE ORAL
Qty: 90 TABLET | Refills: 1 | Status: SHIPPED | OUTPATIENT
Start: 2023-10-31

## 2023-11-30 DIAGNOSIS — I10 ESSENTIAL HYPERTENSION: ICD-10-CM

## 2023-11-30 RX ORDER — VALSARTAN AND HYDROCHLOROTHIAZIDE 320; 12.5 MG/1; MG/1
1 TABLET, FILM COATED ORAL DAILY
Qty: 90 TABLET | Refills: 1 | Status: SHIPPED | OUTPATIENT
Start: 2023-11-30 | End: 2024-11-29

## 2023-12-28 DIAGNOSIS — I1A.0 RESISTANT HYPERTENSION: ICD-10-CM

## 2023-12-28 RX ORDER — AMLODIPINE BESYLATE 10 MG/1
TABLET ORAL
Qty: 90 TABLET | Refills: 0 | Status: SHIPPED | OUTPATIENT
Start: 2023-12-28 | End: 2024-03-27

## 2023-12-28 NOTE — TELEPHONE ENCOUNTER
Refill Routing Note   Medication(s) are not appropriate for processing by [facility] for the following reason(s):        Non-participating provider    [facility] action(s):  Route               Appointments  past 12m or future 3m with PCP    Date Provider   Last Visit   9/18/2023 Brandy Ramirez, CECY   Next Visit   2/19/2024 Brandy Ramirez, CECY   ED visits in past 90 days: 0        Note composed:11:48 AM 12/28/2023

## 2024-01-04 ENCOUNTER — PATIENT OUTREACH (OUTPATIENT)
Dept: ADMINISTRATIVE | Facility: HOSPITAL | Age: 59
End: 2024-01-04
Payer: COMMERCIAL

## 2024-01-04 DIAGNOSIS — E11.9 TYPE 2 DIABETES MELLITUS WITHOUT COMPLICATION, WITHOUT LONG-TERM CURRENT USE OF INSULIN: Primary | ICD-10-CM

## 2024-02-05 ENCOUNTER — PATIENT MESSAGE (OUTPATIENT)
Dept: ADMINISTRATIVE | Facility: HOSPITAL | Age: 59
End: 2024-02-05
Payer: COMMERCIAL

## 2024-02-05 ENCOUNTER — PATIENT MESSAGE (OUTPATIENT)
Dept: FAMILY MEDICINE | Facility: CLINIC | Age: 59
End: 2024-02-05
Payer: COMMERCIAL

## 2024-02-05 ENCOUNTER — PATIENT OUTREACH (OUTPATIENT)
Dept: ADMINISTRATIVE | Facility: HOSPITAL | Age: 59
End: 2024-02-05
Payer: COMMERCIAL

## 2024-02-05 NOTE — PROGRESS NOTES
Care Everywhere updates requested and reviewed.  Immunizations reconciled. Media reports reviewed.  Duplicate HM overrides and  orders removed.  Overdue HM topic chart audit and/or requested.  Overdue lab testing linked to upcoming lab appointments if applies.        Health Maintenance Due   Topic Date Due    Diabetes Urine Screening  Never done    Foot Exam  Never done    Eye Exam  Never done    Shingles Vaccine (1 of 2) Never done    COVID-19 Vaccine (3 - 2023- season) 2023    PROSTATE-SPECIFIC ANTIGEN  12/15/2023

## 2024-02-20 ENCOUNTER — PATIENT MESSAGE (OUTPATIENT)
Dept: UROLOGY | Facility: CLINIC | Age: 59
End: 2024-02-20
Payer: COMMERCIAL

## 2024-03-03 ENCOUNTER — PATIENT MESSAGE (OUTPATIENT)
Dept: UROLOGY | Facility: CLINIC | Age: 59
End: 2024-03-03
Payer: COMMERCIAL

## 2024-03-20 DIAGNOSIS — E11.9 TYPE 2 DIABETES MELLITUS WITHOUT COMPLICATION, WITHOUT LONG-TERM CURRENT USE OF INSULIN: ICD-10-CM

## 2024-03-20 DIAGNOSIS — E66.01 CLASS 3 SEVERE OBESITY DUE TO EXCESS CALORIES WITH SERIOUS COMORBIDITY AND BODY MASS INDEX (BMI) OF 45.0 TO 49.9 IN ADULT: ICD-10-CM

## 2024-03-21 RX ORDER — SEMAGLUTIDE 1.34 MG/ML
INJECTION, SOLUTION SUBCUTANEOUS
Qty: 3 ML | Refills: 0 | Status: SHIPPED | OUTPATIENT
Start: 2024-03-21 | End: 2024-04-17

## 2024-03-21 NOTE — TELEPHONE ENCOUNTER
Patient cancelled his appt in Feb. And rescheduled over portal but does NOT have labs scheduled - call patient and make sure LABS and URINE are scheduled and completed BEFORE his visit with me 3/25/24.

## 2024-03-25 ENCOUNTER — OFFICE VISIT (OUTPATIENT)
Dept: FAMILY MEDICINE | Facility: CLINIC | Age: 59
End: 2024-03-25
Payer: COMMERCIAL

## 2024-03-25 VITALS
TEMPERATURE: 99 F | HEIGHT: 77 IN | WEIGHT: 315 LBS | SYSTOLIC BLOOD PRESSURE: 130 MMHG | DIASTOLIC BLOOD PRESSURE: 78 MMHG | BODY MASS INDEX: 37.19 KG/M2 | OXYGEN SATURATION: 98 % | HEART RATE: 87 BPM

## 2024-03-25 DIAGNOSIS — G47.30 SLEEP APNEA WITH USE OF CONTINUOUS POSITIVE AIRWAY PRESSURE (CPAP): ICD-10-CM

## 2024-03-25 DIAGNOSIS — N40.1 BENIGN PROSTATIC HYPERPLASIA WITH LOWER URINARY TRACT SYMPTOMS, SYMPTOM DETAILS UNSPECIFIED: ICD-10-CM

## 2024-03-25 DIAGNOSIS — M19.041 PRIMARY OSTEOARTHRITIS OF BOTH HANDS: ICD-10-CM

## 2024-03-25 DIAGNOSIS — G61.81 CIDP (CHRONIC INFLAMMATORY DEMYELINATING POLYNEUROPATHY): ICD-10-CM

## 2024-03-25 DIAGNOSIS — E66.01 CLASS 3 SEVERE OBESITY DUE TO EXCESS CALORIES WITH SERIOUS COMORBIDITY AND BODY MASS INDEX (BMI) OF 40.0 TO 44.9 IN ADULT: ICD-10-CM

## 2024-03-25 DIAGNOSIS — E78.5 HYPERLIPIDEMIA ASSOCIATED WITH TYPE 2 DIABETES MELLITUS: ICD-10-CM

## 2024-03-25 DIAGNOSIS — M19.042 PRIMARY OSTEOARTHRITIS OF BOTH HANDS: ICD-10-CM

## 2024-03-25 DIAGNOSIS — E11.29 TYPE 2 DIABETES MELLITUS WITH MICROALBUMINURIA, WITHOUT LONG-TERM CURRENT USE OF INSULIN: ICD-10-CM

## 2024-03-25 DIAGNOSIS — R80.9 MICROALBUMINURIA: ICD-10-CM

## 2024-03-25 DIAGNOSIS — R80.9 TYPE 2 DIABETES MELLITUS WITH MICROALBUMINURIA, WITHOUT LONG-TERM CURRENT USE OF INSULIN: ICD-10-CM

## 2024-03-25 DIAGNOSIS — E11.69 HYPERLIPIDEMIA ASSOCIATED WITH TYPE 2 DIABETES MELLITUS: ICD-10-CM

## 2024-03-25 DIAGNOSIS — D63.8 ANEMIA OF CHRONIC DISEASE: ICD-10-CM

## 2024-03-25 DIAGNOSIS — Z78.9 SELF-CATHETERIZES URINARY BLADDER: ICD-10-CM

## 2024-03-25 DIAGNOSIS — I15.2 HYPERTENSION ASSOCIATED WITH TYPE 2 DIABETES MELLITUS: ICD-10-CM

## 2024-03-25 DIAGNOSIS — Z00.00 ANNUAL PHYSICAL EXAM: Primary | ICD-10-CM

## 2024-03-25 DIAGNOSIS — E11.59 HYPERTENSION ASSOCIATED WITH TYPE 2 DIABETES MELLITUS: ICD-10-CM

## 2024-03-25 PROBLEM — Z30.2 ENCOUNTER FOR STERILIZATION: Status: RESOLVED | Noted: 2017-09-01 | Resolved: 2024-03-25

## 2024-03-25 PROCEDURE — 1160F RVW MEDS BY RX/DR IN RCRD: CPT | Mod: CPTII,S$GLB,, | Performed by: NURSE PRACTITIONER

## 2024-03-25 PROCEDURE — 99396 PREV VISIT EST AGE 40-64: CPT | Mod: S$GLB,,, | Performed by: NURSE PRACTITIONER

## 2024-03-25 PROCEDURE — 3078F DIAST BP <80 MM HG: CPT | Mod: CPTII,S$GLB,, | Performed by: NURSE PRACTITIONER

## 2024-03-25 PROCEDURE — 3008F BODY MASS INDEX DOCD: CPT | Mod: CPTII,S$GLB,, | Performed by: NURSE PRACTITIONER

## 2024-03-25 PROCEDURE — 3075F SYST BP GE 130 - 139MM HG: CPT | Mod: CPTII,S$GLB,, | Performed by: NURSE PRACTITIONER

## 2024-03-25 PROCEDURE — 3066F NEPHROPATHY DOC TX: CPT | Mod: CPTII,S$GLB,, | Performed by: NURSE PRACTITIONER

## 2024-03-25 PROCEDURE — 1159F MED LIST DOCD IN RCRD: CPT | Mod: CPTII,S$GLB,, | Performed by: NURSE PRACTITIONER

## 2024-03-25 PROCEDURE — 3044F HG A1C LEVEL LT 7.0%: CPT | Mod: CPTII,S$GLB,, | Performed by: NURSE PRACTITIONER

## 2024-03-25 PROCEDURE — 99999 PR PBB SHADOW E&M-EST. PATIENT-LVL V: CPT | Mod: PBBFAC,,, | Performed by: NURSE PRACTITIONER

## 2024-03-25 PROCEDURE — 3060F POS MICROALBUMINURIA REV: CPT | Mod: CPTII,S$GLB,, | Performed by: NURSE PRACTITIONER

## 2024-03-26 DIAGNOSIS — I1A.0 RESISTANT HYPERTENSION: ICD-10-CM

## 2024-03-27 RX ORDER — AMLODIPINE BESYLATE 10 MG/1
TABLET ORAL
Qty: 90 TABLET | Refills: 0 | Status: SHIPPED | OUTPATIENT
Start: 2024-03-27

## 2024-03-28 ENCOUNTER — PATIENT MESSAGE (OUTPATIENT)
Dept: UROLOGY | Facility: CLINIC | Age: 59
End: 2024-03-28
Payer: COMMERCIAL

## 2024-04-12 ENCOUNTER — OFFICE VISIT (OUTPATIENT)
Dept: UROLOGY | Facility: CLINIC | Age: 59
End: 2024-04-12
Payer: COMMERCIAL

## 2024-04-12 VITALS — HEIGHT: 77 IN | WEIGHT: 315 LBS | BODY MASS INDEX: 37.19 KG/M2

## 2024-04-12 DIAGNOSIS — G61.81 CIDP (CHRONIC INFLAMMATORY DEMYELINATING POLYNEUROPATHY): ICD-10-CM

## 2024-04-12 DIAGNOSIS — N31.9 NEUROGENIC BLADDER: ICD-10-CM

## 2024-04-12 DIAGNOSIS — R33.9 URINARY RETENTION: ICD-10-CM

## 2024-04-12 DIAGNOSIS — N52.03 COMBINED ARTERIAL INSUFFICIENCY AND CORPORO-VENOUS OCCLUSIVE ERECTILE DYSFUNCTION: ICD-10-CM

## 2024-04-12 DIAGNOSIS — N40.1 BENIGN PROSTATIC HYPERPLASIA WITH LOWER URINARY TRACT SYMPTOMS, SYMPTOM DETAILS UNSPECIFIED: Primary | ICD-10-CM

## 2024-04-12 PROCEDURE — 1159F MED LIST DOCD IN RCRD: CPT | Mod: CPTII,S$GLB,, | Performed by: UROLOGY

## 2024-04-12 PROCEDURE — 3008F BODY MASS INDEX DOCD: CPT | Mod: CPTII,S$GLB,, | Performed by: UROLOGY

## 2024-04-12 PROCEDURE — 3066F NEPHROPATHY DOC TX: CPT | Mod: CPTII,S$GLB,, | Performed by: UROLOGY

## 2024-04-12 PROCEDURE — 3044F HG A1C LEVEL LT 7.0%: CPT | Mod: CPTII,S$GLB,, | Performed by: UROLOGY

## 2024-04-12 PROCEDURE — 1160F RVW MEDS BY RX/DR IN RCRD: CPT | Mod: CPTII,S$GLB,, | Performed by: UROLOGY

## 2024-04-12 PROCEDURE — 99999 PR PBB SHADOW E&M-EST. PATIENT-LVL IV: CPT | Mod: PBBFAC,,, | Performed by: UROLOGY

## 2024-04-12 PROCEDURE — 3060F POS MICROALBUMINURIA REV: CPT | Mod: CPTII,S$GLB,, | Performed by: UROLOGY

## 2024-04-12 PROCEDURE — 99215 OFFICE O/P EST HI 40 MIN: CPT | Mod: S$GLB,,, | Performed by: UROLOGY

## 2024-04-12 NOTE — PROGRESS NOTES
Subjective:      Patient ID: Manfred Shah is a 58 y.o. male.    Chief Complaint: Neurogenic bladde    Mr. 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  Patient is doing intermittent catheterization and this is working well.  The patient does have sensation when he needs to urinate but is still unable to empty his bladder.  Patient is willing to undergo bladder stimulation trial with InterStim after he retired from work.  We will plan this in the next year or so when the patient is ready.  Patient with erectile dysfunction responding to into intracorporeal penile injection therapy.  Patient is catheterizing 7 times daily.  Patient is still unable to void.  Patient is retiring August 15th.  He will notify me if he wants to move forward with testing of InterStim.    Follow-up  This is a chronic (Neurogenic bladder) problem. The problem occurs constantly. Associated symptoms include urinary symptoms (Incomplete bladder emptying inability to void secondary to neurogenic bladder from Guillain-Brooklyn syndrome). Pertinent negatives include no abdominal pain, anorexia, arthralgias, change in bowel habit, chest pain, chills, congestion, coughing, diaphoresis, fatigue, fever, headaches, joint swelling, myalgias, nausea, neck pain, numbness, rash, sore throat, swollen glands, vertigo, visual change, vomiting or weakness. Nothing aggravates the symptoms. He has tried nothing for the symptoms. The treatment provided significant relief.     Review of Systems   Constitutional:  Negative for activity change, appetite change, chills, diaphoresis, fatigue, fever and unexpected weight change.   HENT:  Negative for congestion, hearing loss, sinus pressure, sore throat and trouble swallowing.    Eyes:  Negative for photophobia, pain, discharge and visual disturbance.   Respiratory:  Negative for apnea, cough and shortness of breath.    Cardiovascular:  Negative for chest pain,  palpitations and leg swelling.   Gastrointestinal:  Negative for abdominal distention, abdominal pain, anal bleeding, anorexia, blood in stool, change in bowel habit, constipation, diarrhea, nausea, rectal pain and vomiting.   Endocrine: Negative for cold intolerance, heat intolerance, polydipsia, polyphagia and polyuria.   Genitourinary:  Negative for decreased urine volume, difficulty urinating, dysuria, enuresis, flank pain, frequency, genital sores, hematuria, penile discharge, penile pain, penile swelling, scrotal swelling, testicular pain and urgency.   Musculoskeletal:  Negative for arthralgias, back pain, joint swelling, myalgias and neck pain.   Skin:  Negative for color change, pallor, rash and wound.   Allergic/Immunologic: Negative for environmental allergies, food allergies and immunocompromised state.   Neurological:  Negative for dizziness, vertigo, seizures, weakness, numbness and headaches.   Hematological:  Negative for adenopathy. Does not bruise/bleed easily.   Psychiatric/Behavioral: Negative.        Objective:     Physical Exam  Vitals and nursing note reviewed.   Constitutional:       General: He is not in acute distress.     Appearance: Normal appearance. He is well-developed. He is obese. He is not ill-appearing, toxic-appearing or diaphoretic.   HENT:      Head: Normocephalic and atraumatic.      Right Ear: External ear normal.      Left Ear: External ear normal.      Nose: Nose normal.      Mouth/Throat:      Pharynx: No oropharyngeal exudate or posterior oropharyngeal erythema.   Eyes:      General: No scleral icterus.        Right eye: No discharge.         Left eye: No discharge.      Extraocular Movements: Extraocular movements intact.      Conjunctiva/sclera: Conjunctivae normal.      Pupils: Pupils are equal, round, and reactive to light.   Cardiovascular:      Rate and Rhythm: Normal rate and regular rhythm.      Heart sounds: Normal heart sounds.   Pulmonary:      Effort: Pulmonary  effort is normal.      Breath sounds: Rales (bilateral) present.   Abdominal:      General: Bowel sounds are normal. There is no distension.      Palpations: Abdomen is soft. There is no mass.      Tenderness: There is no abdominal tenderness. There is no right CVA tenderness, left CVA tenderness, guarding or rebound.      Hernia: No hernia is present.   Genitourinary:     Penis: Normal.       Testes: Normal.      Comments: Patient with no CVA tenderness, normal penis and scrotum.  Bladder nontender.  Musculoskeletal:         General: Normal range of motion.      Cervical back: Normal range of motion and neck supple.      Right lower leg: No edema.      Left lower leg: No edema.   Skin:     General: Skin is warm and dry.      Capillary Refill: Capillary refill takes less than 2 seconds.      Findings: No lesion or rash.   Neurological:      Mental Status: He is alert and oriented to person, place, and time.      Deep Tendon Reflexes: Reflexes are normal and symmetric.   Psychiatric:         Mood and Affect: Mood normal.         Behavior: Behavior normal.         Thought Content: Thought content normal.         Judgment: Judgment normal.        Assessment:      1. Benign prostatic hyperplasia with lower urinary tract symptoms, symptom details unspecified    2. CIDP (chronic inflammatory demyelinating polyneuropathy)    3. Combined arterial insufficiency and corporo-venous occlusive erectile dysfunction    4. Neurogenic bladder    5. Urinary retention      Plan:     Patient Instructions   Continue Flomax, Tadalafil and Finasteride  The patient is to notify me in the next several months about trial of InterStim if he wishes to move forward with this.    Six-month follow-up

## 2024-04-12 NOTE — PATIENT INSTRUCTIONS
Continue Flomax, Tadalafil and Finasteride  The patient is to notify me in the next several months about trial of InterStim if he wishes to move forward with this.    Six-month follow-up

## 2024-04-16 DIAGNOSIS — E66.01 CLASS 3 SEVERE OBESITY DUE TO EXCESS CALORIES WITH SERIOUS COMORBIDITY AND BODY MASS INDEX (BMI) OF 45.0 TO 49.9 IN ADULT: ICD-10-CM

## 2024-04-16 DIAGNOSIS — E11.9 TYPE 2 DIABETES MELLITUS WITHOUT COMPLICATION, WITHOUT LONG-TERM CURRENT USE OF INSULIN: ICD-10-CM

## 2024-04-17 RX ORDER — SEMAGLUTIDE 1.34 MG/ML
INJECTION, SOLUTION SUBCUTANEOUS
Qty: 3 ML | Refills: 5 | Status: SHIPPED | OUTPATIENT
Start: 2024-04-17 | End: 2024-05-21 | Stop reason: SDUPTHER

## 2024-04-25 DIAGNOSIS — I1A.0 RESISTANT HYPERTENSION: ICD-10-CM

## 2024-04-25 DIAGNOSIS — E78.5 HYPERLIPIDEMIA, UNSPECIFIED HYPERLIPIDEMIA TYPE: ICD-10-CM

## 2024-04-26 RX ORDER — ROSUVASTATIN CALCIUM 10 MG/1
10 TABLET, COATED ORAL NIGHTLY
Qty: 90 TABLET | Refills: 0 | Status: SHIPPED | OUTPATIENT
Start: 2024-04-26

## 2024-04-26 RX ORDER — HYDRALAZINE HYDROCHLORIDE 25 MG/1
TABLET, FILM COATED ORAL
Qty: 180 TABLET | Refills: 0 | Status: SHIPPED | OUTPATIENT
Start: 2024-04-26

## 2024-04-26 RX ORDER — METOPROLOL SUCCINATE 100 MG/1
TABLET, EXTENDED RELEASE ORAL
Qty: 90 TABLET | Refills: 0 | Status: SHIPPED | OUTPATIENT
Start: 2024-04-26

## 2024-05-02 ENCOUNTER — PATIENT MESSAGE (OUTPATIENT)
Dept: UROLOGY | Facility: CLINIC | Age: 59
End: 2024-05-02
Payer: COMMERCIAL

## 2024-05-06 ENCOUNTER — PATIENT MESSAGE (OUTPATIENT)
Dept: FAMILY MEDICINE | Facility: CLINIC | Age: 59
End: 2024-05-06
Payer: COMMERCIAL

## 2024-05-06 ENCOUNTER — PATIENT MESSAGE (OUTPATIENT)
Dept: UROLOGY | Facility: CLINIC | Age: 59
End: 2024-05-06
Payer: COMMERCIAL

## 2024-05-07 ENCOUNTER — TELEPHONE (OUTPATIENT)
Dept: UROLOGY | Facility: CLINIC | Age: 59
End: 2024-05-07
Payer: COMMERCIAL

## 2024-05-07 NOTE — TELEPHONE ENCOUNTER
----- Message from Meme Barlow sent at 5/7/2024  8:13 AM CDT -----  Regarding: jose  Type: Medical Supplies     Who Called: [facility]   Would the patient rather a call back or a response via [facility]? Call  Best Call Back Number: [phone] / [phone]  Additional Information: calling in regards to the old company pt was getting medical supplies was out of network with pt's insurance so they sent it over to them and need  to sign for them

## 2024-05-07 NOTE — TELEPHONE ENCOUNTER
Spoke to representative and notified them of correct fax number and they will send over paperwork to sign

## 2024-05-10 PROBLEM — D50.9 IRON DEFICIENCY ANEMIA: Status: ACTIVE | Noted: 2024-05-10

## 2024-05-10 PROBLEM — E55.9 VITAMIN D INSUFFICIENCY: Status: ACTIVE | Noted: 2024-05-10

## 2024-05-10 PROBLEM — N39.0 UTI (URINARY TRACT INFECTION), UNCOMPLICATED: Status: ACTIVE | Noted: 2024-05-10

## 2024-05-10 PROBLEM — N18.1 CKD (CHRONIC KIDNEY DISEASE) STAGE 1, GFR 90 ML/MIN OR GREATER: Status: ACTIVE | Noted: 2024-05-10

## 2024-05-10 PROBLEM — R31.29 MICROHEMATURIA: Status: ACTIVE | Noted: 2024-05-10

## 2024-05-10 PROBLEM — D63.1 ANEMIA IN STAGE 1 CHRONIC KIDNEY DISEASE: Status: ACTIVE | Noted: 2021-08-18

## 2024-05-10 PROBLEM — E79.0 HYPERURICEMIA: Status: ACTIVE | Noted: 2024-05-10

## 2024-05-10 PROBLEM — E87.6 HYPOKALEMIA: Status: ACTIVE | Noted: 2024-05-10

## 2024-05-10 PROBLEM — N18.1 ANEMIA IN STAGE 1 CHRONIC KIDNEY DISEASE: Status: ACTIVE | Noted: 2021-08-18

## 2024-05-20 DIAGNOSIS — I10 ESSENTIAL HYPERTENSION: ICD-10-CM

## 2024-05-20 RX ORDER — TADALAFIL 20 MG/1
20 TABLET ORAL
Qty: 30 TABLET | Refills: 0 | Status: SHIPPED | OUTPATIENT
Start: 2024-05-20

## 2024-05-21 ENCOUNTER — PATIENT MESSAGE (OUTPATIENT)
Dept: FAMILY MEDICINE | Facility: CLINIC | Age: 59
End: 2024-05-21
Payer: COMMERCIAL

## 2024-05-21 DIAGNOSIS — E11.9 TYPE 2 DIABETES MELLITUS WITHOUT COMPLICATION, WITHOUT LONG-TERM CURRENT USE OF INSULIN: ICD-10-CM

## 2024-05-21 DIAGNOSIS — E66.01 CLASS 3 SEVERE OBESITY DUE TO EXCESS CALORIES WITH SERIOUS COMORBIDITY AND BODY MASS INDEX (BMI) OF 45.0 TO 49.9 IN ADULT: ICD-10-CM

## 2024-05-21 RX ORDER — VALSARTAN AND HYDROCHLOROTHIAZIDE 320; 12.5 MG/1; MG/1
1 TABLET, FILM COATED ORAL DAILY
Qty: 90 TABLET | Refills: 3 | Status: SHIPPED | OUTPATIENT
Start: 2024-05-21 | End: 2025-05-21

## 2024-05-21 RX ORDER — SEMAGLUTIDE 1.34 MG/ML
INJECTION, SOLUTION SUBCUTANEOUS
Qty: 3 ML | Refills: 5 | Status: SHIPPED | OUTPATIENT
Start: 2024-05-21

## 2024-05-21 RX ORDER — SEMAGLUTIDE 1.34 MG/ML
INJECTION, SOLUTION SUBCUTANEOUS
Qty: 3 ML | Refills: 5 | Status: SHIPPED | OUTPATIENT
Start: 2024-05-21 | End: 2024-05-21 | Stop reason: SDUPTHER

## 2024-05-21 NOTE — TELEPHONE ENCOUNTER
I got approval from the insurance medication is approve- can you re send RX back to [facility]- I will the pharmacy on tomorrow to cancel RX

## 2024-06-02 ENCOUNTER — PATIENT MESSAGE (OUTPATIENT)
Dept: UROLOGY | Facility: CLINIC | Age: 59
End: 2024-06-02
Payer: COMMERCIAL

## 2024-06-04 NOTE — TELEPHONE ENCOUNTER
Left  for patient notifying of appt tomorrow with Dr. Sanderson. Asked him to call us if appt date/time not convenient.

## 2024-06-05 ENCOUNTER — OFFICE VISIT (OUTPATIENT)
Dept: UROLOGY | Facility: CLINIC | Age: 59
End: 2024-06-05
Payer: COMMERCIAL

## 2024-06-05 VITALS — WEIGHT: 315 LBS | BODY MASS INDEX: 37.19 KG/M2 | HEIGHT: 77 IN

## 2024-06-05 DIAGNOSIS — G61.81 CIDP (CHRONIC INFLAMMATORY DEMYELINATING POLYNEUROPATHY): ICD-10-CM

## 2024-06-05 DIAGNOSIS — R35.1 NOCTURIA: Primary | ICD-10-CM

## 2024-06-05 DIAGNOSIS — N49.1 VASITIS: ICD-10-CM

## 2024-06-05 DIAGNOSIS — N31.9 NEUROGENIC BLADDER: ICD-10-CM

## 2024-06-05 DIAGNOSIS — N40.1 BENIGN PROSTATIC HYPERPLASIA WITH LOWER URINARY TRACT SYMPTOMS, SYMPTOM DETAILS UNSPECIFIED: ICD-10-CM

## 2024-06-05 PROCEDURE — 3066F NEPHROPATHY DOC TX: CPT | Mod: CPTII,S$GLB,, | Performed by: UROLOGY

## 2024-06-05 PROCEDURE — 3044F HG A1C LEVEL LT 7.0%: CPT | Mod: CPTII,S$GLB,, | Performed by: UROLOGY

## 2024-06-05 PROCEDURE — 1160F RVW MEDS BY RX/DR IN RCRD: CPT | Mod: CPTII,S$GLB,, | Performed by: UROLOGY

## 2024-06-05 PROCEDURE — 1159F MED LIST DOCD IN RCRD: CPT | Mod: CPTII,S$GLB,, | Performed by: UROLOGY

## 2024-06-05 PROCEDURE — 3060F POS MICROALBUMINURIA REV: CPT | Mod: CPTII,S$GLB,, | Performed by: UROLOGY

## 2024-06-05 PROCEDURE — 99999 PR PBB SHADOW E&M-EST. PATIENT-LVL IV: CPT | Mod: PBBFAC,,, | Performed by: UROLOGY

## 2024-06-05 PROCEDURE — 3008F BODY MASS INDEX DOCD: CPT | Mod: CPTII,S$GLB,, | Performed by: UROLOGY

## 2024-06-05 PROCEDURE — 99214 OFFICE O/P EST MOD 30 MIN: CPT | Mod: S$GLB,,, | Performed by: UROLOGY

## 2024-06-05 RX ORDER — MUPIROCIN 20 MG/G
OINTMENT TOPICAL 3 TIMES DAILY
Qty: 30 G | Refills: 1 | Status: SHIPPED | OUTPATIENT
Start: 2024-06-05 | End: 2024-06-19

## 2024-06-05 RX ORDER — SULFAMETHOXAZOLE AND TRIMETHOPRIM 800; 160 MG/1; MG/1
1 TABLET ORAL DAILY
Qty: 14 TABLET | Refills: 0 | Status: SHIPPED | OUTPATIENT
Start: 2024-06-05 | End: 2024-06-19

## 2024-06-05 NOTE — PROGRESS NOTES
Subjective:      Patient ID: Manfred Shah is a 58 y.o. male.    Chief Complaint: Benign prostatic hyperplasia with lower urinary tract sympto    Mr. 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  Patient is doing intermittent catheterization and this is working well.  The patient does have sensation when he needs to urinate but is still unable to empty his bladder.  Patient is willing to undergo bladder stimulation trial with InterStim after he retired from work.  We will plan this in the next year or so when the patient is ready.  Patient with erectile dysfunction responding to into intracorporeal penile injection therapy.  Patient is catheterizing 7 times daily.  Patient is still unable to void.  Patient is retiring August 15th.  He will notify me if he wants to move forward with testing of InterStim.  Since the patient's last visit he has developed some test to his right spermatic cord which reproducible appears to be a possible vasitis.  The patient also has some edema under his foreskin on the left side of the penile shaft above the glans.  This appears to be lymphatic or edema fluid which does dissipate with squeeze.  No evidence of abscess or cystic lesion.  Patient is to be placed on antibiotics to cover vasitis and also the pain has a wound on his right lower leg and he will receive some band cream for as well.  The patient is to follow up with me on August 16th if not soon and at that point will schedule InterStim stage I and if the InterStim works after stage tube will plan IPP plate.    Follow-up  This is a new (the patient has right-sided scrotal discomfort apparent based on exam has some edema flipped above gland on left distal shaft of penis under foreskin.) problem. The current episode started in the past 7 days. The problem occurs constantly. The problem has been unchanged. Pertinent negatives include no abdominal pain, anorexia,  arthralgias, change in bowel habit, chest pain, chills, congestion, coughing, diaphoresis, fatigue, fever, headaches, joint swelling, myalgias, nausea, neck pain, numbness, rash, sore throat, swollen glands, urinary symptoms, vertigo, visual change, vomiting or weakness. Exacerbated by: Tactile. He has tried nothing for the symptoms.     Review of Systems   Constitutional:  Negative for activity change, appetite change, chills, diaphoresis, fatigue, fever and unexpected weight change.   HENT:  Negative for congestion, hearing loss, sinus pressure, sore throat and trouble swallowing.    Eyes:  Negative for photophobia, pain, discharge and visual disturbance.   Respiratory:  Negative for apnea, cough and shortness of breath.    Cardiovascular:  Negative for chest pain, palpitations and leg swelling.   Gastrointestinal:  Negative for abdominal distention, abdominal pain, anal bleeding, anorexia, blood in stool, change in bowel habit, constipation, diarrhea, nausea, rectal pain and vomiting.   Endocrine: Negative for cold intolerance, heat intolerance, polydipsia, polyphagia and polyuria.   Genitourinary:  Positive for penile swelling (small amount of edema left side of distal shaft of penis under foreskin above glans) and scrotal swelling (tenderness above testicle on spermatic cord within right side of scrotum). Negative for decreased urine volume, difficulty urinating, dysuria, enuresis, flank pain, frequency, genital sores, hematuria, penile discharge, penile pain, testicular pain and urgency.   Musculoskeletal:  Negative for arthralgias, back pain, joint swelling, myalgias and neck pain.   Skin:  Negative for color change, pallor, rash and wound.   Allergic/Immunologic: Negative for environmental allergies, food allergies and immunocompromised state.   Neurological:  Negative for dizziness, vertigo, seizures, weakness, numbness and headaches.   Hematological:  Negative for adenopathy. Does not bruise/bleed easily.    Psychiatric/Behavioral: Negative.        Objective:     Physical Exam  Vitals and nursing note reviewed.   Constitutional:       General: He is not in acute distress.     Appearance: Normal appearance. He is well-developed. He is obese. He is not ill-appearing, toxic-appearing or diaphoretic.   HENT:      Head: Normocephalic and atraumatic.      Right Ear: External ear normal.      Left Ear: External ear normal.      Nose: Nose normal.      Mouth/Throat:      Pharynx: No oropharyngeal exudate or posterior oropharyngeal erythema.   Eyes:      General: No scleral icterus.        Right eye: No discharge.         Left eye: No discharge.      Extraocular Movements: Extraocular movements intact.      Conjunctiva/sclera: Conjunctivae normal.      Pupils: Pupils are equal, round, and reactive to light.   Cardiovascular:      Rate and Rhythm: Normal rate and regular rhythm.      Heart sounds: Normal heart sounds.   Pulmonary:      Effort: Pulmonary effort is normal.   Abdominal:      General: Bowel sounds are normal. There is no distension.      Palpations: Abdomen is soft. There is no mass.      Tenderness: There is no abdominal tenderness. There is no right CVA tenderness, left CVA tenderness, guarding or rebound.      Hernia: No hernia is present.   Genitourinary:     Penis: Normal.       Testes: Normal.          Comments:  Right-sided vasitis with tenderness to vas deferens in scrotum as well as mild penile edema above coronal glans on side under force and distally.  Musculoskeletal:         General: Normal range of motion.      Cervical back: Normal range of motion and neck supple.   Skin:     General: Skin is warm and dry.      Capillary Refill: Capillary refill takes 2 to 3 seconds.   Neurological:      Mental Status: He is alert and oriented to person, place, and time.      Deep Tendon Reflexes: Reflexes are normal and symmetric.   Psychiatric:         Mood and Affect: Mood normal.         Behavior: Behavior  normal.         Thought Content: Thought content normal.         Judgment: Judgment normal.      Assessment:      1. Nocturia    2. Neurogenic bladder    3. Benign prostatic hyperplasia with lower urinary tract symptoms, symptom details unspecified    4. CIDP (chronic inflammatory demyelinating polyneuropathy)      Plan:     Patient Instructions   Wishes to move forward with InterStim investigation and trial as well as to move forward with inflatable penile prosthesis pump.      Would like to do InterStim and seeing it the patient voiding with decreased catheterizations or being able to stop catheterizing prior to placement of the penile pump.       Follow up August 16th to arrange InterStim trial will as IPP scheduling.

## 2024-06-05 NOTE — PATIENT INSTRUCTIONS
Wishes to move forward with InterStim investigation and trial as well as to move forward with inflatable penile prosthesis pump.      Would like to do InterStim and seeing it the patient voiding with decreased catheterizations or being able to stop catheterizing prior to placement of the penile pump.       Follow up August 16th to arrange InterStim trial will as IPP scheduling.

## 2024-06-21 RX ORDER — TADALAFIL 20 MG/1
20 TABLET ORAL
Qty: 30 TABLET | Refills: 0 | Status: SHIPPED | OUTPATIENT
Start: 2024-06-21

## 2024-06-24 DIAGNOSIS — I1A.0 RESISTANT HYPERTENSION: ICD-10-CM

## 2024-06-24 RX ORDER — AMLODIPINE BESYLATE 10 MG/1
TABLET ORAL
Qty: 90 TABLET | Refills: 3 | Status: SHIPPED | OUTPATIENT
Start: 2024-06-24

## 2024-07-01 ENCOUNTER — PATIENT MESSAGE (OUTPATIENT)
Dept: UROLOGY | Facility: CLINIC | Age: 59
End: 2024-07-01
Payer: COMMERCIAL

## 2024-07-17 RX ORDER — TADALAFIL 20 MG/1
20 TABLET ORAL
Qty: 30 TABLET | Refills: 0 | Status: SHIPPED | OUTPATIENT
Start: 2024-07-17

## 2024-07-26 DIAGNOSIS — I1A.0 RESISTANT HYPERTENSION: ICD-10-CM

## 2024-07-29 RX ORDER — HYDRALAZINE HYDROCHLORIDE 25 MG/1
TABLET, FILM COATED ORAL
Qty: 180 TABLET | Refills: 0 | Status: SHIPPED | OUTPATIENT
Start: 2024-07-29

## 2024-07-29 RX ORDER — METOPROLOL SUCCINATE 100 MG/1
TABLET, EXTENDED RELEASE ORAL
Qty: 90 TABLET | Refills: 0 | Status: SHIPPED | OUTPATIENT
Start: 2024-07-29

## 2024-08-12 PROBLEM — N39.0 UTI (URINARY TRACT INFECTION), UNCOMPLICATED: Status: RESOLVED | Noted: 2024-05-10 | Resolved: 2024-08-12

## 2024-08-14 ENCOUNTER — PATIENT MESSAGE (OUTPATIENT)
Dept: UROLOGY | Facility: CLINIC | Age: 59
End: 2024-08-14
Payer: COMMERCIAL

## 2024-08-22 DIAGNOSIS — N52.03 COMBINED ARTERIAL INSUFFICIENCY AND CORPORO-VENOUS OCCLUSIVE ERECTILE DYSFUNCTION: Primary | ICD-10-CM

## 2024-08-23 LAB
LEFT EYE DM RETINOPATHY: NEGATIVE
RIGHT EYE DM RETINOPATHY: NEGATIVE

## 2024-08-23 RX ORDER — TADALAFIL 20 MG/1
20 TABLET ORAL DAILY PRN
Qty: 30 TABLET | Refills: 0 | Status: SHIPPED | OUTPATIENT
Start: 2024-08-23 | End: 2024-09-22

## 2024-10-07 DIAGNOSIS — N52.03 COMBINED ARTERIAL INSUFFICIENCY AND CORPORO-VENOUS OCCLUSIVE ERECTILE DYSFUNCTION: ICD-10-CM

## 2024-10-07 DIAGNOSIS — I1A.0 RESISTANT HYPERTENSION: ICD-10-CM

## 2024-10-08 RX ORDER — HYDRALAZINE HYDROCHLORIDE 25 MG/1
TABLET, FILM COATED ORAL
Qty: 180 TABLET | Refills: 3 | Status: SHIPPED | OUTPATIENT
Start: 2024-10-08

## 2024-10-08 RX ORDER — SULFAMETHOXAZOLE AND TRIMETHOPRIM 800; 160 MG/1; MG/1
1 TABLET ORAL DAILY
Qty: 14 TABLET | Refills: 0 | Status: SHIPPED | OUTPATIENT
Start: 2024-10-08 | End: 2024-10-22

## 2024-10-08 RX ORDER — TADALAFIL 20 MG/1
20 TABLET ORAL DAILY PRN
Qty: 30 TABLET | Refills: 0 | Status: SHIPPED | OUTPATIENT
Start: 2024-10-08 | End: 2024-11-07

## 2024-10-09 ENCOUNTER — PATIENT MESSAGE (OUTPATIENT)
Dept: ADMINISTRATIVE | Facility: HOSPITAL | Age: 59
End: 2024-10-09
Payer: COMMERCIAL

## 2024-10-23 DIAGNOSIS — E66.813 CLASS 3 SEVERE OBESITY DUE TO EXCESS CALORIES WITH SERIOUS COMORBIDITY AND BODY MASS INDEX (BMI) OF 45.0 TO 49.9 IN ADULT: ICD-10-CM

## 2024-10-23 DIAGNOSIS — E66.01 CLASS 3 SEVERE OBESITY DUE TO EXCESS CALORIES WITH SERIOUS COMORBIDITY AND BODY MASS INDEX (BMI) OF 45.0 TO 49.9 IN ADULT: ICD-10-CM

## 2024-10-23 DIAGNOSIS — E11.9 TYPE 2 DIABETES MELLITUS WITHOUT COMPLICATION, WITHOUT LONG-TERM CURRENT USE OF INSULIN: ICD-10-CM

## 2024-10-23 RX ORDER — SEMAGLUTIDE 1.34 MG/ML
INJECTION, SOLUTION SUBCUTANEOUS
Qty: 3 ML | Refills: 0 | Status: SHIPPED | OUTPATIENT
Start: 2024-10-23

## 2024-10-24 DIAGNOSIS — I1A.0 RESISTANT HYPERTENSION: ICD-10-CM

## 2024-10-24 RX ORDER — METOPROLOL SUCCINATE 100 MG/1
TABLET, EXTENDED RELEASE ORAL
Qty: 90 TABLET | Refills: 0 | Status: SHIPPED | OUTPATIENT
Start: 2024-10-24

## 2024-10-27 DIAGNOSIS — I1A.0 RESISTANT HYPERTENSION: ICD-10-CM

## 2024-10-28 RX ORDER — METOPROLOL SUCCINATE 100 MG/1
TABLET, EXTENDED RELEASE ORAL
Qty: 90 TABLET | Refills: 0 | OUTPATIENT
Start: 2024-10-28

## 2024-10-31 ENCOUNTER — PATIENT MESSAGE (OUTPATIENT)
Dept: UROLOGY | Facility: CLINIC | Age: 59
End: 2024-10-31
Payer: COMMERCIAL

## 2024-11-05 ENCOUNTER — OFFICE VISIT (OUTPATIENT)
Dept: UROLOGY | Facility: CLINIC | Age: 59
End: 2024-11-05
Payer: COMMERCIAL

## 2024-11-05 ENCOUNTER — PATIENT MESSAGE (OUTPATIENT)
Dept: UROLOGY | Facility: CLINIC | Age: 59
End: 2024-11-05

## 2024-11-05 ENCOUNTER — PATIENT MESSAGE (OUTPATIENT)
Dept: GASTROENTEROLOGY | Facility: CLINIC | Age: 59
End: 2024-11-05
Payer: COMMERCIAL

## 2024-11-05 VITALS — HEIGHT: 77 IN | WEIGHT: 315 LBS | BODY MASS INDEX: 37.19 KG/M2

## 2024-11-05 DIAGNOSIS — N40.1 BENIGN PROSTATIC HYPERPLASIA WITH LOWER URINARY TRACT SYMPTOMS, SYMPTOM DETAILS UNSPECIFIED: Primary | ICD-10-CM

## 2024-11-05 DIAGNOSIS — N52.8 OTHER MALE ERECTILE DYSFUNCTION: ICD-10-CM

## 2024-11-05 DIAGNOSIS — G61.81 CIDP (CHRONIC INFLAMMATORY DEMYELINATING POLYNEUROPATHY): ICD-10-CM

## 2024-11-05 DIAGNOSIS — Z78.9 SELF-CATHETERIZES URINARY BLADDER: ICD-10-CM

## 2024-11-05 DIAGNOSIS — N31.9 NEUROGENIC BLADDER: ICD-10-CM

## 2024-11-05 DIAGNOSIS — N52.03 COMBINED ARTERIAL INSUFFICIENCY AND CORPORO-VENOUS OCCLUSIVE ERECTILE DYSFUNCTION: ICD-10-CM

## 2024-11-05 PROCEDURE — 1159F MED LIST DOCD IN RCRD: CPT | Mod: CPTII,S$GLB,, | Performed by: UROLOGY

## 2024-11-05 PROCEDURE — 3044F HG A1C LEVEL LT 7.0%: CPT | Mod: CPTII,S$GLB,, | Performed by: UROLOGY

## 2024-11-05 PROCEDURE — 3066F NEPHROPATHY DOC TX: CPT | Mod: CPTII,S$GLB,, | Performed by: UROLOGY

## 2024-11-05 PROCEDURE — 99999 PR PBB SHADOW E&M-EST. PATIENT-LVL V: CPT | Mod: PBBFAC,,, | Performed by: UROLOGY

## 2024-11-05 PROCEDURE — 3060F POS MICROALBUMINURIA REV: CPT | Mod: CPTII,S$GLB,, | Performed by: UROLOGY

## 2024-11-05 PROCEDURE — 3008F BODY MASS INDEX DOCD: CPT | Mod: CPTII,S$GLB,, | Performed by: UROLOGY

## 2024-11-05 PROCEDURE — 99215 OFFICE O/P EST HI 40 MIN: CPT | Mod: S$GLB,,, | Performed by: UROLOGY

## 2024-11-05 PROCEDURE — 1160F RVW MEDS BY RX/DR IN RCRD: CPT | Mod: CPTII,S$GLB,, | Performed by: UROLOGY

## 2024-11-05 RX ORDER — SODIUM CHLORIDE 9 MG/ML
INJECTION, SOLUTION INTRAVENOUS CONTINUOUS
OUTPATIENT
Start: 2024-11-05

## 2024-11-05 RX ORDER — CIPROFLOXACIN 2 MG/ML
400 INJECTION, SOLUTION INTRAVENOUS
OUTPATIENT
Start: 2024-11-05

## 2024-11-05 RX ORDER — MUPIROCIN 20 MG/G
OINTMENT TOPICAL 3 TIMES DAILY
COMMUNITY
Start: 2024-10-07

## 2024-11-05 RX ORDER — CEFAZOLIN SODIUM 2 G/50ML
2 SOLUTION INTRAVENOUS
OUTPATIENT
Start: 2024-11-05

## 2024-11-05 NOTE — PATIENT INSTRUCTIONS
Patient is scheduled for sacral nerve stimulator permanent lead placement on December 3rd at [facility] at 12:30 p.m. p.m..    He is then scheduled for follow-up visit in the office on December 10th to verify whether electrode was working or not.    Patient is scheduled for permanent generator placement versus lead removal on December 12th at 7:30 a.m.    And patient is scheduled for placement of inflatable three-piece penile prosthesis on Tuesday December 17th at 7:30 a.m. at [facility].      Patient to obtain labs as scheduled prior to procedure.  He will withhold aspirin starting 10 days prior to 1st procedure.

## 2024-11-05 NOTE — PROGRESS NOTES
Subjective:      Patient ID: Manfred Shah is a 59 y.o. male.    Chief Complaint: Nocturia  Benign prostatic hyperplasia with lower     Mr. 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  He developed CIDP after recovering from Guillain-[place] disease.  Patient has had permanent urinary retention and inability to void requiring intermittent catheterization.  Patient wishes to try InterStim in order to see if we can stop having to self-catheterize.  Patient also has had severe erectile dysfunction nonresponsive to medication or intracorporeal penile injection.  Patient wishes to undergo penile prosthesis.  We are trying to provide these procedures for the patient in December of 2024.    Follow-up  This is a chronic (Neurogenic bladder and erectile dysfunction) problem. The current episode started more than 1 year ago. The problem occurs constantly. The problem has been unchanged. Associated symptoms include urinary symptoms (Unable to urinate) and weakness (floor extremity dropped foot). Pertinent negatives include no abdominal pain, anorexia, arthralgias, change in bowel habit, chest pain, chills, congestion, coughing, diaphoresis, fatigue, fever, headaches, joint swelling, myalgias, nausea, neck pain, numbness, rash, sore throat, swollen glands, vertigo, visual change or vomiting. Nothing aggravates the symptoms. He has tried nothing for the symptoms. The treatment provided significant relief.     Review of Systems   Constitutional:  Negative for activity change, appetite change, chills, diaphoresis, fatigue, fever and unexpected weight change.   HENT:  Negative for congestion, hearing loss, sinus pressure, sore throat and trouble swallowing.    Eyes:  Negative for photophobia, pain, discharge and visual disturbance.   Respiratory:  Negative for apnea, cough and shortness of breath.    Cardiovascular:  Negative for chest pain, palpitations and leg swelling.   Gastrointestinal:   Negative for abdominal distention, abdominal pain, anal bleeding, anorexia, blood in stool, change in bowel habit, constipation, diarrhea, nausea, rectal pain and vomiting.   Endocrine: Negative for cold intolerance, heat intolerance, polydipsia, polyphagia and polyuria.   Genitourinary:  Negative for decreased urine volume, difficulty urinating, dysuria, enuresis, flank pain, frequency, genital sores, hematuria, penile discharge, penile pain, penile swelling, scrotal swelling, testicular pain and urgency.   Musculoskeletal:  Negative for arthralgias, back pain, joint swelling, myalgias and neck pain.   Skin:  Negative for color change, pallor, rash and wound.   Allergic/Immunologic: Negative for environmental allergies, food allergies and immunocompromised state.   Neurological:  Positive for weakness (floor extremity dropped foot). Negative for dizziness, vertigo, seizures, numbness and headaches.   Hematological:  Negative for adenopathy. Does not bruise/bleed easily.   Psychiatric/Behavioral: Negative.        Objective:     Physical Exam  Vitals and nursing note reviewed.   Constitutional:       Appearance: Normal appearance. He is well-developed and normal weight.   HENT:      Head: Normocephalic and atraumatic.      Right Ear: External ear normal. There is no impacted cerumen.      Left Ear: External ear normal. There is no impacted cerumen.      Nose: Nose normal. No congestion or rhinorrhea.      Mouth/Throat:      Mouth: Mucous membranes are moist.      Pharynx: Oropharynx is clear. No oropharyngeal exudate or posterior oropharyngeal erythema.   Eyes:      General: No scleral icterus.        Right eye: No discharge.         Left eye: No discharge.      Extraocular Movements: Extraocular movements intact.      Conjunctiva/sclera: Conjunctivae normal.      Pupils: Pupils are equal, round, and reactive to light.   Cardiovascular:      Rate and Rhythm: Normal rate and regular rhythm.      Pulses: Normal  pulses.      Heart sounds: Normal heart sounds.   Pulmonary:      Effort: Pulmonary effort is normal.   Abdominal:      General: Bowel sounds are normal. There is no distension.      Palpations: Abdomen is soft. There is no mass.      Tenderness: There is no abdominal tenderness. There is no right CVA tenderness, left CVA tenderness, guarding or rebound.      Hernia: No hernia is present.   Genitourinary:     Penis: Normal.       Testes: Normal.      Comments: Patient with no CVA tenderness, normal penis and scrotum.  Bladder nontender.  Musculoskeletal:         General: Normal range of motion.      Cervical back: Normal range of motion and neck supple.      Right lower leg: No edema.      Left lower leg: No edema.   Skin:     General: Skin is warm and dry.      Capillary Refill: Capillary refill takes less than 2 seconds.   Neurological:      Mental Status: He is alert and oriented to person, place, and time.      Deep Tendon Reflexes: Reflexes are normal and symmetric.   Psychiatric:         Mood and Affect: Mood normal.         Behavior: Behavior normal.         Thought Content: Thought content normal.         Judgment: Judgment normal.        Assessment:      1. Benign prostatic hyperplasia with lower urinary tract symptoms, symptom details unspecified    2. CIDP (chronic inflammatory demyelinating polyneuropathy)    3. Neurogenic bladder    4. Other male erectile dysfunction    5. Self-catheterizes urinary bladder    6. Combined arterial insufficiency and corporo-venous occlusive erectile dysfunction      Plan:     Patient Instructions   Patient is scheduled for sacral nerve stimulator permanent lead placement on December 3rd at [facility] at 12:30 p.m. p.m..    He is then scheduled for follow-up visit in the office on December 10th to verify whether electrode was working or not.    Patient is scheduled for permanent generator placement versus lead removal on December 12th at 7:30 a.m.    And  patient is scheduled for placement of inflatable three-piece penile prosthesis on Tuesday December 17th at 7:30 a.m. at [facility].      Patient to obtain labs as scheduled prior to procedure.  He will withhold aspirin starting 10 days prior to 1st procedure.

## 2024-11-07 ENCOUNTER — PATIENT MESSAGE (OUTPATIENT)
Dept: UROLOGY | Facility: CLINIC | Age: 59
End: 2024-11-07
Payer: COMMERCIAL

## 2024-11-11 DIAGNOSIS — N52.03 COMBINED ARTERIAL INSUFFICIENCY AND CORPORO-VENOUS OCCLUSIVE ERECTILE DYSFUNCTION: ICD-10-CM

## 2024-11-13 RX ORDER — TADALAFIL 20 MG/1
20 TABLET ORAL DAILY PRN
Qty: 30 TABLET | Refills: 0 | Status: SHIPPED | OUTPATIENT
Start: 2024-11-13

## 2024-11-15 ENCOUNTER — PATIENT MESSAGE (OUTPATIENT)
Dept: UROLOGY | Facility: CLINIC | Age: 59
End: 2024-11-15
Payer: COMMERCIAL

## 2024-11-15 DIAGNOSIS — N13.30 HYDRONEPHROSIS OF RIGHT KIDNEY: Primary | ICD-10-CM

## 2024-11-15 DIAGNOSIS — N28.1 BILATERAL RENAL CYSTS: ICD-10-CM

## 2024-11-15 DIAGNOSIS — N20.0 RENAL CALCULUS, LEFT: ICD-10-CM

## 2024-11-18 ENCOUNTER — TELEPHONE (OUTPATIENT)
Dept: UROLOGY | Facility: CLINIC | Age: 59
End: 2024-11-18
Payer: COMMERCIAL

## 2024-11-18 ENCOUNTER — OFFICE VISIT (OUTPATIENT)
Dept: FAMILY MEDICINE | Facility: CLINIC | Age: 59
End: 2024-11-18
Payer: COMMERCIAL

## 2024-11-18 ENCOUNTER — PATIENT MESSAGE (OUTPATIENT)
Dept: UROLOGY | Facility: CLINIC | Age: 59
End: 2024-11-18
Payer: COMMERCIAL

## 2024-11-18 VITALS
OXYGEN SATURATION: 98 % | SYSTOLIC BLOOD PRESSURE: 136 MMHG | HEIGHT: 77 IN | HEART RATE: 73 BPM | WEIGHT: 315 LBS | BODY MASS INDEX: 37.19 KG/M2 | DIASTOLIC BLOOD PRESSURE: 86 MMHG | TEMPERATURE: 98 F

## 2024-11-18 DIAGNOSIS — N20.0 RENAL STONES: ICD-10-CM

## 2024-11-18 DIAGNOSIS — E78.5 HYPERLIPIDEMIA ASSOCIATED WITH TYPE 2 DIABETES MELLITUS: ICD-10-CM

## 2024-11-18 DIAGNOSIS — Z00.00 ENCOUNTER FOR BLOOD TEST FOR ROUTINE GENERAL PHYSICAL EXAMINATION: ICD-10-CM

## 2024-11-18 DIAGNOSIS — N31.9 NEUROGENIC BLADDER: ICD-10-CM

## 2024-11-18 DIAGNOSIS — Z98.1 S/P LUMBAR SPINAL FUSION: ICD-10-CM

## 2024-11-18 DIAGNOSIS — I15.2 HYPERTENSION ASSOCIATED WITH TYPE 2 DIABETES MELLITUS: ICD-10-CM

## 2024-11-18 DIAGNOSIS — R80.9 MICROALBUMINURIA: ICD-10-CM

## 2024-11-18 DIAGNOSIS — E66.813 CLASS 3 SEVERE OBESITY DUE TO EXCESS CALORIES WITH SERIOUS COMORBIDITY AND BODY MASS INDEX (BMI) OF 40.0 TO 44.9 IN ADULT: ICD-10-CM

## 2024-11-18 DIAGNOSIS — N40.1 BENIGN PROSTATIC HYPERPLASIA WITH LOWER URINARY TRACT SYMPTOMS, SYMPTOM DETAILS UNSPECIFIED: ICD-10-CM

## 2024-11-18 DIAGNOSIS — R80.9 TYPE 2 DIABETES MELLITUS WITH MICROALBUMINURIA, WITHOUT LONG-TERM CURRENT USE OF INSULIN: Primary | ICD-10-CM

## 2024-11-18 DIAGNOSIS — N28.1 BILATERAL RENAL CYSTS: ICD-10-CM

## 2024-11-18 DIAGNOSIS — N30.01 ACUTE CYSTITIS WITH HEMATURIA: Primary | ICD-10-CM

## 2024-11-18 DIAGNOSIS — Z13.29 THYROID DISORDER SCREEN: ICD-10-CM

## 2024-11-18 DIAGNOSIS — G47.30 SLEEP APNEA WITH USE OF CONTINUOUS POSITIVE AIRWAY PRESSURE (CPAP): ICD-10-CM

## 2024-11-18 DIAGNOSIS — M21.372 FOOT DROP, LEFT FOOT: ICD-10-CM

## 2024-11-18 DIAGNOSIS — E11.29 TYPE 2 DIABETES MELLITUS WITH MICROALBUMINURIA, WITHOUT LONG-TERM CURRENT USE OF INSULIN: Primary | ICD-10-CM

## 2024-11-18 DIAGNOSIS — E78.5 HYPERLIPIDEMIA, UNSPECIFIED HYPERLIPIDEMIA TYPE: ICD-10-CM

## 2024-11-18 DIAGNOSIS — E66.01 CLASS 3 SEVERE OBESITY DUE TO EXCESS CALORIES WITH SERIOUS COMORBIDITY AND BODY MASS INDEX (BMI) OF 40.0 TO 44.9 IN ADULT: ICD-10-CM

## 2024-11-18 DIAGNOSIS — D63.1 ANEMIA IN STAGE 1 CHRONIC KIDNEY DISEASE: ICD-10-CM

## 2024-11-18 DIAGNOSIS — E11.69 HYPERLIPIDEMIA ASSOCIATED WITH TYPE 2 DIABETES MELLITUS: ICD-10-CM

## 2024-11-18 DIAGNOSIS — N18.1 ANEMIA IN STAGE 1 CHRONIC KIDNEY DISEASE: ICD-10-CM

## 2024-11-18 DIAGNOSIS — G61.81 CIDP (CHRONIC INFLAMMATORY DEMYELINATING POLYNEUROPATHY): ICD-10-CM

## 2024-11-18 DIAGNOSIS — Z78.9 SELF-CATHETERIZES URINARY BLADDER: ICD-10-CM

## 2024-11-18 DIAGNOSIS — E11.59 HYPERTENSION ASSOCIATED WITH TYPE 2 DIABETES MELLITUS: ICD-10-CM

## 2024-11-18 PROBLEM — R74.8 ELEVATED LIVER ENZYMES: Status: RESOLVED | Noted: 2018-10-08 | Resolved: 2024-11-18

## 2024-11-18 PROCEDURE — 3008F BODY MASS INDEX DOCD: CPT | Mod: CPTII,S$GLB,, | Performed by: NURSE PRACTITIONER

## 2024-11-18 PROCEDURE — 3079F DIAST BP 80-89 MM HG: CPT | Mod: CPTII,S$GLB,, | Performed by: NURSE PRACTITIONER

## 2024-11-18 PROCEDURE — 99215 OFFICE O/P EST HI 40 MIN: CPT | Mod: S$GLB,,, | Performed by: NURSE PRACTITIONER

## 2024-11-18 PROCEDURE — 3075F SYST BP GE 130 - 139MM HG: CPT | Mod: CPTII,S$GLB,, | Performed by: NURSE PRACTITIONER

## 2024-11-18 PROCEDURE — 3066F NEPHROPATHY DOC TX: CPT | Mod: CPTII,S$GLB,, | Performed by: NURSE PRACTITIONER

## 2024-11-18 PROCEDURE — 3044F HG A1C LEVEL LT 7.0%: CPT | Mod: CPTII,S$GLB,, | Performed by: NURSE PRACTITIONER

## 2024-11-18 PROCEDURE — 99999 PR PBB SHADOW E&M-EST. PATIENT-LVL V: CPT | Mod: PBBFAC,,, | Performed by: NURSE PRACTITIONER

## 2024-11-18 PROCEDURE — 3060F POS MICROALBUMINURIA REV: CPT | Mod: CPTII,S$GLB,, | Performed by: NURSE PRACTITIONER

## 2024-11-18 RX ORDER — CIPROFLOXACIN 500 MG/1
500 TABLET ORAL EVERY 12 HOURS
Qty: 14 TABLET | Refills: 0 | Status: SHIPPED | OUTPATIENT
Start: 2024-11-18 | End: 2024-11-25

## 2024-11-18 NOTE — TELEPHONE ENCOUNTER
----- Message from Chad Flores DNP sent at 11/18/2024  3:17 PM CST -----  Please inform patient via telephone that his urine cx came back positive for a UTI. It resulted over the weekend. Script for Cipro sent to [facility]. Repeat urine cx in 1 week (after completion of antibiotic therapy). Order placed.

## 2024-11-18 NOTE — TELEPHONE ENCOUNTER
----- Message from Chasity sent at 11/18/2024  3:39 PM CST -----  Type:  RX Refill Request    Who Called: pt  Refill or New Rx:refill  RX Name and Strength:eplerenone (INSPRA) 25 MG Tab  How is the patient currently taking it? (ex. 1XDay):Take 1 tablet by mouth once daily  Is this a 30 day or 90 day RX:90  Preferred Pharmacy with phone number:[id number] - [place], [place] - [address] [address]  Phone: [phone]  Fax: [phone]  Local or Mail Order:local  Ordering Provider: Jon Kiser MD  Would the patient rather a call back or a response via [facility]? call  Best Call Back Number: [phone]  Additional Information: pt [facility]     Pt is schedule to see  Duarte Card MD

## 2024-11-18 NOTE — PROGRESS NOTES
"Subjective:       Patient ID: Manfred Shah is a 59 y.o. male.    Chief Complaint: Follow-up (F/U Labs)        HPI WITH ASSESSMENT AND PLAN OF CARE:      Patient is a 59-year-old white male with CIDP (chronic inflammatory demyelinating polyneuropathy) diagnosed in June 2018 by neurologist, Dr. Rowdy Tran, left foot drop secondary to CIDP, history of Abscess of spinal cord treated by Dr. Rodriguez S/P Laminectomy in August 2018, Lumbar Spinal fusion on 6/3/2020 with Neurosurgeon Dr. Oosrio, Neurogenic bladder with urge incontinence and self-catheterization and BPH followed by Dr. Sanderson, Renal Cysts, kidney stones and urine microalbuminuria monitored by Nephrology Dr. Schwartz and Urology Dr. Sanderson, RLS, Hypertension, Hyperlipidemia, TYPE 2 Diabetes, Elevated Liver Enzymes, Heberden's nodes with joint inflammation and pain to bilateral hands followed by [facility],  and Sleep Apnea with CPAP use that is here today for follow up with fasting lab results.       Resistant Hypertension   evaluated by [facility], Dr. Kiser, that is currently controlled on eplerenone (INSPRA) 25 MG Tab daily, amlodipine 10 mg daily, Valsartan HCTZ 320/12.5 mg daily, Metoprolol  mg daily and Hydralazine 25 mg twice daily.   Last seen Dr. Kiser May 2023  Dr. Kiser had advised that patient edema is due to venous insufficiency possibly due to ropinirole so medication was stopped.  /86 (BP Location: Left arm, Patient Position: Sitting)   Pulse 73   Temp 98.2 °F (36.8 °C) (Temporal)   Ht 6' 5" (1.956 m)   Wt (!) 169.3 kg (373 lb 2.1 oz)   SpO2 98%   BMI 44.25 kg/m²   Continue current medication regimen.  Stable.       Hyperlipidemia   currently taking Rosuvastatin 10 mg daily, and Fish Oil    LDL 74  Stable  monitored yearly          TYPE 2 DIABETES  IFG/Prediabetes since January 2018    He always has an IFG that is sometimes above the 126 cut-off but his HgbA1C had never gone above 6.4 so continued to " classify as IFG/Prediabetic.    HOWEVER, in November 2021 - I started patient on Ozempic 0.5 mg injection per his request for prediabetes and obesity.  I increased the Ozempic to 1 mg injection in Feb. 2022  Even on Ozempic 1 mg injection weekly, FBG was still high at 119  - so diagnosed with Type 2 Diabetes.  Body mass index is 44.25 kg/m².  Currently taking Ozempic 1 mg daily  Today, , HgA1c 5.2%  +microalbumin/creatinine ratio 144.7. BUN, serum creatinine, eGFR all WNL. - Nephrology consult ordered for kidney workup.  See note below on microalbuminuria             Microalbuminuria  3/22/2024:  +microalbumin/creatinine ratio 144.7. BUN, serum creatinine, eGFR all WNL. -   Nephrology consult ordered for kidney workup.  Seen Dr. Schwartz office 5/9/2024:  - UP/C 290.  Continue to monitor UP/C.   - Serologic w/u as above  - continue ACEi/ARB, valsartan/HCTZ  Has upcoming labs/urine and appt with Dr. Schwartz on 11/21/2024.                Renal Cysts and Renal Stones  Bilateral renal cysts and renal stones noted on kidney ultrasound 11/13/2023  Being followed by both Nephrology Dr. Schwartz and Urology Dr. Sanderson.  Has CT stone study scheduled for 11/21/2024.      BPH and neurogenic bladder with urge incontinence and self catheterizes as needed   Followed by [facility] Dr. Sanderson - last seen 11/5/2024  Had + urine culture on 11/13/2024 labs - do not see where this was addressed - sent message to Urology CECY Flores as Dr. Sanderson out of office until 11/26/24  Patient does have CT stone study scheduled for 11/21/24.  Patient also has multiple upcoming procedures scheduled with Dr. Sanderson in December:  Sacral neurostimulator 12/3/24 and 12/12/2024  Penile prosthesis on 12/17/2024.        Morbidly obese.   Body mass index is 44.25 kg/m².  Started patient on Ozempic injections for weight control in November 2021 and lost 15 pounds in Feb. 2022 then became noncompliant with medication and follow up.  In December 2022 -  Taking Ozempic 1 mg injection weekly consistently   December 2022 to September 2023 - lost 38 pounds.  September 2023 to present November 2024- gained 13 pounds in past year.  Continue Ozempic 1 mg injection weekly.  Working on diet and exercise.        History of Elevated liver enzymes   Patient reports he was admitted to Hospital in [place] and he had a liver workup during that hospitalization prior to 2018   His liver enzyme elevation had resolved with weight loss in October 2018 but was again elevated in 2019 with weight gain.    They have again improved and back within range  Will continue to monitor.            Sleep Apnea with CPAP use   and had uvula removed by surgery in past.  On CPAP nightly       CIDP with left foot drop    followed by a neurologist Dr. Rowdy Tran in past - no longer seeing specialist unless he begins to have issues with leg weakness again.             Heberden's nodes with joint inflammation and pain to bilateral hands   evaluated by [facility] and determined Osteoarthritis.  Stable       Chronic Anemia  Since May 2020.   Still decreased but improved.   Continue ferrous sulfate 650 mg every other day.  Stable.  Now followed by NEPHROLOGIST             Lab Visit on 11/13/2024   Component Date Value Ref Range Status    Specimen UA 11/13/2024 Urine, Clean Catch   Final    Color, UA 11/13/2024 Yellow  Yellow, Straw, Bria Final    Appearance, UA 11/13/2024 Hazy (A)  Clear Final    pH, UA 11/13/2024 6.0  5.0 - 8.0 Final    Specific [place], UA 11/13/2024 1.020  1.005 - 1.030 Final    Protein, UA 11/13/2024 1+ (A)  Negative Final    Comment: Recommend a 24 hour urine protein or a urine   protein/creatinine ratio if globulin induced proteinuria is  clinically suspected.      Glucose, UA 11/13/2024 Negative  Negative Final    Ketones, UA 11/13/2024 Negative  Negative Final    Bilirubin (UA) 11/13/2024 Negative  Negative Final    Occult Blood UA 11/13/2024 1+ (A)  Negative Final     Nitrite, UA 11/13/2024 Positive (A)  Negative Final    Urobilinogen, UA 11/13/2024 Negative  <2.0 EU/dL Final    Leukocytes, UA 11/13/2024 2+ (A)  Negative Final    Urine Culture, Routine 11/13/2024  (A)   Final                    Value:KLEBSIELLA AEROGENES  >100,000 cfu/ml      RBC, UA 11/13/2024 20 (H)  0 - 4 /hpf Final    WBC, UA 11/13/2024 >100 (H)  0 - 5 /hpf Final    Bacteria 11/13/2024 Few (A)  None-Occ /hpf Final    Hyaline Casts, UA 11/13/2024 2 (A)  0-1/lpf /lpf Final    Microscopic Comment 11/13/2024 SEE COMMENT   Final    Comment: Other formed elements not mentioned in the report are not   present in the microscopic examination.      Clinical Support on 11/13/2024   Component Date Value Ref Range Status    QRS Duration 11/13/2024 90  ms Final    [facility] QTC Calculation 11/13/2024 470  ms Final   Lab Visit on 11/13/2024   Component Date Value Ref Range Status    Sodium 11/13/2024 143  136 - 145 mmol/L Final    Potassium 11/13/2024 3.5  3.5 - 5.1 mmol/L Final    Chloride 11/13/2024 108  95 - 110 mmol/L Final    CO2 11/13/2024 25  23 - 29 mmol/L Final    Glucose 11/13/2024 109  70 - 110 mg/dL Final    BUN 11/13/2024 13  6 - 20 mg/dL Final    Creatinine 11/13/2024 0.9  0.5 - 1.4 mg/dL Final    Calcium 11/13/2024 9.2  8.7 - 10.5 mg/dL Final    Total Protein 11/13/2024 7.1  6.0 - 8.4 g/dL Final    Albumin 11/13/2024 3.8  3.5 - 5.2 g/dL Final    Total Bilirubin 11/13/2024 0.5  0.1 - 1.0 mg/dL Final    Comment: For infants and newborns, interpretation of results should be based  on gestational age, weight and in agreement with clinical  observations.    Premature Infant recommended reference ranges:  Up to 24 hours.............<8.0 mg/dL  Up to 48 hours............<12.0 mg/dL  3-5 days..................<15.0 mg/dL  6-29 days.................<15.0 mg/dL      Alkaline Phosphatase 11/13/2024 58  40 - 150 U/L Final    AST 11/13/2024 35  10 - 40 U/L Final    ALT 11/13/2024 38  10 - 44 U/L Final    eGFR 11/13/2024 >60.0   >60 mL/min/1.73 m^2 Final    Anion Gap 11/13/2024 10  8 - 16 mmol/L Final    Hemoglobin A1C 11/13/2024 5.2  4.0 - 5.6 % Final    Comment: ADA Screening Guidelines:  5.7-6.4%  Consistent with prediabetes  >or=6.5%  Consistent with diabetes    High levels of fetal hemoglobin interfere with the HbA1C  assay. Heterozygous hemoglobin variants (HbS, HgC, etc)do  not significantly interfere with this assay.   However, presence of multiple variants may affect accuracy.      Estimated Avg Glucose 11/13/2024 103  68 - 131 mg/dL Final    WBC 11/13/2024 5.11  3.90 - 12.70 K/uL Final    RBC 11/13/2024 4.40 (L)  4.60 - 6.20 M/uL Final    Hemoglobin 11/13/2024 13.1 (L)  14.0 - 18.0 g/dL Final    Hematocrit 11/13/2024 38.1 (L)  40.0 - 54.0 % Final    MCV 11/13/2024 87  82 - 98 fL Final    MCH 11/13/2024 29.8  27.0 - 31.0 pg Final    MCHC 11/13/2024 34.4  32.0 - 36.0 g/dL Final    RDW 11/13/2024 13.2  11.5 - 14.5 % Final    Platelets 11/13/2024 198  150 - 450 K/uL Final    MPV 11/13/2024 9.0 (L)  9.2 - 12.9 fL Final    Immature Granulocytes 11/13/2024 0.2  0.0 - 0.5 % Final    Gran # (ANC) 11/13/2024 2.5  1.8 - 7.7 K/uL Final    Immature Grans (Abs) 11/13/2024 0.01  0.00 - 0.04 K/uL Final    Comment: Mild elevation in immature granulocytes is non specific and   can be seen in a variety of conditions including stress response,   acute inflammation, trauma and pregnancy. Correlation with other   laboratory and clinical findings is essential.      Lymph # 11/13/2024 1.7  1.0 - 4.8 K/uL Final    Mono # 11/13/2024 0.5  0.3 - 1.0 K/uL Final    Eos # 11/13/2024 0.4  0.0 - 0.5 K/uL Final    Baso # 11/13/2024 0.07  0.00 - 0.20 K/uL Final    nRBC 11/13/2024 0  0 /100 WBC Final    Gran % 11/13/2024 48.1  38.0 - 73.0 % Final    Lymph % 11/13/2024 33.7  18.0 - 48.0 % Final    Mono % 11/13/2024 9.0  4.0 - 15.0 % Final    Eosinophil % 11/13/2024 7.8  0.0 - 8.0 % Final    Basophil % 11/13/2024 1.4  0.0 - 1.9 % Final    Differential Method  "11/13/2024 Automated   Final         Vitals:    11/18/24 1301   BP: 136/86   BP Location: Left arm   Patient Position: Sitting   Pulse: 73   Temp: 98.2 °F (36.8 °C)   TempSrc: Temporal   SpO2: 98%   Weight: (!) 169.3 kg (373 lb 2.1 oz)   Height: 6' 5" (1.956 m)         Diagnoses this Encounter:         ICD-10-CM ICD-9-CM   1. Type 2 diabetes mellitus with microalbuminuria, without long-term current use of insulin  E11.29 250.40    R80.9 791.0   2. Microalbuminuria  R80.9 791.0   3. Anemia in stage 1 chronic kidney disease  N18.1 285.21    D63.1 585.1   4. Hyperlipidemia associated with type 2 diabetes mellitus  E11.69 250.80    E78.5 272.4   5. Hypertension associated with type 2 diabetes mellitus  E11.59 250.80    I15.2 401.9   6. Class 3 severe obesity due to excess calories with serious comorbidity and body mass index (BMI) of 40.0 to 44.9 in adult  E66.813 278.01    E66.01 V85.41    Z68.41    7. CIDP (chronic inflammatory demyelinating polyneuropathy)  G61.81 357.81   8. Foot drop, left foot  M21.372 736.79   9. S/P lumbar spinal fusion  Z98.1 V45.4   10. Bilateral renal cysts  N28.1 753.10   11. Renal stones  N20.0 592.0   12. Benign prostatic hyperplasia with lower urinary tract symptoms, symptom details unspecified  N40.1 600.01   13. Neurogenic bladder  N31.9 596.54   14. Self-catheterizes urinary bladder  Z78.9 V49.89   15. Sleep apnea with use of continuous positive airway pressure (CPAP)  G47.30 327.23   16. Thyroid disorder screen  Z13.29 V77.0   17. Encounter for blood test for routine general physical examination  Z00.00 V72.62       Orders Placed This Encounter    Comprehensive Metabolic Panel    Hemoglobin A1C    Lipid Panel    TSH    PSA, TOTAL AND FREE        Follow up in about 4 months (around 3/18/2025) for fasting labs and WELLNESS EXAM.     I spent a total of 51 minutes on the day of the visit.  This includes face to face time and non-face to face time preparing to see the patient (eg, review " of tests), obtaining and/or reviewing separately obtained history, documenting clinical information in the electronic or other health record, independently interpreting results and communicating results to the patient/family/caregiver, or care coordinator.       Patient's Medications   New Prescriptions    CIPROFLOXACIN HCL (CIPRO) 500 MG TABLET    Take 1 tablet (500 mg total) by mouth every 12 (twelve) hours. for 7 days   Previous Medications    AMLODIPINE (NORVASC) 10 MG TABLET    Take 1 tablet by mouth once daily    ASPIRIN (ECOTRIN) 81 MG EC TABLET    Take 1 tablet (81 mg total) by mouth once daily.    DICLOFENAC SODIUM (VOLTAREN) 1 % GEL    Apply 2 g topically as needed. Instructed to hold dos    DIPHENHYDRAMINE HCL (BENADRYL ORAL)    Take 75 mg by mouth every evening.    EPLERENONE (INSPRA) 25 MG TAB    Take 1 tablet by mouth once daily    FERROUS SULFATE (FEOSOL) 325 MG (65 MG IRON) TAB TABLET    Take 2 tablets (650 mg total) by mouth daily with breakfast.    FINASTERIDE (PROSCAR) 5 MG TABLET    Take 1 tablet by mouth once daily    HYDRALAZINE (APRESOLINE) 25 MG TABLET    Take 1 tablet by mouth twice daily    LYSINE 500 MG TAB    Take 500 mg by mouth every other day. Instructed to hold for sx    METOPROLOL SUCCINATE (TOPROL-XL) 100 MG 24 HR TABLET    Take 1 tablet by mouth once daily    MULTIVITAMIN (THERAGRAN) PER TABLET    Take 1 tablet by mouth once daily. Instructed to hold for sx    OMEGA-3 FATTY ACIDS/FISH OIL (FISH OIL-OMEGA-3 FATTY ACIDS) 300-1,000 MG CAPSULE    Take 1 capsule by mouth once daily.    OZEMPIC 1 MG/DOSE (4 MG/3 ML)    INJECT 1 MG INTO THE SKIN EVERY 7 DAYS AS DIRECTED    ROSUVASTATIN (CRESTOR) 10 MG TABLET    TAKE 1 TABLET BY MOUTH ONCE DAILY IN THE EVENING    TADALAFIL (CIALIS) 20 MG TAB    TAKE 1 TABLET BY MOUTH ONCE DAILY AS NEEDED FOR  ERECTILE  DYSFUNCTION    TAMSULOSIN (FLOMAX) 0.4 MG CAP    Take 1 capsule by mouth once daily    VALSARTAN-HYDROCHLOROTHIAZIDE (DIOVAN-HCT) 320-12.5  MG PER TABLET    Take 1 tablet by mouth once daily   Modified Medications    No medications on file   Discontinued Medications    MUPIROCIN (BACTROBAN) 2 % OINTMENT    Apply topically 3 (three) times daily.    PEP INJECTION    Inject .25 ml as directed     For compounding pharmacy use:   Add PAPAVERINE 30 mcg  Add PHENTOLAMINE 10 mg  Add ALPROSTADIL 100 mcg         Review of Systems   Constitutional:  Positive for activity change. Negative for unexpected weight change.   HENT:  Negative for hearing loss, rhinorrhea and trouble swallowing.    Eyes:  Negative for discharge and visual disturbance.   Respiratory:  Negative for chest tightness and wheezing.    Cardiovascular:  Negative for chest pain and palpitations.   Gastrointestinal:  Negative for blood in stool, constipation, diarrhea and vomiting.   Endocrine: Negative for polydipsia and polyuria.   Genitourinary:  Negative for difficulty urinating, hematuria and urgency.   Musculoskeletal:  Positive for arthralgias. Negative for joint swelling and neck pain.   Neurological:  Negative for weakness and headaches.   Psychiatric/Behavioral:  Negative for confusion and dysphoric mood.          Objective:        Physical Exam  Constitutional:       General: He is not in acute distress.     Appearance: He is obese. He is not ill-appearing.      Comments: Body mass index is 44.25 kg/m².     HENT:      Head: Normocephalic.      Right Ear: Tympanic membrane, ear canal and external ear normal.      Left Ear: Tympanic membrane, ear canal and external ear normal.      Nose: Nose normal.      Mouth/Throat:      Mouth: Mucous membranes are moist.      Pharynx: Oropharynx is clear.   Cardiovascular:      Rate and Rhythm: Normal rate and regular rhythm.      Pulses: Normal pulses.      Heart sounds: Normal heart sounds. No murmur heard.  Pulmonary:      Effort: Pulmonary effort is normal. No respiratory distress.      Breath sounds: Normal breath sounds. No wheezing.   Abdominal:       General: Bowel sounds are normal.      Palpations: Abdomen is soft.      Tenderness: There is no abdominal tenderness.      Hernia: No hernia is present.   Musculoskeletal:         General: Normal range of motion.      Cervical back: Normal range of motion.      Right lower le+ Edema present.      Left lower le+ Edema present.      Left foot: Foot drop present.   Feet:      Right foot:      Toenail Condition: Right toenails are normal.      Left foot:      Toenail Condition: Left toenails are normal.   Skin:     General: Skin is warm and dry.      Capillary Refill: Capillary refill takes less than 2 seconds.   Neurological:      Mental Status: He is alert and oriented to person, place, and time.   Psychiatric:         Mood and Affect: Mood normal.         Behavior: Behavior normal.         Thought Content: Thought content normal.         Judgment: Judgment normal.             Past Medical History:   Diagnosis Date    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 Tray Rodriguez    BPH (benign prostatic hyperplasia)     CIDP (chronic inflammatory demyelinating polyneuropathy) 2018    followed by Dr. Rowdy Tran - Neurologist in [place].      Elevated liver enzymes 10/08/2018    Elevated PSA     followed by Dr. Sanderson    Erectile dysfunction     Foot drop, left foot     due to neurological condition/Guillan Hertel; wears brace    Hyperlipidemia     Hypertension     Impaired fasting glucose     Neurogenic bladder 2018    followed by Dr. Sanderson    RLS (restless legs syndrome) 10/08/2018    Self-catheterizes urinary bladder     5-6 times a day    Sleep apnea with use of continuous positive airway pressure (CPAP) 01/10/2018    followed by [facility] in [place]    Type 2 diabetes mellitus without complication, without long-term current use of insulin 2022    Urge incontinence 2018    UTI (urinary tract infection), uncomplicated 05/10/2024       Past  Surgical History:   Procedure Laterality Date    ADENOIDECTOMY      COLONOSCOPY N/A 01/24/2020    Procedure: COLONOSCOPY;  Surgeon: Stacy Markham MD;  Location: [facility];  Service: Endoscopy;  Laterality: N/A;    HERNIA REPAIR      as a child, x2    LUMBAR LAMINECTOMY  2012    [facility]    LUMBAR LAMINECTOMY  08/2018    Dr. Rodriguez [facility]; complications from surgery - abscess to spinal cord - discharge summary scanned to media file    PLANTAR FASCIA RELEASE Left     TONSILLECTOMY      TRANSFORAMINAL LUMBAR INTERBODY FUSION (TLIF) OF SPINE WITH PERCUTANEOUS INSTRUMENTATION Bilateral 06/03/2020    Procedure: FUSION, SPINE, LUMBAR, TLIF, WITH PERCUTANEOUS INSTRUMENTATION  (L3-4 MIS TLIF) [facility] Mic[facility] [place] JENNIFER IMCHAELA [phone] Spine Wave;  Surgeon: Samuel Osorio DO;  Location: [facility] OR;  Service: Neurosurgery;  Laterality: Bilateral;  SPINEWAVE JENNIFER KRAMER [phone] TEXTED HIM @ 9:45AM ON 5-  PRE-OP BY RN 5----BMI--44---COVID NEGATIVE  CLEAR    VASECTOMY      X-STOP IMPLANTATION  06/03/2020       Family History   Problem Relation Name Age of Onset    Cancer Mother Renata Larios         Breast Cancer    Heart disease Mother Renata Larios         pacemaker    Hyperlipidemia Mother Renata Larios         taking Crestor    Hypertension Mother Renata Larios         taking Amlodipine    Diabetes Mother Renata Larios         Prediabetes    Diabetes Father Royal Alyssa     Cancer Father Royal Alyssa         unknown type of cancer    Hypertension Father [facility]     Rheum arthritis Father [facility]     No Known Problems Sister      Diabetes Paternal Grandmother      Cancer Paternal Grandmother      Prostate cancer Neg Hx      Kidney disease Neg Hx         Social History     Socioeconomic History    Marital status:    Occupational History    Occupation: teacher   Tobacco Use    Smoking status: Never     Passive exposure: Past    Smokeless tobacco:  Never   Substance and Sexual Activity    Alcohol use: Yes     Comment: occasionally    Drug use: No    Sexual activity: Yes     Partners: Female     Social Drivers of Health     Financial Resource Strain: Low Risk  (11/12/2024)    Overall Financial Resource Strain (CARDIA)     Difficulty of Paying Living Expenses: Not very hard   Food Insecurity: No Food Insecurity (11/12/2024)    Hunger Vital Sign     Worried About Running Out of Food in the Last Year: Never true     Ran Out of Food in the Last Year: Never true   Transportation Needs: No Transportation Needs (12/22/2022)    PRAPARE - Transportation     Lack of Transportation (Medical): No     Lack of Transportation (Non-Medical): No   Physical Activity: Sufficiently Active (11/12/2024)    Exercise Vital Sign     Days of Exercise per Week: 3 days     Minutes of Exercise per Session: 60 min   Stress: No Stress Concern Present (11/12/2024)    [employer] - Occupational Stress Questionnaire     Feeling of Stress : Not at all   Housing Stability: High Risk (12/22/2022)    Housing Stability Vital Sign     Unable to Pay for Housing in the Last Year: No     Number of Places Lived in the Last Year: 3     Unstable Housing in the Last Year: No

## 2024-11-19 RX ORDER — ROSUVASTATIN CALCIUM 10 MG/1
10 TABLET, COATED ORAL NIGHTLY
Qty: 90 TABLET | Refills: 1 | Status: SHIPPED | OUTPATIENT
Start: 2024-11-19

## 2024-11-19 RX ORDER — EPLERENONE 25 MG/1
25 TABLET, FILM COATED ORAL DAILY
Qty: 90 TABLET | Refills: 1 | Status: SHIPPED | OUTPATIENT
Start: 2024-11-19

## 2024-11-19 RX ORDER — ROSUVASTATIN CALCIUM 10 MG/1
10 TABLET, COATED ORAL NIGHTLY
Qty: 90 TABLET | Refills: 0 | OUTPATIENT
Start: 2024-11-19

## 2024-11-20 DIAGNOSIS — E66.01 CLASS 3 SEVERE OBESITY DUE TO EXCESS CALORIES WITH SERIOUS COMORBIDITY AND BODY MASS INDEX (BMI) OF 45.0 TO 49.9 IN ADULT: ICD-10-CM

## 2024-11-20 DIAGNOSIS — E11.9 TYPE 2 DIABETES MELLITUS WITHOUT COMPLICATION, WITHOUT LONG-TERM CURRENT USE OF INSULIN: ICD-10-CM

## 2024-11-20 DIAGNOSIS — E66.813 CLASS 3 SEVERE OBESITY DUE TO EXCESS CALORIES WITH SERIOUS COMORBIDITY AND BODY MASS INDEX (BMI) OF 45.0 TO 49.9 IN ADULT: ICD-10-CM

## 2024-11-20 RX ORDER — SEMAGLUTIDE 1.34 MG/ML
INJECTION, SOLUTION SUBCUTANEOUS
Qty: 3 ML | Refills: 5 | Status: SHIPPED | OUTPATIENT
Start: 2024-11-20

## 2024-11-27 ENCOUNTER — TELEPHONE (OUTPATIENT)
Dept: UROLOGY | Facility: CLINIC | Age: 59
End: 2024-11-27
Payer: COMMERCIAL

## 2024-11-27 DIAGNOSIS — N13.30 HYDRONEPHROSIS OF RIGHT KIDNEY: ICD-10-CM

## 2024-11-27 DIAGNOSIS — N20.1 RIGHT URETERAL CALCULUS: Primary | ICD-10-CM

## 2024-11-27 RX ORDER — CIPROFLOXACIN 2 MG/ML
400 INJECTION, SOLUTION INTRAVENOUS
OUTPATIENT
Start: 2024-11-27

## 2024-11-27 RX ORDER — SODIUM CHLORIDE 9 MG/ML
INJECTION, SOLUTION INTRAVENOUS CONTINUOUS
OUTPATIENT
Start: 2024-11-27

## 2024-11-27 NOTE — PROGRESS NOTES
Diagnoses and all orders for this visit:    Right ureteral calculus  -     Case Request Operating Room: URETEROSCOPY, WITH LASER LITHOTRIPSY, CYSTOSCOPY, WITH RETROGRADE PYELOGRAM, CYSTOSCOPY, WITH URETERAL STENT INSERTION  -     Vital Signs ; Standing  -     Notify physician ; Standing  -     Diet NPO; Standing  -     Place in Outpatient; Standing  -     Place MARIEL hose; Standing  -     Diet NPO    Hydronephrosis of right kidney  -     Case Request Operating Room: URETEROSCOPY, WITH LASER LITHOTRIPSY, CYSTOSCOPY, WITH RETROGRADE PYELOGRAM, CYSTOSCOPY, WITH URETERAL STENT INSERTION  -     Vital Signs ; Standing  -     Notify physician ; Standing  -     Diet NPO; Standing  -     Place in Outpatient; Standing  -     Place MARIEL hose; Standing  -     Diet NPO    Other orders  -     0.9% NaCl infusion  -     IP VTE LOW RISK PATIENT; Standing  -     ciprofloxacin (CIPRO)400mg/200ml D5W IVPB 400 mg    Schedule patient for right ureteroscopy with laser litho and stone basket extraction; right retrograde pyelogram and possible right ureter stent placement by Dr. Sanderson on 12/6/2024.   Pre-op labs up-to-date.  Temporarily hold baby aspirin for 7 days prior to surgery to reduce risk of bleeding. May resume after sx.   Temporarily hold Ozempic for 7 day prior to surgery because it delays gastric emptying and put you at risk for aspiration pneumonia when sedated.   Surgery packet to be provided to patient.     Follow-up post-op.     Chad Flores, DNP

## 2024-11-27 NOTE — TELEPHONE ENCOUNTER
Patient with multiple surgeries coming up in December. I reviewed instructions to hold ASA and Ozempic, pt states last dose of Ozempic was 11/23, last dose ASA also last week. He verbalized understanding of taking no further doses until after surgery. His labs are up to date. I informed him he could expect a phone call a day or two before surgery with arrival time and other important instructions. He verbalized understanding of everything discussed.

## 2024-11-27 NOTE — TELEPHONE ENCOUNTER
----- Message from Chad Flores DNP sent at 11/27/2024  2:33 PM CST -----  Regarding: sx case for 12/6/2024  Patient is scheduled for right ureteroscopy with laser litho and stone basket extraction; right retrograde pyelogram and possible right ureter stent placement by Dr. Sanderson on 12/6/2024. Pre-op labs up-to-date. Inform patient to temporarily hold baby aspirin for 7 days prior to surgery to reduce risk of bleeding. May resume after sx. Temporarily hold Ozempic for 7 day prior to surgery because it delays gastric emptying and put you at risk for aspiration pneumonia when sedated. Provide patient with surgery packet.

## 2024-12-01 NOTE — PROGRESS NOTES
Lm for pt to call back regarding scheduling. shikha   decreased ability to use legs for bridging/pushing/impaired ability to control trunk for mobility

## 2024-12-02 ENCOUNTER — TELEPHONE (OUTPATIENT)
Dept: UROLOGY | Facility: CLINIC | Age: 59
End: 2024-12-02
Payer: COMMERCIAL

## 2024-12-02 NOTE — TELEPHONE ENCOUNTER
----- Message from Hany Quiroz sent at 12/2/2024 12:45 PM CST -----    ----- Message -----  From: Chaitanya Roach  Sent: 12/2/2024  12:15 PM CST  To: [facility] Staff    .Type:  Needs Medical Advice    Who Called: pt    Would the patient rather a call back or a response via [facility]? Call back  Best Call Back Number: [phone]  Additional Information:     Pt stated he would like a call back for the arrival time for his procedure tomorrow

## 2024-12-04 ENCOUNTER — PATIENT MESSAGE (OUTPATIENT)
Dept: UROLOGY | Facility: CLINIC | Age: 59
End: 2024-12-04
Payer: COMMERCIAL

## 2024-12-06 PROBLEM — N20.1 RIGHT URETERAL CALCULUS: Status: ACTIVE | Noted: 2024-12-06

## 2024-12-06 PROBLEM — N13.30 HYDRONEPHROSIS OF RIGHT KIDNEY: Status: ACTIVE | Noted: 2024-12-06

## 2024-12-09 ENCOUNTER — OFFICE VISIT (OUTPATIENT)
Dept: CARDIOLOGY | Facility: CLINIC | Age: 59
End: 2024-12-09
Payer: COMMERCIAL

## 2024-12-09 ENCOUNTER — TELEPHONE (OUTPATIENT)
Dept: UROLOGY | Facility: CLINIC | Age: 59
End: 2024-12-09
Payer: COMMERCIAL

## 2024-12-09 VITALS
OXYGEN SATURATION: 96 % | SYSTOLIC BLOOD PRESSURE: 160 MMHG | HEIGHT: 77 IN | DIASTOLIC BLOOD PRESSURE: 99 MMHG | WEIGHT: 315 LBS | BODY MASS INDEX: 37.19 KG/M2 | HEART RATE: 90 BPM

## 2024-12-09 DIAGNOSIS — E66.01 CLASS 3 SEVERE OBESITY DUE TO EXCESS CALORIES WITH SERIOUS COMORBIDITY AND BODY MASS INDEX (BMI) OF 40.0 TO 44.9 IN ADULT: ICD-10-CM

## 2024-12-09 DIAGNOSIS — E78.5 HYPERLIPIDEMIA ASSOCIATED WITH TYPE 2 DIABETES MELLITUS: ICD-10-CM

## 2024-12-09 DIAGNOSIS — I70.0 AORTIC ATHEROSCLEROSIS: ICD-10-CM

## 2024-12-09 DIAGNOSIS — I15.2 HYPERTENSION ASSOCIATED WITH TYPE 2 DIABETES MELLITUS: ICD-10-CM

## 2024-12-09 DIAGNOSIS — I1A.0 RESISTANT HYPERTENSION: Primary | ICD-10-CM

## 2024-12-09 DIAGNOSIS — E11.59 HYPERTENSION ASSOCIATED WITH TYPE 2 DIABETES MELLITUS: ICD-10-CM

## 2024-12-09 DIAGNOSIS — E11.69 HYPERLIPIDEMIA ASSOCIATED WITH TYPE 2 DIABETES MELLITUS: ICD-10-CM

## 2024-12-09 DIAGNOSIS — E66.813 CLASS 3 SEVERE OBESITY DUE TO EXCESS CALORIES WITH SERIOUS COMORBIDITY AND BODY MASS INDEX (BMI) OF 40.0 TO 44.9 IN ADULT: ICD-10-CM

## 2024-12-09 PROCEDURE — 99214 OFFICE O/P EST MOD 30 MIN: CPT | Mod: S$GLB,,, | Performed by: INTERNAL MEDICINE

## 2024-12-09 PROCEDURE — 3080F DIAST BP >= 90 MM HG: CPT | Mod: CPTII,S$GLB,, | Performed by: INTERNAL MEDICINE

## 2024-12-09 PROCEDURE — 3077F SYST BP >= 140 MM HG: CPT | Mod: CPTII,S$GLB,, | Performed by: INTERNAL MEDICINE

## 2024-12-09 PROCEDURE — 3008F BODY MASS INDEX DOCD: CPT | Mod: CPTII,S$GLB,, | Performed by: INTERNAL MEDICINE

## 2024-12-09 PROCEDURE — 99999 PR PBB SHADOW E&M-EST. PATIENT-LVL V: CPT | Mod: PBBFAC,,, | Performed by: INTERNAL MEDICINE

## 2024-12-09 PROCEDURE — 3066F NEPHROPATHY DOC TX: CPT | Mod: CPTII,S$GLB,, | Performed by: INTERNAL MEDICINE

## 2024-12-09 PROCEDURE — 3044F HG A1C LEVEL LT 7.0%: CPT | Mod: CPTII,S$GLB,, | Performed by: INTERNAL MEDICINE

## 2024-12-09 PROCEDURE — 3060F POS MICROALBUMINURIA REV: CPT | Mod: CPTII,S$GLB,, | Performed by: INTERNAL MEDICINE

## 2024-12-09 RX ORDER — HYDRALAZINE HYDROCHLORIDE 50 MG/1
50 TABLET, FILM COATED ORAL 3 TIMES DAILY
Qty: 270 TABLET | Refills: 3 | Status: SHIPPED | OUTPATIENT
Start: 2024-12-09

## 2024-12-09 NOTE — ASSESSMENT & PLAN NOTE
Hydralazine increased to 50 mg 3 times daily.  He is on multiple antihypertensive meds.  Current therapy to continue.

## 2024-12-09 NOTE — ASSESSMENT & PLAN NOTE
He is considered primary prevention.  He is on 10 mg rosuvastatin.  Current LDL 74 mg%.  Therapy to continue.

## 2024-12-09 NOTE — TELEPHONE ENCOUNTER
Patient reports a dribble when attempts to urinate since implantation of the . He reports the ability to dribble is an improvement from prior to implantation. He reports that the dribble is not enough to empty his bladder and he is still performing self cath after each attempt to void.

## 2024-12-09 NOTE — PROGRESS NOTES
[facility] Cardiology     Subjective:    Patient ID:  Manfred Shah is a 59 y.o. male who presents for follow-up of Hypertension, Diabetes Mellitus, and Hyperlipidemia    Review of patient's allergies indicates:  No Known Allergies  He is here to establish care and has followed with Cardiology for years for hypertension.  Echocardiogram 2020 showed normal ejection fraction without LVH or valvular disease.  He has difficult to control hypertension and is currently on Inspira, hydralazine, Toprol, amlodipine, valsartan/HCTZ.  He thinks his blood pressures are usually in the 01/21/2030 range but he does not check at home.  He has a cuff but has not been checking.    He is diabetic.  He takes Ozempic currently.  He is on rosuvastatin 10 mg-primary prevention.  Current LDL 74 mg%.  He has normal renal function.    He has history of myasthenia gravis.  He has left leg footdrop.  He has neurogenic bladder requiring in and out catheterization.  He is followed by urology and nephrology.  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         Review of Systems   Constitutional: Negative for chills, decreased appetite, diaphoresis, fever, malaise/fatigue, night sweats, weight gain and weight loss.   HENT:  Negative for congestion, ear discharge, ear pain, hearing loss, hoarse voice, nosebleeds, odynophagia, sore throat, stridor and tinnitus.    Eyes:  Negative for blurred vision, discharge, double vision, pain, photophobia, redness, vision loss in left eye, vision loss in right eye, visual disturbance and visual halos.   Cardiovascular:  Positive for leg swelling. Negative for chest pain, claudication, cyanosis, dyspnea on exertion, irregular heartbeat, near-syncope, orthopnea, palpitations, paroxysmal nocturnal dyspnea and syncope.   Respiratory:  Negative for cough, hemoptysis, shortness of breath, sleep disturbances due to breathing, snoring, sputum  "production and wheezing.    Endocrine: Negative for cold intolerance, heat intolerance, polydipsia, polyphagia and polyuria.   Hematologic/Lymphatic: Negative for adenopathy and bleeding problem. Does not bruise/bleed easily.   Skin:  Negative for color change, dry skin, flushing, itching, nail changes, poor wound healing, rash, skin cancer, suspicious lesions and unusual hair distribution.   Musculoskeletal:  Positive for joint pain. Negative for arthritis, back pain, falls, gout, joint swelling, muscle cramps, muscle weakness, myalgias, neck pain and stiffness.        Left leg footdrop   Gastrointestinal:  Negative for bloating, abdominal pain, anorexia, change in bowel habit, bowel incontinence, constipation, diarrhea, dysphagia, excessive appetite, flatus, heartburn, hematemesis, hematochezia, hemorrhoids, jaundice, melena, nausea and vomiting.   Genitourinary:  Negative for bladder incontinence, decreased libido, dysuria, flank pain, frequency, genital sores, hematuria, hesitancy, incomplete emptying, nocturia and urgency.   Neurological:  Negative for aphonia, brief paralysis, difficulty with concentration, disturbances in coordination, excessive daytime sleepiness, dizziness, focal weakness, headaches, light-headedness, loss of balance, numbness, paresthesias, seizures, sensory change, tremors, vertigo and weakness.   Psychiatric/Behavioral:  Negative for altered mental status, depression, hallucinations, memory loss, substance abuse, suicidal ideas and thoughts of violence. The patient does not have insomnia and is not nervous/anxious.    Allergic/Immunologic: Negative for hives and persistent infections.        Objective:       Vitals:    12/09/24 1303   BP: (!) 160/99   BP Location: Right arm   Patient Position: Sitting   Pulse: 90   SpO2: 96%   Weight: (!) 170.4 kg (375 lb 10.6 oz)   Height: 6' 5" (1.956 m)    Physical Exam  Constitutional:       General: He is not in acute distress.     Appearance: He is " well-developed. He is not diaphoretic.   HENT:      Head: Normocephalic and atraumatic.      Nose: Nose normal.   Eyes:      General: No scleral icterus.        Right eye: No discharge.      Conjunctiva/sclera: Conjunctivae normal.      Pupils: Pupils are equal, round, and reactive to light.   Neck:      Thyroid: No thyromegaly.      Vascular: No JVD.      Trachea: No tracheal deviation.   Cardiovascular:      Rate and Rhythm: Normal rate and regular rhythm.      Pulses:           Carotid pulses are 2+ on the right side and 2+ on the left side.       Radial pulses are 2+ on the right side and 2+ on the left side.        Dorsalis pedis pulses are 2+ on the right side and 2+ on the left side.        Posterior tibial pulses are 2+ on the right side and 2+ on the left side.      Heart sounds: Normal heart sounds. No murmur heard.     No friction rub. No gallop.   Pulmonary:      Effort: Pulmonary effort is normal. No respiratory distress.      Breath sounds: Normal breath sounds. No stridor. No wheezing or rales.   Chest:      Chest wall: No tenderness.   Abdominal:      General: Bowel sounds are normal. There is no distension.      Palpations: Abdomen is soft. There is no mass.      Tenderness: There is no abdominal tenderness. There is no guarding or rebound.   Musculoskeletal:         General: No tenderness. Normal range of motion.      Cervical back: Normal range of motion and neck supple.   Lymphadenopathy:      Cervical: No cervical adenopathy.   Skin:     General: Skin is warm and dry.      Coloration: Skin is not pale.      Findings: No erythema or rash.   Neurological:      Mental Status: He is alert and oriented to person, place, and time.      Cranial Nerves: No cranial nerve deficit.      Coordination: Coordination normal.   Psychiatric:         Behavior: Behavior normal.         Thought Content: Thought content normal.         Judgment: Judgment normal.           Assessment:       1. Resistant hypertension     2. Aortic atherosclerosis    3. Hyperlipidemia associated with type 2 diabetes mellitus    4. Hypertension associated with type 2 diabetes mellitus    5. Class 3 severe obesity due to excess calories with serious comorbidity and body mass index (BMI) of 40.0 to 44.9 in adult      Results for orders placed or performed during the hospital encounter of 12/06/24   POCT glucose    Collection Time: 12/06/24 11:36 AM   Result Value Ref Range    POCT Glucose 92 70 - 110 mg/dL   Urinary Stone Analysis    Collection Time: 12/06/24  1:08 PM   Result Value Ref Range    Stone Source Stone          Current Outpatient Medications:     amLODIPine (NORVASC) 10 MG tablet, Take 1 tablet by mouth once daily, Disp: 90 tablet, Rfl: 3    aspirin (ECOTRIN) 81 MG EC tablet, Take 1 tablet (81 mg total) by mouth once daily., Disp: , Rfl: 0    ciprofloxacin HCl (CIPRO) 500 MG tablet, Take 1 tablet (500 mg total) by mouth every 12 (twelve) hours. for 14 days, Disp: 28 tablet, Rfl: 0    ciprofloxacin HCl (CIPRO) 500 MG tablet, Take 1 tablet (500 mg total) by mouth 2 (two) times daily. for 5 days, Disp: 10 tablet, Rfl: 0    diclofenac sodium (VOLTAREN) 1 % Gel, Apply 2 g topically as needed. Instructed to hold dos, Disp: , Rfl:     diphenhydramine HCl (BENADRYL ORAL), Take 75 mg by mouth every evening., Disp: , Rfl:     eplerenone (INSPRA) 25 MG Tab, Take 1 tablet (25 mg total) by mouth once daily. (Patient taking differently: Take 25 mg by mouth once daily. Instructed to hold dos), Disp: 90 tablet, Rfl: 1    ferrous sulfate (FEOSOL) 325 mg (65 mg iron) Tab tablet, Take 2 tablets (650 mg total) by mouth daily with breakfast. (Patient taking differently: Take 650 mg by mouth daily with breakfast. On hold for surgeries), Disp: , Rfl: 0    finasteride (PROSCAR) 5 mg tablet, Take 1 tablet by mouth once daily (Patient taking differently: Take 5 mg by mouth once daily.), Disp: 90 tablet, Rfl: 0    HYDROcodone-acetaminophen (NORCO) 5-325 mg per  tablet, Take 1 tablet by mouth every 6 (six) hours as needed for Pain., Disp: 28 tablet, Rfl: 0    lysine 500 mg Tab, Take 500 mg by mouth every other day. Instructed to hold for sx (Patient taking differently: Take 500 mg by mouth every other day. On hold for surgeries), Disp: , Rfl:     metoprolol succinate (TOPROL-XL) 100 MG 24 hr tablet, Take 1 tablet by mouth once daily (Patient taking differently: Take 100 mg by mouth once daily.), Disp: 90 tablet, Rfl: 0    multivitamin (THERAGRAN) per tablet, Take 1 tablet by mouth once daily. Instructed to hold for sx (Patient taking differently: Take 1 tablet by mouth once daily. On hold for surgeries), Disp: , Rfl:     omega-3 fatty acids/fish oil (FISH OIL-OMEGA-3 FATTY ACIDS) 300-1,000 mg capsule, Take 1 capsule by mouth once daily. (Patient taking differently: Take 1 capsule by mouth once daily. On hold for surgeries), Disp: , Rfl:     rosuvastatin (CRESTOR) 10 MG tablet, Take 1 tablet (10 mg total) by mouth every evening., Disp: 90 tablet, Rfl: 1    semaglutide (OZEMPIC) 1 mg/dose (4 mg/3 mL), INJECT 1MG INTO THE SKIN EVERY 7 DAYS AS DIRECTED (Patient taking differently: Inject 1 mg into the skin every 7 days. Last dose 11/24/2024), Disp: 3 mL, Rfl: 5    tadalafiL (CIALIS) 20 MG Tab, TAKE 1 TABLET BY MOUTH ONCE DAILY AS NEEDED FOR  ERECTILE  DYSFUNCTION, Disp: 30 tablet, Rfl: 0    tamsulosin (FLOMAX) 0.4 mg Cap, Take 1 capsule by mouth once daily (Patient taking differently: Take 1 capsule by mouth once daily.), Disp: 90 capsule, Rfl: 0    valsartan-hydrochlorothiazide (DIOVAN-HCT) 320-12.5 mg per tablet, Take 1 tablet by mouth once daily, Disp: 90 tablet, Rfl: 3    hydrALAZINE (APRESOLINE) 50 MG tablet, Take 1 tablet (50 mg total) by mouth 3 (three) times daily., Disp: 270 tablet, Rfl: 3    HYDROcodone-acetaminophen (NORCO) 5-325 mg per tablet, Take 1 tablet by mouth every 6 (six) hours as needed for Pain., Disp: 20 tablet, Rfl: 0     Lab Results   Component Value  Date    WBC 3.74 (L) 11/20/2024    RBC 4.73 11/20/2024    HGB 13.6 (L) 11/20/2024    HCT 41.0 11/20/2024    MCV 87 11/20/2024    MCH 28.8 11/20/2024    MCHC 33.2 11/20/2024    RDW 13.2 11/20/2024     11/20/2024    MPV 8.9 (L) 11/20/2024    GRAN 1.8 11/20/2024    GRAN 49.0 11/20/2024    LYMPH 1.1 11/20/2024    LYMPH 30.5 11/20/2024    MONO 0.5 11/20/2024    MONO 14.4 11/20/2024    EOS 0.2 11/20/2024    BASO 0.03 11/20/2024    EOSINOPHIL 5.3 11/20/2024    BASOPHIL 0.8 11/20/2024    MG 1.8 11/20/2024        CMP  Lab Results   Component Value Date     11/20/2024    K 4.2 11/20/2024     11/20/2024    CO2 27 11/20/2024     11/20/2024    BUN 9 11/20/2024    CREATININE 0.9 11/20/2024    CALCIUM 9.4 11/20/2024    PROT 7.1 11/20/2024    ALBUMIN 3.7 11/20/2024    BILITOT 0.6 11/20/2024    ALKPHOS 68 11/20/2024    AST 35 11/20/2024    ALT 35 11/20/2024    ANIONGAP 11 11/20/2024    ESTGFRAFRICA >60.0 02/09/2022    EGFRNONAA >60.0 02/09/2022        Lab Results   Component Value Date    LABBLOO No growth after 5 days. 05/05/2024    LABURIN No growth 11/27/2024            Results for orders placed or performed in visit on 11/13/24   EKG 12-lead    Collection Time: 11/13/24  7:31 AM   Result Value Ref Range    QRS Duration 90 ms    [facility] QTC Calculation 470 ms    Narrative    Test Reason : Z01.818,    Vent. Rate :  80 BPM     Atrial Rate :  80 BPM     P-R Int : 194 ms          QRS Dur :  90 ms      QT Int : 408 ms       P-R-T Axes :  73  50  35 degrees    QTcB Int : 470 ms    Normal sinus rhythm  Normal ECG  When compared with ECG of 02-May-2023 14:32,  No significant change was found  Confirmed by Sergei Paris ([id number]) on 11/13/2024 5:57:18 PM    Referred By: MOON CRAFT           Confirmed By: Sergei Paris                   Plan:       Problem List Items Addressed This Visit          Cardiac/Vascular    Hyperlipidemia associated with type 2 diabetes mellitus     He is considered primary prevention.  He  is on 10 mg rosuvastatin.  Current LDL 74 mg%.  Therapy to continue.         Hypertension associated with type 2 diabetes mellitus     Hydralazine increased to 50 mg 3 times daily.  He is on multiple antihypertensive meds.  Current therapy to continue.         Resistant hypertension - Primary    Relevant Medications    hydrALAZINE (APRESOLINE) 50 MG tablet    Other Relevant Orders    IN OFFICE EKG 12-LEAD (to Muse)    Aortic atherosclerosis     Condition stable.            Endocrine    Class 3 severe obesity due to excess calories with serious comorbidity and body mass index (BMI) of 40.0 to 44.9 in adult     Weight loss encouraged.               One year follow-up advised.  I did increase hydralazine to 50 mg 3 times daily.  All his blood pressure therapy will be continued.    I encouraged the patient to check his readings on a regular basis.             Duarte Card MD  12/09/2024   1:29 PM

## 2024-12-10 ENCOUNTER — OFFICE VISIT (OUTPATIENT)
Dept: UROLOGY | Facility: CLINIC | Age: 59
End: 2024-12-10
Payer: COMMERCIAL

## 2024-12-10 VITALS
OXYGEN SATURATION: 98 % | SYSTOLIC BLOOD PRESSURE: 140 MMHG | HEIGHT: 77 IN | BODY MASS INDEX: 11.59 KG/M2 | WEIGHT: 98.19 LBS | DIASTOLIC BLOOD PRESSURE: 75 MMHG | HEART RATE: 87 BPM

## 2024-12-10 DIAGNOSIS — R33.9 URINARY RETENTION: ICD-10-CM

## 2024-12-10 DIAGNOSIS — N52.03 COMBINED ARTERIAL INSUFFICIENCY AND CORPORO-VENOUS OCCLUSIVE ERECTILE DYSFUNCTION: Primary | ICD-10-CM

## 2024-12-10 DIAGNOSIS — N18.1 CKD (CHRONIC KIDNEY DISEASE) STAGE 1, GFR 90 ML/MIN OR GREATER: ICD-10-CM

## 2024-12-10 DIAGNOSIS — Z78.9 SELF-CATHETERIZES URINARY BLADDER: ICD-10-CM

## 2024-12-10 DIAGNOSIS — N31.9 NEUROGENIC BLADDER: ICD-10-CM

## 2024-12-10 DIAGNOSIS — G61.81 CIDP (CHRONIC INFLAMMATORY DEMYELINATING POLYNEUROPATHY): ICD-10-CM

## 2024-12-10 PROCEDURE — 99999 PR PBB SHADOW E&M-EST. PATIENT-LVL IV: CPT | Mod: PBBFAC,,, | Performed by: UROLOGY

## 2024-12-10 RX ORDER — SULFAMETHOXAZOLE AND TRIMETHOPRIM 800; 160 MG/1; MG/1
1 TABLET ORAL 2 TIMES DAILY
Qty: 28 TABLET | Refills: 0 | Status: SHIPPED | OUTPATIENT
Start: 2024-12-10 | End: 2024-12-24

## 2024-12-10 NOTE — PATIENT INSTRUCTIONS
Patient to undergo sacral nerve stimulator generator placement on Thursday 12/12/2024  Patient to follow up at [facility] on Tuesday 12/17/2024 for placement of inflatable penile prosthesis  Patient to follow-up postop follow up 1-2 weeks for post-op check.  Consent on Chart.

## 2024-12-10 NOTE — PROGRESS NOTES
Subjective:      Patient ID: Manfred Shah is a 59 y.o. male.    Chief Complaint: No chief complaint on file.    Mr. Shah is 51-year-old gentleman who is a proximally 8 days status post placement of sacral nerve stimulator leads.  The patient has developed some increased ability to drain urine but only a dribble at this time.  The patient was unable to do this prior to placement and wishes to move forward with placement of the generator and see if he develops improve urinating over time.  The patient is still continuing to perform intermittent catheterization as necessary.  The patient did develop starting yesterday some discomfort to the left testicle and has apparent epididymal tenderness and swelling consistent with possible epididymitis.  The patient has been on Cipro will change medication to Bactrim double-strength at this point.  Would like to have this resolved prior to placement of prosthesis.  The patient is to returned to the operating room in 2 days for placement of the generator in the pocket and connection to the lead for permanent lead placement and generator placement at [facility].  Risks, benefits and complications of the procedure were explained the patient and informed consent was obtained prior to the patient leaving the office.    Follow-up  This is a new (Placement of sacral stimulator lead, now with pain in left epididymis) problem. The current episode started in the past 7 days. The problem occurs constantly. The problem has been unchanged. Pertinent negatives include no abdominal pain, anorexia, arthralgias, change in bowel habit, chest pain, chills, congestion, coughing, diaphoresis, fatigue, fever, headaches, joint swelling, myalgias, nausea, neck pain, numbness, rash, sore throat, swollen glands, urinary symptoms, vertigo, visual change, vomiting or weakness. Nothing aggravates the symptoms. He has tried nothing for the symptoms. The treatment provided significant  relief.     Review of Systems   Constitutional:  Negative for activity change, appetite change, chills, diaphoresis, fatigue, fever and unexpected weight change.   HENT:  Negative for congestion, hearing loss, sinus pressure, sore throat and trouble swallowing.    Eyes:  Negative for photophobia, pain, discharge and visual disturbance.   Respiratory:  Negative for apnea, cough and shortness of breath.    Cardiovascular:  Negative for chest pain, palpitations and leg swelling.   Gastrointestinal:  Negative for abdominal distention, abdominal pain, anal bleeding, anorexia, blood in stool, change in bowel habit, constipation, diarrhea, nausea, rectal pain and vomiting.   Endocrine: Negative for cold intolerance, heat intolerance, polydipsia, polyphagia and polyuria.   Genitourinary:  Negative for decreased urine volume, difficulty urinating, dysuria, enuresis, flank pain, frequency, genital sores, hematuria, penile discharge, penile pain, penile swelling, scrotal swelling, testicular pain and urgency.   Musculoskeletal:  Negative for arthralgias, back pain, joint swelling, myalgias and neck pain.   Skin:  Negative for color change, pallor, rash and wound.   Allergic/Immunologic: Negative for environmental allergies, food allergies and immunocompromised state.   Neurological:  Negative for dizziness, vertigo, seizures, weakness, numbness and headaches.   Hematological:  Negative for adenopathy. Does not bruise/bleed easily.   Psychiatric/Behavioral: Negative.        Objective:     Physical Exam  Constitutional:       General: He is not in acute distress.     Appearance: Normal appearance. He is obese. He is not ill-appearing, toxic-appearing or diaphoretic.   HENT:      Head: Normocephalic and atraumatic.      Right Ear: External ear normal. There is no impacted cerumen.      Left Ear: External ear normal. There is no impacted cerumen.      Nose: Nose normal. No congestion or rhinorrhea.      Mouth/Throat:      Mouth:  Mucous membranes are moist.      Pharynx: Oropharynx is clear. No oropharyngeal exudate or posterior oropharyngeal erythema.   Eyes:      General: No scleral icterus.        Right eye: No discharge.         Left eye: No discharge.      Conjunctiva/sclera: Conjunctivae normal.      Pupils: Pupils are equal, round, and reactive to light.   Cardiovascular:      Rate and Rhythm: Normal rate and regular rhythm.      Pulses: Normal pulses.      Heart sounds: Normal heart sounds.   Pulmonary:      Effort: Pulmonary effort is normal. No respiratory distress.      Breath sounds: Normal breath sounds. No stridor. No wheezing, rhonchi or rales.   Chest:      Chest wall: No tenderness.   Abdominal:      General: Abdomen is flat. Bowel sounds are normal. There is no distension.      Palpations: Abdomen is soft. There is no mass.      Tenderness: There is no abdominal tenderness. There is no right CVA tenderness, left CVA tenderness, guarding or rebound.      Hernia: No hernia is present.   Genitourinary:     Penis: Normal.       Testes: Normal.   Musculoskeletal:         General: No swelling, tenderness, deformity or signs of injury. Normal range of motion.      Cervical back: Normal range of motion and neck supple.      Right lower leg: No edema.      Left lower leg: No edema.   Skin:     General: Skin is warm and dry.      Capillary Refill: Capillary refill takes less than 2 seconds.      Coloration: Skin is not jaundiced or pale.      Findings: No bruising, erythema, lesion or rash.   Neurological:      General: No focal deficit present.      Mental Status: He is alert. Mental status is at baseline. He is disoriented.      Cranial Nerves: No cranial nerve deficit.      Sensory: No sensory deficit.      Motor: No weakness.      Coordination: Coordination normal.      Gait: Gait normal.      Deep Tendon Reflexes: Reflexes normal.   Psychiatric:         Mood and Affect: Mood normal.         Behavior: Behavior normal.          Thought Content: Thought content normal.         Judgment: Judgment normal.      Assessment:      1. Combined arterial insufficiency and corporo-venous occlusive erectile dysfunction    2. CIDP (chronic inflammatory demyelinating polyneuropathy)    3. CKD (chronic kidney disease) stage 1, GFR 90 ml/min or greater    4. Neurogenic bladder    5. Urinary retention    6. Self-catheterizes urinary bladder      Plan:     There are no Patient Instructions on file for this visit.

## 2024-12-13 ENCOUNTER — PATIENT MESSAGE (OUTPATIENT)
Dept: UROLOGY | Facility: CLINIC | Age: 59
End: 2024-12-13
Payer: COMMERCIAL

## 2024-12-14 DIAGNOSIS — N52.03 COMBINED ARTERIAL INSUFFICIENCY AND CORPORO-VENOUS OCCLUSIVE ERECTILE DYSFUNCTION: ICD-10-CM

## 2024-12-16 ENCOUNTER — PATIENT MESSAGE (OUTPATIENT)
Dept: UROLOGY | Facility: CLINIC | Age: 59
End: 2024-12-16
Payer: COMMERCIAL

## 2024-12-17 RX ORDER — TADALAFIL 20 MG/1
20 TABLET ORAL DAILY PRN
Qty: 30 TABLET | Refills: 0 | Status: SHIPPED | OUTPATIENT
Start: 2024-12-17

## 2024-12-20 ENCOUNTER — PATIENT MESSAGE (OUTPATIENT)
Dept: UROLOGY | Facility: CLINIC | Age: 59
End: 2024-12-20
Payer: COMMERCIAL

## 2024-12-24 ENCOUNTER — OFFICE VISIT (OUTPATIENT)
Dept: UROLOGY | Facility: CLINIC | Age: 59
End: 2024-12-24
Payer: COMMERCIAL

## 2024-12-24 VITALS — HEART RATE: 106 BPM | SYSTOLIC BLOOD PRESSURE: 138 MMHG | DIASTOLIC BLOOD PRESSURE: 77 MMHG

## 2024-12-24 DIAGNOSIS — Z98.890 POST-OPERATIVE STATE: Primary | ICD-10-CM

## 2024-12-24 PROCEDURE — 1160F RVW MEDS BY RX/DR IN RCRD: CPT | Mod: CPTII,S$GLB,, | Performed by: UROLOGY

## 2024-12-24 PROCEDURE — 3060F POS MICROALBUMINURIA REV: CPT | Mod: CPTII,S$GLB,, | Performed by: UROLOGY

## 2024-12-24 PROCEDURE — 3066F NEPHROPATHY DOC TX: CPT | Mod: CPTII,S$GLB,, | Performed by: UROLOGY

## 2024-12-24 PROCEDURE — 3044F HG A1C LEVEL LT 7.0%: CPT | Mod: CPTII,S$GLB,, | Performed by: UROLOGY

## 2024-12-24 PROCEDURE — 99999 PR PBB SHADOW E&M-EST. PATIENT-LVL III: CPT | Mod: PBBFAC,,, | Performed by: UROLOGY

## 2024-12-24 PROCEDURE — 1159F MED LIST DOCD IN RCRD: CPT | Mod: CPTII,S$GLB,, | Performed by: UROLOGY

## 2024-12-24 PROCEDURE — 99024 POSTOP FOLLOW-UP VISIT: CPT | Mod: S$GLB,,, | Performed by: UROLOGY

## 2024-12-24 PROCEDURE — 3078F DIAST BP <80 MM HG: CPT | Mod: CPTII,S$GLB,, | Performed by: UROLOGY

## 2024-12-24 PROCEDURE — 3075F SYST BP GE 130 - 139MM HG: CPT | Mod: CPTII,S$GLB,, | Performed by: UROLOGY

## 2024-12-24 NOTE — PROGRESS NOTES
Manfred Shah is a 59 y.o. male patient.   No diagnosis found.  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 Tray Rodriguez    BPH (benign prostatic hyperplasia)     CIDP (chronic inflammatory demyelinating polyneuropathy) 06/2018    followed by Dr. Rowdy Tran - Neurologist in [place].      Elevated liver enzymes 10/08/2018    Elevated PSA     followed by Dr. Sanderson    Epididymitis, left     possible, discomfort, swelling and tenderness to left testicle    Erectile dysfunction     Foot drop, left foot     due to neurological condition/[place]; wears brace    Impaired fasting glucose     Neurogenic bladder 09/05/2018    followed by Dr. Sanderson    RLS (restless legs syndrome) 10/08/2018    Self-catheterizes urinary bladder     5-6 times a day    Sleep apnea with use of continuous positive airway pressure (CPAP) 01/10/2018    followed by [facility] in [place]    Type 2 diabetes mellitus without complication, without long-term current use of insulin 12/23/2022    Urge incontinence 09/11/2018    UTI (urinary tract infection), uncomplicated 05/10/2024     Past Surgical History Pertinent Negatives:   Procedure Date Noted    CYSTOSCOPY 08/08/2017    PROSTATE SURGERY 08/08/2017     Scheduled Meds:  Continuous Infusions:  PRN Meds:    Review of patient's allergies indicates:  No Known Allergies  There are no hospital problems to display for this patient.    Blood pressure 138/77, pulse 106.    Subjective:   Diet: Adequate intake.  Patient reports no nausea or vomiting.    Activity level: Returning to normal.    Pain control: Well controlled.    Objective:  Vital signs (most recent): Blood pressure 138/77, pulse 106.  General appearance: Comfortable, well-appearing, in no acute distress and not in pain.    Lungs:  Normal respiratory rate and normal effort.  He is in no respiratory distress.  Breath sounds normal.  There  are no decreased breath sounds, wheezes, rales or rhonchi.    Heart: Normal rate.    Chest: Asymmetric chest wall expansion.    Abdomen: Abdomen is soft.  No distension or ascites.    Bowel sounds:  Bowel sounds are normal.       Assessment:   Post-op: 7 days.    Condition: In stable condition.     Plan:  Transfer to ICU.  Encourage ambulation.  Continue wound care as written.  Discontinue drain.  Consults: medicine.  Regular diet.  Start antibiotics.         Anthony Sanderson MD  12/24/2024

## 2024-12-29 ENCOUNTER — PATIENT MESSAGE (OUTPATIENT)
Dept: UROLOGY | Facility: CLINIC | Age: 59
End: 2024-12-29
Payer: COMMERCIAL

## 2024-12-30 ENCOUNTER — DOCUMENTATION ONLY (OUTPATIENT)
Dept: UROLOGY | Facility: CLINIC | Age: 59
End: 2024-12-30
Payer: COMMERCIAL

## 2024-12-30 NOTE — PROGRESS NOTES
Post-op note    Patient presents postoperative day 13 with some residual penoscrotal edema.  Much improved over last week.  No erythema.  Able to palpate pump but unsure if buttons were being compressed.  Some decrease in pressure from penile prosthesis appears to have occurred with pushing the buttons on the pump however the patient is still seems somewhat erect.  The patient is not having any discomfort.  Will follow up in 1 week and re-evaluate with a scrotal swelling and try to deflate pump again.  Patient to activate his InterStim unit into resume light activity.

## 2025-01-07 ENCOUNTER — PROCEDURE VISIT (OUTPATIENT)
Dept: UROLOGY | Facility: CLINIC | Age: 60
End: 2025-01-07
Payer: COMMERCIAL

## 2025-01-07 VITALS
SYSTOLIC BLOOD PRESSURE: 142 MMHG | HEIGHT: 77 IN | WEIGHT: 315 LBS | BODY MASS INDEX: 37.19 KG/M2 | HEART RATE: 102 BPM | DIASTOLIC BLOOD PRESSURE: 80 MMHG

## 2025-01-07 DIAGNOSIS — Z96.0 URETERAL STENT PRESENT: Primary | ICD-10-CM

## 2025-01-07 PROCEDURE — 52310 CYSTOSCOPY AND TREATMENT: CPT | Mod: 79,S$GLB,, | Performed by: UROLOGY

## 2025-01-07 NOTE — PROCEDURES
Cystoscopy    Date/Time: 1/7/2025 1:30 PM    Performed by: Anthony Sanderson MD  Authorized by: Anthony Sanderson MD    Consent Done?:  Yes (Written)  Timeout: prior to procedure the correct patient, procedure, and site was verified    Prep: patient was prepped and draped in usual sterile fashion    Local anesthesia used?: Yes    Anesthesia:  Intraurethral instillation  Local anesthetic:  Lidocaine 2% topical gel  Anesthetic total (ml):  10  Indications comment:  Ureteral stent present  Position:  Supine  Anesthesia:  Intraurethral instillation  Patient sedated?: No    Preparation: Patient was prepped and draped in usual sterile fashion    Scope type:  Flexible cystoscopeNoNo  Stent inserted: No    Stent removed: Yes    Stent encrusted: No    External exam normal: Yes    Digital exam performed: No    Urethra normal: Yes    Prostate normal: Yes    Bladder neck normal: Yes    Bladder normal: Yes     patient tolerated the procedure well with no immediate complications  Comments:      Stent protruding from left ureteral orifice extracted without difficulty and removed intact.

## 2025-01-07 NOTE — PATIENT INSTRUCTIONS
Patient to follow up on nurses schedule in 1 week for re-evaluation and attempt to deflate pump as scrotum still swollen.

## 2025-01-14 ENCOUNTER — CLINICAL SUPPORT (OUTPATIENT)
Dept: UROLOGY | Facility: CLINIC | Age: 60
End: 2025-01-14
Payer: COMMERCIAL

## 2025-01-14 VITALS
HEART RATE: 79 BPM | BODY MASS INDEX: 37.19 KG/M2 | WEIGHT: 315 LBS | HEIGHT: 77 IN | SYSTOLIC BLOOD PRESSURE: 138 MMHG | OXYGEN SATURATION: 97 % | DIASTOLIC BLOOD PRESSURE: 78 MMHG

## 2025-01-14 DIAGNOSIS — Z98.890 POST-OPERATIVE STATE: Primary | ICD-10-CM

## 2025-01-14 PROCEDURE — 99999 PR PBB SHADOW E&M-EST. PATIENT-LVL IV: CPT | Mod: PBBFAC,,,

## 2025-01-14 NOTE — PROGRESS NOTES
Patient was seen by Dr. Sanderson. Dr. Sanderson reports edema remains, he was unable to deflate the pump. Advised f/u in one week. Appt made.

## 2025-01-15 ENCOUNTER — OFFICE VISIT (OUTPATIENT)
Dept: URGENT CARE | Facility: CLINIC | Age: 60
End: 2025-01-15
Payer: COMMERCIAL

## 2025-01-15 VITALS
RESPIRATION RATE: 17 BRPM | BODY MASS INDEX: 37.19 KG/M2 | SYSTOLIC BLOOD PRESSURE: 121 MMHG | DIASTOLIC BLOOD PRESSURE: 67 MMHG | WEIGHT: 315 LBS | OXYGEN SATURATION: 99 % | HEIGHT: 77 IN | HEART RATE: 103 BPM | TEMPERATURE: 99 F

## 2025-01-15 DIAGNOSIS — R50.9 FEVER, UNSPECIFIED FEVER CAUSE: ICD-10-CM

## 2025-01-15 DIAGNOSIS — R68.89 FLU-LIKE SYMPTOMS: ICD-10-CM

## 2025-01-15 DIAGNOSIS — R05.9 COUGH, UNSPECIFIED TYPE: Primary | ICD-10-CM

## 2025-01-15 LAB
CTP QC/QA: YES
CTP QC/QA: YES
POC MOLECULAR INFLUENZA A AGN: NEGATIVE
POC MOLECULAR INFLUENZA B AGN: NEGATIVE
SARS-COV-2 AG RESP QL IA.RAPID: NEGATIVE

## 2025-01-15 PROCEDURE — 87502 INFLUENZA DNA AMP PROBE: CPT | Mod: QW,S$GLB,, | Performed by: PHYSICIAN ASSISTANT

## 2025-01-15 PROCEDURE — 87811 SARS-COV-2 COVID19 W/OPTIC: CPT | Mod: QW,S$GLB,, | Performed by: PHYSICIAN ASSISTANT

## 2025-01-15 PROCEDURE — 99214 OFFICE O/P EST MOD 30 MIN: CPT | Mod: S$GLB,,, | Performed by: PHYSICIAN ASSISTANT

## 2025-01-15 RX ORDER — OSELTAMIVIR PHOSPHATE 75 MG/1
75 CAPSULE ORAL 2 TIMES DAILY
Qty: 10 CAPSULE | Refills: 0 | Status: SHIPPED | OUTPATIENT
Start: 2025-01-15 | End: 2025-01-20

## 2025-01-16 NOTE — PATIENT INSTRUCTIONS
Patient Education       Upper Respiratory Infection ED   General Information   You came to the Emergency Department (ED) for an upper respiratory infection or URI. A URI can affect your nose, throat, ears, and sinuses. A virus is the cause of almost all URIs and antibiotics will not help you feel better more quickly. The common cold is an example of a viral URI.  URIs are easy to spread from person to person, most often through coughing or sneezing. A URI will almost always get better in a week or two without any treatment.  What care is needed at home?   Call your regular doctor to let them know you were in the ED. Make a follow-up appointment if you were told to.  If you smoke, try to quit. Your doctor or nurse can help.  Drink lots of fluids like water, juice, or broth. This will help replace any fluids lost if you have a runny nose or fever. Warm tea or soup can help soothe a sore throat.  If the air in your home feels dry, use a cool mist humidifier. This can help a stuffy nose and make it easier to breathe.  You can also use saline nose drops to relieve stuffiness.  If you decide to take over-the-counter cough or cold medicines, follow the directions on the label carefully. Be sure you do not take more than 1 medicine that contains acetaminophen. Also, if you have a heart problem or high blood pressure, check with your doctor before you take any of these medicines.  Wash your hands often. Cough or sneeze into a tissue or your elbow instead of your hands. This will help keep others healthy.  When do I need to get emergency help?   Return to the ED if:   You have trouble breathing when talking or sitting still.  When do I need to call the doctor?   You have a fever of 100.4°F (38°C) or higher for several days, chills, a very bad sore throat, or ear or sinus pain.  You develop a new fever after several days of feeling the same or improving.  You develop chest pain when you cough.  You have a cough that lasts more  than 10 days.  You cough up blood, or the color of the mucus you cough up changes.  You have new or worsening symptoms.  Last Reviewed Date   2020-09-25  Consumer Information Use and Disclaimer   This information is not specific medical advice and does not replace information you receive from your health care provider. This is only a brief summary of general information. It does NOT include all information about conditions, illnesses, injuries, tests, procedures, treatments, therapies, discharge instructions or life-style choices that may apply to you. You must talk with your health care provider for complete information about your health and treatment options. This information should not be used to decide whether or not to accept your health care providers advice, instructions or recommendations. Only your health care provider has the knowledge and training to provide advice that is right for you.  Copyright   Copyright © 2021 [employer] and its affiliates and/or licensors. All rights reserved. Patient Education       Flu, Adult ED   General Information   You came to the Emergency Department (ED) for the flu. The flu, or influenza, is an infection that is caused by a virus. It is easy to spread from person to person. Most people get over the flu without any long-term problems. However, some people are more likely to get very sick from the flu.  You may need antiviral medicine to treat the flu. If so, it is important to take all of the medicine, even if you start to feel better. Antibiotics do not work on the flu.  What care is needed at home?   Call your regular doctor to let them know you were in the ED. Make a follow-up appointment if you were told to.  Drink lots of water, juice, or broth to replace fluids lost in runny nose and fever.  Take warm, steamy showers to help soothe the cough.  Use hard candy or cough drops to soothe sore throat and cough.  Wash your hands often. This will help keep others  healthy.  Try to thin mucus.  Drink lots of liquids.  Use a cool mist humidifier to avoid dry air.  Use saline nose drops to relieve stuffiness.  You may want to take drugs like ibuprofen, naproxen, or acetaminophen to help with fever and body aches.  When do I need to get emergency help?   Call for an ambulance right away if:   You are having so much trouble breathing that you can only say one or two words at a time.  You need to sit upright at all times to be able to breathe and or cannot lie down.  You are very tired from working to catch your breath or you are sweating from trying to breathe.  Return to the ED if:   You have trouble breathing when talking or sitting still.  You have severe chest discomfort.  You feel confused or disoriented.  When do I need to call the doctor?   You are throwing up and cant keep liquids down.  You develop early signs of fluid loss, such as:  Dark-colored urine.  Dry mouth.  Muscle cramps.  Lack of energy.  Feeling lightheaded when you get up.  You have new or worsening symptoms.  Last Reviewed Date   2020-09-24  Consumer Information Use and Disclaimer   This information is not specific medical advice and does not replace information you receive from your health care provider. This is only a brief summary of general information. It does NOT include all information about conditions, illnesses, injuries, tests, procedures, treatments, therapies, discharge instructions or life-style choices that may apply to you. You must talk with your health care provider for complete information about your health and treatment options. This information should not be used to decide whether or not to accept your health care providers advice, instructions or recommendations. Only your health care provider has the knowledge and training to provide advice that is right for you.  Copyright   Copyright © 2021 [employer] and its affiliates and/or licensors. All rights reserved. Patient  Education       Flu, Adult ED   General Information   You came to the Emergency Department (ED) for the flu. The flu, or influenza, is an infection that is caused by a virus. It is easy to spread from person to person. Most people get over the flu without any long-term problems. However, some people are more likely to get very sick from the flu.  You may need antiviral medicine to treat the flu. If so, it is important to take all of the medicine, even if you start to feel better. Antibiotics do not work on the flu.  What care is needed at home?   Call your regular doctor to let them know you were in the ED. Make a follow-up appointment if you were told to.  Drink lots of water, juice, or broth to replace fluids lost in runny nose and fever.  Take warm, steamy showers to help soothe the cough.  Use hard candy or cough drops to soothe sore throat and cough.  Wash your hands often. This will help keep others healthy.  Try to thin mucus.  Drink lots of liquids.  Use a cool mist humidifier to avoid dry air.  Use saline nose drops to relieve stuffiness.  You may want to take drugs like ibuprofen, naproxen, or acetaminophen to help with fever and body aches.  When do I need to get emergency help?   Call for an ambulance right away if:   You are having so much trouble breathing that you can only say one or two words at a time.  You need to sit upright at all times to be able to breathe and or cannot lie down.  You are very tired from working to catch your breath or you are sweating from trying to breathe.  Return to the ED if:   You have trouble breathing when talking or sitting still.  You have severe chest discomfort.  You feel confused or disoriented.  When do I need to call the doctor?   You are throwing up and cant keep liquids down.  You develop early signs of fluid loss, such as:  Dark-colored urine.  Dry mouth.  Muscle cramps.  Lack of energy.  Feeling lightheaded when you get up.  You have new or worsening  symptoms.  Last Reviewed Date   2020-09-24  Consumer Information Use and Disclaimer   This information is not specific medical advice and does not replace information you receive from your health care provider. This is only a brief summary of general information. It does NOT include all information about conditions, illnesses, injuries, tests, procedures, treatments, therapies, discharge instructions or life-style choices that may apply to you. You must talk with your health care provider for complete information about your health and treatment options. This information should not be used to decide whether or not to accept your health care providers advice, instructions or recommendations. Only your health care provider has the knowledge and training to provide advice that is right for you.  Copyright   Copyright © 2021 [employer] and its affiliates and/or licensors. All rights reserved.

## 2025-01-16 NOTE — PROGRESS NOTES
"Subjective:      Patient ID: Manfred Shah is a 59 y.o. male.    Vitals:  height is 6' 5" (1.956 m) and weight is 167.8 kg (370 lb) (abnormal). His oral temperature is 99.1 °F (37.3 °C). His blood pressure is 121/67 and his pulse is 103. His respiration is 17 and oxygen saturation is 99%.     Chief Complaint: No chief complaint on file.    Patient presents with chills, fatigue, fever . Onset -today. No other symptoms reported. He states he took advil and one dose of left over antibiotic (cipro.) Patient states he does have a h/o of UTI. Denies burning on urination.  He also complains of body aches  today.  He had a measured temperature at home on 0102 earlier today    Fever   This is a new problem. The current episode started 1 day ago. The problem occurs cycles. The problem has been unchanged. He has not experienced a heat injury.The maximum temperature noted was 101 to 101.9 F. The temperature was taken using an oral thermometer. Associated symptoms include muscle aches. Pertinent negatives include no coughing, sleepiness, sore throat, urinary pain or vomiting. Treatments tried: advil. The treatment provided mild relief.       Constitution: Positive for fever.   HENT:  Negative for sore throat.    Respiratory:  Negative for cough.    Gastrointestinal:  Negative for vomiting.   Musculoskeletal:  Positive for muscle ache.   Skin:  Negative for erythema.      Objective:     Physical Exam   Constitutional: He is oriented to person, place, and time. He appears well-developed. He is cooperative.  Non-toxic appearance. He does not appear ill. No distress.   HENT:   Head: Normocephalic and atraumatic.   Ears:   Right Ear: Hearing, tympanic membrane, external ear and ear canal normal.   Left Ear: Hearing, tympanic membrane, external ear and ear canal normal.   Nose: Nose normal. No mucosal edema, rhinorrhea or nasal deformity. No epistaxis. Right sinus exhibits no maxillary sinus tenderness and no frontal sinus tenderness. " Left sinus exhibits no maxillary sinus tenderness and no frontal sinus tenderness.   Mouth/Throat: Uvula is midline, oropharynx is clear and moist and mucous membranes are normal. No trismus in the jaw. Normal dentition. No uvula swelling. No oropharyngeal exudate, posterior oropharyngeal edema or posterior oropharyngeal erythema.   Eyes: Conjunctivae, EOM and lids are normal. Pupils are equal, round, and reactive to light. Right eye exhibits no discharge. Left eye exhibits no discharge. No scleral icterus.   Neck: Trachea normal and phonation normal. Neck supple. No JVD present. No tracheal deviation present. No thyromegaly present. No edema present. No erythema present. No neck rigidity present.   Cardiovascular: Normal rate, regular rhythm, normal heart sounds and normal pulses.   No murmur heard.Exam reveals no gallop and no friction rub.   Pulmonary/Chest: Effort normal and breath sounds normal. No stridor. No respiratory distress. He has no decreased breath sounds. He has no wheezes. He has no rhonchi. He has no rales. He exhibits no tenderness.   Abdominal: Normal appearance. He exhibits no distension. Soft. There is no abdominal tenderness. There is no rebound and no guarding.   Musculoskeletal: Normal range of motion.         General: No deformity. Normal range of motion.   Neurological: He is alert and oriented to person, place, and time. He exhibits normal muscle tone. Coordination normal.   Skin: Skin is warm, dry, intact, not diaphoretic, not pale and no rash. Capillary refill takes less than 2 seconds. No erythema   Psychiatric: His speech is normal and behavior is normal. Judgment and thought content normal.   Nursing note and vitals reviewed.    Results for orders placed or performed in visit on 01/15/25   POCT Influenza A/B MOLECULAR    Collection Time: 01/15/25  7:11 PM   Result Value Ref Range    POC Molecular Influenza A Ag Negative Negative    POC Molecular Influenza B Ag Negative Negative      Acceptable Yes    SARS Coronavirus 2 Antigen, POCT Manual Read    Collection Time: 01/15/25  7:13 PM   Result Value Ref Range    SARS Coronavirus 2 Antigen Negative Negative     Acceptable Yes     No results found.     Assessment:     1. Cough, unspecified type    2. Fever, unspecified fever cause    3. Flu-like symptoms        Plan:       Cough, unspecified type  -     POCT Influenza A/B MOLECULAR  -     oseltamivir (TAMIFLU) 75 MG capsule; Take 1 capsule (75 mg total) by mouth 2 (two) times daily. for 5 days  Dispense: 10 capsule; Refill: 0    Fever, unspecified fever cause  -     SARS Coronavirus 2 Antigen, POCT Manual Read  -     oseltamivir (TAMIFLU) 75 MG capsule; Take 1 capsule (75 mg total) by mouth 2 (two) times daily. for 5 days  Dispense: 10 capsule; Refill: 0    Flu-like symptoms  -     oseltamivir (TAMIFLU) 75 MG capsule; Take 1 capsule (75 mg total) by mouth 2 (two) times daily. for 5 days  Dispense: 10 capsule; Refill: 0      Follow up if symptoms worsen or fail to improve, for F/U with PCP or ED. There are no Patient Instructions on file for this visit.

## 2025-01-17 ENCOUNTER — TELEPHONE (OUTPATIENT)
Dept: UROLOGY | Facility: CLINIC | Age: 60
End: 2025-01-17
Payer: COMMERCIAL

## 2025-01-17 DIAGNOSIS — I1A.0 RESISTANT HYPERTENSION: ICD-10-CM

## 2025-01-21 ENCOUNTER — PATIENT MESSAGE (OUTPATIENT)
Dept: UROLOGY | Facility: CLINIC | Age: 60
End: 2025-01-21
Payer: COMMERCIAL

## 2025-01-21 RX ORDER — METOPROLOL SUCCINATE 100 MG/1
TABLET, EXTENDED RELEASE ORAL
Qty: 90 TABLET | Refills: 3 | Status: SHIPPED | OUTPATIENT
Start: 2025-01-21

## 2025-01-31 ENCOUNTER — CLINICAL SUPPORT (OUTPATIENT)
Dept: UROLOGY | Facility: CLINIC | Age: 60
End: 2025-01-31
Payer: COMMERCIAL

## 2025-01-31 VITALS — HEART RATE: 87 BPM | DIASTOLIC BLOOD PRESSURE: 82 MMHG | SYSTOLIC BLOOD PRESSURE: 144 MMHG

## 2025-01-31 DIAGNOSIS — Z98.890 POST-OPERATIVE STATE: Primary | ICD-10-CM

## 2025-01-31 PROCEDURE — 99999 PR PBB SHADOW E&M-EST. PATIENT-LVL II: CPT | Mod: PBBFAC,,,

## 2025-02-05 NOTE — PROGRESS NOTES
Patient was seen by Dr. Sanderson. He checked incisions, remarked that swelling is subsiding, he deflated and reinflated prosthesis, left prosthesis inflated, patient to f/u on 2/14/25.

## 2025-02-14 ENCOUNTER — OFFICE VISIT (OUTPATIENT)
Dept: UROLOGY | Facility: CLINIC | Age: 60
End: 2025-02-14
Payer: COMMERCIAL

## 2025-02-14 ENCOUNTER — TELEPHONE (OUTPATIENT)
Dept: UROLOGY | Facility: CLINIC | Age: 60
End: 2025-02-14

## 2025-02-14 VITALS
BODY MASS INDEX: 44.7 KG/M2 | DIASTOLIC BLOOD PRESSURE: 76 MMHG | SYSTOLIC BLOOD PRESSURE: 133 MMHG | WEIGHT: 315 LBS | HEART RATE: 91 BPM

## 2025-02-14 DIAGNOSIS — Z96.89 HISTORY OF IMPLANTATION OF PENILE PROSTHESIS: ICD-10-CM

## 2025-02-14 DIAGNOSIS — Z98.890 POST-OPERATIVE STATE: Primary | ICD-10-CM

## 2025-02-14 DIAGNOSIS — N31.9 NEUROGENIC BLADDER: ICD-10-CM

## 2025-02-14 PROCEDURE — 99999 PR PBB SHADOW E&M-EST. PATIENT-LVL IV: CPT | Mod: PBBFAC,,, | Performed by: UROLOGY

## 2025-02-14 NOTE — PATIENT INSTRUCTIONS
The patient is activate pump daily for about 10 minutes and deflate.    Patient can participate in sexual activity if desired.  Patient to follow-up in 4-5 weeks.

## 2025-02-14 NOTE — PROGRESS NOTES
Subjective:      Patient ID: Manfred Shah is a 59 y.o. male.    Chief Complaint: Follow-up    Mr. Shah is a 59-year-old gentleman who is 2 months status post placement of IPP.  The patient presents for teaching on inflation and deflation.  He has had significant scrotal edema and swelling postoperatively and had difficulty manipulating the pump.  The patient presents today with decreased swelling and more ability to palpate pump and valve.  Patient was taught how to feel this and where to press.  He was able to activate and deactivate his prosthesis.  He will continue to do this daily until he follows up in a month.  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  The patient's self-catheterize as to empty his bladder.    Follow-up  This is a chronic (Status post IPP placement) problem. The current episode started more than 1 month ago. The problem occurs constantly. The problem has been gradually improving. Associated symptoms include urinary symptoms (Patient with neurogenic bladder who performs self-catheterizations) and weakness. Pertinent negatives include no abdominal pain, anorexia, arthralgias, change in bowel habit, chest pain, chills, congestion, coughing, diaphoresis, fatigue, fever, headaches, joint swelling, myalgias, nausea, neck pain, numbness, rash, sore throat, swollen glands, vertigo, visual change or vomiting. Nothing aggravates the symptoms. Treatments tried: IPP placement. The treatment provided significant relief.     Review of Systems   Constitutional:  Negative for chills, diaphoresis, fatigue and fever.   HENT:  Negative for congestion and sore throat.    Respiratory:  Negative for cough.    Cardiovascular:  Negative for chest pain.   Gastrointestinal:  Negative for abdominal pain, anorexia, change in bowel habit, nausea and vomiting.   Genitourinary:  Positive for difficulty urinating (neurogenic bladder) and scrotal swelling (status post IPP placement).    Musculoskeletal:  Negative for arthralgias, joint swelling, myalgias and neck pain.   Skin:  Negative for rash.   Neurological:  Positive for weakness. Negative for vertigo, numbness and headaches.      Objective:     Physical Exam  Vitals and nursing note reviewed.   Constitutional:       General: He is not in acute distress.     Appearance: Normal appearance. He is well-developed. He is obese. He is not ill-appearing, toxic-appearing or diaphoretic.   HENT:      Head: Normocephalic and atraumatic.      Right Ear: External ear normal. There is no impacted cerumen.      Left Ear: External ear normal. There is no impacted cerumen.      Nose: Nose normal. No congestion or rhinorrhea.      Mouth/Throat:      Pharynx: No oropharyngeal exudate or posterior oropharyngeal erythema.   Eyes:      General: No scleral icterus.        Right eye: No discharge.         Left eye: No discharge.      Conjunctiva/sclera: Conjunctivae normal.      Pupils: Pupils are equal, round, and reactive to light.   Cardiovascular:      Rate and Rhythm: Normal rate and regular rhythm.      Heart sounds: Normal heart sounds.   Pulmonary:      Effort: Pulmonary effort is normal.   Abdominal:      General: Bowel sounds are normal. There is no distension.      Palpations: Abdomen is soft. There is no mass.      Tenderness: There is no abdominal tenderness. There is no right CVA tenderness, left CVA tenderness, guarding or rebound.      Hernia: No hernia is present.   Genitourinary:         Comments: Patient with no CVA tenderness, normal penis and scrotum.  Bladder nontender.  IPP pump in left side of scrotum has turned laterally a little bit and so the button is more lateral than anterior on top of the pump.  The patient was talk were this is located and was taught how to activate and deactivate pump.  Musculoskeletal:         General: Normal range of motion.      Cervical back: Normal range of motion and neck supple.   Skin:     General: Skin is  warm and dry.      Capillary Refill: Capillary refill takes 2 to 3 seconds.   Neurological:      Mental Status: He is alert and oriented to person, place, and time.      Deep Tendon Reflexes: Reflexes are normal and symmetric.   Psychiatric:         Behavior: Behavior normal.         Thought Content: Thought content normal.         Judgment: Judgment normal.        Assessment:      1. Post-operative state    2. History of implantation of penile prosthesis    3. Neurogenic bladder      Plan:     There are no Patient Instructions on file for this visit.

## 2025-02-18 ENCOUNTER — PATIENT MESSAGE (OUTPATIENT)
Dept: UROLOGY | Facility: CLINIC | Age: 60
End: 2025-02-18
Payer: COMMERCIAL

## 2025-02-18 NOTE — TELEPHONE ENCOUNTER
Patient is active on [facility]. Please investigate this matter and reach out to patient at your convenience.

## 2025-02-20 ENCOUNTER — PATIENT MESSAGE (OUTPATIENT)
Dept: GASTROENTEROLOGY | Facility: CLINIC | Age: 60
End: 2025-02-20
Payer: COMMERCIAL

## 2025-02-20 LAB — CREATININE URINE: 94

## 2025-02-25 NOTE — PROGRESS NOTES
Outpatient Rehab    Physical Therapy Visit    Patient Name: Cesario Sahu  MRN: [id number]  YOB: 1954  Encounter Date: 2/25/2025    Therapy Diagnosis:   Encounter Diagnoses   Name Primary?    Decreased strength of lower extremity Yes    Decreased functional mobility and endurance      Physician: Dion Martínez PA*    Physician Orders: Eval and Treat  Medical Diagnosis:   M17.11 (ICD-10-CM) - Primary osteoarthritis of right knee   M17.12 (ICD-10-CM) - Primary osteoarthritis of left knee         Visit # / Visits Authorized:  1 / 16  Date of Evaluation:  2/18/2025   Insurance Authorization Period: 02/07/2025 to 02/07/2026  Plan of Care Certification:  2/18/2025 to 05/13/2025      Time In: 1010   Time Out: 1045  Total Time: 35   Total Billable Time: 25    FOTO:  Intake Score:  %  Survey Score 1:  %  Survey Score 2:  %         Subjective   Patient states she is having a good day today and reports her Right knee pain is minimal. At the end of today's session patient did report noticing some left knee discomfort, but tolerable..  Pain reported as 3/10.      Objective            Treatment:  CPT?  Intervention  Performed??   Today?   2/25/2025    Duration / Intensity?    MT?  ?  ?  ?    TE?  Patient educated on plan of care and current condition as well as importance of keeping knee joint mobile. ?  Patient verbalized  understanding?    ?  Nu-step?  ?  6 minutes Level 3?    ?  Recumbent Bike           x 6 minutes level 1    ?  Tailgaters?           x 3 minutes 3lbs?      Gastroc stretch                   NMR?  Quad Sets?    3 x 10 bilateral?    ?  Short Arc Quads?             x 3 x 10 bilateral?3lbs    ?  Long Arc Quads?            x 3 x 10 bilateral?3lbs    ?  Supine ball squeezes?             x 3 minutes 3 second holds?    ?  Supine clamshells?              x 3 minutes 3 second holds black band?    ?  ?Bridges             x 2 x 10?      SLR              x 2x10  bilateral     Prone hip extension      "Subjective:       Patient ID: Manfred Shah is a 58 y.o. male.    Chief Complaint: Annual Exam    HPI  Patient is a 58-year-old white male with CIDP (chronic inflammatory demyelinating polyneuropathy) diagnosed in June 2018 by neurologist, Dr. Rowdy Tran, left foot drop secondary to CIDP, history of Abscess of spinal cord treated by Dr. Rodriguez S/P Laminectomy in August 2018, Lumbar Spinal fusion on 6/3/2020 with Neurosurgeon Dr. Osorio, Neurogenic bladder with urge incontinence and self-catheterization and BPH followed by Dr. Sanderson, RLS, Hypertension, Hyperlipidemia, TYPE 2 Diabetes, Elevated Liver Enzymes, [place]'s nodes with joint inflammation and pain to bilateral hands followed by [facility],  and Sleep Apnea with CPAP use that is here today for ANNUAL exam with fasting lab results.       Resistant Hypertension   evaluated by [facility], Dr. Kiser, that is currently controlled on eplerenone (INSPRA) 25 MG Tab daily, amlodipine 10 mg daily, Valsartan HCTZ 320/12.5 mg daily, Metoprolol  mg daily and Hydralazine 25 mg twice daily.   Last seen Dr. Kiser May 2023  Dr. Kiser had advised that patient edema is due to venous insufficiency possibly due to ropinirole so medication was stopped.  /78 (BP Location: Left arm, Patient Position: Sitting, BP Method: Large (Manual))   Pulse 87   Temp 99 °F (37.2 °C) (Temporal)   Ht 6' 5" (1.956 m)   Wt (!) 169.3 kg (373 lb 3.8 oz)   SpO2 98%   BMI 44.26 kg/m²   Continue current medication regimen.  Stable.     Hyperlipidemia   currently taking Rosuvastatin 10 mg daily, and Fish Oil    LDL 74  Stable  monitored yearly         TYPE 2 DIABETES  IFG/Prediabetes since January 2018    He always has an IFG that is sometimes above the 126 cut-off but his HgbA1C had never gone above 6.4 so continued to classify as IFG/Prediabetic.    HOWEVER, in November 2021 - I started patient on Ozempic 0.5 mg injection per his request for prediabetes and "               TA?  Sit to stands from elevated mat without use of upper extremities?              x 2 x 10?    ?  Matrix Leg Press             x 3 minutes 65lbs    PLAN?  ?  ?  ?       CPT Codes available for Billing:    (00) minutes of Manual therapy (MT) to improve pain and ROM.   (09) minutes of Therapeutic Exercise (TE) to develop strength, endurance, range of motion, and flexibility.   (20) minutes of Neuromuscular Re-Education (NMR)? to improve: Balance, Coordination, Kinesthetic, Sense, Proprioception, and Posture.   (06) minutes of Therapeutic Activities (TA) to improve functional performance.   Vasopneumatic Device Therapy () for management of swelling/edema. ([id number])   Unattended Electrical Stimulation (ES) for muscle performance or pain modulation.   BFR: Blood flow restriction applied during exercise     Assessment & Plan   Assessment: Patient tolerated first treatment session very well. Patient did have some right hip discomfort with long arc quads, but when given cues to perform with more eccentric control this dissipated. Cueing given throughout session to decrease compensation patterns.       Patient will continue to benefit from skilled outpatient physical therapy to address the deficits listed in the problem list box on initial evaluation, provide pt/family education and to maximize pt's level of independence in the home and community environment.     Patient's spiritual, cultural, and educational needs considered and patient agreeable to plan of care and goals.           Plan: Continue Plan of Care (POC) and progress per patient tolerance. See treatment section for details on planned progressions next session.    Goals:   Active       Long Term Goals       Patient will demonstrate improved function as indicated by a score of greater than or equal to 58 out of 100 on FOTO.          (Progressing)       Start:  02/18/25    Expected End:  05/13/25            Patient will improve strength to at least  obesity.  I increased the Ozempic to 1 mg injection in Feb. 2022  Even on Ozempic 1 mg injection weekly, FBG was still high at 119  - so diagnosed with Type 2 Diabetes.  Body mass index is 44.26 kg/m².  Currently taking Ozempic 1 mg daily  Today, , HgA1c 5.3%  +microalbumin/creatinine ratio 144.7. BUN, serum creatinine, eGFR all WNL. - Nephrology consult ordered for kidney workup.  Diabetic foot exam completed.  Eye exam overdue.  Recheck 6 months           Microalbuminuria  +microalbumin/creatinine ratio 144.7. BUN, serum creatinine, eGFR all WNL. -   Nephrology consult ordered for kidney workup.          Morbidly obese.   Body mass index is 44.26 kg/m².  Started patient on Ozempic injections for weight control in November 2021 and lost 15 pounds in Feb. 2022 then became noncompliant with medication and follow up.  In December 2022 - Taking Ozempic 1 mg injection weekly consistently and had lost 33 pounds in 3 months.  Weight gain of 13 pounds over past 6 months (Sept 2023 - now)  Continue Ozempic 1 mg injection weekly.  Need better adherence to a healthy diet.       Elevated liver enzymes   Patient reports he was admitted to Hospital in [place] and he had a liver workup during that hospitalization.    His liver enzyme elevation had resolved with weight loss in October 2018 but was again elevated in 2019 with weight gain.    AST 47, ALT 53  Remains elevated but stable.  Will continue to work on lifestyle modifications.        Sleep Apnea with CPAP use   and had uvula removed by surgery in past.  On CPAP nightly     CIDP with left foot drop    followed by a neurologist Dr. Rowdy Tran in past - no longer seeing specialist unless he begins to have issues with leg weakness again.     BPH and neurogenic bladder with urge incontinence and self catheterizes as needed   Followed by [facility] Dr. Sanderson - last seen July 2023  Upcoming appt with Urology in April 2024         [facility] nodes with joint  4+/5 grossly,  in order to improve functional independence and quality of life.   (Progressing)       Start:  02/18/25    Expected End:  05/13/25               Short Term Goals       Patient will demonstrate improved function as indicated by a score of greater than or equal to 45 out of 100 on FOTO.          (Progressing)       Start:  02/18/25    Expected End:  04/01/25            Patient will improve strength by 1/2 a grade, in order to progress towards independence with functional activities.          (Progressing)       Start:  02/18/25    Expected End:  04/01/25                Katrina Damian, PT     inflammation and pain to bilateral hands   evaluated by [facility] and determined Osteoarthritis.  Stable     Chronic Anemia  Since May 2020.   Still decreased but improved.   Continue ferrous sulfate 650 mg every other day.  Stable.        Wellness labs:  CBC acceptable, anemia improving  CMP acceptable, LFTs elevated but stable  Lipid panel okay, LDL 74  PSA to be drawn today - previous level 1.2 (12/2022)  HgA1c 5.3%  +urine microalbumin/creatinine ratio 144.7 - Nephrology consult ordered    Health maintenance:  Diabetic foot exam completed  Eye Exam - overdue  Colonoscopy due 2030    Lab Visit on 03/22/2024   Component Date Value Ref Range Status    Microalbumin, Urine 03/22/2024 204.0  ug/mL Final    Creatinine, Urine 03/22/2024 141.0  23.0 - 375.0 mg/dL Final    Microalb/Creat Ratio 03/22/2024 144.7 (H)  0.0 - 30.0 ug/mg Final   Lab Visit on 03/22/2024   Component Date Value Ref Range Status    WBC 03/22/2024 6.41  3.90 - 12.70 K/uL Final    RBC 03/22/2024 4.30 (L)  4.60 - 6.20 M/uL Final    Hemoglobin 03/22/2024 13.0 (L)  14.0 - 18.0 g/dL Final    Hematocrit 03/22/2024 37.9 (L)  40.0 - 54.0 % Final    MCV 03/22/2024 88  82 - 98 fL Final    MCH 03/22/2024 30.2  27.0 - 31.0 pg Final    MCHC 03/22/2024 34.3  32.0 - 36.0 g/dL Final    RDW 03/22/2024 12.6  11.5 - 14.5 % Final    Platelets 03/22/2024 234  150 - 450 K/uL Final    MPV 03/22/2024 8.8 (L)  9.2 - 12.9 fL Final    Immature Granulocytes 03/22/2024 0.3  0.0 - 0.5 % Final    Gran # (ANC) 03/22/2024 4.0  1.8 - 7.7 K/uL Final    Immature Grans (Abs) 03/22/2024 0.02  0.00 - 0.04 K/uL Final    Comment: Mild elevation in immature granulocytes is non specific and   can be seen in a variety of conditions including stress response,   acute inflammation, trauma and pregnancy. Correlation with other   laboratory and clinical findings is essential.      Lymph # 03/22/2024 1.5  1.0 - 4.8 K/uL Final    Mono # 03/22/2024 0.5  0.3 - 1.0 K/uL Final    Eos #  03/22/2024 0.4  0.0 - 0.5 K/uL Final    Baso # 03/22/2024 0.04  0.00 - 0.20 K/uL Final    nRBC 03/22/2024 0  0 /100 WBC Final    Gran % 03/22/2024 62.8  38.0 - 73.0 % Final    Lymph % 03/22/2024 22.9  18.0 - 48.0 % Final    Mono % 03/22/2024 7.6  4.0 - 15.0 % Final    Eosinophil % 03/22/2024 5.8  0.0 - 8.0 % Final    Basophil % 03/22/2024 0.6  0.0 - 1.9 % Final    Differential Method 03/22/2024 Automated   Final    Sodium 03/22/2024 148 (H)  136 - 145 mmol/L Final    Potassium 03/22/2024 3.7  3.5 - 5.1 mmol/L Final    Chloride 03/22/2024 108  95 - 110 mmol/L Final    CO2 03/22/2024 29  23 - 29 mmol/L Final    Glucose 03/22/2024 113 (H)  70 - 110 mg/dL Final    BUN 03/22/2024 16  2 - 20 mg/dL Final    Creatinine 03/22/2024 0.76  0.50 - 1.40 mg/dL Final    Calcium 03/22/2024 9.5  8.7 - 10.5 mg/dL Final    Total Protein 03/22/2024 7.3  6.0 - 8.4 g/dL Final    Albumin 03/22/2024 4.1  3.5 - 5.2 g/dL Final    Total Bilirubin 03/22/2024 0.6  0.1 - 1.0 mg/dL Final    Comment: For infants and newborns, interpretation of results should be based  on gestational age, weight and in agreement with clinical  observations.    Premature Infant recommended reference ranges:  Up to 24 hours.............<8.0 mg/dL  Up to 48 hours............<12.0 mg/dL  3-5 days..................<15.0 mg/dL  6-29 days.................<15.0 mg/dL      Alkaline Phosphatase 03/22/2024 54  38 - 126 U/L Final    AST 03/22/2024 47 (H)  15 - 46 U/L Final    ALT 03/22/2024 53 (H)  10 - 44 U/L Final    Anion Gap 03/22/2024 11  8 - 16 mmol/L Final    eGFR 03/22/2024 >60.0  >60 mL/min/1.73 m^2 Final    Hemoglobin A1C 03/22/2024 5.3  4.0 - 5.6 % Final    Comment: ADA Screening Guidelines:  5.7-6.4%  Consistent with prediabetes  >or=6.5%  Consistent with diabetes    High levels of fetal hemoglobin interfere with the HbA1C  assay. Heterozygous hemoglobin variants (HbS, HgC, etc)do  not significantly interfere with this assay.   However, presence of multiple variants  may affect accuracy.      Estimated Avg Glucose 03/22/2024 105  68 - 131 mg/dL Final    Cholesterol 03/22/2024 125  120 - 199 mg/dL Final    Comment: The National Cholesterol Education Program (NCEP) has set the  following guidelines (reference ranges) for Cholesterol:  Optimal.....................<200 mg/dL  Borderline High.............200-239 mg/dL  High........................> or = 240 mg/dL      Triglycerides 03/22/2024 95  30 - 150 mg/dL Final    Comment: The National Cholesterol Education Program (NCEP) has set the  following guidelines (reference values) for triglycerides:  Normal......................<150 mg/dL  Borderline High.............150-199 mg/dL  High........................200-499 mg/dL      HDL 03/22/2024 32 (L)  40 - 75 mg/dL Final    Comment: The National Cholesterol Education Program (NCEP) has set the  following guidelines (reference values) for HDL Cholesterol:  Low...............<40 mg/dL  Optimal...........>60 mg/dL      LDL Cholesterol 03/22/2024 74.0  63.0 - 159.0 mg/dL Final    Comment: The National Cholesterol Education Program (NCEP) has set the  following guidelines (reference values) for LDL Cholesterol:  Optimal.......................<130 mg/dL  Borderline High...............130-159 mg/dL  High..........................160-189 mg/dL  Very High.....................>190 mg/dL      HDL/Cholesterol Ratio 03/22/2024 25.6  20.0 - 50.0 % Final    Total Cholesterol/HDL Ratio 03/22/2024 3.9  2.0 - 5.0 Final    Non-HDL Cholesterol 03/22/2024 93  mg/dL Final    Comment: Risk category and Non-HDL cholesterol goals:  Coronary heart disease (CHD)or equivalent (10-year risk of CHD >20%):  Non-HDL cholesterol goal     <130 mg/dL  Two or more CHD risk factors and 10-year risk of CHD <= 20%:  Non-HDL cholesterol goal     <160 mg/dL  0 to 1 CHD risk factor:  Non-HDL cholesterol goal     <190 mg/dL      TSH 03/22/2024 1.710  0.400 - 4.000 uIU/mL Final    Comment: Warning:  Heterophilic antibodies in  "serum or plasma of   certain individuals are known to cause interference with   immunoassays. These antibodies may be present in blood samples   from individuals regularly exposed to animal or who have been   treated with animal products.     Patients taking high doses of supplemental biotin may have  negatively biased results.          Review of Systems   Constitutional: Negative.    HENT: Negative.     Eyes: Negative.    Respiratory: Negative.  Negative for shortness of breath.    Cardiovascular: Negative.  Negative for chest pain.   Gastrointestinal: Negative.    Genitourinary: Negative.    Musculoskeletal: Negative.    Skin: Negative.    Neurological: Negative.    Psychiatric/Behavioral: Negative.         Objective:     Vitals:    03/25/24 1433   BP: 130/78   BP Location: Left arm   Patient Position: Sitting   BP Method: Large (Manual)   Pulse: 87   Temp: 99 °F (37.2 °C)   TempSrc: Temporal   SpO2: 98%   Weight: (!) 169.3 kg (373 lb 3.8 oz)   Height: 6' 5" (1.956 m)          Physical Exam  Constitutional:       General: He is not in acute distress.     Appearance: He is obese. He is not ill-appearing.   HENT:      Head: Normocephalic.      Right Ear: Tympanic membrane, ear canal and external ear normal.      Left Ear: Tympanic membrane, ear canal and external ear normal.      Nose: Nose normal.      Mouth/Throat:      Mouth: Mucous membranes are moist.      Pharynx: Oropharynx is clear.   Cardiovascular:      Rate and Rhythm: Normal rate and regular rhythm.      Pulses: Normal pulses.           Dorsalis pedis pulses are 2+ on the right side and 2+ on the left side.        Posterior tibial pulses are 2+ on the right side and 2+ on the left side.      Heart sounds: Normal heart sounds. No murmur heard.  Pulmonary:      Effort: Pulmonary effort is normal. No respiratory distress.      Breath sounds: Normal breath sounds. No wheezing.   Abdominal:      General: Bowel sounds are normal.      Palpations: Abdomen is " soft.      Tenderness: There is no abdominal tenderness.      Hernia: No hernia is present.   Musculoskeletal:         General: Normal range of motion.      Cervical back: Normal range of motion.      Right lower le+ Edema present.      Left lower le+ Edema present.      Left foot: Foot drop present.   Feet:      Right foot:      Protective Sensation: 7 sites tested.  7 sites sensed.      Skin integrity: Skin integrity normal.      Toenail Condition: Right toenails are normal.      Left foot:      Protective Sensation: 7 sites tested.  7 sites sensed.      Skin integrity: Skin integrity normal.      Toenail Condition: Left toenails are normal.      Comments: Left leg brace present. No skin breakdown under device.  Skin:     General: Skin is warm and dry.      Capillary Refill: Capillary refill takes less than 2 seconds.   Neurological:      Mental Status: He is alert and oriented to person, place, and time.   Psychiatric:         Mood and Affect: Mood normal.         Behavior: Behavior normal.         Thought Content: Thought content normal.         Judgment: Judgment normal.           Assessment:         ICD-10-CM ICD-9-CM   1. Annual physical exam  Z00.00 V70.0   2. Type 2 diabetes mellitus with microalbuminuria, without long-term current use of insulin  E11.29 250.40    R80.9 791.0   3. Hypertension associated with type 2 diabetes mellitus  E11.59 250.80    I15.2 401.9   4. Hyperlipidemia associated with type 2 diabetes mellitus  E11.69 250.80    E78.5 272.4   5. Microalbuminuria  R80.9 791.0   6. Class 3 severe obesity due to excess calories with serious comorbidity and body mass index (BMI) of 40.0 to 44.9 in adult  E66.01 278.01    Z68.41 V85.41   7. CIDP (chronic inflammatory demyelinating polyneuropathy)  G61.81 357.81   8. Sleep apnea with use of continuous positive airway pressure (CPAP)  G47.30 327.23   9. Benign prostatic hyperplasia with lower urinary tract symptoms, symptom details unspecified   N40.1 600.01   10. Self-catheterizes urinary bladder  Z78.9 V49.89   11. Primary osteoarthritis of both hands  M19.041 715.14    M19.042    12. Anemia of chronic disease  D63.8 285.29       Plan:       1. Annual physical exam   Health Maintenance Summary   Full History      Expand All  Collapse All  Scheduled - PROSTATE-SPECIFIC ANTIGEN   (Yearly)Scheduled for 3/25/2024  12/15/2022  PSA, Screening   08/11/2021  PSA, Screening   05/21/2020  PSA, Screening   02/09/2019  PSA, Screening   01/09/2018  PSA, Screening   View More History   Postponed - Shingles Vaccine   (1 of 2)Postponed until 3/25/2025  No completion history exists for this topic.   Postponed - COVID-19 Vaccine   (3 - 2023-24 season)Postponed until 3/25/2025  04/28/2021  Imm Admin: COVID-19, MRNA, LN-S, PF (MODERNA FULL 0.5 ML DOSE)   03/29/2021  Imm Admin: COVID-19, MRNA, LN-S, PF (MODERNA FULL 0.5 ML DOSE)   Scheduled - Hemoglobin A1c   (Every 6 Months)Scheduled for 9/17/2024 03/22/2024  Hemoglobin A1C External component of Hemoglobin A1C   09/13/2023  Hemoglobin A1C External component of Hemoglobin A1C   03/15/2023  Hemoglobin A1C External component of Hemoglobin A1C   12/15/2022  Hemoglobin A1C External component of Hemoglobin A1C   02/09/2022  Hemoglobin A1C External component of Hemoglobin A1C   View More History   Diabetes Urine Screening   (Yearly)Next due on 3/22/2025  03/22/2024  MICROALB/CREAT RATIO component of Microalbumin/Creatinine Ratio, Urine   Lipid Panel   (Yearly)Next due on 3/22/2025  03/22/2024  Cholesterol Total component of Lipid Panel   09/28/2023  POCT Lipid Profile w/ Glucose   12/15/2022  Cholesterol Total component of Lipid Panel   08/13/2021  POCT Lipid Profile w/ Glucose   08/11/2021  Cholesterol Total component of Lipid Panel   View More History   Low Dose Statin   (Yearly)Next due on 3/25/2025  03/25/2024  Registry Metric: Last Current Statin Reviewed Date   10/31/2023  Registry Metric: Last Current Statin Order Date    Foot Exam   (Yearly)Next due on 3/25/2025  03/25/2024  Done   03/25/2024  [employer]: WORKFLOW - DIABETES - DIABETIC FOOT EXAM PERFORMED   Eye Exam   (Yearly)Next due on 3/25/2025  03/25/2024  Declined   TETANUS VACCINE   (Every 10 Years)Next due on 2/28/2029 02/28/2019  Imm Admin: Tdap   Colorectal Cancer Screening   (Colonoscopy - Every 10 Years)Next due on 1/24/2030 01/24/2020  Colonoscopy   01/24/2020  Surgical Procedure: COLONOSCOPY   Hepatitis C Screening  Completed  08/11/2021  Hepatitis C Ab component of Hepatitis C Antibody   HIV Screening  Completed  08/11/2021  HIV 1/2 Ag/Ab (4th Gen)   Pneumococcal Vaccines (Age 0-64)   (Series Information)Completed  09/18/2023  Imm Admin: Pneumococcal Conjugate - 20 Valent   Discontinued - Influenza Vaccine  Discontinued  09/22/2011  Imm Admin: Influenza - Quadrivalent - PF *Preferred* (6 months and older)     2. Type 2 diabetes mellitus with microalbuminuria, without long-term current use of insulin  Overview:  IFG/Prediabetes since January 2018    He always has an IFG that is sometimes above the 126 cut-off but his HgbA1C had never gone above 6.4 so continued to classify as IFG/Prediabetic.    HOWEVER, in November 2021 - I started patient on Ozempic 0.5 mg injection per his request for prediabetes and obesity.  I increased the Ozempic to 1 mg injection in Feb. 2022  Even on Ozempic 1 mg injection weekly, FBG was still high at 119  - so diagnosed with Type 2 Diabetes.  Body mass index is 44.26 kg/m².  Currently taking Ozempic 1 mg daily  Today, , HgA1c 5.3%  +microalbumin/creatinine ratio 144.7. BUN, serum creatinine, eGFR all WNL. - Nephrology consult ordered for kidney workup.  Diabetic foot exam completed.  Eye exam overdue.  Recheck 6 months      Orders:  -     Comprehensive Metabolic Panel; Future; Expected date: 03/25/2024  -     Hemoglobin A1C; Future; Expected date: 03/25/2024    3. Hypertension associated with type 2 diabetes  "mellitus  Overview:  Resistant Hypertension   evaluated by [facility], Dr. Kiser, that is currently controlled on eplerenone (INSPRA) 25 MG Tab daily, amlodipine 10 mg daily, Valsartan HCTZ 320/12.5 mg daily, Metoprolol  mg daily and Hydralazine 25 mg twice daily.   Last seen Dr. Kiser May 2023  Dr. Kiser had advised that patient edema is due to venous insufficiency possibly due to ropinirole so medication was stopped.  /78 (BP Location: Left arm, Patient Position: Sitting, BP Method: Large (Manual))   Pulse 87   Temp 99 °F (37.2 °C) (Temporal)   Ht 6' 5" (1.956 m)   Wt (!) 169.3 kg (373 lb 3.8 oz)   SpO2 98%   BMI 44.26 kg/m²   Continue current medication regimen.  Stable.      4. Hyperlipidemia associated with type 2 diabetes mellitus  Overview:  currently taking Rosuvastatin 10 mg daily, and Fish Oil    LDL 74  Stable  monitored yearly        5. Microalbuminuria  Overview:  +microalbumin/creatinine ratio 144.7. BUN, serum creatinine, eGFR all WNL. -   Nephrology consult ordered for kidney workup.        Orders:  -     Ambulatory referral/consult to Nephrology; Future; Expected date: 04/01/2024    6. Class 3 severe obesity due to excess calories with serious comorbidity and body mass index (BMI) of 40.0 to 44.9 in adult  Overview:  Body mass index is 44.26 kg/m².  Started patient on Ozempic injections for weight control in November 2021 and lost 15 pounds in Feb. 2022 then became noncompliant with medication and follow up.  In December 2022 - Taking Ozempic 1 mg injection weekly consistently and had lost 33 pounds in 3 months.  Weight gain of 13 pounds over past 6 months (Sept 2023 - now)  Continue Ozempic 1 mg injection weekly.  Need better adherence to a healthy diet.      7. CIDP (chronic inflammatory demyelinating polyneuropathy)  Overview:  CIDP with left foot drop    followed by a neurologist Dr. Rowdy Tran in past - no longer seeing specialist unless he begins to have issues " with leg weakness again.      8. Sleep apnea with use of continuous positive airway pressure (CPAP)  Overview:  Sleep Apnea with CPAP use   and had uvula removed by surgery in past.  On CPAP nightly      9. Benign prostatic hyperplasia with lower urinary tract symptoms, symptom details unspecified  Overview:  BPH and neurogenic bladder with urge incontinence and self catheterizes as needed   Followed by [facility] Dr. Sanderson - [place] seen July 2023  Upcoming appt with Urology in April 2024      10. Self-catheterizes urinary bladder  Overview:  BPH and neurogenic bladder with urge incontinence and self catheterizes as needed   Followed by [facility] Dr. Sanderson - [place] seen July 2023  Upcoming appt with Urology in April 2024      11. Primary osteoarthritis of both hands  Overview:  Heberden's nodes with joint inflammation and pain to bilateral hands   evaluated by [facility] and determined Osteoarthritis.  Stable      12. Anemia of chronic disease  Overview:  Since May 2020.   Still decreased but improved.   Continue ferrous sulfate 650 mg every other day.  Stable.             Follow up in about 6 months (around 9/25/2024) for 6 month followup with fasting labs.     Patient's Medications   New Prescriptions    No medications on file   Previous Medications    AMLODIPINE (NORVASC) 10 MG TABLET    Take 1 tablet by mouth once daily    ASPIRIN (ECOTRIN) 81 MG EC TABLET    Take 1 tablet (81 mg total) by mouth once daily.    CHOLECALCIFEROL, VITAMIN D3, (VITAMIN D3) 25 MCG (1,000 UNIT) CAPSULE    Take 2,000 Units by mouth once daily.    DIPHENHYDRAMINE HCL (BENADRYL ORAL)    Take by mouth.    DOCUSATE SODIUM (COLACE) 100 MG CAPSULE    Take 1 capsule (100 mg total) by mouth 2 (two) times daily.    EPLERENONE (INSPRA) 25 MG TAB    Take 1 tablet by mouth once daily    FERROUS SULFATE (FEOSOL) 325 MG (65 MG IRON) TAB TABLET    Take 2 tablets (650 mg total) by mouth daily with breakfast.    FINASTERIDE (PROSCAR)  5 MG TABLET    Take 1 tablet (5 mg total) by mouth once daily.    HYDRALAZINE (APRESOLINE) 25 MG TABLET    Take 1 tablet by mouth twice daily    LYSINE 500 MG TAB    Take 500 mg by mouth once daily.    METOPROLOL SUCCINATE (TOPROL-XL) 100 MG 24 HR TABLET    Take 1 tablet by mouth once daily    MULTIVITAMIN (THERAGRAN) PER TABLET    Take 1 tablet by mouth once daily.    OMEGA-3 FATTY ACIDS/FISH OIL (FISH OIL-OMEGA-3 FATTY ACIDS) 300-1,000 MG CAPSULE    Take 1 capsule by mouth once daily.    PEP INJECTION    Inject .25 ml as directed     For compounding pharmacy use:   Add PAPAVERINE 30 mcg  Add PHENTOLAMINE 10 mg  Add ALPROSTADIL 100 mcg    ROSUVASTATIN (CRESTOR) 10 MG TABLET    TAKE 1 TABLET BY MOUTH ONCE DAILY IN THE EVENING    SEMAGLUTIDE (OZEMPIC) 1 MG/DOSE (4 MG/3 ML)    INJECT 1MG INTO THE SKIN EVERY 7 DAYS AS DIRECTED    TADALAFIL (CIALIS) 20 MG TAB    Take 1 tablet (20 mg total) by mouth once daily as directed    TAMSULOSIN (FLOMAX) 0.4 MG CAP    Take 1 capsule (0.4 mg total) by mouth once daily.    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 Tray Rodriguez    CIDP (chronic inflammatory demyelinating polyneuropathy) 06/2018    followed by Dr. Rowdy Tran - Neurologist in [place].      Elevated liver enzymes 10/8/2018    Elevated PSA     followed by Dr. Sanderson    Foot drop, left foot     due to neurological condition/[facility] [place]    Hyperlipidemia     Hypertension     Impaired fasting glucose     Neurogenic bladder 09/05/2018    followed by Dr. Sanderson    RLS (restless legs syndrome) 10/8/2018    Self-catheterizes urinary bladder     Urologist    Sleep apnea with use of continuous positive airway pressure (CPAP) 01/10/2018     followed by [facility] in [place]    Type 2 diabetes mellitus without complication, without long-term current use of insulin 12/23/2022    Urge incontinence 9/11/2018       Past Surgical History:   Procedure Laterality Date    ADENOIDECTOMY      COLONOSCOPY N/A 1/24/2020    Procedure: COLONOSCOPY;  Surgeon: Stacy Markham MD;  Location: [facility];  Service: Endoscopy;  Laterality: N/A;    HERNIA REPAIR      LUMBAR LAMINECTOMY  2012    [facility]    LUMBAR LAMINECTOMY  08/2018    Dr. Rodriguez [facility]; complications from surgery - abscess to spinal cord - discharge summary scanned to media file    PLANTAR FASCIA RELEASE      TONSILLECTOMY      TRANSFORAMINAL LUMBAR INTERBODY FUSION (TLIF) OF SPINE WITH PERCUTANEOUS INSTRUMENTATION Bilateral 6/3/2020    Procedure: FUSION, SPINE, LUMBAR, TLIF, WITH PERCUTANEOUS INSTRUMENTATION  (L3-4 MIS TLIF) Flouro Micrcoscope [facility] JENNIFER JENNINGS [phone] Spine Wave;  Surgeon: Samuel Osorio DO;  Location: [facility];  Service: Neurosurgery;  Laterality: Bilateral;  SPINEWAVE JENNIFER KRAMER [phone] TEXTED HIM @ 9:45AM ON 5-  PRE-OP BY RN 5----BMI--44---COVID NEGATIVE  CLEAR    VASECTOMY      X-STOP IMPLANTATION         Family History   Problem Relation Age of Onset    Cancer Mother         Breast Cancer    Heart disease Mother         pacemaker    Hyperlipidemia Mother         taking Crestor    Hypertension Mother         taking Amlodipine    Diabetes Mother         Prediabetes    Diabetes Father     Cancer Father         unknown type of cancer    Hypertension Father     Rheum arthritis Father     No Known Problems Sister     Prostate cancer Neg Hx     Kidney disease Neg Hx        Social History     Socioeconomic History    Marital status:    Occupational History    Occupation: teacher   Tobacco Use    Smoking status: Never     Passive exposure: Past    Smokeless tobacco: Never   Substance and Sexual Activity     Alcohol use: Yes     Comment: OCCASS    Drug use: No    Sexual activity: Yes     Partners: Female     Social Determinants of Health     Financial Resource Strain: Low Risk  (12/22/2022)    Overall Financial Resource Strain (CARDIA)     Difficulty of Paying Living Expenses: Not very hard   Food Insecurity: No Food Insecurity (12/22/2022)    Hunger Vital Sign     Worried About Running Out of Food in the Last Year: Never true     Ran Out of Food in the Last Year: Never true   Transportation Needs: No Transportation Needs (12/22/2022)    PRAPARE - Transportation     Lack of Transportation (Medical): No     Lack of Transportation (Non-Medical): No   Physical Activity: Insufficiently Active (12/22/2022)    Exercise Vital Sign     Days of Exercise per Week: 1 day     Minutes of Exercise per Session: 30 min   Stress: Stress Concern Present (12/22/2022)    [place] [employer] - Occupational Stress Questionnaire     Feeling of Stress : To some extent   Social Connections: Unknown (12/22/2022)    Social Connection and Isolation Panel [NHANES]     Frequency of Communication with Friends and Family: Three times a week     Frequency of Social Gatherings with Friends and Family: Once a week     Active Member of Clubs or Organizations: Yes     Attends Club or Organization Meetings: More than 4 times per year     Marital Status:    Housing Stability: High Risk (12/22/2022)    Housing Stability Vital Sign     Unable to Pay for Housing in the Last Year: No     Number of Places Lived in the Last Year: 3     Unstable Housing in the Last Year: No

## 2025-02-27 PROBLEM — N20.0 NEPHROLITHIASIS: Status: ACTIVE | Noted: 2025-02-27

## 2025-03-03 ENCOUNTER — PATIENT MESSAGE (OUTPATIENT)
Dept: UROLOGY | Facility: CLINIC | Age: 60
End: 2025-03-03
Payer: COMMERCIAL

## 2025-03-10 ENCOUNTER — PATIENT MESSAGE (OUTPATIENT)
Dept: FAMILY MEDICINE | Facility: CLINIC | Age: 60
End: 2025-03-10
Payer: COMMERCIAL

## 2025-03-13 ENCOUNTER — PATIENT MESSAGE (OUTPATIENT)
Dept: UROLOGY | Facility: CLINIC | Age: 60
End: 2025-03-13
Payer: COMMERCIAL

## 2025-03-17 ENCOUNTER — PATIENT MESSAGE (OUTPATIENT)
Dept: UROLOGY | Facility: CLINIC | Age: 60
End: 2025-03-17
Payer: COMMERCIAL

## 2025-03-18 ENCOUNTER — PATIENT MESSAGE (OUTPATIENT)
Dept: UROLOGY | Facility: CLINIC | Age: 60
End: 2025-03-18
Payer: COMMERCIAL

## 2025-03-19 ENCOUNTER — PATIENT MESSAGE (OUTPATIENT)
Dept: UROLOGY | Facility: CLINIC | Age: 60
End: 2025-03-19

## 2025-03-19 ENCOUNTER — OFFICE VISIT (OUTPATIENT)
Dept: UROLOGY | Facility: CLINIC | Age: 60
End: 2025-03-19
Payer: COMMERCIAL

## 2025-03-19 VITALS — SYSTOLIC BLOOD PRESSURE: 154 MMHG | HEART RATE: 94 BPM | DIASTOLIC BLOOD PRESSURE: 86 MMHG

## 2025-03-19 DIAGNOSIS — N52.03 COMBINED ARTERIAL INSUFFICIENCY AND CORPORO-VENOUS OCCLUSIVE ERECTILE DYSFUNCTION: Primary | ICD-10-CM

## 2025-03-19 DIAGNOSIS — Z98.890 POST-OPERATIVE STATE: ICD-10-CM

## 2025-03-19 DIAGNOSIS — Z96.89 HISTORY OF IMPLANTATION OF PENILE PROSTHESIS: ICD-10-CM

## 2025-03-19 PROCEDURE — 3079F DIAST BP 80-89 MM HG: CPT | Mod: CPTII,S$GLB,, | Performed by: UROLOGY

## 2025-03-19 PROCEDURE — 3077F SYST BP >= 140 MM HG: CPT | Mod: CPTII,S$GLB,, | Performed by: UROLOGY

## 2025-03-19 PROCEDURE — 3066F NEPHROPATHY DOC TX: CPT | Mod: CPTII,S$GLB,, | Performed by: UROLOGY

## 2025-03-19 PROCEDURE — 1159F MED LIST DOCD IN RCRD: CPT | Mod: CPTII,S$GLB,, | Performed by: UROLOGY

## 2025-03-19 PROCEDURE — 99213 OFFICE O/P EST LOW 20 MIN: CPT | Mod: S$GLB,,, | Performed by: UROLOGY

## 2025-03-19 PROCEDURE — 1160F RVW MEDS BY RX/DR IN RCRD: CPT | Mod: CPTII,S$GLB,, | Performed by: UROLOGY

## 2025-03-19 PROCEDURE — 99999 PR PBB SHADOW E&M-EST. PATIENT-LVL III: CPT | Mod: PBBFAC,,, | Performed by: UROLOGY

## 2025-03-19 NOTE — PATIENT INSTRUCTIONS
Patient to inflate and deflate prosthesis daily.  The patient able to use prosthesis if desires.  Will notify me if has any complications or issues.  Follow up in 6 months.

## 2025-03-19 NOTE — PROGRESS NOTES
Manfred Shah is a 59 y.o. male patient.   1. Combined arterial insufficiency and corporo-venous occlusive erectile dysfunction    2. 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 Tray Rodirguez    BPH (benign prostatic hyperplasia)     CIDP (chronic inflammatory demyelinating polyneuropathy) 06/2018    followed by Dr. Rowdy Tran - Neurologist in [place].      Elevated liver enzymes 10/08/2018    Elevated PSA     followed by Dr. Sanderson    Epididymitis, left     possible, discomfort, swelling and tenderness to left testicle    Erectile dysfunction     Foot drop, left foot     due to neurological condition/[facility]; wears brace    Impaired fasting glucose     Nephrolithiasis 2/27/2025    Neurogenic bladder 09/05/2018    followed by Dr. Sanderson    RLS (restless legs syndrome) 10/08/2018    Self-catheterizes urinary bladder     5-6 times a day    Sleep apnea with use of continuous positive airway pressure (CPAP) 01/10/2018    followed by [facility] in [place]    Type 2 diabetes mellitus without complication, without long-term current use of insulin 12/23/2022    Urge incontinence 09/11/2018    UTI (urinary tract infection), uncomplicated 05/10/2024     Past Surgical History Pertinent Negatives:   Procedure Date Noted    CYSTOSCOPY 08/08/2017    PROSTATE SURGERY 08/08/2017     Scheduled Meds:  Continuous Infusions:  PRN Meds:    Review of patient's allergies indicates:  No Known Allergies  There are no hospital problems to display for this patient.    Blood pressure (!) 154/86, pulse 94.    Subjective:   Diet: Adequate intake.  Patient reports no nausea or vomiting.    Activity level: Normal (Pt able to Inflate and Deflate IPP, Swelling down.).    Pain control: Well controlled.      Objective:  Vital signs (most recent): Blood pressure (!) 154/86, pulse 94.  General appearance: Comfortable,  well-appearing, in no acute distress and not in pain.    Lungs:  Normal effort.    Heart: Normal rate.    Chest: Asymmetric chest wall expansion.    Abdomen: Abdomen is soft.  No distension.    Tenderness: There is no abdominal tenderness tenderness.    Wound:  Clean.  There is no drainage.    Extremities: There is decreased range of motion.    Neurological: The patient is alert and oriented to person, place and time.  Normal strength.  No right hemiparesis, left hemiparesis or tongue deviation.  Pupils are equal, round, and reactive to light.       Assessment:   Post-op: 92 days.    Condition: In stable condition.     Plan:  Encourage ambulation.  Regular diet.  Resume oral medications.         Anthony Sanderson MD  3/19/2025

## 2025-03-21 ENCOUNTER — TELEPHONE (OUTPATIENT)
Dept: UROLOGY | Facility: CLINIC | Age: 60
End: 2025-03-21
Payer: COMMERCIAL

## 2025-03-31 ENCOUNTER — PATIENT MESSAGE (OUTPATIENT)
Dept: FAMILY MEDICINE | Facility: CLINIC | Age: 60
End: 2025-03-31

## 2025-03-31 ENCOUNTER — OFFICE VISIT (OUTPATIENT)
Dept: FAMILY MEDICINE | Facility: CLINIC | Age: 60
End: 2025-03-31
Payer: COMMERCIAL

## 2025-03-31 VITALS
WEIGHT: 315 LBS | OXYGEN SATURATION: 98 % | HEIGHT: 77 IN | BODY MASS INDEX: 37.19 KG/M2 | DIASTOLIC BLOOD PRESSURE: 82 MMHG | SYSTOLIC BLOOD PRESSURE: 136 MMHG | HEART RATE: 94 BPM | TEMPERATURE: 98 F

## 2025-03-31 DIAGNOSIS — E11.59 HYPERTENSION ASSOCIATED WITH TYPE 2 DIABETES MELLITUS: ICD-10-CM

## 2025-03-31 DIAGNOSIS — E66.01 CLASS 3 SEVERE OBESITY DUE TO EXCESS CALORIES WITH SERIOUS COMORBIDITY AND BODY MASS INDEX (BMI) OF 45.0 TO 49.9 IN ADULT: ICD-10-CM

## 2025-03-31 DIAGNOSIS — G47.30 SLEEP APNEA WITH USE OF CONTINUOUS POSITIVE AIRWAY PRESSURE (CPAP): ICD-10-CM

## 2025-03-31 DIAGNOSIS — E11.69 HYPERLIPIDEMIA ASSOCIATED WITH TYPE 2 DIABETES MELLITUS: ICD-10-CM

## 2025-03-31 DIAGNOSIS — M21.372 FOOT DROP, LEFT FOOT: ICD-10-CM

## 2025-03-31 DIAGNOSIS — E11.29 TYPE 2 DIABETES MELLITUS WITH MICROALBUMINURIA, WITHOUT LONG-TERM CURRENT USE OF INSULIN: ICD-10-CM

## 2025-03-31 DIAGNOSIS — I15.2 HYPERTENSION ASSOCIATED WITH TYPE 2 DIABETES MELLITUS: ICD-10-CM

## 2025-03-31 DIAGNOSIS — N18.1 ANEMIA IN STAGE 1 CHRONIC KIDNEY DISEASE: ICD-10-CM

## 2025-03-31 DIAGNOSIS — E66.813 CLASS 3 SEVERE OBESITY DUE TO EXCESS CALORIES WITH SERIOUS COMORBIDITY AND BODY MASS INDEX (BMI) OF 45.0 TO 49.9 IN ADULT: ICD-10-CM

## 2025-03-31 DIAGNOSIS — D63.1 ANEMIA IN STAGE 1 CHRONIC KIDNEY DISEASE: ICD-10-CM

## 2025-03-31 DIAGNOSIS — E78.5 HYPERLIPIDEMIA ASSOCIATED WITH TYPE 2 DIABETES MELLITUS: ICD-10-CM

## 2025-03-31 DIAGNOSIS — N31.9 NEUROGENIC BLADDER: ICD-10-CM

## 2025-03-31 DIAGNOSIS — M19.041 PRIMARY OSTEOARTHRITIS OF BOTH HANDS: ICD-10-CM

## 2025-03-31 DIAGNOSIS — N40.1 BENIGN PROSTATIC HYPERPLASIA WITH LOWER URINARY TRACT SYMPTOMS, SYMPTOM DETAILS UNSPECIFIED: ICD-10-CM

## 2025-03-31 DIAGNOSIS — M19.042 PRIMARY OSTEOARTHRITIS OF BOTH HANDS: ICD-10-CM

## 2025-03-31 DIAGNOSIS — Z00.00 ANNUAL PHYSICAL EXAM: Primary | ICD-10-CM

## 2025-03-31 DIAGNOSIS — R80.9 TYPE 2 DIABETES MELLITUS WITH MICROALBUMINURIA, WITHOUT LONG-TERM CURRENT USE OF INSULIN: ICD-10-CM

## 2025-03-31 DIAGNOSIS — N28.1 BILATERAL RENAL CYSTS: ICD-10-CM

## 2025-03-31 DIAGNOSIS — R80.9 MICROALBUMINURIA: ICD-10-CM

## 2025-03-31 DIAGNOSIS — N20.0 RENAL STONES: ICD-10-CM

## 2025-03-31 DIAGNOSIS — G61.81 CIDP (CHRONIC INFLAMMATORY DEMYELINATING POLYNEUROPATHY): ICD-10-CM

## 2025-03-31 PROCEDURE — 99999 PR PBB SHADOW E&M-EST. PATIENT-LVL V: CPT | Mod: PBBFAC,,, | Performed by: NURSE PRACTITIONER

## 2025-03-31 NOTE — PROGRESS NOTES
"Subjective:       Patient ID: Manfred Shah is a 59 y.o. male.    Chief Complaint: Annual Exam        HPI WITH ASSESSMENT AND PLAN OF CARE:      Patient is a 59-year-old white male with CIDP (chronic inflammatory demyelinating polyneuropathy) diagnosed in June 2018 by neurologist, Dr. Rowdy Tran, left foot drop secondary to CIDP, history of Abscess of spinal cord treated by Dr. Rodriguez S/P Laminectomy in August 2018, Lumbar Spinal fusion on 6/3/2020 with Neurosurgeon Dr. Osorio, Neurogenic bladder with urge incontinence and self-catheterization and BPH followed by Dr. Sanderson, Renal Cysts, kidney stones and urine microalbuminuria monitored by Nephrology Dr. Schwartz and Urology Dr. Sanderson, RLS, Hypertension, Hyperlipidemia, TYPE 2 Diabetes, Elevated Liver Enzymes, Heberden's nodes with joint inflammation and pain to bilateral hands followed by [facility],  and Sleep Apnea with CPAP use that is here today for Annual Wellness with fasting lab results.       Resistant Hypertension   evaluated by [facility], Dr. Kiser, that is currently controlled on eplerenone (INSPRA) 25 MG Tab daily, amlodipine 10 mg daily, Valsartan HCTZ 320/12.5 mg daily, Metoprolol  mg daily and Hydralazine 50 mg three times daily.  Now sees Dr. Card Cardiology last visit 12/9/2024  Increased hydralazine to 50 mg 3 times daily. 12/9/2024  /82   Pulse 94   Temp 97.9 °F (36.6 °C) (Other (see comments))   Ht 6' 5" (1.956 m)   Wt (!) 174.8 kg (385 lb 5.8 oz)   SpO2 98%   BMI 45.70 kg/m²   Continue current medication regimen.  Stable.  Recheck in 6 months.        Hyperlipidemia   currently taking Rosuvastatin 10 mg daily, and Fish Oil    LDL 74.8  Stable  monitored yearly          TYPE 2 DIABETES  IFG/Prediabetes since January 2018    He always has an IFG that is sometimes above the 126 cut-off but his HgbA1C had never gone above 6.4 so continued to classify as IFG/Prediabetic.    HOWEVER, in November 2021 - I " started patient on Ozempic 0.5 mg injection per his request for prediabetes and obesity.  I increased the Ozempic to 1 mg injection in Feb. 2022  Even on Ozempic 1 mg injection weekly, FBG was still high at 119  - so diagnosed with Type 2 Diabetes.  Body mass index is 45.7 kg/m².  Currently taking Ozempic 1 mg daily  Today , HgA1c 5.4%  Stable  Recheck in 6 months.  Diabetic foot exam normal today  Diabetic eye exam - [facility] - requesting record               Microalbuminuria  3/22/2024:  +microalbumin/creatinine ratio 144.7. BUN, serum creatinine, eGFR all WNL. -   Nephrology consult ordered for kidney workup.  Seen Dr. Schwartz office last visit 2/24/2025                   Renal Cysts and Renal Stones  Bilateral renal cysts and renal stones noted on kidney ultrasound 11/13/2023  Being followed by both Nephrology Dr. Schwartz and Urology Dr. Sanderson  Last Nephrology Dr. Schwartz visit: 2/24/2025  Last Urology Dr. Sanderson Visit: 3/19/2025         BPH and neurogenic bladder with urge incontinence and self catheterizes as needed   Followed by [facility] Dr. Sanderson - last seen 3/19/2025           Morbidly obese.   Body mass index is 45.7 kg/m².  Started patient on Ozempic injections for weight control in November 2021 and lost 15 pounds in Feb. 2022 then became noncompliant with medication and follow up.  In December 2022 - Taking Ozempic 1 mg injection weekly consistently   December 2022 to September 2023 - lost 38 pounds.  September 2023 to present March 2025- gained 20 pounds.  Continue Ozempic 1 mg injection weekly.  Working on diet and exercise.        History of Elevated liver enzymes   Patient reports he was admitted to Hospital in [place] and he had a liver workup during that hospitalization prior to 2018   His liver enzyme elevation had resolved with weight loss in October 2018 but was again elevated in 2019 with weight gain.    They have again improved and back within range  Will continue to  monitor.             Sleep Apnea with CPAP use   and had uvula removed by surgery in past.  On CPAP nightly        CIDP with left foot drop    followed by a neurologist Dr. Rowdy Tran in past - no longer seeing specialist unless he begins to have issues with leg weakness again.           Heberden's nodes with joint inflammation and pain to bilateral hands   evaluated by [facility] and determined Osteoarthritis.  Stable        Chronic Anemia  Since May 2020.   Still decreased but improved.   Has been ferrous sulfate 650 mg every 3 days. And levels dropped  Increase dose back every other day  Stable.  Now followed by NEPHROLOGIST               Wellness Labs  CBC chronic anemia present  CMP okay with liver and kidney function WNL  Cholesterol WNL  TSH WNL  A1C 5.4%  PSA WNL    Health Maintenance  Diabetic foot exam done today  Diabetic Urine orders placed today  Diabetic Eye exam completed at [facility] will request records        Lab Visit on 03/26/2025   Component Date Value Ref Range Status    Sodium 03/26/2025 142  136 - 145 mmol/L Final    Potassium 03/26/2025 3.6  3.5 - 5.1 mmol/L Final    Chloride 03/26/2025 107  95 - 110 mmol/L Final    CO2 03/26/2025 23  23 - 29 mmol/L Final    Glucose 03/26/2025 120 (H)  70 - 110 mg/dL Final    BUN 03/26/2025 16  6 - 20 mg/dL Final    Creatinine 03/26/2025 1.0  0.5 - 1.4 mg/dL Final    Calcium 03/26/2025 10.0  8.7 - 10.5 mg/dL Final    Protein Total 03/26/2025 7.5  6.0 - 8.4 gm/dL Final    Albumin 03/26/2025 3.9  3.5 - 5.2 g/dL Final    Bilirubin Total 03/26/2025 0.7  0.1 - 1.0 mg/dL Final    For infants and newborns, interpretation of results should be based   on gestational age, weight and in agreement with clinical   observations.    Premature Infant recommended reference ranges:   0-24 hours:  <8.0 mg/dL   24-48 hours: <12.0 mg/dL   3-5 days:    <15.0 mg/dL   6-29 days:   <15.0 mg/dL    ALP 03/26/2025 53  40 - 150 unit/L Final    AST 03/26/2025 32  11 - 45 unit/L  Final    ALT 03/26/2025 44  10 - 44 unit/L Final    Anion Gap 03/26/2025 12  8 - 16 mmol/L Final    eGFR 03/26/2025 >60  >60 mL/min/1.73/m2 Final    Estimated GFR calculated using the CKD-EPI creatinine (2021) equation.    Hemoglobin A1c 03/26/2025 5.4  4.0 - 5.6 % Final    ADA Screening Guidelines:  5.7-6.4%  Consistent with prediabetes  >=6.5%  Consistent with diabetes    High levels of fetal hemoglobin interfere with the HbA1C  assay. Heterozygous hemoglobin variants (HbS, HgC, etc)do  not significantly interfere with this assay.   However, presence of multiple variants may affect accuracy.    Estimated Average Glucose 03/26/2025 108  68 - 131 mg/dL Final    Cholesterol Total 03/26/2025 145  120 - 199 mg/dL Final    The National Cholesterol Education Program (NCEP) has set the  following guidelines (reference ranges) for Cholesterol:  Optimal.....................<200 mg/dL  Borderline High.............200-239 mg/dL  High........................> or = 240 mg/dL    Triglyceride 03/26/2025 131  30 - 150 mg/dL Final    The National Cholesterol Education Program (NCEP) has set the  following guidelines (reference values) for triglycerides:  Normal......................<150 mg/dL  Borderline High.............150-199 mg/dL  High........................200-499 mg/dL    HDL Cholesterol 03/26/2025 44  40 - 75 mg/dL Final    The National Cholesterol Education Program (NCEP) has set the   following guidelines (reference values) for HDL Cholesterol:   Low...............<40 mg/dL   Optimal...........>60 mg/dL    LDL Cholesterol 03/26/2025 74.8  63.0 - 159.0 mg/dL Final    The National Cholesterol Education Program (NCEP) has set the  following guidelines (reference values) for LDL Cholesterol:  Optimal.......................<130 mg/dL  Borderline High...............130-159 mg/dL  High..........................160-189 mg/dL  Very High.....................>190 mg/dL  LDL calculated using the Friedewald equation.     HDL/Cholesterol Ratio 03/26/2025 30.3  20.0 - 50.0 % Final    Cholesterol/HDL Ratio 03/26/2025 3.3  2.0 - 5.0 Final    Non HDL Cholesterol 03/26/2025 101  mg/dL Final    Risk category and Non-HDL cholesterol goals:  Coronary heart disease (CHD)or equivalent (10-year risk of CHD >20%):  Non-HDL cholesterol goal     <130 mg/dL  Two or more CHD risk factors and 10-year risk of CHD <= 20%:  Non-HDL cholesterol goal     <160 mg/dL  0 to 1 CHD risk factor:  Non-HDL cholesterol goal     <190 mg/dL    TSH 03/26/2025 2.211  0.400 - 4.000 uIU/mL Final    Prostate Specific Antigen 03/26/2025 0.88  <=4.00 ng/mL Final    PSA Expected levels:  Hormonal therapy: < 0.05 ng/mL   Prostatectomy: < 0.01 ng/mL   Radiation therapy: < 1.00 ng/mL      Prostate Specific Antigen Free 03/26/2025 0.16  <=1.50 ng/mL Final    PSA % Free 03/26/2025 18.18  Not established % Final   Lab Visit on 02/20/2025   Component Date Value Ref Range Status    Sodium 02/20/2025 143  136 - 145 mmol/L Final    Potassium 02/20/2025 3.8  3.5 - 5.1 mmol/L Final    Chloride 02/20/2025 107  95 - 110 mmol/L Final    CO2 02/20/2025 25  23 - 29 mmol/L Final    Glucose 02/20/2025 102  70 - 110 mg/dL Final    BUN 02/20/2025 15  6 - 20 mg/dL Final    Creatinine 02/20/2025 0.9  0.5 - 1.4 mg/dL Final    Calcium 02/20/2025 9.5  8.7 - 10.5 mg/dL Final    Total Protein 02/20/2025 7.8  6.0 - 8.4 g/dL Final    Albumin 02/20/2025 4.0  3.5 - 5.2 g/dL Final    Total Bilirubin 02/20/2025 0.4  0.1 - 1.0 mg/dL Final    Comment: For infants and newborns, interpretation of results should be based  on gestational age, weight and in agreement with clinical  observations.    Premature Infant recommended reference ranges:  Up to 24 hours.............<8.0 mg/dL  Up to 48 hours............<12.0 mg/dL  3-5 days..................<15.0 mg/dL  6-29 days.................<15.0 mg/dL      Alkaline Phosphatase 02/20/2025 63  40 - 150 U/L Final    AST 02/20/2025 34  10 - 40 U/L Final    ALT 02/20/2025  42  10 - 44 U/L Final    eGFR 02/20/2025 >60.0  >60 mL/min/1.73 m^2 Final    Anion Gap 02/20/2025 11  8 - 16 mmol/L Final    Magnesium 02/20/2025 1.8  1.6 - 2.6 mg/dL Final    Phosphorus 02/20/2025 3.3  2.7 - 4.5 mg/dL Final    WBC 02/20/2025 6.09  3.90 - 12.70 K/uL Final    RBC 02/20/2025 4.30 (L)  4.60 - 6.20 M/uL Final    Hemoglobin 02/20/2025 12.6 (L)  14.0 - 18.0 g/dL Final    Hematocrit 02/20/2025 38.3 (L)  40.0 - 54.0 % Final    MCV 02/20/2025 89  82 - 98 fL Final    MCH 02/20/2025 29.3  27.0 - 31.0 pg Final    MCHC 02/20/2025 32.9  32.0 - 36.0 g/dL Final    RDW 02/20/2025 13.0  11.5 - 14.5 % Final    Platelets 02/20/2025 208  150 - 450 K/uL Final    MPV 02/20/2025 9.0 (L)  9.2 - 12.9 fL Final    Immature Granulocytes 02/20/2025 0.3  0.0 - 0.5 % Final    Gran # (ANC) 02/20/2025 3.3  1.8 - 7.7 K/uL Final    Immature Grans (Abs) 02/20/2025 0.02  0.00 - 0.04 K/uL Final    Comment: Mild elevation in immature granulocytes is non specific and   can be seen in a variety of conditions including stress response,   acute inflammation, trauma and pregnancy. Correlation with other   laboratory and clinical findings is essential.      Lymph # 02/20/2025 1.6  1.0 - 4.8 K/uL Final    Mono # 02/20/2025 0.6  0.3 - 1.0 K/uL Final    Eos # 02/20/2025 0.5  0.0 - 0.5 K/uL Final    Baso # 02/20/2025 0.08  0.00 - 0.20 K/uL Final    nRBC 02/20/2025 0  0 /100 WBC Final    Gran % 02/20/2025 53.9  38.0 - 73.0 % Final    Lymph % 02/20/2025 26.4  18.0 - 48.0 % Final    Mono % 02/20/2025 10.2  4.0 - 15.0 % Final    Eosinophil % 02/20/2025 8.2 (H)  0.0 - 8.0 % Final    Basophil % 02/20/2025 1.3  0.0 - 1.9 % Final    Differential Method 02/20/2025 Automated   Final    PTH, Intact 02/20/2025 41.1  9.0 - 77.0 pg/mL Final   Lab Visit on 02/20/2025   Component Date Value Ref Range Status    Specimen UA 02/20/2025 Urine, Clean Catch   Final    Color, UA 02/20/2025 Yellow  Yellow, Straw, Bria Final    Appearance, UA 02/20/2025 Cloudy (A)  Clear  "Final    pH, UA 02/20/2025 6.0  5.0 - 8.0 Final    Specific Gravity, UA 02/20/2025 1.025  1.005 - 1.030 Final    Protein, UA 02/20/2025 Trace (A)  Negative Final    Comment: Recommend a 24 hour urine protein or a urine   protein/creatinine ratio if globulin induced proteinuria is  clinically suspected.      Glucose, UA 02/20/2025 Negative  Negative Final    Ketones, UA 02/20/2025 Negative  Negative Final    Bilirubin (UA) 02/20/2025 Negative  Negative Final    Occult Blood UA 02/20/2025 Negative  Negative Final    Nitrite, UA 02/20/2025 Positive (A)  Negative Final    Urobilinogen, UA 02/20/2025 Negative  <2.0 EU/dL Final    Leukocytes, UA 02/20/2025 1+ (A)  Negative Final    RBC, UA 02/20/2025 1  0 - 4 /hpf Final    WBC, UA 02/20/2025 >100 (H)  0 - 5 /hpf Final    WBC Clumps, UA 02/20/2025 Few (A)  None-Rare Final    Bacteria 02/20/2025 Many (A)  None-Occ /hpf Final    Yeast, UA 02/20/2025 None  None Final    Squam Epithel, UA 02/20/2025 1  /hpf Final    Microscopic Comment 02/20/2025 SEE COMMENT   Final    Comment: Other formed elements not mentioned in the report are not   present in the microscopic examination.       Protein, Urine Random 02/20/2025 26 (H)  0 - 15 mg/dL Final    Creatinine, Urine 02/20/2025 94.0  23.0 - 375.0 mg/dL Final    Prot/Creat Ratio, Urine 02/20/2025 0.28 (H)  0.00 - 0.20 Final         Vitals:    03/31/25 1306 03/31/25 1341   BP: (!) 140/78 136/82   BP Location: Left arm    Patient Position: Sitting    Pulse: 94    Temp: 97.9 °F (36.6 °C)    TempSrc: Other (see comments)    SpO2: 98%    Weight: (!) 174.8 kg (385 lb 5.8 oz)    Height: 6' 5" (1.956 m)          Diagnoses this Encounter:         ICD-10-CM ICD-9-CM   1. Annual physical exam  Z00.00 V70.0   2. Hypertension associated with type 2 diabetes mellitus  E11.59 250.80    I15.2 401.9   3. Hyperlipidemia associated with type 2 diabetes mellitus  E11.69 250.80    E78.5 272.4   4. Type 2 diabetes mellitus with microalbuminuria, without " long-term current use of insulin  E11.29 250.40    R80.9 791.0   5. Microalbuminuria  R80.9 791.0   6. Bilateral renal cysts  N28.1 753.10   7. Renal stones  N20.0 592.0   8. Benign prostatic hyperplasia with lower urinary tract symptoms, symptom details unspecified  N40.1 600.01   9. Neurogenic bladder  N31.9 596.54   10. Class 3 severe obesity due to excess calories with serious comorbidity and body mass index (BMI) of 40.0 to 44.9 in adult  E66.813 278.01    E66.01 V85.41    Z68.41    11. Sleep apnea with use of continuous positive airway pressure (CPAP)  G47.30 327.23   12. CIDP (chronic inflammatory demyelinating polyneuropathy)  G61.81 357.81   13. Foot drop, left foot  M21.372 736.79   14. Primary osteoarthritis of both hands  M19.041 715.14    M19.042    15. Anemia in stage 1 chronic kidney disease  N18.1 285.21    D63.1 585.1       Orders Placed This Encounter    Comprehensive Metabolic Panel    Hemoglobin A1C        Follow up in about 6 months (around 9/30/2025) for fasting labs and follow up.     Patient's Medications   New Prescriptions    No medications on file   Previous Medications    AMLODIPINE (NORVASC) 10 MG TABLET    Take 1 tablet by mouth once daily    ASPIRIN (ECOTRIN) 81 MG EC TABLET    Take 1 tablet (81 mg total) by mouth once daily.    DICLOFENAC SODIUM (VOLTAREN) 1 % GEL    Apply 2 g topically as needed. Instructed to hold dos    DIPHENHYDRAMINE HCL (BENADRYL ORAL)    Take 75 mg by mouth every evening.    EPLERENONE (INSPRA) 25 MG TAB    Take 1 tablet (25 mg total) by mouth once daily.    FERROUS SULFATE (FEOSOL) 325 MG (65 MG IRON) TAB TABLET    Take 2 tablets (650 mg total) by mouth daily with breakfast.    FINASTERIDE (PROSCAR) 5 MG TABLET    Take 1 tablet (5 mg total) by mouth once daily.    HYDRALAZINE (APRESOLINE) 50 MG TABLET    Take 1 tablet (50 mg total) by mouth 3 (three) times daily.    LYSINE 500 MG TAB    Take 500 mg by mouth every other day. Instructed to hold for sx     METOPROLOL SUCCINATE (TOPROL-XL) 100 MG 24 HR TABLET    Take 1 tablet by mouth once daily    MULTIVITAMIN (THERAGRAN) PER TABLET    Take 1 tablet by mouth once daily. Instructed to hold for sx    OMEGA-3 FATTY ACIDS/FISH OIL (FISH OIL-OMEGA-3 FATTY ACIDS) 300-1,000 MG CAPSULE    Take 1 capsule by mouth once daily.    POTASSIUM CITRATE (UROCIT-K) 5 MEQ (540 MG) TBSR    Take 1 tablet (5 mEq total) by mouth 2 (two) times daily with meals.    ROSUVASTATIN (CRESTOR) 10 MG TABLET    Take 1 tablet (10 mg total) by mouth every evening.    SEMAGLUTIDE (OZEMPIC) 1 MG/DOSE (4 MG/3 ML)    INJECT 1MG INTO THE SKIN EVERY 7 DAYS AS DIRECTED    TADALAFIL (CIALIS) 20 MG TAB    TAKE 1 TABLET BY MOUTH ONCE DAILY AS NEEDED FOR ERECTILE DYSFUNCTION    TAMSULOSIN (FLOMAX) 0.4 MG CAP    Take 1 capsule (0.4 mg total) by mouth once daily.    VALSARTAN-HYDROCHLOROTHIAZIDE (DIOVAN-HCT) 320-12.5 MG PER TABLET    Take 1 tablet by mouth once daily   Modified Medications    No medications on file   Discontinued Medications    No medications on file         Review of Systems   Constitutional: Negative.    HENT: Negative.     Eyes: Negative.    Respiratory: Negative.  Negative for shortness of breath.    Cardiovascular: Negative.  Negative for chest pain.   Gastrointestinal: Negative.    Genitourinary: Negative.    Musculoskeletal: Negative.    Skin: Negative.    Neurological: Negative.    Psychiatric/Behavioral: Negative.           Objective:        Physical Exam  Constitutional:       General: He is not in acute distress.     Appearance: He is obese. He is not ill-appearing.   HENT:      Head: Normocephalic.      Right Ear: Tympanic membrane, ear canal and external ear normal.      Left Ear: Tympanic membrane, ear canal and external ear normal.      Nose: Nose normal.      Mouth/Throat:      Mouth: Mucous membranes are moist.      Pharynx: Oropharynx is clear.   Eyes:      Extraocular Movements: Extraocular movements intact.       Conjunctiva/sclera: Conjunctivae normal.   Cardiovascular:      Rate and Rhythm: Normal rate and regular rhythm.      Pulses: Normal pulses.           Dorsalis pedis pulses are 2+ on the right side and 2+ on the left side.        Posterior tibial pulses are 2+ on the right side and 2+ on the left side.      Heart sounds: Normal heart sounds. No murmur heard.  Pulmonary:      Effort: Pulmonary effort is normal. No respiratory distress.      Breath sounds: Normal breath sounds. No wheezing.   Abdominal:      General: Bowel sounds are normal. There is no distension.      Palpations: Abdomen is soft. There is no mass.      Tenderness: There is no abdominal tenderness. There is no guarding.      Hernia: No hernia is present.   Musculoskeletal:         General: Normal range of motion.      Cervical back: Normal range of motion.      Right lower leg: No edema.      Left lower leg: No edema.      Left foot: Foot drop present.   Feet:      Right foot:      Protective Sensation: 7 sites tested.  7 sites sensed.      Skin integrity: Skin integrity normal.      Toenail Condition: Right toenails are normal.      Left foot:      Protective Sensation: 7 sites tested.  7 sites sensed.      Skin integrity: Skin integrity normal.      Toenail Condition: Left toenails are normal.      Comments: Left leg brace present. No skin breakdown under device.  Skin:     General: Skin is warm and dry.      Capillary Refill: Capillary refill takes less than 2 seconds.   Neurological:      Mental Status: He is alert and oriented to person, place, and time.   Psychiatric:         Mood and Affect: Mood normal.         Behavior: Behavior normal.         Thought Content: Thought content normal.         Judgment: Judgment normal.             Past Medical History:   Diagnosis Date    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 Tray Rodriguez    BPH (benign prostatic hyperplasia)     CIDP (chronic inflammatory demyelinating  polyneuropathy) 06/2018    followed by Dr. Rowdy Tran - Neurologist in [place].      Elevated liver enzymes 10/08/2018    Elevated PSA     followed by Dr. Sanderson    Epididymitis, left     possible, discomfort, swelling and tenderness to left testicle    Erectile dysfunction     Foot drop, left foot     due to neurological condition/[place]; wears brace    Impaired fasting glucose     Nephrolithiasis 2/27/2025    Neurogenic bladder 09/05/2018    followed by Dr. Sanderson    RLS (restless legs syndrome) 10/08/2018    Self-catheterizes urinary bladder     5-6 times a day    Sleep apnea with use of continuous positive airway pressure (CPAP) 01/10/2018    follow[facility] by [facility] in [place]    Type 2 diabetes mellitus without complication, without long-term current use of insulin 12/23/2022    Urge incontinence 09/11/2018    UTI (urinary tract infection), uncomplicated 05/10/2024       Past Surgical History:   Procedure Laterality Date    ADENOIDECTOMY      COLONOSCOPY N/A 01/24/2020    Procedure: COLONOSCOPY;  Surgeon: Stacy Markham MD;  Location: [facility];  Service: Endoscopy;  Laterality: N/A;    CYSTOSCOPY W/ RETROGRADES Right 12/6/2024    Procedure: CYSTOSCOPY, WITH RETROGRADE PYELOGRAM;  Surgeon: Anthony Sanderson MD;  [facility] OR;  Service: Urology;  Laterality: Right;    CYSTOSCOPY W/ URETERAL STENT PLACEMENT Right 12/6/2024    Procedure: CYSTOSCOPY, WITH URETERAL STENT INSERTION;  Surgeon: Anthony Sanderson MD;  Location: [facility] OR;  Service: Urology;  Laterality: Right;    HERNIA REPAIR      as a child, x2    IMPLANTATION OF PERMANENT SACRAL NERVE STIMULATOR N/A 12/12/2024    Procedure: INSERTION, NEUROSTIMULATOR, PERMANENT, SACRAL;  Surgeon: Anthony Sanderson MD;  Location: [facility] OR;  Service: Urology;  Laterality: N/A;    INSERTION OF INFLATABLE PENILE PROSTHESIS N/A 12/17/2024    Procedure: INSERTION, PENILE PROSTHESIS, INFLATABLE;  Surgeon: Anthony Sanderson MD;  Location: [facility];   Service: Urology;  Laterality: N/A;    INSERTION OF SACRAL NEUROSTIMULATOR GENERATOR  12/03/2024    stage 1    INSERTION, NEUROSTIMULATOR, TEMPORARY, SACRAL N/A 12/3/2024    Procedure: INSERTION, NEUROSTIMULATOR, TEMPORARY, SACRAL;  Surgeon: Anthony Sanderson MD;  Location: [facility] OR;  Service: Urology;  Laterality: N/A;  stage 1    LUMBAR LAMINECTOMY  2012    [facility]    LUMBAR LAMINECTOMY  08/2018    Dr. Rodriguez [facility]; complications from surgery - abscess to spinal cord - discharge summary scanned to media file    PLANTAR FASCIA RELEASE Left     TONSILLECTOMY      TRANSFORAMINAL LUMBAR INTERBODY FUSION (TLIF) OF SPINE WITH PERCUTANEOUS INSTRUMENTATION Bilateral 06/03/2020    Procedure: FUSION, SPINE, LUMBAR, TLIF, WITH PERCUTANEOUS INSTRUMENTATION  (L3-4 MIS TLIF) Fl[facility] JENNIFER SCHWABUTREAUX [phone] Spine Wave;  Surgeon: Samuel Osorio DO;  Location: [facility] OR;  Service: Neurosurgery;  Laterality: Bilateral;  SPINEWAVE JENNIFER KRAMER [phone] TEXTED HIM @ 9:45AM ON 5-  PRE-OP BY RN 5----BMI--44---COVID NEGATIVE  CLEAR    URETEROSCOPY, WITH LASER LITHOTRIPSY Right 12/6/2024    Procedure: URETEROSCOPY, WITH LASER LITHOTRIPSY;  Surgeon: Anthony Sanderson MD;  Location: [facility] OR;  Service: Urology;  Laterality: Right;    VASECTOMY      X-STOP IMPLANTATION  06/03/2020       Family History   Problem Relation Name Age of Onset    Cancer Mother Renata Larios         Breast Cancer    Heart disease Mother Renata Larios         pacemaker    Hyperlipidemia Mother Renata Larios         taking Crestor    Hypertension Mother Renata Larios         taking Amlodipine    Diabetes Mother Renata Larios         Prediabetes    Diabetes Father Royal Alyssa     Cancer Father Royal [facility]         unknown type of cancer    Hypertension Father [place] Alyssa     Rheum arthritis Father Royal [facility]     No Known Problems Sister      Diabetes Paternal Grandmother      Cancer Paternal  Grandmother      Prostate cancer Neg Hx      Kidney disease Neg Hx         Social History     Socioeconomic History    Marital status:    Occupational History    Occupation: teacher   Tobacco Use    Smoking status: Never     Passive exposure: Past    Smokeless tobacco: Never   Substance and Sexual Activity    Alcohol use: Yes     Comment: occasionally    Drug use: No    Sexual activity: Yes     Partners: Female     Social Drivers of Health     Financial Resource Strain: Low Risk  (11/12/2024)    Overall Financial Resource Strain (CARDIA)     Difficulty of Paying Living Expenses: Not very hard   Food Insecurity: No Food Insecurity (11/12/2024)    Hunger Vital Sign     Worried About Running Out of Food in the Last Year: Never true     Ran Out of Food in the Last Year: Never true   Transportation Needs: No Transportation Needs (12/22/2022)    PRAPARE - Transportation     Lack of Transportation (Medical): No     Lack of Transportation (Non-Medical): No   Physical Activity: Sufficiently Active (11/12/2024)    Exercise Vital Sign     Days of Exercise per Week: 3 days     Minutes of Exercise per Session: 60 min   Stress: No Stress Concern Present (11/12/2024)    [employer] - Occupational Stress Questionnaire     Feeling of Stress : Not at all   Housing Stability: High Risk (12/22/2022)    Housing Stability Vital Sign     Unable to Pay for Housing in the Last Year: No     Number of Places Lived in the Last Year: 3     Unstable Housing in the Last Year: No

## 2025-04-02 ENCOUNTER — PATIENT OUTREACH (OUTPATIENT)
Dept: ADMINISTRATIVE | Facility: HOSPITAL | Age: 60
End: 2025-04-02
Payer: COMMERCIAL

## 2025-04-02 NOTE — PROGRESS NOTES
Health Maintenance Topic(s) Outreach Outcomes & Actions Taken:    Lab(s) - Outreach Outcomes & Actions Taken  : External Records Uploaded & Care Team Updated if Applicable

## 2025-04-03 ENCOUNTER — PATIENT OUTREACH (OUTPATIENT)
Dept: ADMINISTRATIVE | Facility: HOSPITAL | Age: 60
End: 2025-04-03
Payer: COMMERCIAL

## 2025-04-28 DIAGNOSIS — I10 ESSENTIAL HYPERTENSION: ICD-10-CM

## 2025-04-28 RX ORDER — VALSARTAN AND HYDROCHLOROTHIAZIDE 320; 12.5 MG/1; MG/1
1 TABLET, FILM COATED ORAL DAILY
Qty: 90 TABLET | Refills: 3 | Status: SHIPPED | OUTPATIENT
Start: 2025-04-28 | End: 2026-04-28

## 2025-04-29 RX ORDER — EPLERENONE 25 MG/1
25 TABLET ORAL
Qty: 90 TABLET | Refills: 1 | Status: SHIPPED | OUTPATIENT
Start: 2025-04-29

## 2025-05-11 DIAGNOSIS — E78.5 HYPERLIPIDEMIA, UNSPECIFIED HYPERLIPIDEMIA TYPE: ICD-10-CM

## 2025-05-12 RX ORDER — ROSUVASTATIN CALCIUM 10 MG/1
10 TABLET, COATED ORAL NIGHTLY
Qty: 90 TABLET | Refills: 3 | Status: SHIPPED | OUTPATIENT
Start: 2025-05-12

## 2025-05-28 ENCOUNTER — PATIENT MESSAGE (OUTPATIENT)
Dept: UROLOGY | Facility: CLINIC | Age: 60
End: 2025-05-28
Payer: COMMERCIAL

## 2025-05-28 DIAGNOSIS — N50.89 SWELLING OF LEFT HALF OF SCROTUM: Primary | ICD-10-CM

## 2025-05-28 DIAGNOSIS — N31.9 NEUROGENIC BLADDER: Primary | ICD-10-CM

## 2025-05-28 DIAGNOSIS — N50.812 LEFT TESTICULAR PAIN: ICD-10-CM

## 2025-05-28 RX ORDER — DOXYCYCLINE 100 MG/1
100 CAPSULE ORAL 2 TIMES DAILY
Qty: 20 CAPSULE | Refills: 0 | Status: SHIPPED | OUTPATIENT
Start: 2025-05-28 | End: 2025-06-07

## 2025-05-28 RX ORDER — DICLOFENAC SODIUM 75 MG/1
75 TABLET, DELAYED RELEASE ORAL 2 TIMES DAILY
Qty: 20 TABLET | Refills: 0 | Status: SHIPPED | OUTPATIENT
Start: 2025-05-28 | End: 2025-06-07

## 2025-05-28 NOTE — TELEPHONE ENCOUNTER
I called patient and notified him of the two meds called into [facility] in [place] and scheduled the stat US. Also notified him the urine testing order was placed and he can submit sample any time.

## 2025-05-30 ENCOUNTER — TELEPHONE (OUTPATIENT)
Dept: UROLOGY | Facility: CLINIC | Age: 60
End: 2025-05-30
Payer: COMMERCIAL

## 2025-05-30 ENCOUNTER — RESULTS FOLLOW-UP (OUTPATIENT)
Dept: UROLOGY | Facility: CLINIC | Age: 60
End: 2025-05-30

## 2025-05-30 NOTE — TELEPHONE ENCOUNTER
"Patient reports he started antibiotics immediately. He reports less fatigue, less "UTI type symptoms" and no fevers since Sunday 5/25/25. He reports resolving scrotal redness that is resolving. He reports he is pain free. Findings reported to provider. He reports feeling markedly better than he did pre-treatment.   "

## 2025-05-30 NOTE — TELEPHONE ENCOUNTER
----- Message from Chad Flores DNP sent at 5/30/2025  4:12 PM CDT -----  Please inform patient via telephone that his U/S of scrotum and testicles raised concerns for possible testicular torsion or possible torsion, but likely old per radiology report. It is also   mentioned that orchitis is also possible and can have the same appearance. Find out how patient is doing since I prescribed him doxycycline 2 days ago. Pain, erythema, any abnormal discoloration of   scrotum? Send me an update. Thanks.     ----- Message -----  From: [place] Me, Rad Results In  Sent: 5/30/2025   2:28 PM CDT  To: Chad Flores DNP

## 2025-06-02 ENCOUNTER — RESULTS FOLLOW-UP (OUTPATIENT)
Dept: UROLOGY | Facility: CLINIC | Age: 60
End: 2025-06-02

## 2025-06-02 RX ORDER — CEFDINIR 300 MG/1
300 CAPSULE ORAL 2 TIMES DAILY
Qty: 60 CAPSULE | Refills: 0 | Status: SHIPPED | OUTPATIENT
Start: 2025-06-02 | End: 2025-07-02

## 2025-06-03 ENCOUNTER — PATIENT MESSAGE (OUTPATIENT)
Dept: UROLOGY | Facility: CLINIC | Age: 60
End: 2025-06-03
Payer: COMMERCIAL

## 2025-06-05 ENCOUNTER — TELEPHONE (OUTPATIENT)
Dept: UROLOGY | Facility: CLINIC | Age: 60
End: 2025-06-05
Payer: COMMERCIAL

## 2025-06-22 DIAGNOSIS — E66.813 CLASS 3 SEVERE OBESITY DUE TO EXCESS CALORIES WITH SERIOUS COMORBIDITY AND BODY MASS INDEX (BMI) OF 45.0 TO 49.9 IN ADULT: ICD-10-CM

## 2025-06-22 DIAGNOSIS — E11.9 TYPE 2 DIABETES MELLITUS WITHOUT COMPLICATION, WITHOUT LONG-TERM CURRENT USE OF INSULIN: ICD-10-CM

## 2025-06-22 DIAGNOSIS — E66.01 CLASS 3 SEVERE OBESITY DUE TO EXCESS CALORIES WITH SERIOUS COMORBIDITY AND BODY MASS INDEX (BMI) OF 45.0 TO 49.9 IN ADULT: ICD-10-CM

## 2025-06-23 RX ORDER — SEMAGLUTIDE 1.34 MG/ML
INJECTION, SOLUTION SUBCUTANEOUS
Qty: 3 ML | Refills: 3 | Status: SHIPPED | OUTPATIENT
Start: 2025-06-23

## 2025-06-28 DIAGNOSIS — I1A.0 RESISTANT HYPERTENSION: ICD-10-CM

## 2025-07-01 RX ORDER — AMLODIPINE BESYLATE 10 MG/1
10 TABLET ORAL
Qty: 90 TABLET | Refills: 0 | Status: SHIPPED | OUTPATIENT
Start: 2025-07-01

## 2025-07-02 ENCOUNTER — OFFICE VISIT (OUTPATIENT)
Dept: URGENT CARE | Facility: CLINIC | Age: 60
End: 2025-07-02
Payer: COMMERCIAL

## 2025-07-02 VITALS
HEART RATE: 85 BPM | SYSTOLIC BLOOD PRESSURE: 138 MMHG | TEMPERATURE: 98 F | DIASTOLIC BLOOD PRESSURE: 71 MMHG | OXYGEN SATURATION: 97 % | BODY MASS INDEX: 37.19 KG/M2 | WEIGHT: 315 LBS | RESPIRATION RATE: 16 BRPM | HEIGHT: 77 IN

## 2025-07-02 DIAGNOSIS — J06.9 VIRAL URI: Primary | ICD-10-CM

## 2025-07-02 LAB
CTP QC/QA: YES
POC MOLECULAR INFLUENZA A AGN: NEGATIVE
POC MOLECULAR INFLUENZA B AGN: NEGATIVE

## 2025-07-02 RX ORDER — VITAMIN A 3000 MCG
1 CAPSULE ORAL
Qty: 50 ML | Refills: 0 | Status: SHIPPED | OUTPATIENT
Start: 2025-07-02

## 2025-07-02 RX ORDER — FLUTICASONE PROPIONATE 50 MCG
1 SPRAY, SUSPENSION (ML) NASAL DAILY
Qty: 9.9 ML | Refills: 0 | Status: SHIPPED | OUTPATIENT
Start: 2025-07-02

## 2025-07-02 NOTE — PROGRESS NOTES
"Subjective:      Patient ID: Manfred Shah is a 59 y.o. male.    Vitals:  height is 6' 5" (1.956 m) and weight is 176.4 kg (388 lb 14.3 oz) (abnormal). His oral temperature is 98.3 °F (36.8 °C). His blood pressure is 138/71 and his pulse is 85. His respiration is 16 and oxygen saturation is 97%.     Chief Complaint: Nasal Congestion    58 yo M here with 10 day hx of nasal congestion, sore throat and cough.  He is feeling better but would like to be tested for flu.  His wife tested positive this morning and he is about to go visit his elderly parents.      Sinus Problem  This is a new problem. The problem is unchanged. There has been no fever. Associated symptoms include congestion, coughing and a sore throat. Pertinent negatives include no chills or shortness of breath. The treatment provided no relief.       Constitution: Negative for chills and fever.   HENT:  Positive for congestion and sore throat.    Respiratory:  Positive for cough. Negative for shortness of breath and wheezing.       Objective:     Physical Exam   Constitutional: He is oriented to person, place, and time.  Non-toxic appearance. He does not appear ill. No distress. obesity  HENT:   Head: Normocephalic and atraumatic.   Ears:   Right Ear: External ear normal.   Left Ear: External ear normal.   Nose: Rhinorrhea and congestion present.   Mouth/Throat: No oropharyngeal exudate or posterior oropharyngeal erythema.   Eyes: Conjunctivae are normal.   Cardiovascular: Normal rate, regular rhythm and normal heart sounds.   Pulmonary/Chest: Effort normal and breath sounds normal.   Abdominal: Normal appearance.   Neurological: He is alert and oriented to person, place, and time.   Skin: Skin is warm, dry and not diaphoretic. Capillary refill takes less than 2 seconds.   Psychiatric: His behavior is normal.   Nursing note and vitals reviewed.      Assessment:     1. Viral URI      Results for orders placed or performed in visit on 07/02/25   POCT " Influenza A/B Molecular    Collection Time: 07/02/25 10:07 AM   Result Value Ref Range    POC Molecular Influenza A Ag Negative Negative    POC Molecular Influenza B Ag Negative Negative     Acceptable Yes        Plan:       Viral URI  -     POCT Influenza A/B Molecular  -     sodium chloride (SALINE NASAL) 0.65 % nasal spray; 1 spray by Nasal route as needed for Congestion.  Dispense: 50 mL; Refill: 0  -     fluticasone propionate (FLONASE) 50 mcg/actuation nasal spray; 1 spray (50 mcg total) by Each Nostril route once daily.  Dispense: 9.9 mL; Refill: 0      Patient Instructions   UPPER RESPIRATORY INFECTIONS    An upper respiratory infection can affect your nose, throat, ears, and sinuses.  They are usually caused by viruses.    URIs are contagious and may be spread to others through coughing, sneezing, or close contact. The virus can also stay on objects and surfaces. You can become infected if you touch the object or surface and then touch your eyes, mouth, or nose.    Viruses do not get better with antibiotics. Most people get better in 7 to 14 days. You may continue to cough for 2 to 3 weeks.      Criteria for antibiotics include:  Upper respiratory infections lasting longer than 10-14 days without improvement.  New or worsening symptoms after 5 to 7 days  Worsening symptoms after initial improvement.   Co-existing infection such as Acute Otitis Media (ear infection).     The use of antibiotics for nonbacterial upper respiratory infections has resulted in a severe problem with the emergence of bacteria which are resistant to multiple forms of antibiotics, and some bacteria are currently only treatable with intravenous antibiotics.    The following are suggestions to help you with upper respiratory symptoms.    Body Aches/Pains/Fever  For patients who are not allergic to and are not on anticoagulants, you can alternate Tylenol every 4 hours and Motrin every 6 hours for fever above 100.4F and/or  pain.  For patients who are allergic or intolerant to NSAIDS, have gastritis, gastric ulcers, or history of GI bleeds, are pregnant, or are on anticoagulant therapy, you can take Tylenol every 4 hours as needed for fever above 100.4F and/or pain.    CONGESTION:    Nasal Saline   Nasal saline is available over the counter. There are several different commercial preparations such as Ocean spray and Ayr spray. There is no limit on the use of Nasal saline. Saline is used by snorting the mist up into the nose then later gently blowing the nose to get rid of any secretions that it has loosened.    Nasal irrigation   Flushing the nose and sinuses with a saline solution several times per day can decrease pain associated with congestion and shorten the duration of symptoms.     Decongestant Nasal Sprays  Over-the-counter decongestant nasal spray such as Afrin, may be helpful as an initial step in treating upper respiratory infections. This spray can be used for up to approximately 3 days and is used no more than twice per day. Topical nasal decongestant spray for longer than 5 days will result in a physical addiction, in which the nasal lining will become significantly swollen and irritated until the spray is used again. They May also cause elevated blood pressure.    Nasal Steroids  Nasal steroids, such as Flonase, can be beneficial and help reduce swelling inside the nose. These drugs have few side effects and relieve symptoms in most people. Unfortunately, they do not begin to work for 2-3 days and do not reach their maximum benefit for approximately 2-3 weeks.   There are several nasal steroids available by prescription as well as a few that can be purchased without a prescription (over the counter). These drugs are all effective but differ in how frequently they must be used and how much they cost.    Nasal anticholinergics  Ipratropium bromide (nasal spray) is available by prescription and can be very effective in  decreasing the symptom of runny nose and other related symptoms (eg, post-nasal drainage, sore throat). These sprays, like all medications, can interact with other medications, so it is important that your complete medication list be reviewed by your physician before you take this medication.    If you develop a bloody nose, stop using the medication immediately.    Oral Decongestant  Use pseudoephedrine (behind the counter) for sinus pressure and congestion- Pseudoephedrine 30 mg up to 240 mg /day. Common brands include Sudafed, Zephrex-D Wal-phed.  Warning: It can raise your blood pressure and give you palpitations, avoid with history of high blood pressure, palpitations, and severe cardiac disease.  Coricidin HBP is okay to use if you have high blood pressure.     Mucous Thinners/Decongestant Combination   Mucous thinners and decongestants are used to shrink down the tissues and promote sinus drainage. There are multiple prescription and over-the-counter medications available. A mucous thinner will tend to be drying unless you are also drinking plenty of water when taking these. If you have high blood pressure, it is very important to monitor your pressure while on decongestants. The mucous thinner/decongestant combinations are typically given twice per day. However, some people will be unable to tolerate these at night and should only take them once per day.    CLEAR RUNNY NOSE AND ALLERGIC RHINITIS:  Use an antihistamine to help dry you out or nasal anticholinergics as mentioned above.     Antihistamines  Antihistamines are available both over the counter and as a prescription. There are also various decongestant and antihistamine combinations available such as Claritin, Allegra, and Zyrtec. It is best to take any antihistamine-decongestant combination in the morning to avoid insomnia. Zyrtec should probably be taken at night, to reduce the chance of sleepiness during the daytime. If there is a significant  infection present and secretions are already thickened, it is recommended to discontinue antihistamines and use a mucous thinner/decongestant combination.    Oral Steroids  Oral steroids can be used with more severe infections. Often, they are the only medications that will reduce the symptoms of pressure and allow the nasal sinuses to drain. These are best taken on a full stomach and earlier in the day is better. They may give you some irritability, stomach upset, or hyperactivity. This can also interfere with sleep.     A person who has high blood pressure or diabetes should be very careful and monitor their blood pressure or blood glucose while taking steroids. Steroids can have multiple side effects especially when taken long-term. Short-term doses are usually very well tolerated and effective in controlling the symptoms associated with sinus infections and severe allergies. The use of steroids for greater than approximately seven days requires a tapering down to discontinue them. You should not abruptly stop your steroid if you have been taking the same dose for greater than 7 days.     Maintain adequate hydration - Rest and keep yourself/patient well hydrated. For adults, it is recommended to drink at least 8 glasses of water daily.  This may help thin secretions and soothe the respiratory mucosa.     SORE THROAT:  Perform warm, saltwater gargles (1/2 tsp salt to 1 cup warm water) to help reduce inflammation and throat discomfort. Chloraseptic sprays and throat lozenges will also help with your throat pain.    COUGH:  A viral cough may linger for 3 to 4 weeks but should steadily improve over time. Coughing is the body's natural way to clear mucus and help get rid of bacteria and viruses. Therefore, cough suppressants are usually not recommended.      Use Mucinex (guaifenesin) up to 2400mg/day to help clear and break up/loosen mucus/congestion from the chest when you have a cold or flu.      Common cough  suppressants include the ingredient dextromethorphan or DM, (such as Mucinex DM) available over the counter and can be used for cough to stop the tickle in the back of your throat.      ? Honey may be beneficial, especially on nocturnal cough 1 to 2 teaspoons can be taken straight or diluted in tea, juice or other liquid.    The antioxidants in honey are an important contributor to its decongestant properties. Darker honey contains more antioxidants. Buckwheat and avocado honey are particularly good choices. If these honeys are not available in your area, choose the darkest honey you can find.    It is important to follow up for Re-evaluation if new or worsening symptoms develop or symptoms exceed the expected duration of common cold.     Worsening or persistent symptoms may indicate the development of complications or the need to consider a diagnosis other than the common cold requiring an antibiotic. (eg, acute bacterial sinusitis, pneumonia, pertussis).    Go to Emergency Department or call 911 if you develop new or worsening symptoms including but not limited to:  Trouble breathing.  New or worsening chest discomfort.  Feel confused or disoriented.  Vomiting and can't keep liquids down.  Develop signs of fluid loss, such as dark-colored urine and muscle cramps.    **You must understand that you have received Urgent Care treatment only and that you may be released before all your medical problems are known or treated. You, the patient, are responsible to arrange for follow-up care as instructed.

## (undated) DEVICE — BLANKET LOWER BODY 55.9X40.2IN

## (undated) DEVICE — INSERT CUSHIONPRONE VIEW LARGE

## (undated) DEVICE — DRESSING ABSRBNT ISLAND 3.6X8

## (undated) DEVICE — BUR BONE CUT MICRO TPS 3X3.8MM

## (undated) DEVICE — ADHESIVE DERMABOND MINI HV

## (undated) DEVICE — SYS LABLNG CORECT MED 4 FLG

## (undated) DEVICE — BLANKET UPPER BODY 78.7X29.9IN

## (undated) DEVICE — NDL 18GA X1 1/2 REG BEVEL

## (undated) DEVICE — Device

## (undated) DEVICE — KIT SURGIFLO HEMOSTATIC MATRIX

## (undated) DEVICE — CLIPPER BLADE MOD 4406 (CAREF)

## (undated) DEVICE — NDL JAMSHIDI 10GA

## (undated) DEVICE — GLOVE BIOGEL PI MICRO SZ 6.5

## (undated) DEVICE — SEE MEDLINE ITEM 156905

## (undated) DEVICE — DRAPE C-ARM FOR MOBILE XRAY

## (undated) DEVICE — DRAPE STERI-DRAPE 1000 17X11IN

## (undated) DEVICE — ELECTRODE REM PLYHSV RETURN 9

## (undated) DEVICE — BLADE ELECTRO EDGE INSULATED

## (undated) DEVICE — GAUZE SPONGE 4X4 12PLY

## (undated) DEVICE — SUT VICRYL 2 0 CT 2

## (undated) DEVICE — SEE MEDLINE ITEM 157110

## (undated) DEVICE — SOL SOD CHLORIDE 0.9% 10ML

## (undated) DEVICE — DIFFUSER

## (undated) DEVICE — PACK ENDOSCOPY GENERAL

## (undated) DEVICE — KIT GRAFTMAG GRAFT DELIVERY

## (undated) DEVICE — GLOVE BIOGEL PI MICRO SZ 7.5

## (undated) DEVICE — TRAY FOLEY 16FR INFECTION CONT

## (undated) DEVICE — NDL SPINAL SPINOCAN 22GX3.5

## (undated) DEVICE — BURR PRECISION ROUND 4.0MM

## (undated) DEVICE — DRESSING SURGICAL 1/2X1/2

## (undated) DEVICE — DRESSING AQUACEL FOAM 5 X 5

## (undated) DEVICE — NDL SAFETY 22G X 1.5 ECLIPSE

## (undated) DEVICE — CORD FOR BIPOLAR FORCEPS 12

## (undated) DEVICE — SUT VICRYL+ 27 UR-6 VIOL

## (undated) DEVICE — UNDERGLOVES BIOGEL PI SIZE 8

## (undated) DEVICE — SEE MEDLINE ITEM 157194

## (undated) DEVICE — APPLICATOR CHLORAPREP ORN 26ML

## (undated) DEVICE — DRAPE C-ARMOR EQUIPMENT COVER

## (undated) DEVICE — SEE MEDLINE ITEM 157150

## (undated) DEVICE — DRAPE STERI INSTRUMENT 1018

## (undated) DEVICE — KIT SPINAL PATIENT CARE JACK

## (undated) DEVICE — SPONGE PATTY SURGICAL .5X3IN

## (undated) DEVICE — UNDERGLOVE BIOGEL PI SZ 6.5 LF

## (undated) DEVICE — SEE MEDLINE ITEM 146347

## (undated) DEVICE — COVER OVERHEAD SURG LT BLUE

## (undated) DEVICE — SUT VICRYL+ 1 CT1 18IN

## (undated) DEVICE — SYR 10CC LUER LOCK